# Patient Record
Sex: MALE | Race: WHITE | Employment: OTHER | URBAN - METROPOLITAN AREA
[De-identification: names, ages, dates, MRNs, and addresses within clinical notes are randomized per-mention and may not be internally consistent; named-entity substitution may affect disease eponyms.]

---

## 2022-04-26 RX ORDER — LEVETIRACETAM 500 MG/1
500 TABLET ORAL NIGHTLY
Status: ON HOLD | COMMUNITY
End: 2022-05-25 | Stop reason: HOSPADM

## 2022-04-26 RX ORDER — VIT B COMP NO.3/FOLIC/C/BIOTIN 1 MG-60 MG
1 TABLET ORAL
COMMUNITY

## 2022-04-26 RX ORDER — ROSUVASTATIN CALCIUM 5 MG/1
5 TABLET, COATED ORAL DAILY
COMMUNITY

## 2022-04-26 RX ORDER — INSULIN GLARGINE 100 [IU]/ML
40 INJECTION, SOLUTION SUBCUTANEOUS NIGHTLY
Status: ON HOLD | COMMUNITY
End: 2022-05-25 | Stop reason: HOSPADM

## 2022-04-26 RX ORDER — ACETAMINOPHEN 325 MG/1
650 TABLET ORAL EVERY 6 HOURS PRN
COMMUNITY

## 2022-04-26 RX ORDER — ONDANSETRON 4 MG/1
4 TABLET, ORALLY DISINTEGRATING ORAL EVERY 8 HOURS PRN
Status: ON HOLD | COMMUNITY
End: 2022-05-25 | Stop reason: HOSPADM

## 2022-04-26 RX ORDER — HYDROMORPHONE HYDROCHLORIDE 2 MG/1
2 TABLET ORAL EVERY 8 HOURS PRN
Status: ON HOLD | COMMUNITY
End: 2022-05-25 | Stop reason: HOSPADM

## 2022-04-26 RX ORDER — ASPIRIN 81 MG/1
81 TABLET ORAL DAILY
Status: ON HOLD | COMMUNITY
End: 2022-05-25 | Stop reason: HOSPADM

## 2022-04-26 RX ORDER — POLYETHYLENE GLYCOL 3350 17 G/17G
17 POWDER, FOR SOLUTION ORAL DAILY
Status: ON HOLD | COMMUNITY
End: 2022-05-25 | Stop reason: HOSPADM

## 2022-04-26 RX ORDER — CHLORHEXIDINE GLUCONATE 0.12 MG/ML
15 RINSE ORAL 2 TIMES DAILY
COMMUNITY

## 2022-04-26 RX ORDER — DOCUSATE SODIUM 100 MG/1
100 CAPSULE, LIQUID FILLED ORAL 2 TIMES DAILY
COMMUNITY

## 2022-04-26 RX ORDER — HYDRALAZINE HYDROCHLORIDE 25 MG/1
100 TABLET, FILM COATED ORAL 3 TIMES DAILY
Status: ON HOLD | COMMUNITY
End: 2022-05-25 | Stop reason: HOSPADM

## 2022-04-26 RX ORDER — MEMANTINE HYDROCHLORIDE 5 MG/1
5 TABLET ORAL 2 TIMES DAILY
Status: ON HOLD | COMMUNITY
End: 2022-05-25 | Stop reason: HOSPADM

## 2022-04-26 RX ORDER — OMEPRAZOLE 20 MG/1
40 CAPSULE, DELAYED RELEASE ORAL 2 TIMES DAILY
Status: ON HOLD | COMMUNITY
End: 2022-05-25 | Stop reason: HOSPADM

## 2022-04-26 RX ORDER — DILTIAZEM HYDROCHLORIDE 60 MG/1
60 TABLET, FILM COATED ORAL 3 TIMES DAILY
Status: ON HOLD | COMMUNITY
End: 2022-05-25 | Stop reason: HOSPADM

## 2022-04-26 RX ORDER — CARVEDILOL 3.12 MG/1
3.12 TABLET ORAL 2 TIMES DAILY WITH MEALS
Status: ON HOLD | COMMUNITY
End: 2022-05-25 | Stop reason: HOSPADM

## 2022-04-26 RX ORDER — ONDANSETRON 2 MG/ML
4 INJECTION INTRAMUSCULAR; INTRAVENOUS EVERY 8 HOURS PRN
Status: ON HOLD | COMMUNITY
End: 2022-05-25 | Stop reason: HOSPADM

## 2022-04-26 RX ORDER — CLONAZEPAM 0.5 MG/1
0.5 TABLET ORAL 2 TIMES DAILY PRN
COMMUNITY

## 2022-04-26 RX ORDER — DRONABINOL 2.5 MG/1
2.5 CAPSULE ORAL
Status: ON HOLD | COMMUNITY
End: 2022-05-25 | Stop reason: HOSPADM

## 2022-04-26 RX ORDER — ARIPIPRAZOLE 5 MG/1
5 TABLET ORAL DAILY
Status: ON HOLD | COMMUNITY
End: 2022-05-25 | Stop reason: HOSPADM

## 2022-04-26 RX ORDER — DEXTROSE MONOHYDRATE 25 G/50ML
25 INJECTION, SOLUTION INTRAVENOUS PRN
Status: ON HOLD | COMMUNITY
End: 2022-05-25 | Stop reason: HOSPADM

## 2022-04-26 RX ORDER — NICOTINE POLACRILEX 4 MG
15 LOZENGE BUCCAL SEE ADMIN INSTRUCTIONS
Status: ON HOLD | COMMUNITY
End: 2022-05-25 | Stop reason: HOSPADM

## 2022-04-26 RX ORDER — LEVETIRACETAM 250 MG/1
250 TABLET ORAL DAILY
Status: ON HOLD | COMMUNITY
End: 2022-05-25 | Stop reason: HOSPADM

## 2022-04-26 RX ORDER — CLONIDINE HYDROCHLORIDE 0.1 MG/1
0.1 TABLET ORAL 2 TIMES DAILY
Status: ON HOLD | COMMUNITY
End: 2022-05-25 | Stop reason: HOSPADM

## 2022-04-26 RX ORDER — OXYCODONE HCL 10 MG/1
10 TABLET, FILM COATED, EXTENDED RELEASE ORAL EVERY 12 HOURS
Status: ON HOLD | COMMUNITY
End: 2022-05-25 | Stop reason: HOSPADM

## 2022-04-26 RX ORDER — LEVOTHYROXINE SODIUM 175 UG/1
175 TABLET ORAL DAILY
COMMUNITY

## 2022-04-26 RX ORDER — LEVETIRACETAM 500 MG/1
500 TABLET ORAL
Status: ON HOLD | COMMUNITY
End: 2022-05-25 | Stop reason: HOSPADM

## 2022-04-26 RX ORDER — ANTICOAGULANT SODIUM CITRATE SOLUTION 4 G/100ML
SOLUTION INTRAVENOUS CONTINUOUS
Status: ON HOLD | COMMUNITY
End: 2022-05-25 | Stop reason: HOSPADM

## 2022-04-26 NOTE — PROGRESS NOTES
Patient is currently at [facility].  PAT telephone assessment completed with USHA Ag at [facility] and from the medical record. He is alert to self with periods of confusion. He is not ambulatory. He is a full code. His wife Luis Arreguin is next of kin ([phone]). Per Wali Collins she will be available by telephone for consent on day of procedure. This RN left a voicemail for her to confirm. Patient has a small, nearly healed wound on the dorsum of his right foot covered with a dry sterile dressing. He has a sacral decubitus with a wound vac. The wound vac will be clamped and the machine will not be coming with the patient on the day of procedure. The patient is not in isolation. He is on 2L oxygen prn via nasal cannula. He has a PICC line in his left arm. He has a peripheral IV in his left arm also. He is a Monday, Wednesday, Friday dialysis patient with a right chest tesio catheter. He is unable to use a call light. Transport will be by [facility] ambulance as arranged by Wali Collins. Instructions faxed to [facility].

## 2022-04-26 NOTE — PROGRESS NOTES
Have you been tested for COVID  Yes           Have you been told you were positive for COVID No  Have you had any known exposure to someone that is positive for COVID No  Do you have a cough                   No              Do you have shortness of breath No                 Do you have a sore throat            No                Are you having chills                    No                Are you having muscle aches. No                    Please come to the hospital wearing a mask and have your significant other wear a mask as well. Both of you should check your temperature before leaving to come here,  if it is 100 or higher please call [phone] for instruction. [facility] PRE-ADMISSION TESTING INSTRUCTIONS    The Preadmission Testing patient is instructed accordingly using the following criteria (check applicable):        GENERAL INSTRUCTIONS:    [x] Nothing by mouth after midnight, including gum, candy, mints or water    [x] You may brush your teeth, but do not swallow any water    [x] Take medications as instructed with 1-2 oz of water    [x] Stop herbal supplements and vitamins 5 days prior to procedure    [x] Follow preop dosing of blood thinners per physician instructions    [x] Take 1/2 dose of evening insulin, but no insulin after midnight    [x] No oral diabetic medications after midnight    [x] If diabetic and have low blood sugar or feel symptomatic, take 1-2oz apple juice only    [] Bring inhalers day of surgery    [] Bring C-PAP/ Bi-Pap day of surgery    [] Bring urine specimen day of surgery    [x] Shower or bath with soap, lather and rinse well, AM of Surgery, no lotion, powders or creams to surgical site    [] Follow bowel prep as instructed per surgeon    [x] No tobacco products within 24 hours of surgery     [x] No alcohol or illegal drug use within 24 hours of surgery.     [x] Jewelry, body piercing's, eyeglasses, contact lenses and dentures are not permitted into surgery (bring cases)      [x] Please do not wear any nail polish, make up or hair products on the day of surgery    [x] You can expect a call the business day prior to procedure to notify you if your arrival time changes    [] Parent/guardian of a minor must accompany their child and remain on the premises  the entire time they are under our care     [] Pediatric patients may bring favorite toy, blanket or comfort item with them    [x] A caregiver or family member must remain with the patient during their stay if they are mentally handicapped, have dementia, disoriented or unable to use a call light or would be a safety concern if left unattended    [x] Please notify surgeon if you develop any illness between now and time of surgery (cold, cough, sore throat, fever, nausea, vomiting) or any signs of infections  including skin, wounds, and dental.    [] Other instructions

## 2022-04-27 ENCOUNTER — ANESTHESIA (OUTPATIENT)
Dept: ENDOSCOPY | Age: 67
End: 2022-04-27
Payer: COMMERCIAL

## 2022-04-27 ENCOUNTER — HOSPITAL ENCOUNTER (OUTPATIENT)
Age: 67
Setting detail: OUTPATIENT SURGERY
Discharge: NURSING, REHAB OR LONG-TERM CARE | End: 2022-04-27
Attending: SURGERY | Admitting: SURGERY
Payer: COMMERCIAL

## 2022-04-27 ENCOUNTER — ANESTHESIA EVENT (OUTPATIENT)
Dept: ENDOSCOPY | Age: 67
End: 2022-04-27
Payer: COMMERCIAL

## 2022-04-27 ENCOUNTER — APPOINTMENT (OUTPATIENT)
Dept: GENERAL RADIOLOGY | Age: 67
End: 2022-04-27
Attending: SURGERY
Payer: COMMERCIAL

## 2022-04-27 VITALS
RESPIRATION RATE: 18 BRPM | WEIGHT: 212 LBS | OXYGEN SATURATION: 97 % | DIASTOLIC BLOOD PRESSURE: 69 MMHG | TEMPERATURE: 96.8 F | HEART RATE: 83 BPM | SYSTOLIC BLOOD PRESSURE: 131 MMHG | HEIGHT: 77 IN | BODY MASS INDEX: 25.03 KG/M2

## 2022-04-27 VITALS
SYSTOLIC BLOOD PRESSURE: 99 MMHG | DIASTOLIC BLOOD PRESSURE: 54 MMHG | OXYGEN SATURATION: 87 % | RESPIRATION RATE: 35 BRPM

## 2022-04-27 LAB — METER GLUCOSE: 181 MG/DL (ref 74–99)

## 2022-04-27 PROCEDURE — 2500000003 HC RX 250 WO HCPCS: Performed by: NURSE ANESTHETIST, CERTIFIED REGISTERED

## 2022-04-27 PROCEDURE — 71045 X-RAY EXAM CHEST 1 VIEW: CPT

## 2022-04-27 PROCEDURE — 7100000011 HC PHASE II RECOVERY - ADDTL 15 MIN: Performed by: SURGERY

## 2022-04-27 PROCEDURE — 2580000003 HC RX 258: Performed by: SURGERY

## 2022-04-27 PROCEDURE — 88305 TISSUE EXAM BY PATHOLOGIST: CPT

## 2022-04-27 PROCEDURE — 88305 TISSUE EXAM BY PATHOLOGIST: CPT | Performed by: ANESTHESIOLOGY

## 2022-04-27 PROCEDURE — 3700000000 HC ANESTHESIA ATTENDED CARE: Performed by: SURGERY

## 2022-04-27 PROCEDURE — 2709999900 HC NON-CHARGEABLE SUPPLY: Performed by: SURGERY

## 2022-04-27 PROCEDURE — 3700000001 HC ADD 15 MINUTES (ANESTHESIA): Performed by: SURGERY

## 2022-04-27 PROCEDURE — 6360000002 HC RX W HCPCS: Performed by: NURSE ANESTHETIST, CERTIFIED REGISTERED

## 2022-04-27 PROCEDURE — 7100000010 HC PHASE II RECOVERY - FIRST 15 MIN: Performed by: SURGERY

## 2022-04-27 PROCEDURE — 3609012400 HC EGD TRANSORAL BIOPSY SINGLE/MULTIPLE: Performed by: SURGERY

## 2022-04-27 PROCEDURE — 82962 GLUCOSE BLOOD TEST: CPT

## 2022-04-27 RX ORDER — SODIUM CHLORIDE 9 MG/ML
INJECTION, SOLUTION INTRAVENOUS CONTINUOUS
Status: DISCONTINUED | OUTPATIENT
Start: 2022-04-27 | End: 2022-04-27 | Stop reason: HOSPADM

## 2022-04-27 RX ORDER — PROPOFOL 10 MG/ML
INJECTION, EMULSION INTRAVENOUS PRN
Status: DISCONTINUED | OUTPATIENT
Start: 2022-04-27 | End: 2022-04-27 | Stop reason: SDUPTHER

## 2022-04-27 RX ORDER — LIDOCAINE HYDROCHLORIDE 20 MG/ML
INJECTION, SOLUTION INFILTRATION; PERINEURAL PRN
Status: DISCONTINUED | OUTPATIENT
Start: 2022-04-27 | End: 2022-04-27 | Stop reason: SDUPTHER

## 2022-04-27 RX ORDER — SODIUM CHLORIDE 0.9 % (FLUSH) 0.9 %
5-40 SYRINGE (ML) INJECTION EVERY 12 HOURS SCHEDULED
Status: DISCONTINUED | OUTPATIENT
Start: 2022-04-27 | End: 2022-04-27 | Stop reason: HOSPADM

## 2022-04-27 RX ORDER — CEFAZOLIN SODIUM 1 G/3ML
INJECTION, POWDER, FOR SOLUTION INTRAMUSCULAR; INTRAVENOUS PRN
Status: DISCONTINUED | OUTPATIENT
Start: 2022-04-27 | End: 2022-04-27 | Stop reason: SDUPTHER

## 2022-04-27 RX ORDER — SODIUM CHLORIDE 0.9 % (FLUSH) 0.9 %
5-40 SYRINGE (ML) INJECTION PRN
Status: DISCONTINUED | OUTPATIENT
Start: 2022-04-27 | End: 2022-04-27 | Stop reason: HOSPADM

## 2022-04-27 RX ORDER — SODIUM CHLORIDE 9 MG/ML
25 INJECTION, SOLUTION INTRAVENOUS PRN
Status: DISCONTINUED | OUTPATIENT
Start: 2022-04-27 | End: 2022-04-27 | Stop reason: HOSPADM

## 2022-04-27 RX ADMIN — SODIUM CHLORIDE: 9 INJECTION, SOLUTION INTRAVENOUS at 12:12

## 2022-04-27 RX ADMIN — LIDOCAINE HYDROCHLORIDE 60 MG: 20 INJECTION, SOLUTION EPIDURAL; INFILTRATION; INTRACAUDAL; PERINEURAL at 12:14

## 2022-04-27 RX ADMIN — CEFAZOLIN 2000 MG: 1 INJECTION, POWDER, FOR SOLUTION INTRAMUSCULAR; INTRAVENOUS at 12:19

## 2022-04-27 RX ADMIN — PROPOFOL 210 MG: 10 INJECTION, EMULSION INTRAVENOUS at 12:14

## 2022-04-27 ASSESSMENT — PAIN SCALES - WONG BAKER
WONGBAKER_NUMERICALRESPONSE: 0

## 2022-04-27 ASSESSMENT — COPD QUESTIONNAIRES: CAT_SEVERITY: MODERATE

## 2022-04-27 ASSESSMENT — LIFESTYLE VARIABLES: SMOKING_STATUS: 0

## 2022-04-27 ASSESSMENT — PAIN - FUNCTIONAL ASSESSMENT: PAIN_FUNCTIONAL_ASSESSMENT: NONE - DENIES PAIN

## 2022-04-27 NOTE — ANESTHESIA PRE PROCEDURE
Department of Anesthesiology  Preprocedure Note       Name:  Cadence Perry   Age:  74 y.o.  :  1955                                          MRN:  [id number]         Date:  2022      Surgeon: Floyd Jara):  Mora Maurice MD    Procedure: Procedure(s):  EGD PEG TUBE PLACEMENT    Medications prior to admission:   Prior to Admission medications    Medication Sig Start Date End Date Taking? Authorizing Provider   levETIRAcetam (KEPPRA) 250 MG tablet Take 250 mg by mouth daily   Yes Historical Provider, MD   levETIRAcetam (KEPPRA) 500 MG tablet Take 500 mg by mouth nightly   Yes Historical Provider, MD   apixaban (ELIQUIS) 5 MG TABS tablet Take 5 mg by mouth 2 times daily   Yes Historical Provider, MD   chlorhexidine (PERIDEX) 0.12 % solution Take 15 mLs by mouth 2 times daily   Yes Historical Provider, MD   nystatin (MYCOSTATIN) 657296 UNIT/ML suspension Take 500,000 Units by mouth 3 times daily   Yes Historical Provider, MD   oxyCODONE (OXYCONTIN) 10 MG extended release tablet Take 10 mg by mouth every 12 hours. Yes Historical Provider, MD   NYSTATIN IN AQUAPHOR OINTMENT Apply topically 2 times daily   Yes Historical Provider, MD   clonazePAM (KLONOPIN) 0.5 MG tablet Take 0.5 mg by mouth 2 times daily as needed. Yes Historical Provider, MD   insulin glargine (LANTUS) 100 UNIT/ML injection vial Inject 26 Units into the skin daily   Yes Historical Provider, MD   dronabinol (MARINOL) 2.5 MG capsule Take 2.5 mg by mouth 2 times daily (before meals). Yes Historical Provider, MD   alteplase (CATHFLO) 2 MG injection 2 mg by IntraCATHeter route as needed Per cathflo protocol at [facility]   Yes Historical Provider, MD   cloNIDine (CATAPRES) 0.1 MG tablet Take 0.1 mg by mouth 2 times daily   Yes Historical Provider, MD   HYDROmorphone (DILAUDID) 2 MG tablet Take 2 mg by mouth every 8 hours as needed for Pain.    Yes Historical Provider, MD   anticoagulant sodium citrate 4 GM/100ML SOLN by CRRT route continuous Yes Historical Provider, MD   carvedilol (COREG) 3.125 MG tablet Take 3.125 mg by mouth 2 times daily (with meals)   Yes Historical Provider, MD   hydrALAZINE (APRESOLINE) 25 MG tablet Take 25 mg by mouth 3 times daily   Yes Historical Provider, MD   docusate sodium (COLACE) 100 MG capsule Take 100 mg by mouth 2 times daily   Yes Historical Provider, MD   levETIRAcetam (KEPPRA) 500 MG tablet Take 500 mg by mouth three times a week Monday, Wednesday and Friday after dialysis   Yes Historical Provider, MD   aspirin 81 MG EC tablet Take 81 mg by mouth daily   Yes Historical Provider, MD   memantine (NAMENDA) 5 MG tablet Take 5 mg by mouth 2 times daily   Yes Historical Provider, MD   glucagon, rDNA, 1 MG injection as needed for Low blood sugar   Yes Historical Provider, MD   dextrose 50 % solution Infuse 25 g intravenously as needed   Yes Historical Provider, MD   glucose (GLUTOSE) 40 % GEL Take 15 g by mouth See Admin Instructions   Yes Historical Provider, MD   mineral oil-hydrophilic petrolatum (AQUAPHOR) ointment Apply topically as needed for Dry Skin Apply topically as needed.    Yes Historical Provider, MD   ondansetron (ZOFRAN-ODT) 4 MG disintegrating tablet Take 4 mg by mouth every 8 hours as needed for Nausea or Vomiting   Yes Historical Provider, MD   darbepoetin kandi-polysorbate (ARANESP) 60 MCG/0.3ML SOSY injection Inject 60 mcg into the skin every 14 days   Yes Historical Provider, MD   levothyroxine (SYNTHROID) 175 MCG tablet Take 175 mcg by mouth Daily   Yes Historical Provider, MD   rosuvastatin (CRESTOR) 5 MG tablet Take 5 mg by mouth daily   Yes Historical Provider, MD   ondansetron (ZOFRAN) 4 MG/2ML injection Infuse 4 mg intravenously every 8 hours as needed for Nausea or Vomiting   Yes Historical Provider, MD   omeprazole (PRILOSEC) 20 MG delayed release capsule Take 20 mg by mouth daily   Yes Historical Provider, MD   VORTIoxetine HBr (TRINTELLIX) 20 MG TABS tablet Take 10 mg by mouth daily   Yes Historical Provider, MD   polyethylene glycol (MIRALAX) 17 g PACK packet Take 17 g by mouth daily   Yes Historical Provider, MD   B complex-vitamin C-folic acid (NEPHRO-JESSY) 1 MG tablet Take 1 tablet by mouth daily (with breakfast)   Yes Historical Provider, MD   lactulose (CEPHULAC) 20 g packet Take 20 g by mouth 2 times daily   Yes Historical Provider, MD   insulin lispro (HUMALOG) 100 UNIT/ML injection vial Inject into the skin 4 times daily (before meals and nightly) Sliding scale  150-199 1 unit  200-249 2 units  250-299 3 units  300-349 4 units  Above 350 call physician   Yes Historical Provider, MD   dilTIAZem (CARDIZEM) 60 MG tablet Take 60 mg by mouth 4 times daily   Yes Historical Provider, MD   ARIPiprazole (ABILIFY) 5 MG tablet Take 5 mg by mouth daily   Yes Historical Provider, MD   acetaminophen (TYLENOL) 325 MG tablet Take 650 mg by mouth every 6 hours as needed for Pain   Yes Historical Provider, MD       Current medications:    No current facility-administered medications for this encounter. Current Outpatient Medications   Medication Sig Dispense Refill    levETIRAcetam (KEPPRA) 250 MG tablet Take 250 mg by mouth daily      levETIRAcetam (KEPPRA) 500 MG tablet Take 500 mg by mouth nightly      apixaban (ELIQUIS) 5 MG TABS tablet Take 5 mg by mouth 2 times daily      chlorhexidine (PERIDEX) 0.12 % solution Take 15 mLs by mouth 2 times daily      nystatin (MYCOSTATIN) 613526 UNIT/ML suspension Take 500,000 Units by mouth 3 times daily      oxyCODONE (OXYCONTIN) 10 MG extended release tablet Take 10 mg by mouth every 12 hours.  NYSTATIN IN AQUAPHOR OINTMENT Apply topically 2 times daily      clonazePAM (KLONOPIN) 0.5 MG tablet Take 0.5 mg by mouth 2 times daily as needed.  insulin glargine (LANTUS) 100 UNIT/ML injection vial Inject 26 Units into the skin daily      dronabinol (MARINOL) 2.5 MG capsule Take 2.5 mg by mouth 2 times daily (before meals).       alteplase (CATHFLO) 2 MG injection 2 mg by IntraCATHeter route as needed Per cathflo protocol at [facility]      cloNIDine (CATAPRES) 0.1 MG tablet Take 0.1 mg by mouth 2 times daily      HYDROmorphone (DILAUDID) 2 MG tablet Take 2 mg by mouth every 8 hours as needed for Pain.  anticoagulant sodium citrate 4 GM/100ML SOLN by CRRT route continuous      carvedilol (COREG) 3.125 MG tablet Take 3.125 mg by mouth 2 times daily (with meals)      hydrALAZINE (APRESOLINE) 25 MG tablet Take 25 mg by mouth 3 times daily      docusate sodium (COLACE) 100 MG capsule Take 100 mg by mouth 2 times daily      levETIRAcetam (KEPPRA) 500 MG tablet Take 500 mg by mouth three times a week Monday, Wednesday and Friday after dialysis      aspirin 81 MG EC tablet Take 81 mg by mouth daily      memantine (NAMENDA) 5 MG tablet Take 5 mg by mouth 2 times daily      glucagon, rDNA, 1 MG injection as needed for Low blood sugar      dextrose 50 % solution Infuse 25 g intravenously as needed      glucose (GLUTOSE) 40 % GEL Take 15 g by mouth See Admin Instructions      mineral oil-hydrophilic petrolatum (AQUAPHOR) ointment Apply topically as needed for Dry Skin Apply topically as needed.       ondansetron (ZOFRAN-ODT) 4 MG disintegrating tablet Take 4 mg by mouth every 8 hours as needed for Nausea or Vomiting      darbepoetin kandi-polysorbate (ARANESP) 60 MCG/0.3ML SOSY injection Inject 60 mcg into the skin every 14 days      levothyroxine (SYNTHROID) 175 MCG tablet Take 175 mcg by mouth Daily      rosuvastatin (CRESTOR) 5 MG tablet Take 5 mg by mouth daily      ondansetron (ZOFRAN) 4 MG/2ML injection Infuse 4 mg intravenously every 8 hours as needed for Nausea or Vomiting      omeprazole (PRILOSEC) 20 MG delayed release capsule Take 20 mg by mouth daily      VORTIoxetine HBr (TRINTELLIX) 20 MG TABS tablet Take 10 mg by mouth daily      polyethylene glycol (MIRALAX) 17 g PACK packet Take 17 g by mouth daily      B complex-vitamin C-folic acid (NEPHRO-JESSY) 1 MG tablet Take 1 tablet by mouth daily (with breakfast)      lactulose (CEPHULAC) 20 g packet Take 20 g by mouth 2 times daily      insulin lispro (HUMALOG) 100 UNIT/ML injection vial Inject into the skin 4 times daily (before meals and nightly) Sliding scale  150-199 1 unit  200-249 2 units  250-299 3 units  300-349 4 units  Above 350 call physician      dilTIAZem (CARDIZEM) 60 MG tablet Take 60 mg by mouth 4 times daily      ARIPiprazole (ABILIFY) 5 MG tablet Take 5 mg by mouth daily      acetaminophen (TYLENOL) 325 MG tablet Take 650 mg by mouth every 6 hours as needed for Pain         Allergies: Allergies   Allergen Reactions    Other      \"narcotics\" reaction unknown       Problem List:  There is no problem list on file for this patient.       Past Medical History:        Diagnosis Date    [facility]fib ([facility]     TRUMAN (acute kidney injury) ([facility]     Anemia     Anxiety     Bipolar 1 disorder ([facility])     Charcot foot due to diabetes mellitus ([facility]     CHF [facility]congestive heart failure) ([facility])     CKD (chronic kidney disease)     Diabetes mellitus ([facility]     Dialysis patient ([facility]     Hyperlipidemia     Hypertension     Right sided weakness     Sacral decubitus ulcer     Seizure [facility])        Past Surgical History:        Procedure Laterality Date    AMPUTATION Right 2017    right toe    DIALYSIS CATHETER INSERTION  2020    right chest    HYDROCELE EXCISION  2010    JOINT REPLACEMENT Right 2011    TONSILLECTOMY      VEIN SURGERY  2010    venous ablation       Social History:    Social History     Tobacco Use    Smoking status: Former Smoker    Smokeless tobacco: Never Used   Substance Use Topics    Alcohol use: Not Currently                                Counseling given: Not Answered      Vital Signs (Current):   Vitals:    04/26/22 1019 04/26/22 1043   Weight:  212 lb (96.2 kg)   Height: 6' 5\" (1.956 m)                                               BP Readings from Last 3 Encounters:   No data found for BP       NPO Status:                                                                                 BMI:   Wt Readings from Last 3 Encounters:   04/26/22 212 lb (96.2 kg)     Body mass index is 25.14 kg/m². CBC: No results found for: WBC, RBC, HGB, HCT, MCV, RDW, PLT    CMP: No results found for: NA, K, CL, CO2, BUN, CREATININE, GFRAA, AGRATIO, LABGLOM, GLUCOSE, GLU, PROT, CALCIUM, BILITOT, ALKPHOS, AST, ALT    POC Tests: No results for input(s): POCGLU, POCNA, POCK, POCCL, POCBUN, POCHEMO, POCHCT in the last 72 hours. Coags: No results found for: PROTIME, INR, APTT    HCG (If Applicable): No results found for: PREGTESTUR, PREGSERUM, HCG, HCGQUANT     ABGs: No results found for: PHART, PO2ART, BTP2WBB, QFH9QHP, BEART, C2XNLKLZ     Type & Screen (If Applicable):  No results found for: LABABO, LABRH    Drug/Infectious Status (If Applicable):  No results found for: HIV, HEPCAB    COVID-19 Screening (If Applicable): No results found for: COVID19        Anesthesia Evaluation  Patient summary reviewed and Nursing notes reviewed no history of anesthetic complications:   Airway: Mallampati: III  TM distance: >3 FB   Neck ROM: limited  Mouth opening: > = 3 FB Dental:          Pulmonary:   (+) COPD (2L oxygen NC): moderate,  decreased breath sounds,      (-) not a current smoker                           Cardiovascular:  Exercise tolerance: poor (<4 METS),   (+) hypertension: moderate, dysrhythmias: atrial fibrillation, CHF: no interval change, murmur, hyperlipidemia        Rhythm: regular  Rate: normal           Beta Blocker:  Dose within 24 Hrs         Neuro/Psych:   (+) seizures: no interval change, CVA (Right sided weakness): residual symptoms, neuromuscular disease:, psychiatric history:depression/anxiety              ROS comment: Not currently oriented to person, place, or time.  GI/Hepatic/Renal:   (+) GERD: no interval change, renal disease: ESRD and dialysis,          ROS comment: Dysphagia. Endo/Other:    (+) DiabetesType II DM, using insulin, hypothyroidism, blood dyscrasia (Eliquis): anticoagulation therapy, arthritis (Chronic pain): OA., . Pt had no PAT visit       Abdominal:         (-) obese       Vascular:   + PVD, aortic or cerebral, . ROS comment: Charcot foot  Sacral decubitus. Other Findings: Contractures of the RUE and thenar wasting left hand          Anesthesia Plan      MAC     ASA 4     (He has a PICC line in his left arm. He has a peripheral IV in his left arm also. He is a Monday, Wednesday, Friday dialysis patient with a right chest tesio catheter. Patient at high risk for a low risk procedure  No labs or cardiac available in the eRecord  Consent obtained and plan discussed with POA/wife Justyna Van at [phone] 4/27/22 @ 1030)  Induction: intravenous. Anesthetic plan and risks discussed with patient. Plan discussed with CRNA.                 Aicha Duncan DO   4/27/2022    Note reviewed and agree with plan LILY Kessler - CRNA

## 2022-04-27 NOTE — H&P
[address] [facility]                              HISTORY AND PHYSICAL    PATIENT NAME: Naomi Sierra                    :        1955  MED REC NO:   [id number]                            ROOM:  ACCOUNT NO:   [de-identified]                           ADMIT DATE: 2022  PROVIDER:     Yissel Donovan MD    DATE OF PROCEDURE:  2022    CHIEF COMPLAINT:  \"I can't swallow. \"    HISTORY OF PRESENT ILLNESS:  The patient is a 26-year-old male, a  patient of [facility], who has significant past medical history who has been  in [facility] for quite some time. We were originally consulted for sacral  decubitus which had to be debrided, but now we are consulted for failure  to thrive, inability to take p.o., and pharyngeal dysfunction. The  patient is known to have anxiety, anemia, and CHF as well as diabetes  and chronic kidney disease. The patient reports he has some difficulty  swallowing. There has been no history per se of aspiration, but he has  failure to thrive and has not had good nutrition for several weeks now. He has lost weight. Medical consultation was placed for placement of a  percutaneous endoscopic gastrostomy tube. The patient was evaluated,  and a recommendation was made, and the family agreed; after the risks,  benefits and options were reviewed with the patient's family who agreed  to proceed. Medical records were reviewed at the time of the  consultation because he was somewhat confused. He is a full code. His  wife, Sherly Joseph, this was discussed with her. Medications reviewed. ALLERGIES:  No known drug allergies. REVIEW OF SYSTEMS:  Include no nausea or vomiting. Positive weight  loss. Positive poor nutrition. No shortness of breath, chest pain. No  dizziness, lightheadedness. No evidence of bleeding. Positive sacral  decubitus.   No evidence of rashes or lesions other than that discussed  above. Psych, normal affect. Neurologic, no change in neuro status. PAST MEDICAL HISTORY:  Significant for diabetes mellitus, CHF, chronic  renal disease, hyperlipidemia, hypertension, sacral decubitus, history  of seizure, Charcot foot, bipolar disorder, anxiety, anemia, TRUMAN and  atrial fibrillation. PAST SURGICAL HISTORY:  Reviewed. PHYSICAL EXAMINATION:  VITAL SIGNS:  Stable, afebrile. GENERAL APPEARANCE:  Awake, alert. HEENT:  Head:  Normocephalic, atraumatic. LUNGS:  Clear to auscultation. COR:  Regular rate and rhythm. Atrial fibrillation noted. ABDOMEN:  Soft, mildly distended. No rebound or guarding. No masses or  organomegaly. EXTREMITIES:  Pulses in all extremities without focal deficits. NEUROLOGIC:  Weakness in the right upper extremity. IMPRESSION:  Failure to thrive, pharyngeal dysfunction, inability to  eat, multiple medical problems as above, deconditioning. PLAN:  Percutaneous endoscopic gastrostomy tube placement. I had reviewed the risks, benefits and options with the patient's family  who agrees to proceed. Questions were answered, and informed consent  was obtained.         Libertad Giraldo MD    D: 04/27/2022 7:32:37       T: 04/27/2022 7:37:11     [email or web address]  Job#: [id number]     Doc#: [id number]    CC:

## 2022-04-27 NOTE — ANESTHESIA POSTPROCEDURE EVALUATION
Department of Anesthesiology  Postprocedure Note    Patient: Willy Martínez  MRN: [id number]  YOB: 1955  Date of evaluation: 4/27/2022  Time:  12:34 PM     Procedure Summary     Date: 04/27/22 Room / Location: [facility] / [facility]    Anesthesia Start: 3948 Anesthesia Stop: 1233    Procedures:       EGD PEG TUBE PLACEMENT (N/A )      EGD BIOPSY Diagnosis: (INABILITY TO FEED)    Surgeons: Melanie Alvarez MD Responsible Provider: Sofiya Segura DO    Anesthesia Type: MAC ASA Status: 4          Anesthesia Type: MAC    Sally Phase I:      Sally Phase II:      Last vitals: Reviewed and per EMR flowsheets. Anesthesia Post Evaluation    Patient location during evaluation: bedside (endo rn)  Patient participation: complete - patient cannot participate  Level of consciousness: lethargic  Airway patency: patent  Nausea & Vomiting: no nausea and no vomiting  Complications: no  Cardiovascular status: blood pressure returned to baseline  Respiratory status: acceptable  Hydration status: euvolemic  Comments: Seen and examined. Back to baseline.

## 2022-04-27 NOTE — BRIEF OP NOTE
DATE OF PROCEDURE: 4/27/2022    SURGEON: MD RAMA Fuchs: Pharyngeal dysfunction, inability to take PO    POSTOPERATIVE DIAGNOSES: same, severe gastritis/duodenitis     OPERATION: EGD with biopsy of duodendum and stomach                          Placement of PEG  ANESTHESIA: LMAC  EBL: 5 cc  Drains: none    COMPLICATIONS: None. Findings:  Severe duodenitis, gastritis; Biopsies and photographs obtained. A new 20 Fr PEG placeed to 3 cm from skin surface. Multiple photos and biopsies taken.       Ander Grant MD 4/27/2022 12:30 PM

## 2022-04-28 NOTE — OP NOTE
[address] [address]                                OPERATIVE REPORT    PATIENT NAME: Raissa Davenport                    :        1955  MED REC NO:   [id number]                            ROOM:  ACCOUNT NO:   [de-identified]                           ADMIT DATE: 2022  PROVIDER:     Tara Tafoya MD    DATE OF PROCEDURE:  2022    PREOPERATIVE DIAGNOSIS:  Pharyngeal dysfunction, inability to eat. POSTOPERATIVE DIAGNOSIS:  Severe gastritis and duodenitis, deferred to  Pathology. PROCEDURE PERFORMED:  Esophagogastroduodenoscopy with multiple biopsies  of the duodenum and stomach and photography, percutaneous endoscopic  gastrostomy tube placement. SURGEON:  Tara Tafoya MD    ANESTHESIA:  LMAC. COMPLICATIONS:  None. EBL:  5 mL. DRAINS:  None. SPECIMENS:  Gastric biopsies and duodenal biopsies. FINDINGS:  Severe duodenitis and gastritis of which biopsies were  performed and photographs were taken. A new 20-Kyrgyz PEG tube was  secured 3 cm from the skin surface without complication. A second scope  was performed demonstrating no evidence of bleeding and a PEG tube in  proper position. INDICATION:  Pharyngeal dysfunction, inability to take p.o. DESCRIPTION OF PROCEDURE:  The patient was identified. Informed consent  was obtained as the risks and benefits of the procedure were reviewed  with the patient and family who agreed to proceed. The patient was  brought into the endoscopy suite. An IV was established and the above  anesthesia was administered as well as Cetacaine spray to the  oropharynx. The adult video esophagoscope was inserted into the proximal esophagus  without complications. The scope then traversed into the distal  esophagus and down into the stomach, which was insufflated with air. The antrum was photographed and visualized.   The pylorus was visualized. After identification, it was intubated. Visualization of the C-loop of  the duodenum was performed without complications. Multiple photographs  were taken. The scope was then brought back into the stomach. On the anterior wall,  the light was visualized. The area was prepped and draped in a sterile  fashion. A 5-mL wheal of lidocaine was created over the area. A small  nick was made with a 15 blade. A needle was inserted directly into the  stomach without complication. Using a loop through the scope, the  needle was grasped. A guidewire was passed through the needle and  subsequently grasped with the loop snare. The snare was then brought  out through the oropharynx. In the usual fashion the PEG tube was  secured to the guidewire and in pull-through fashion was brought through  the oropharynx, esophagus, stomach, and out the anterior abdominal wall. The PEG tube was in proper position and matured. The scope was  reinserted without complication, and visualization demonstrated no  evidence of bleeding. The patient tolerated the procedure well and was transferred to the  recovery room area in stable condition, with stable vital signs. Biopsies performed as stated above.         Alban Ponce MD    D: 04/27/2022 12:49:04       T: 04/27/2022 13:53:20     [email or web address]  Job#: [id number]     Doc#: [id number]    CC:  John Rios MD       Doc MD Cesar       [facility]

## 2022-05-10 ENCOUNTER — HOSPITAL ENCOUNTER (INPATIENT)
Age: 67
LOS: 14 days | Discharge: NURSING, REHAB OR LONG-TERM CARE | DRG: 003 | End: 2022-05-25
Attending: STUDENT IN AN ORGANIZED HEALTH CARE EDUCATION/TRAINING PROGRAM | Admitting: INTERNAL MEDICINE
Payer: MEDICARE

## 2022-05-10 DIAGNOSIS — R77.8 ELEVATED TROPONIN: ICD-10-CM

## 2022-05-10 DIAGNOSIS — J18.9 PNEUMONIA DUE TO INFECTIOUS ORGANISM, UNSPECIFIED LATERALITY, UNSPECIFIED PART OF LUNG: ICD-10-CM

## 2022-05-10 DIAGNOSIS — I46.9 CARDIAC ARREST (HCC): Primary | ICD-10-CM

## 2022-05-10 DIAGNOSIS — R52 PAIN: ICD-10-CM

## 2022-05-10 DIAGNOSIS — D72.829 LEUKOCYTOSIS, UNSPECIFIED TYPE: ICD-10-CM

## 2022-05-10 DIAGNOSIS — J96.01 ACUTE RESPIRATORY FAILURE WITH HYPOXIA (HCC): ICD-10-CM

## 2022-05-10 PROCEDURE — 36556 INSERT NON-TUNNEL CV CATH: CPT

## 2022-05-10 PROCEDURE — 99285 EMERGENCY DEPT VISIT HI MDM: CPT

## 2022-05-10 PROCEDURE — 5A1955Z RESPIRATORY VENTILATION, GREATER THAN 96 CONSECUTIVE HOURS: ICD-10-PCS | Performed by: INTERNAL MEDICINE

## 2022-05-10 PROCEDURE — 31500 INSERT EMERGENCY AIRWAY: CPT

## 2022-05-10 PROCEDURE — 96375 TX/PRO/DX INJ NEW DRUG ADDON: CPT

## 2022-05-10 PROCEDURE — 0BH18EZ INSERTION OF ENDOTRACHEAL AIRWAY INTO TRACHEA, VIA NATURAL OR ARTIFICIAL OPENING ENDOSCOPIC: ICD-10-PCS | Performed by: INTERNAL MEDICINE

## 2022-05-10 PROCEDURE — 96374 THER/PROPH/DIAG INJ IV PUSH: CPT

## 2022-05-10 PROCEDURE — 02HV33Z INSERTION OF INFUSION DEVICE INTO SUPERIOR VENA CAVA, PERCUTANEOUS APPROACH: ICD-10-PCS | Performed by: INTERNAL MEDICINE

## 2022-05-11 ENCOUNTER — APPOINTMENT (OUTPATIENT)
Dept: CT IMAGING | Age: 67
DRG: 003 | End: 2022-05-11
Payer: MEDICARE

## 2022-05-11 ENCOUNTER — APPOINTMENT (OUTPATIENT)
Dept: GENERAL RADIOLOGY | Age: 67
DRG: 003 | End: 2022-05-11
Payer: MEDICARE

## 2022-05-11 PROBLEM — I46.9 CARDIAC ARREST (HCC): Status: ACTIVE | Noted: 2022-05-11

## 2022-05-11 LAB
ABO/RH: NORMAL
ACANTHOCYTES: ABNORMAL
ADENOVIRUS BY PCR: NOT DETECTED
ALBUMIN SERPL-MCNC: 2.4 G/DL (ref 3.5–5.2)
ALP BLD-CCNC: 591 U/L (ref 40–129)
ALT SERPL-CCNC: 30 U/L (ref 0–40)
AMMONIA: 41.8 UMOL/L (ref 16–60)
ANION GAP SERPL CALCULATED.3IONS-SCNC: 11 MMOL/L (ref 7–16)
ANION GAP SERPL CALCULATED.3IONS-SCNC: 11 MMOL/L (ref 7–16)
ANISOCYTOSIS: ABNORMAL
ANISOCYTOSIS: ABNORMAL
ANTIBODY SCREEN: NORMAL
APTT: 32.8 SEC (ref 24.5–35.1)
AST SERPL-CCNC: 31 U/L (ref 0–39)
B.E.: -0.5 MMOL/L (ref -3–3)
B.E.: -1.6 MMOL/L (ref -3–3)
B.E.: -1.7 MMOL/L (ref -3–3)
BASOPHILIC STIPPLING: ABNORMAL
BASOPHILIC STIPPLING: ABNORMAL
BASOPHILS ABSOLUTE: 0 E9/L (ref 0–0.2)
BASOPHILS ABSOLUTE: 0.03 E9/L (ref 0–0.2)
BASOPHILS RELATIVE PERCENT: 0 % (ref 0–2)
BASOPHILS RELATIVE PERCENT: 0.1 % (ref 0–2)
BILIRUB SERPL-MCNC: 0.3 MG/DL (ref 0–1.2)
BORDETELLA PARAPERTUSSIS BY PCR: NOT DETECTED
BORDETELLA PERTUSSIS BY PCR: NOT DETECTED
BUN BLDV-MCNC: 61 MG/DL (ref 6–23)
BUN BLDV-MCNC: 65 MG/DL (ref 6–23)
BURR CELLS: ABNORMAL
C-REACTIVE PROTEIN: 13.9 MG/DL (ref 0–0.4)
CALCIUM SERPL-MCNC: 10.5 MG/DL (ref 8.6–10.2)
CALCIUM SERPL-MCNC: 11.1 MG/DL (ref 8.6–10.2)
CHLAMYDOPHILIA PNEUMONIAE BY PCR: NOT DETECTED
CHLORIDE BLD-SCNC: 94 MMOL/L (ref 98–107)
CHLORIDE BLD-SCNC: 95 MMOL/L (ref 98–107)
CO2: 26 MMOL/L (ref 22–29)
CO2: 27 MMOL/L (ref 22–29)
COHB: 0.7 % (ref 0–1.5)
COHB: 0.8 % (ref 0–1.5)
COHB: 1.1 % (ref 0–1.5)
CORONAVIRUS 229E BY PCR: NOT DETECTED
CORONAVIRUS HKU1 BY PCR: NOT DETECTED
CORONAVIRUS NL63 BY PCR: NOT DETECTED
CORONAVIRUS OC43 BY PCR: NOT DETECTED
CORTISOL TOTAL: 32.7 MCG/DL (ref 2.68–18.4)
CREAT SERPL-MCNC: 2.3 MG/DL (ref 0.7–1.2)
CREAT SERPL-MCNC: 2.4 MG/DL (ref 0.7–1.2)
CRITICAL: ABNORMAL
DATE ANALYZED: ABNORMAL
DATE OF COLLECTION: ABNORMAL
EKG ATRIAL RATE: 100 BPM
EKG ATRIAL RATE: 211 BPM
EKG P AXIS: 55 DEGREES
EKG P-R INTERVAL: 158 MS
EKG Q-T INTERVAL: 336 MS
EKG Q-T INTERVAL: 426 MS
EKG QRS DURATION: 144 MS
EKG QRS DURATION: 84 MS
EKG QTC CALCULATION (BAZETT): 404 MS
EKG QTC CALCULATION (BAZETT): 500 MS
EKG R AXIS: 106 DEGREES
EKG R AXIS: 16 DEGREES
EKG T AXIS: 0 DEGREES
EKG T AXIS: 53 DEGREES
EKG VENTRICULAR RATE: 83 BPM
EKG VENTRICULAR RATE: 87 BPM
EOSINOPHILS ABSOLUTE: 0 E9/L (ref 0.05–0.5)
EOSINOPHILS ABSOLUTE: 0.01 E9/L (ref 0.05–0.5)
EOSINOPHILS RELATIVE PERCENT: 0 % (ref 0–6)
EOSINOPHILS RELATIVE PERCENT: 0 % (ref 0–6)
FIO2: 50 %
GFR AFRICAN AMERICAN: 33
GFR AFRICAN AMERICAN: 34
GFR NON-AFRICAN AMERICAN: 27 ML/MIN/1.73
GFR NON-AFRICAN AMERICAN: 28 ML/MIN/1.73
GLUCOSE BLD-MCNC: 254 MG/DL (ref 74–99)
GLUCOSE BLD-MCNC: 341 MG/DL (ref 74–99)
HCO3: 22.8 MMOL/L (ref 22–26)
HCO3: 28.4 MMOL/L (ref 22–26)
HCO3: 29.4 MMOL/L (ref 22–26)
HCT VFR BLD CALC: 26.8 % (ref 37–54)
HCT VFR BLD CALC: 29.4 % (ref 37–54)
HCT VFR BLD CALC: 30.8 % (ref 37–54)
HEMOGLOBIN: 7.8 G/DL (ref 12.5–16.5)
HEMOGLOBIN: 8.7 G/DL (ref 12.5–16.5)
HEMOGLOBIN: 8.8 G/DL (ref 12.5–16.5)
HHB: 0.1 % (ref 0–5)
HHB: 0.3 % (ref 0–5)
HHB: 3.6 % (ref 0–5)
HUMAN METAPNEUMOVIRUS BY PCR: NOT DETECTED
HUMAN RHINOVIRUS/ENTEROVIRUS BY PCR: NOT DETECTED
HYPOCHROMIA: ABNORMAL
HYPOCHROMIA: ABNORMAL
IMMATURE GRANULOCYTES #: 0.78 E9/L
IMMATURE GRANULOCYTES %: 3.4 % (ref 0–5)
INFLUENZA A BY PCR: NOT DETECTED
INFLUENZA B BY PCR: NOT DETECTED
INR BLD: 1.2
LAB: ABNORMAL
LACTIC ACID, SEPSIS: 2.9 MMOL/L (ref 0.5–1.9)
LACTIC ACID: 1.7 MMOL/L (ref 0.5–2.2)
LACTIC ACID: 2 MMOL/L (ref 0.5–2.2)
LACTIC ACID: 2.5 MMOL/L (ref 0.5–2.2)
LV EF: 63 %
LVEF MODALITY: NORMAL
LYMPHOCYTES ABSOLUTE: 0 E9/L (ref 1.5–4)
LYMPHOCYTES ABSOLUTE: 0.57 E9/L (ref 1.5–4)
LYMPHOCYTES RELATIVE PERCENT: 0 % (ref 20–42)
LYMPHOCYTES RELATIVE PERCENT: 2.5 % (ref 20–42)
Lab: ABNORMAL
MAGNESIUM: 2.5 MG/DL (ref 1.6–2.6)
MCH RBC QN AUTO: 28.1 PG (ref 26–35)
MCH RBC QN AUTO: 28.5 PG (ref 26–35)
MCHC RBC AUTO-ENTMCNC: 28.6 % (ref 32–34.5)
MCHC RBC AUTO-ENTMCNC: 29.1 % (ref 32–34.5)
MCV RBC AUTO: 96.4 FL (ref 80–99.9)
MCV RBC AUTO: 99.7 FL (ref 80–99.9)
METAMYELOCYTES RELATIVE PERCENT: 1.7 % (ref 0–1)
METER GLUCOSE: 129 MG/DL (ref 74–99)
METER GLUCOSE: 153 MG/DL (ref 74–99)
METER GLUCOSE: 164 MG/DL (ref 74–99)
METER GLUCOSE: 195 MG/DL (ref 74–99)
METHB: 0.3 % (ref 0–1.5)
MODE: ABNORMAL
MODE: AC
MODE: AC
MONOCYTES ABSOLUTE: 0.71 E9/L (ref 0.1–0.95)
MONOCYTES ABSOLUTE: 0.86 E9/L (ref 0.1–0.95)
MONOCYTES RELATIVE PERCENT: 3.1 % (ref 2–12)
MONOCYTES RELATIVE PERCENT: 4.4 % (ref 2–12)
MYCOPLASMA PNEUMONIAE BY PCR: NOT DETECTED
NEUTROPHILS ABSOLUTE: 20.54 E9/L (ref 1.8–7.3)
NEUTROPHILS ABSOLUTE: 20.64 E9/L (ref 1.8–7.3)
NEUTROPHILS RELATIVE PERCENT: 90.9 % (ref 43–80)
NEUTROPHILS RELATIVE PERCENT: 93.9 % (ref 43–80)
NUCLEATED RED BLOOD CELLS: 0 /100 WBC
O2 CONTENT: 12.1 ML/DL
O2 CONTENT: 14.3 ML/DL
O2 CONTENT: 15.1 ML/DL
O2 SATURATION: 96.3 % (ref 92–98.5)
O2 SATURATION: 99.7 % (ref 92–98.5)
O2 SATURATION: 99.9 % (ref 92–98.5)
O2HB: 95 % (ref 94–97)
O2HB: 98.6 % (ref 94–97)
O2HB: 98.9 % (ref 94–97)
OPERATOR ID: 2863
OPERATOR ID: 3342
OPERATOR ID: 3342
OVALOCYTES: ABNORMAL
OVALOCYTES: ABNORMAL
PARAINFLUENZA VIRUS 1 BY PCR: NOT DETECTED
PARAINFLUENZA VIRUS 2 BY PCR: NOT DETECTED
PARAINFLUENZA VIRUS 3 BY PCR: NOT DETECTED
PARAINFLUENZA VIRUS 4 BY PCR: NOT DETECTED
PATIENT TEMP: 37 C
PCO2: 37.5 MMHG (ref 35–45)
PCO2: 72.9 MMHG (ref 35–45)
PCO2: 95.8 MMHG (ref 35–45)
PDW BLD-RTO: 16.7 FL (ref 11.5–15)
PDW BLD-RTO: 16.8 FL (ref 11.5–15)
PEEP/CPAP: 8 CMH2O
PEEP/CPAP: 8 CMH2O
PFO2: 1.61 MMHG/%
PH BLOOD GAS: 7.11 (ref 7.35–7.45)
PH BLOOD GAS: 7.21 (ref 7.35–7.45)
PH BLOOD GAS: 7.4 (ref 7.35–7.45)
PHOSPHORUS: 3 MG/DL (ref 2.5–4.5)
PLATELET # BLD: 271 E9/L (ref 130–450)
PLATELET # BLD: 301 E9/L (ref 130–450)
PMV BLD AUTO: 9.5 FL (ref 7–12)
PMV BLD AUTO: 9.8 FL (ref 7–12)
PO2: 298.4 MMHG (ref 75–100)
PO2: 431.1 MMHG (ref 75–100)
PO2: 80.5 MMHG (ref 75–100)
POIKILOCYTES: ABNORMAL
POIKILOCYTES: ABNORMAL
POLYCHROMASIA: ABNORMAL
POLYCHROMASIA: ABNORMAL
POTASSIUM REFLEX MAGNESIUM: 4.5 MMOL/L (ref 3.5–5)
POTASSIUM SERPL-SCNC: 4.1 MMOL/L (ref 3.5–5)
PRO-BNP: ABNORMAL PG/ML (ref 0–125)
PROTHROMBIN TIME: 13 SEC (ref 9.3–12.4)
RBC # BLD: 2.78 E12/L (ref 3.8–5.8)
RBC # BLD: 3.09 E12/L (ref 3.8–5.8)
RESPIRATORY SYNCYTIAL VIRUS BY PCR: NOT DETECTED
RR MECHANICAL: 20 B/MIN
RR MECHANICAL: 20 B/MIN
SARS-COV-2, PCR: NOT DETECTED
SEDIMENTATION RATE, ERYTHROCYTE: 78 MM/HR (ref 0–15)
SODIUM BLD-SCNC: 131 MMOL/L (ref 132–146)
SODIUM BLD-SCNC: 133 MMOL/L (ref 132–146)
SOURCE, BLOOD GAS: ABNORMAL
T4 FREE: 0.86 NG/DL (ref 0.93–1.7)
TEAR DROP CELLS: ABNORMAL
THB: 10 G/DL (ref 11.5–16.5)
THB: 9 G/DL (ref 11.5–16.5)
THB: 9.7 G/DL (ref 11.5–16.5)
TIME ANALYZED: 139
TIME ANALYZED: 15
TIME ANALYZED: 822
TOTAL PROTEIN: 6.9 G/DL (ref 6.4–8.3)
TROPONIN, HIGH SENSITIVITY: 308 NG/L (ref 0–11)
TROPONIN, HIGH SENSITIVITY: 313 NG/L (ref 0–11)
TROPONIN, HIGH SENSITIVITY: 320 NG/L (ref 0–11)
TSH SERPL DL<=0.05 MIU/L-ACNC: 2.7 UIU/ML (ref 0.27–4.2)
VT MECHANICAL: 450 ML
VT MECHANICAL: 450 ML
WBC # BLD: 21.5 E9/L (ref 4.5–11.5)
WBC # BLD: 22.6 E9/L (ref 4.5–11.5)

## 2022-05-11 PROCEDURE — 36592 COLLECT BLOOD FROM PICC: CPT

## 2022-05-11 PROCEDURE — 2580000003 HC RX 258

## 2022-05-11 PROCEDURE — 84439 ASSAY OF FREE THYROXINE: CPT

## 2022-05-11 PROCEDURE — 87077 CULTURE AEROBIC IDENTIFY: CPT

## 2022-05-11 PROCEDURE — 85014 HEMATOCRIT: CPT

## 2022-05-11 PROCEDURE — 2580000003 HC RX 258: Performed by: INTERNAL MEDICINE

## 2022-05-11 PROCEDURE — 2500000003 HC RX 250 WO HCPCS

## 2022-05-11 PROCEDURE — 99223 1ST HOSP IP/OBS HIGH 75: CPT | Performed by: INTERNAL MEDICINE

## 2022-05-11 PROCEDURE — 2580000003 HC RX 258: Performed by: STUDENT IN AN ORGANIZED HEALTH CARE EDUCATION/TRAINING PROGRAM

## 2022-05-11 PROCEDURE — 6360000002 HC RX W HCPCS: Performed by: SPECIALIST

## 2022-05-11 PROCEDURE — 86140 C-REACTIVE PROTEIN: CPT

## 2022-05-11 PROCEDURE — APPSS60 APP SPLIT SHARED TIME 46-60 MINUTES: Performed by: PHYSICIAN ASSISTANT

## 2022-05-11 PROCEDURE — 85730 THROMBOPLASTIN TIME PARTIAL: CPT

## 2022-05-11 PROCEDURE — C9113 INJ PANTOPRAZOLE SODIUM, VIA: HCPCS | Performed by: STUDENT IN AN ORGANIZED HEALTH CARE EDUCATION/TRAINING PROGRAM

## 2022-05-11 PROCEDURE — 82805 BLOOD GASES W/O2 SATURATION: CPT

## 2022-05-11 PROCEDURE — 83735 ASSAY OF MAGNESIUM: CPT

## 2022-05-11 PROCEDURE — 6360000002 HC RX W HCPCS: Performed by: STUDENT IN AN ORGANIZED HEALTH CARE EDUCATION/TRAINING PROGRAM

## 2022-05-11 PROCEDURE — 87040 BLOOD CULTURE FOR BACTERIA: CPT

## 2022-05-11 PROCEDURE — 84100 ASSAY OF PHOSPHORUS: CPT

## 2022-05-11 PROCEDURE — 86850 RBC ANTIBODY SCREEN: CPT

## 2022-05-11 PROCEDURE — 83605 ASSAY OF LACTIC ACID: CPT

## 2022-05-11 PROCEDURE — 2580000003 HC RX 258: Performed by: SPECIALIST

## 2022-05-11 PROCEDURE — A4216 STERILE WATER/SALINE, 10 ML: HCPCS

## 2022-05-11 PROCEDURE — 6360000002 HC RX W HCPCS

## 2022-05-11 PROCEDURE — 31500 INSERT EMERGENCY AIRWAY: CPT

## 2022-05-11 PROCEDURE — 82962 GLUCOSE BLOOD TEST: CPT

## 2022-05-11 PROCEDURE — 83880 ASSAY OF NATRIURETIC PEPTIDE: CPT

## 2022-05-11 PROCEDURE — A4216 STERILE WATER/SALINE, 10 ML: HCPCS | Performed by: STUDENT IN AN ORGANIZED HEALTH CARE EDUCATION/TRAINING PROGRAM

## 2022-05-11 PROCEDURE — 80048 BASIC METABOLIC PNL TOTAL CA: CPT

## 2022-05-11 PROCEDURE — 36415 COLL VENOUS BLD VENIPUNCTURE: CPT

## 2022-05-11 PROCEDURE — 87081 CULTURE SCREEN ONLY: CPT

## 2022-05-11 PROCEDURE — 94002 VENT MGMT INPAT INIT DAY: CPT

## 2022-05-11 PROCEDURE — 2000000000 HC ICU R&B

## 2022-05-11 PROCEDURE — 80053 COMPREHEN METABOLIC PANEL: CPT

## 2022-05-11 PROCEDURE — 93010 ELECTROCARDIOGRAM REPORT: CPT | Performed by: INTERNAL MEDICINE

## 2022-05-11 PROCEDURE — 99222 1ST HOSP IP/OBS MODERATE 55: CPT | Performed by: NURSE PRACTITIONER

## 2022-05-11 PROCEDURE — 86923 COMPATIBILITY TEST ELECTRIC: CPT

## 2022-05-11 PROCEDURE — 2500000003 HC RX 250 WO HCPCS: Performed by: STUDENT IN AN ORGANIZED HEALTH CARE EDUCATION/TRAINING PROGRAM

## 2022-05-11 PROCEDURE — 85018 HEMOGLOBIN: CPT

## 2022-05-11 PROCEDURE — 85651 RBC SED RATE NONAUTOMATED: CPT

## 2022-05-11 PROCEDURE — 0202U NFCT DS 22 TRGT SARS-COV-2: CPT

## 2022-05-11 PROCEDURE — 87186 SC STD MICRODIL/AGAR DIL: CPT

## 2022-05-11 PROCEDURE — P9040 RBC LEUKOREDUCED IRRADIATED: HCPCS

## 2022-05-11 PROCEDURE — 86901 BLOOD TYPING SEROLOGIC RH(D): CPT

## 2022-05-11 PROCEDURE — 70450 CT HEAD/BRAIN W/O DYE: CPT

## 2022-05-11 PROCEDURE — 82533 TOTAL CORTISOL: CPT

## 2022-05-11 PROCEDURE — 82140 ASSAY OF AMMONIA: CPT

## 2022-05-11 PROCEDURE — 85025 COMPLETE CBC W/AUTO DIFF WBC: CPT

## 2022-05-11 PROCEDURE — 85610 PROTHROMBIN TIME: CPT

## 2022-05-11 PROCEDURE — 93005 ELECTROCARDIOGRAM TRACING: CPT | Performed by: PHYSICIAN ASSISTANT

## 2022-05-11 PROCEDURE — 71045 X-RAY EXAM CHEST 1 VIEW: CPT

## 2022-05-11 PROCEDURE — P9016 RBC LEUKOCYTES REDUCED: HCPCS

## 2022-05-11 PROCEDURE — 93306 TTE W/DOPPLER COMPLETE: CPT

## 2022-05-11 PROCEDURE — 6370000000 HC RX 637 (ALT 250 FOR IP): Performed by: STUDENT IN AN ORGANIZED HEALTH CARE EDUCATION/TRAINING PROGRAM

## 2022-05-11 PROCEDURE — 84443 ASSAY THYROID STIM HORMONE: CPT

## 2022-05-11 PROCEDURE — 6370000000 HC RX 637 (ALT 250 FOR IP): Performed by: INTERNAL MEDICINE

## 2022-05-11 PROCEDURE — 71250 CT THORAX DX C-: CPT

## 2022-05-11 PROCEDURE — 84484 ASSAY OF TROPONIN QUANT: CPT

## 2022-05-11 PROCEDURE — 93005 ELECTROCARDIOGRAM TRACING: CPT | Performed by: STUDENT IN AN ORGANIZED HEALTH CARE EDUCATION/TRAINING PROGRAM

## 2022-05-11 PROCEDURE — 87150 DNA/RNA AMPLIFIED PROBE: CPT

## 2022-05-11 PROCEDURE — 86900 BLOOD TYPING SEROLOGIC ABO: CPT

## 2022-05-11 PROCEDURE — 74176 CT ABD & PELVIS W/O CONTRAST: CPT

## 2022-05-11 RX ORDER — DOCUSATE SODIUM 100 MG/1
100 CAPSULE, LIQUID FILLED ORAL 2 TIMES DAILY
Status: DISCONTINUED | OUTPATIENT
Start: 2022-05-11 | End: 2022-05-11

## 2022-05-11 RX ORDER — INSULIN GLARGINE 100 [IU]/ML
7 INJECTION, SOLUTION SUBCUTANEOUS DAILY
COMMUNITY

## 2022-05-11 RX ORDER — INSULIN GLARGINE 100 [IU]/ML
20 INJECTION, SOLUTION SUBCUTANEOUS 2 TIMES DAILY
Status: DISCONTINUED | OUTPATIENT
Start: 2022-05-11 | End: 2022-05-26 | Stop reason: HOSPADM

## 2022-05-11 RX ORDER — INSULIN LISPRO 100 [IU]/ML
0-12 INJECTION, SOLUTION INTRAVENOUS; SUBCUTANEOUS EVERY 6 HOURS
Status: DISCONTINUED | OUTPATIENT
Start: 2022-05-11 | End: 2022-05-26 | Stop reason: HOSPADM

## 2022-05-11 RX ORDER — CHLORHEXIDINE GLUCONATE 0.12 MG/ML
15 RINSE ORAL 2 TIMES DAILY
Status: DISCONTINUED | OUTPATIENT
Start: 2022-05-11 | End: 2022-05-26 | Stop reason: HOSPADM

## 2022-05-11 RX ORDER — SODIUM CHLORIDE 9 MG/ML
INJECTION, SOLUTION INTRAVENOUS PRN
Status: DISCONTINUED | OUTPATIENT
Start: 2022-05-11 | End: 2022-05-26 | Stop reason: HOSPADM

## 2022-05-11 RX ORDER — SENNA PLUS 8.6 MG/1
1 TABLET ORAL NIGHTLY
Status: DISCONTINUED | OUTPATIENT
Start: 2022-05-11 | End: 2022-05-11

## 2022-05-11 RX ORDER — LINEZOLID 2 MG/ML
600 INJECTION, SOLUTION INTRAVENOUS EVERY 12 HOURS
Status: DISCONTINUED | OUTPATIENT
Start: 2022-05-11 | End: 2022-05-15

## 2022-05-11 RX ORDER — LEVETIRACETAM 100 MG/ML
500 SOLUTION ORAL 2 TIMES DAILY
Status: DISCONTINUED | OUTPATIENT
Start: 2022-05-11 | End: 2022-05-26 | Stop reason: HOSPADM

## 2022-05-11 RX ORDER — ONDANSETRON 2 MG/ML
4 INJECTION INTRAMUSCULAR; INTRAVENOUS ONCE
Status: COMPLETED | OUTPATIENT
Start: 2022-05-11 | End: 2022-05-11

## 2022-05-11 RX ORDER — 0.9 % SODIUM CHLORIDE 0.9 %
1000 INTRAVENOUS SOLUTION INTRAVENOUS ONCE
Status: COMPLETED | OUTPATIENT
Start: 2022-05-11 | End: 2022-05-11

## 2022-05-11 RX ORDER — DEXTROSE MONOHYDRATE 50 MG/ML
100 INJECTION, SOLUTION INTRAVENOUS PRN
Status: DISCONTINUED | OUTPATIENT
Start: 2022-05-11 | End: 2022-05-17

## 2022-05-11 RX ORDER — SENNOSIDES 8.8 MG/5ML
5 LIQUID ORAL NIGHTLY
Status: DISCONTINUED | OUTPATIENT
Start: 2022-05-11 | End: 2022-05-26 | Stop reason: HOSPADM

## 2022-05-11 RX ORDER — POLYETHYLENE GLYCOL 3350 17 G/17G
17 POWDER, FOR SOLUTION ORAL DAILY
Status: DISCONTINUED | OUTPATIENT
Start: 2022-05-11 | End: 2022-05-26 | Stop reason: HOSPADM

## 2022-05-11 RX ORDER — SODIUM CHLORIDE 0.9 % (FLUSH) 0.9 %
5-40 SYRINGE (ML) INJECTION EVERY 12 HOURS SCHEDULED
Status: DISCONTINUED | OUTPATIENT
Start: 2022-05-11 | End: 2022-05-26 | Stop reason: HOSPADM

## 2022-05-11 RX ORDER — SODIUM CHLORIDE 0.9 % (FLUSH) 0.9 %
5-40 SYRINGE (ML) INJECTION PRN
Status: DISCONTINUED | OUTPATIENT
Start: 2022-05-11 | End: 2022-05-26 | Stop reason: HOSPADM

## 2022-05-11 RX ORDER — FENTANYL CITRATE 50 UG/ML
50 INJECTION, SOLUTION INTRAMUSCULAR; INTRAVENOUS ONCE
Status: COMPLETED | OUTPATIENT
Start: 2022-05-11 | End: 2022-05-11

## 2022-05-11 RX ADMIN — CHLORHEXIDINE GLUCONATE 0.12% ORAL RINSE 15 ML: 1.2 LIQUID ORAL at 11:04

## 2022-05-11 RX ADMIN — Medication 50 MCG/HR: at 01:07

## 2022-05-11 RX ADMIN — MEROPENEM 1000 MG: 1 INJECTION, POWDER, FOR SOLUTION INTRAVENOUS at 15:55

## 2022-05-11 RX ADMIN — LEVETIRACETAM 500 MG: 100 SOLUTION ORAL at 20:30

## 2022-05-11 RX ADMIN — DOCUSATE SODIUM LIQUID 100 MG: 100 LIQUID ORAL at 11:04

## 2022-05-11 RX ADMIN — LEVOTHYROXINE SODIUM 175 MCG: 125 TABLET ORAL at 11:06

## 2022-05-11 RX ADMIN — HYDROCORTISONE SODIUM SUCCINATE 100 MG: 100 INJECTION, POWDER, FOR SOLUTION INTRAMUSCULAR; INTRAVENOUS at 04:14

## 2022-05-11 RX ADMIN — INSULIN LISPRO 2 UNITS: 100 INJECTION, SOLUTION INTRAVENOUS; SUBCUTANEOUS at 18:23

## 2022-05-11 RX ADMIN — CEFEPIME 2000 MG: 2 INJECTION, POWDER, FOR SOLUTION INTRAMUSCULAR; INTRAVENOUS at 04:14

## 2022-05-11 RX ADMIN — Medication 10 ML: at 20:26

## 2022-05-11 RX ADMIN — SENNOSIDES 8.8 MG: 8.8 SYRUP ORAL at 22:24

## 2022-05-11 RX ADMIN — SENNOSIDES 8.8 MG: 8.8 SYRUP ORAL at 11:19

## 2022-05-11 RX ADMIN — SODIUM CHLORIDE 1000 ML: 9 INJECTION, SOLUTION INTRAVENOUS at 01:18

## 2022-05-11 RX ADMIN — LINEZOLID 600 MG: 600 INJECTION, SOLUTION INTRAVENOUS at 11:43

## 2022-05-11 RX ADMIN — NOREPINEPHRINE BITARTRATE 5 MCG/MIN: 1 INJECTION, SOLUTION, CONCENTRATE INTRAVENOUS at 00:40

## 2022-05-11 RX ADMIN — Medication 2 MG/HR: at 02:08

## 2022-05-11 RX ADMIN — SODIUM CHLORIDE, PRESERVATIVE FREE 40 MG: 5 INJECTION INTRAVENOUS at 11:05

## 2022-05-11 RX ADMIN — ONDANSETRON 4 MG: 2 INJECTION INTRAMUSCULAR; INTRAVENOUS at 01:10

## 2022-05-11 RX ADMIN — LEVETIRACETAM 500 MG: 100 SOLUTION ORAL at 11:06

## 2022-05-11 RX ADMIN — LINEZOLID 600 MG: 600 INJECTION, SOLUTION INTRAVENOUS at 23:30

## 2022-05-11 RX ADMIN — SODIUM CHLORIDE: 9 INJECTION, SOLUTION INTRAVENOUS at 15:51

## 2022-05-11 RX ADMIN — INSULIN GLARGINE 20 UNITS: 100 INJECTION, SOLUTION SUBCUTANEOUS at 20:30

## 2022-05-11 RX ADMIN — INSULIN LISPRO 2 UNITS: 100 INJECTION, SOLUTION INTRAVENOUS; SUBCUTANEOUS at 11:27

## 2022-05-11 RX ADMIN — POLYETHYLENE GLYCOL 3350 17 G: 17 POWDER, FOR SOLUTION ORAL at 10:00

## 2022-05-11 RX ADMIN — INSULIN GLARGINE 20 UNITS: 100 INJECTION, SOLUTION SUBCUTANEOUS at 11:24

## 2022-05-11 RX ADMIN — SODIUM CHLORIDE 80 MG: 9 INJECTION INTRAMUSCULAR; INTRAVENOUS; SUBCUTANEOUS at 01:59

## 2022-05-11 RX ADMIN — Medication 25 MCG/HR: at 18:22

## 2022-05-11 RX ADMIN — CHLORHEXIDINE GLUCONATE 0.12% ORAL RINSE 15 ML: 1.2 LIQUID ORAL at 20:26

## 2022-05-11 RX ADMIN — DOCUSATE SODIUM LIQUID 100 MG: 100 LIQUID ORAL at 20:26

## 2022-05-11 RX ADMIN — VANCOMYCIN HYDROCHLORIDE 2000 MG: 10 INJECTION, POWDER, LYOPHILIZED, FOR SOLUTION INTRAVENOUS at 06:42

## 2022-05-11 RX ADMIN — FENTANYL CITRATE 50 MCG: 50 INJECTION, SOLUTION INTRAMUSCULAR; INTRAVENOUS at 00:38

## 2022-05-11 ASSESSMENT — PULMONARY FUNCTION TESTS
PIF_VALUE: 39
PIF_VALUE: 47
PIF_VALUE: 40
PIF_VALUE: 45
PIF_VALUE: 40
PIF_VALUE: 56
PIF_VALUE: 52
PIF_VALUE: 46
PIF_VALUE: 40
PIF_VALUE: 50
PIF_VALUE: 46
PIF_VALUE: 42
PIF_VALUE: 54
PIF_VALUE: 45
PIF_VALUE: 42
PIF_VALUE: 53
PIF_VALUE: 40
PIF_VALUE: 46
PIF_VALUE: 48

## 2022-05-11 ASSESSMENT — PAIN SCALES - WONG BAKER
WONGBAKER_NUMERICALRESPONSE: 0

## 2022-05-11 NOTE — CONSULTS
[address] [place] [address] [facility]  Consultation Note     Admit Date: 5/10/2022 11:58 PM    Reason for Consult:   Sepsis with leukocytosis, on Levophed with sacral ulcers and pneumonia    Attending Physician:  Abdirahman Sim MD    HISTORY OF PRESENT ILLNESS:             The history is obtained from extensive review of available past medical records. The patient is a 74 y.o. male who is previously known to the ID service. The patient was admitted to [facility] from [facility] on 3/15/2022 with a stage IV sacral decubitus ulcer. He had undergone debridement on 3/10/2022 during his previous admission. Cultures grew VRE and Pseudomonas aeruginosa. MRI was negative for osteomyelitis. Surgical report did not show infection past the fascia. He was on Levofloxacin after dialysis until 3/24/2022. He had a PEG inserted on 4/27/2022. He was followed by Dr. Richard Ramirez at [facility]. He began developing leukocytosis. Blood cultures and decubitus ulcer cultures were sent out. He was also growing Streptococcus aureus and gram-negative rods. Blood cultures are negative so far. He was treated with Daptomycin and Meropenem    The patient was sent to the ED at [facility] on 5/10/2022 for altered mentation and respiratory distress. He was hypothermic, tachycardic and hypotensive. White count was 22. 6. proBNP was 29,060. Creatinine was 2.3 and BUN was 61. Cortisol was 30.7. He was intubated and admitted to the ICU. He is on vasopressors. He was treated with Vancomycin and Cefepime in the ED. Patient was seen by surgery for decubitus ulcer and bright red blood per rectum. The are not planning procedures.     Past Medical History:        Diagnosis Date    [facility]     TRUMAN (acute kidney injury) ([facility]     Anemia     Anxiety     Bipolar 1 disorder ([facility]     Charcot foot due to diabetes mellitus ([facility]     CHF (congestive heart failure) ([facility]     CKD (chronic kidney disease)     Diabetes mellitus [facility]     Dialysis patient ([facility]     Hyperlipidemia     Hypertension     Right sided weakness     Sacral decubitus ulcer     Seizure [facility])      Past Surgical History:        Procedure Laterality Date    AMPUTATION Right 2017    right toe    DIALYSIS CATHETER INSERTION  2020    right chest    HYDROCELE EXCISION  2010    JOINT REPLACEMENT Right 2011    TONSILLECTOMY      UPPER GASTROINTESTINAL ENDOSCOPY N/A 4/27/2022    EGD BIOPSY performed by Clinton Erazo MD at [place] [address]  2010    venous ablation     Current Medications:   Scheduled Meds:   polyethylene glycol  17 g Oral Daily    pantoprazole (PROTONIX) 40 mg injection  40 mg IntraVENous Daily    chlorhexidine  15 mL Mouth/Throat BID    senna  5 mL Per G Tube Nightly    docusate  100 mg Per G Tube BID    levothyroxine  175 mcg Per G Tube Daily    [Held by provider] nitroglycerin  0.5 inch Topical 4 times per day    insulin glargine  20 Units SubCUTAneous BID    insulin lispro  0-12 Units SubCUTAneous Q6H    levETIRAcetam  500 mg PEG Tube BID     Continuous Infusions:   norepinephrine 15 mcg/min (05/11/22 0100)    fentaNYL 150 mcg/hr (05/11/22 0129)    midazolam 2 mg/hr (05/11/22 0208)    dextrose       PRN Meds:perflutren lipid microspheres, glucose, dextrose bolus **OR** dextrose bolus, glucagon (rDNA), dextrose    Allergies:   Other    Social History:   Social History     Socioeconomic History    Marital status:      Spouse name: Not on file    Number of children: Not on file    Years of education: Not on file    Highest education level: Not on file   Occupational History    Not on file   Tobacco Use    Smoking status: Former Smoker    Smokeless tobacco: Never Used   Vaping Use    Vaping Use: Never used   Substance and Sexual Activity    Alcohol use: Not Currently    Drug use: Not Currently    Sexual activity: Not on file   Other Topics Concern    Not on file   Social History Narrative    Not on file     Social Determinants of Health     Financial Resource Strain:     Difficulty of Paying Living Expenses: Not on file   Food Insecurity:     Worried About Running Out of Food in the Last Year: Not on file    Jenny of Food in the Last Year: Not on file   Transportation Needs:     Lack of Transportation (Medical): Not on file    Lack of Transportation (Non-Medical): Not on file   Physical Activity:     Days of Exercise per Week: Not on file    Minutes of Exercise per Session: Not on file   Stress:     Feeling of Stress : Not on file   Social Connections:     Frequency of Communication with Friends and Family: Not on file    Frequency of Social Gatherings with Friends and Family: Not on file    Attends Voodoo Services: Not on file    Active Member of [address] [place] [address] [employer] or Organizations: Not on file    Attends Club or Organization Meetings: Not on file    Marital Status: Not on file   Intimate Partner Violence:     Fear of Current or Ex-Partner: Not on file    Emotionally Abused: Not on file    Physically Abused: Not on file    Sexually Abused: Not on file   Housing Stability:     Unable to Pay for Housing in the Last Year: Not on file    Number of [place] in the Last Year: Not on file    Unstable Housing in the Last Year: Not on file      Family History:   No family history on file. . Otherwise non-pertinent to the chief complaint. REVIEW OF SYSTEMS:    10 point review of system could not be obtained for the patient given his status of intubation and sedation. No family members at bedside. Otherwise, as in the HPI    PHYSICAL EXAM:    Vitals:   BP 85/71   Pulse 90   Temp 98.7 °F (37.1 °C) (Axillary)   Resp 22   Ht 6' 5\" (1.956 m)   Wt 206 lb 9.1 oz (93.7 kg)   SpO2 98%   BMI 24.50 kg/m²   Constitutional: The patient is lying in bed in the ICU. He is intubated and sedated. Skin: Warm and dry. No rashes were noted. HEENT: Eyes show round, and reactive pupils.  No jaundice. Moist mucous membranes, no ulcerations, no thrush. ET tube. Neck: Supple to movements. No lymphadenopathy. Chest: No use of accessory muscles to breathe. Symmetrical expansion. Auscultation reveals no wheezing, crackles, or rhonchi. Right tunneled HD catheter. Cardiovascular: S1 and S2 are rhythmic and regular. No murmurs appreciated. Abdomen: Positive bowel sounds to auscultation. Benign to palpation. No masses felt. No hepatosplenomegaly. PEG. Extremities: No clubbing, no cyanosis, no edema. Lines: Peripheral.      CBC+dif:  Recent Labs     05/11/22  0032 05/11/22  0258 05/11/22  0830   WBC 22.6*  --  21.5*   HGB 8.8*   < > 7.8*   HCT 30.8*   < > 26.8*   MCV 99.7   < > 96.4      < > 271   NEUTROABS 20.54*  --   --     < > = values in this interval not displayed.      No results found for: CRP   No results found for: CRPHS  No results found for: SEDRATE  Lab Results   Component Value Date    ALT 30 05/11/2022    AST 31 05/11/2022    ALKPHOS 591 (H) 05/11/2022    BILITOT 0.3 05/11/2022     Lab Results   Component Value Date     05/11/2022    K 4.5 05/11/2022    CL 94 05/11/2022    CO2 26 05/11/2022    BUN 61 05/11/2022    CREATININE 2.3 05/11/2022    GFRAA 34 05/11/2022    LABGLOM 28 05/11/2022    GLUCOSE 341 05/11/2022    PROT 6.9 05/11/2022    LABALBU 2.4 05/11/2022    CALCIUM 11.1 05/11/2022    BILITOT 0.3 05/11/2022    ALKPHOS 591 05/11/2022    AST 31 05/11/2022    ALT 30 05/11/2022       Lab Results   Component Value Date    PROTIME 13.0 05/11/2022    INR 1.2 05/11/2022       Lab Results   Component Value Date    TSH 2.700 05/11/2022       No results found for: NITRITE, COLORU, PHUR, LABCAST, WBCUA, RBCUA, MUCUS, TRICHOMONAS, YEAST, BACTERIA, CLARITYU, SPECGRAV, LEUKOCYTESUR, UROBILINOGEN, BILIRUBINUR, BLOODU, GLUCOSEU, AMORPHOUS    Lab Results   Component Value Date    HCO3 22.8 05/11/2022    BE -1.7 05/11/2022    O2SAT 96.3 05/11/2022    PH 7.402 05/11/2022    PCO2 37.5 05/11/2022    PO2 80.5 05/11/2022     Radiology:      Microbiology:  Blood culture 5/10/2022: Negative so far  Decubitus ulcer 5/10/2022: Staphylococcus aureus, GNR  Respiratory panel: Pending  Nares screen MRSA: Pending  Blood cultures 5/11/2022: Negative so far    Assessment:  · HCAP  · Sepsis with septic shock associated  · Acute respiratory failure  · Leukocytosis associated to HCAP  · Hypothermia associated with the above  · Sacral decubitus ulcer with Pseudomonas and Enterococcus. Treated with IV antibiotics  · CKD, on HD    Plan:    · Start Linezolid  · Resume Meropenem  · Check cultures, baseline ESR, CRP  · Monitor labs  · Will follow with you    Thank you for having us see this patient in consultation. Discussed with Dr. Jason Bernal.     Levi Mims MD  9:12 AM  5/11/2022

## 2022-05-11 NOTE — CONSULTS
Palliative Care Department  [phone]  Palliative Care Initial Consult  Provider Dian Walls, APRN - CNP     Daniel Pack  [id number]  Hospital Day: 2  Date of Initial Consult: 5/11/2022  Referring Provider: Adrián Feliz MD  Palliative Medicine was consulted for assistance with: Goals of Care, Code Status Discussion, Family Support    HPI:   Daniel Pack is a 74 y.o. with a medical history of atrial fibrillation on Eliquis and aspirin, anxiety, anemia, CHF, DM, CKD on HD, sacral decubitus ulcer with wound VAC, HLD, hypothyroidism who was admitted on 5/10/2022 from facility with a CHIEF COMPLAINT of unresponsiveness. Patient reportedly was unresponsive at facility and lost pulses while in the ED. CPR was initiated, patient was intubated and 2 rounds of epinephrine were given. ROSC was achieved and patient was transferred to ICU for further management. Stools were noted to be maroon-colored and patient was started on Protonix. Patient was also started on antibiotics for HCAP/sepsis with septic shock. Palliative medicine is consulted for goals of care, CODE STATUS discussion, and family support. ASSESSMENT/PLAN:     Pertinent Hospital Diagnoses      Status post cardiac arrest   Acute respiratory failure   Sepsis with septic shock   HCAP   CKD on HD   Sacral decubitus ulcer      Palliative Care Encounter / Counseling Regarding Goals of Care  Please see detailed goals of care discussion as below   At this time, Daniel Pack, Does Not have capacity for medical decision-making.   Capacity is time limited and situation/question specific   During encounter, Sandra Ramirez, spouse, was surrogate medical decision-maker   Outcome of goals of care meeting: Continue full code and current management   Code status Full Code   Advanced Directives: no POA or living will in epic   Surrogate/Legal NOK:  shira Newman, spouse, [phone]        Spiritual assessment: no spiritual distress identified  Bereavement and grief: to be determined  Referrals to: none today  SUBJECTIVE:     Current medical issues leading to Palliative Medicine involvement include   Active Hospital Problems    Diagnosis Date Noted    Cardiac arrest [facility]) [I46.9] 05/11/2022     Priority: Medium       Details of Conversation: Chart reviewed and patient seen. Patient remains intubated in ICU. Responsive to voice and follows simple commands. Spoke to patient's spouse, Vijaya Santillan. Role of palliative medicine explained. Vijaya Santillan explains that patient has told her in the past he does not want to be a DNR. We discussed CODE STATUS options and Vijaya Santillan wishes to continue full code and aggressive medical management. Vijaya Santillan is tearful on the phone. Support was provided and questions were answered. Palliative medicine will continue to follow. OBJECTIVE:   Prognosis: depends upon goals and unknown    Physical Exam:  /62   Pulse 89   Temp 97.7 °F (36.5 °C) (Infrared)   Resp 19   Ht 6' 5\" (1.956 m)   Wt 206 lb 9.1 oz (93.7 kg)   SpO2 99%   BMI 24.50 kg/m²   Constitutional:  Ill-appearing, awakens to voice  Lungs:  Intubated  Heart:  IRRR  Abd:  Soft, non tender, non distended, bowel sounds present  Ext:  Moving all extremities, + edema, pulses present  Skin:  Warm and dry  Neuro: Awakens to voice, follows simple commands    Objective data reviewed: labs, images, records, medication use, vitals and chart    Discussed patient and the plan of care with the other IDT members: Palliative Medicine IDT Team    Time/Communication  Greater than 50% of time spent, total 50 minutes in counseling and coordination of care at the bedside regarding goals of care, diagnosis and prognosis and see above. Thank you for allowing Palliative Medicine to participate in the care of Chris Schulz.

## 2022-05-11 NOTE — CONSULTS
Comprehensive Nutrition Assessment    Type and Reason for Visit:  Initial,Consult (TF Ordering and Management)    Nutrition Recommendations/Plan:   1. When hemodynamically stable, recommend continue/start current TF order, as tolerated. TF Ordered:  Immune Enhancing TF (Pivot 1.5) to goal rate 55 ml/hr x 23 hr/d (Hold 30 min before and after Synthroid) and Protein Modular once daily  This will provide: 1265 ml/d, 1998 total regine, 145 g total protein, 960 ml free water  This regimen will meet ~100% calorie/protein needs     Malnutrition Assessment:  Malnutrition Status: At risk for malnutrition (Comment) (05/11/22 9281)    Context:  Chronic Illness     Findings of the 6 clinical characteristics of malnutrition:  Energy Intake:  Mild decrease in energy intake (Comment) (NPO/prior to TF order)  Weight Loss:  Greater than 7.5% over 3 months     Body Fat Loss:  No significant body fat loss     Muscle Mass Loss:  No significant muscle mass loss    Fluid Accumulation:  No significant fluid accumulation     Strength:  Not Performed    Nutrition Assessment:    Pt admit from LTAC after unresponsive -s/p cardiac arrest. Now intubated/sedated. PMH=DM, ESRD on IHD, dementia, CHF, PEG. Pt w/wound VAC from LTAC on buttocks wound. Will change TF order to Immune Enhancing to promote wound healing and peptide-based likely well randolph while on pressor. Continue to monitor tolerance. Nutrition Related Findings:    pressor (MAP 65), NGT, PEG clamped and scab around site,  intubated, hematochezia d/t anal fissure, +3 edema, Wound Type:  Wound Vac,Wound Consult Pending       Current Nutrition Intake & Therapies:    Average Meal Intake: NPO  Average Supplements Intake: NPO  Current Tube Feeding (TF) Orders:  · Feeding Route: PEG  · Formula: Diabetic  · Schedule: Continuous  · Feeding Regimen: goal =50 ml/hr = 1200 ml/d  · Additives/Modulars: None  · Water Flushes: 30 ml Q 4 hr = 180 ml/d  · Current TF & Flush Orders Provides: just ordered 5/11 - clamped this AM  · Goal TF & Flush Orders Provides: 1800 regine, 99 g pro, 1091 ml total free water      Anthropometric Measures:  Height: 6' 5\" (195.6 cm)  Ideal Body Weight (IBW): 208 lbs (95 kg)    Admission Body Weight: 206 lb 9.1 oz (93.7 kg) (5/11)  Current Body Weight: 206 lb 9.1 oz (93.7 kg), 99.3 % IBW. Weight Source: Bed Scale (5/11)  Current BMI (kg/m2): 24.5  Usual Body Weight: 249 lb 1.9 oz (113 kg) (2/12 at [facility])  % Weight Change (Calculated): -17.1                    BMI Categories: Normal Weight (BMI 22.0 to 24.9) age over 72    Estimated Daily Nutrient Needs:  Energy Requirements Based On: Formula ([address] [facility] [address]b)  Weight Used for Energy Requirements: Current  Energy (kcal/day): 1146-3551  Weight Used for Protein Requirements: Current  Protein (g/day): 140-155  Method Used for Fluid Requirements: Standard Renal  Fluid (ml/day): per Renal and Critical Care    Nutrition Diagnosis:   · Inadequate oral intake related to impaired respiratory function as evidenced by intubation,nutrition support - enteral nutrition,NPO or clear liquid status due to medical condition,wounds      Nutrition Interventions:   Food and/or Nutrient Delivery: Continue NPO,Modify Tube Feeding (Immune Enhancing TF and Protein Modular to meet needs and promote wound healing)  Nutrition Education/Counseling: Education not indicated  Coordination of Nutrition Care: Continue to monitor while inpatient       Goals:     Goals:  Tolerate nutrition support at goal rate       Nutrition Monitoring and Evaluation:   Behavioral-Environmental Outcomes: None Identified  Food/Nutrient Intake Outcomes: Enteral Nutrition Intake/Tolerance  Physical Signs/Symptoms Outcomes: Hemodynamic Status,Biochemical Data,GI Status,Fluid Status or Edema,Nutrition Focused Physical Findings,Skin,Weight    Discharge Planning:    Enteral Nutrition     Rosalinda Husain RD, CNSC, LD  Contact: [phone]

## 2022-05-11 NOTE — CONSULTS
Inpatient Cardiology Consultation      Reason for Consult: Post cardiac arrest    Consulting Physician:  Karyna Owens    Requesting Physician:  Unique Velásquez, APRN - CNP    Date of Consultation: 5/11/2022    HISTORY OF PRESENT ILLNESS:   Mr Lori Ryder is a 64-year-old male who is not previously known to [facility] cardiology. Past medical history includes  atrial fibrillation on Eliquis, HFpEF (EF 50-55% on TTE 3/2022 at [facility]), end-stage renal disease on hemodialysis Monday Wednesday Friday,malnutrition with history of PEG tube, hypothyroidism, type 2 diabetes mellitus, chronic nonhealing wounds with stage IV sacral decubitus ulcer, seizures, dementia. hospitalized 3/15/2022 with stage IV sacral decubitus ulcer with debridement and cultures positive for VRE and Pseudomonas aeruginosa. MRI negative for osteomyelitis. Presented to [facility] 5/11/2022 after being found unresponsive at SNF. VS: 94.4-1 06/ 5% ventilator-133/75  Labs: WBC 22.6, H&H 8.8/30.8, platelets 623. Na+ 131, K+ 4.5, BUN/SCR 61/2.3, glucose 341. Troponin 308--320  proBNP 29,060      Lactic acid 2.9  Blood gases: pH 7.105, PCO2 95.8, PO2 431.1, HCO3 29.4    CT head: No acute intracranial abnormalities  CT chest without contrast: Pulmonary edema versus multifocal pneumonia with bilateral pleural effusions. Generalized mediastinal lymphadenopathy  CT abdomen pelvis: Bilateral pleural effusions left greater than the right with superimposed consolidation. No acute abdominal findings. Patient was brought in via EMS being bagged with reportedly having a pulse. Upon presentation to the emergency department no pulse was noted and CPR was initiated. 2 rounds of epinephrine were given. ROSC was achieved and he was intubated for airway protection. He was admitted to the ICU with hypotension and started on Levophed and acute anemia not requiring transfusion at this time. Stools were noted to be maroon-colored.   He was started on Protonix and general surgery was consulted. Blood cultures were obtained and he was started on antibiotics for HCAP/sepsis with septic shock. Nephrology and infectious disease as well as hematology/oncology were consulted. Cardiology was asked see the patient for further recommendations and management post cardiac arrest.  An echocardiogram was ordered by the ICU team, pending results. Please note: past medical records were reviewed per electronic medical record (EMR) - see detailed reports under Past Medical/ Surgical History. Past Medical History:    1. Paroxysmal atrial fibrillation  2. HFpEF  3. TTE 3/7/2022 [facility]): Normal left  ventricle size. The left ventricle has low normal systolic      function. The estimated left ventricular ejection fraction is 50-55%. 05) Wall thickness is increased consistent with right ventricular  hypertrophy. The right ventricle has moderately decreased function. 06) Mild to moderate mitral regurgitation. 07) Mitral annular calcification. 08) Severe tricuspid regurgitation. 09) Moderate pulmonic regurgitation. 10) Moderate left atrial enlargement. 11) The left atrium is mildly enlarged. 12) The right atrium is moderately enlarged. 13) Borderline pulmonary hypertension; the estimated pulmonary artery      systolic pressure is 41 mmHg. 14) Diastolic function could not be assessed due to arrhythmia. 4. Hypertension  5. Hyperlipidemia  6. Type 2 diabetes mellitus, poorly controlled history of Charcot foot and great toe amputation  7. End-stage renal disease on hemodialysis  8. Chronic anemia  9. Malnutrition requiring PEG tube  10. Hypothyroidism  11. Chronic nonhealing wounds with sacral decubitus ulcer  12. History of tonsillectomy, right joint replacement          Medications Prior to admit:  Prior to Admission medications    Medication Sig Start Date End Date Taking?  Authorizing Provider   insulin glargine (LANTUS) 100 UNIT/ML injection vial Inject 7 Units into the skin daily Give in a.m. Yes Historical Provider, MD   levETIRAcetam (KEPPRA) 250 MG tablet Take 250 mg by mouth daily    Historical Provider, MD   levETIRAcetam (KEPPRA) 500 MG tablet Take 500 mg by mouth nightly    Historical Provider, MD   apixaban (ELIQUIS) 5 MG TABS tablet Take 5 mg by mouth 2 times daily    Historical Provider, MD   chlorhexidine (PERIDEX) 0.12 % solution Take 15 mLs by mouth 2 times daily    Historical Provider, MD   nystatin (MYCOSTATIN) 955064 UNIT/ML suspension Take 500,000 Units by mouth 3 times daily    Historical Provider, MD   oxyCODONE (OXYCONTIN) 10 MG extended release tablet Take 10 mg by mouth every 12 hours. Historical Provider, MD   NYSTATIN IN AQUAPHOR OINTMENT Apply topically 2 times daily    Historical Provider, MD   clonazePAM (KLONOPIN) 0.5 MG tablet Take 0.5 mg by mouth 2 times daily as needed. Historical Provider, MD   insulin glargine (LANTUS) 100 UNIT/ML injection vial Inject 40 Units into the skin nightly     Historical Provider, MD   dronabinol (MARINOL) 2.5 MG capsule Take 2.5 mg by mouth 2 times daily (before meals). Patient not taking: Reported on 5/11/2022    Historical Provider, MD   alteplase (CATHFLO) 2 MG injection 2 mg by IntraCATHeter route as needed Per cathflo protocol at [facility] Provider, MD   cloNIDine (CATAPRES) 0.1 MG tablet Take 0.1 mg by mouth 2 times daily    Historical Provider, MD   HYDROmorphone (DILAUDID) 2 MG tablet Take 2 mg by mouth every 8 hours as needed for Pain.     Historical Provider, MD   anticoagulant sodium citrate 4 GM/100ML SOLN by CRRT route continuous    Historical Provider, MD   carvedilol (COREG) 3.125 MG tablet Take 3.125 mg by mouth 2 times daily (with meals)    Historical Provider, MD   hydrALAZINE (APRESOLINE) 25 MG tablet Take 100 mg by mouth 3 times daily     Historical Provider, MD   docusate sodium (COLACE) 100 MG capsule Take 100 mg by mouth 2 times daily    Historical Provider, MD   levETIRAcetam (KEPPRA) 500 MG tablet Take 500 mg by mouth three times a week Monday, Wednesday and Friday after dialysis    Historical Provider, MD   aspirin 81 MG EC tablet Take 81 mg by mouth daily    Historical Provider, MD   memantine (NAMENDA) 5 MG tablet Take 5 mg by mouth 2 times daily    Historical Provider, MD   glucagon, rDNA, 1 MG injection as needed for Low blood sugar    Historical Provider, MD   dextrose 50 % solution Infuse 25 g intravenously as needed    Historical Provider, MD   glucose (GLUTOSE) 40 % GEL Take 15 g by mouth See Admin Instructions    Historical Provider, MD   mineral oil-hydrophilic petrolatum (AQUAPHOR) ointment Apply topically as needed for Dry Skin Apply topically as needed.     Historical Provider, MD   ondansetron (ZOFRAN-ODT) 4 MG disintegrating tablet Take 4 mg by mouth every 8 hours as needed for Nausea or Vomiting    Historical Provider, MD   darbepoetin kandi-polysorbate (ARANESP) 60 MCG/0.3ML SOSY injection Inject 60 mcg into the skin every 14 days    Historical Provider, MD   levothyroxine (SYNTHROID) 175 MCG tablet Take 175 mcg by mouth Daily    Historical Provider, MD   rosuvastatin (CRESTOR) 5 MG tablet Take 5 mg by mouth daily    Historical Provider, MD   ondansetron (ZOFRAN) 4 MG/2ML injection Infuse 4 mg intravenously every 8 hours as needed for Nausea or Vomiting    Historical Provider, MD   omeprazole (PRILOSEC) 20 MG delayed release capsule Take 40 mg by mouth 2 times daily     Historical Provider, MD   VORTIoxetine HBr (TRINTELLIX) 20 MG TABS tablet Take 20 mg by mouth daily     Historical Provider, MD   polyethylene glycol (MIRALAX) 17 g PACK packet Take 17 g by mouth daily    Historical Provider, MD   B complex-vitamin C-folic acid (NEPHRO-JESSY) 1 MG tablet Take 1 tablet by mouth daily (with breakfast)    Historical Provider, MD   lactulose (CEPHULAC) 20 g packet Take 20 g by mouth 2 times daily    Historical Provider, MD   insulin lispro (HUMALOG) 100 UNIT/ML injection vial Inject into the skin every 6 hours Sliding scale  150-199 1 unit  200-249 2 units  250-299 3 units  300-349 4 units  Above 350 call physician     Historical Provider, MD   dilTIAZem (CARDIZEM) 60 MG tablet Take 60 mg by mouth 3 times daily     Historical Provider, MD   ARIPiprazole (ABILIFY) 5 MG tablet Take 5 mg by mouth daily    Historical Provider, MD   acetaminophen (TYLENOL) 325 MG tablet Take 650 mg by mouth every 6 hours as needed for Pain    Historical Provider, MD       Current Medications:    Current Facility-Administered Medications: norepinephrine (LEVOPHED) 16 mg in dextrose 5 % 250 mL infusion, 1-100 mcg/min, IntraVENous, Continuous  fentaNYL 10 mcg/ml in 0.9%  ml infusion,  mcg/hr, IntraVENous, Continuous  midazolam (VERSED) 1 mg/mL in D5W infusion, 1-10 mg/hr, IntraVENous, Continuous  polyethylene glycol (GLYCOLAX) packet 17 g, 17 g, Oral, Daily  pantoprazole (PROTONIX) 40 mg in sodium chloride (PF) 10 mL injection, 40 mg, IntraVENous, Daily  chlorhexidine (PERIDEX) 0.12 % solution 15 mL, 15 mL, Mouth/Throat, BID  senna (SENOKOT) 8.8 MG/5ML syrup 8.8 mg, 5 mL, Per G Tube, Nightly  docusate (COLACE) 50 MG/5ML liquid 100 mg, 100 mg, Per G Tube, BID  levothyroxine (SYNTHROID) tablet 175 mcg, 175 mcg, Per G Tube, Daily  perflutren lipid microspheres (DEFINITY) injection 1.65 mg, 1.5 mL, IntraVENous, ONCE PRN  [Held by provider] nitroglycerin (NITRO-BID) 2 % ointment 0.5 inch, 0.5 inch, Topical, 4 times per day  insulin glargine (LANTUS) injection vial 20 Units, 20 Units, SubCUTAneous, BID  insulin lispro (HUMALOG) injection vial 0-12 Units, 0-12 Units, SubCUTAneous, Q6H  glucose chewable tablet 16 g, 4 tablet, Oral, PRN  dextrose bolus 10% 125 mL, 125 mL, IntraVENous, PRN **OR** dextrose bolus 10% 250 mL, 250 mL, IntraVENous, PRN  glucagon (rDNA) injection 1 mg, 1 mg, IntraMUSCular, PRN  dextrose 5 % solution, 100 mL/hr, IntraVENous, PRN  levETIRAcetam To get better and follow your care plan as instructed. * 133   K 4.5 4.1   CO2 26 27   BUN 61* 65*   CREATININE 2.3* 2.4*   LABGLOM 28 27   CALCIUM 11.1* 10.5*     Mag:   Recent Labs     05/11/22  0830   MG 2.5     Phos:   Recent Labs     05/11/22  0830   PHOS 3.0     TSH:   Recent Labs     05/11/22 0032   TSH 2.700       Recent Labs     05/11/22 0032   PROTIME 13.0*   INR 1.2     APTT:  Recent Labs     05/11/22 0032   APTT 32.8     LIVER PROFILE:  Recent Labs     05/11/22 0032   AST 31   ALT 30   LABALBU 2.4*       Assessment and Plan to follow per Dr Fabián Augustin   Electronically signed by Albert Medley, [address] on 5/11/2022 at 11:33 AM    The above documentation has been prepared under my direction and personally reviewed by me in its entirety. I confirm that the note above accurately reflects all work, treatment, procedures, and medical decision making performed by me. The patient's history was independently obtained. The patient was independently examined. Electrocardiogram, prior and present cardiovascular assessment, and laboratory studies were reviewed. The patient is a 67-year-old white male with no association to [facility] and present residence at [facility]. He is presently unable to provide history with history exclusively obtained from review of medical records. He has a reported history of persistent/permanent atrial fibrillation with a rate control and anticoagulation strategy with an outpatient echocardiogram of March, 2022 reportedly demonstrating evidence of a normal-sized left ventricular chamber with left ventricular systolic function at the lower limits of normal estimated ejection fraction 50-55% with reported right ventricular hypertrophy and right ventricular dysfunction with mild to moderate mitral regurgitation and severe tricuspid regurgitation with mild pulmonary hypertension right ventricular systolic pressures of approximately 40 mmHg.   Patient has a reported history of hypertension, diabetes with as that of his past medical history and review of systems as documented. On examination he is presently intubated and sedated. He is presently hemodynamically stable with vital signs as documented. Jugular venous pressure appears normal with no identified carotid bruits. Lung fields are essentially clear to auscultation with slightly coarse breath sounds. Cardiac examination is notable for an irregular rhythm consistent with that of his atrial arrhythmias with no cardiac murmur presently appreciated. A benign abdominal examination is present no significant peripheral edema noted    Diagnostic Assessment and Plan: On a clinical basis, the patient presents with a picture compatible with that of sepsis and hypercapnic respiratory failure and a possible primary respiratory arrest.  Presently no evidence of acute electrocardiographic abnormalities are noted with his high-sensitivity troponin levels nonspecific in nature, especially in view of his chronic kidney disease and the possibility of a type II event secondary to increased myocardial oxygen demands not entirely excluded. On this basis, supportive care will be maintained and predominately directed by that of the pulmonary/critical care, infectious disease and nephrology services with plans of an echocardiogram to assess the presence or absence of regional wall motion of base and assessment of left ventricular systolic function to guide additional therapeutic recommendations for cardiovascular standpoint. Appropriate monitoring of his volume status will be necessary as well as that of needs of appropriate nutritional support to assist fluid mobilization reduce risk of progressive debilitation. Aggressive risk factor modification of blood pressure, diabetes and serum lipids will remain essential to reducing risk of future atherosclerotic development.   His overall prognosis appears guarded with recommended palliative care assessment to assist family decision related to advanced directives. Thank you for allowing me to participate in your patient's care. Please feel free to contact me if you have any questions or concerns. Aye Quick.  Sarahy Del Toro, [address] [facility]

## 2022-05-11 NOTE — CONSULTS
Critical Care Admit/Consult Note         Patient - Daniel Pack   MRN -  [id number]   Harleen # - [de-identified]   - 1955      Date of Admission -  5/10/2022 11:58 PM  Date of evaluation -  2022  0209/0209-A   Hospital Day - 0            ADMIT/CONSULT DETAILS     Reason for Admit/Consult   Status postcardiac arrest    Catrina Mcgowan MD  Primary Care Physician - Julianna Delarosa MD     ICU attending - Dr. Alondra Menendez    [facility]   The patient is a 74 y.o. male resident of [facility] with significant past medical history of atrial fibrillation on Eliquis and ASA, anxiety, anemia, CHF, insulin dependent type II diabetes, CKD with on hemodialysis, sacral decubitus ulcer with wound VAC, hyperlipidemia, hypothyroidism presents from the ED status postcardiac arrest.  Patient had arrived via EMS from nursing home facility with complaints of unresponsiveness. At that time patient had pulses. Subsequently the patient lost pulses while in the ED and CPR was initiated. 2 rounds of epinephrine were given. ROSC was achieved. Patient was intubated for airway protection. Patient's vital signs in the ED were significant for hypotension. Patient was started on Levophed through a central line. Additionally he was found to be anemic with an initial hemoglobin of 8.8. Repeat hemoglobin is 8.7. Patient did not require blood transfusion. He was noted to have maroon-colored stools concerning for a GI bleed and was started on Protonix. Additionally lab work showed evidence of elevated white blood cell count and pneumonia on both chest x-ray and CT scan. Patient was started on vancomycin. Blood cultures were ordered.         Awake and following commands: No  Current Ventilation: - Ventilator Settings:    Vt (Set, mL): 450 mL  Resp Rate (Set): 20 bmp  FiO2 : 50 %    PEEP/CPAP (cmH2O): 8  Pressure Support: 0 cmH20  Sedation: fentanyl and versed  Paralyzed: No  Vasopressors: norepinephrine    Past Medical History         Diagnosis Date    A-fib ([facility]     TRUMAN (acute kidney injury) ([facility]     Anemia     Anxiety     Bipolar 1 disorder ([facility]     Charcot foot due to diabetes mellitus ([facility]     CHF (congestive heart failure) ([facility])     CKD (chronic kidney disease)     Diabetes mellitus ([facility]     Dialysis patient ([facility]     Hyperlipidemia     Hypertension     Right sided weakness     Sacral decubitus ulcer     Seizure ([facility]         Past Surgical History           Procedure Laterality Date    AMPUTATION Right 2017    right toe    DIALYSIS CATHETER INSERTION  2020    right chest    HYDROCELE EXCISION  2010    JOINT REPLACEMENT Right 2011    TONSILLECTOMY      UPPER GASTROINTESTINAL ENDOSCOPY N/A 4/27/2022    EGD BIOPSY performed by Wellington Araya MD at [place] [id number]  2010    venous ablation       SOCIAL AND OCCUPATIONAL HEALTH:    Social History       Tobacco History       Smoking Status  Former Smoker      Smokeless Tobacco Use  Never Used                    Current Medications   Current Medications    vancomycin  20 mg/kg IntraVENous Once       IV Drips/Infusions   norepinephrine 15 mcg/min (05/11/22 0100)    fentaNYL 150 mcg/hr (05/11/22 0129)    midazolam 2 mg/hr (05/11/22 0208)     Home Medications  Medications Prior to Admission: levETIRAcetam (KEPPRA) 250 MG tablet, Take 250 mg by mouth daily  levETIRAcetam (KEPPRA) 500 MG tablet, Take 500 mg by mouth nightly  apixaban (ELIQUIS) 5 MG TABS tablet, Take 5 mg by mouth 2 times daily  chlorhexidine (PERIDEX) 0.12 % solution, Take 15 mLs by mouth 2 times daily  nystatin (MYCOSTATIN) 033592 UNIT/ML suspension, Take 500,000 Units by mouth 3 times daily  oxyCODONE (OXYCONTIN) 10 MG extended release tablet, Take 10 mg by mouth every 12 hours. NYSTATIN IN AQUAPHOR OINTMENT, Apply topically 2 times daily  clonazePAM (KLONOPIN) 0.5 MG tablet, Take 0.5 mg by mouth 2 times daily as needed.   insulin glargine (LANTUS) 100 UNIT/ML injection vial, Inject 26 Units into the skin daily  dronabinol (MARINOL) 2.5 MG capsule, Take 2.5 mg by mouth 2 times daily (before meals). alteplase (CATHFLO) 2 MG injection, 2 mg by IntraCATHeter route as needed Per cathflo protocol at [facility]  cloNIDine (CATAPRES) 0.1 MG tablet, Take 0.1 mg by mouth 2 times daily  HYDROmorphone (DILAUDID) 2 MG tablet, Take 2 mg by mouth every 8 hours as needed for Pain. anticoagulant sodium citrate 4 GM/100ML SOLN, by CRRT route continuous  carvedilol (COREG) 3.125 MG tablet, Take 3.125 mg by mouth 2 times daily (with meals)  hydrALAZINE (APRESOLINE) 25 MG tablet, Take 25 mg by mouth 3 times daily  docusate sodium (COLACE) 100 MG capsule, Take 100 mg by mouth 2 times daily  levETIRAcetam (KEPPRA) 500 MG tablet, Take 500 mg by mouth three times a week Monday, Wednesday and Friday after dialysis  aspirin 81 MG EC tablet, Take 81 mg by mouth daily  memantine (NAMENDA) 5 MG tablet, Take 5 mg by mouth 2 times daily  glucagon, rDNA, 1 MG injection, as needed for Low blood sugar  dextrose 50 % solution, Infuse 25 g intravenously as needed  glucose (GLUTOSE) 40 % GEL, Take 15 g by mouth See Admin Instructions  mineral oil-hydrophilic petrolatum (AQUAPHOR) ointment, Apply topically as needed for Dry Skin Apply topically as needed.   ondansetron (ZOFRAN-ODT) 4 MG disintegrating tablet, Take 4 mg by mouth every 8 hours as needed for Nausea or Vomiting  darbepoetin knadi-polysorbate (ARANESP) 60 MCG/0.3ML SOSY injection, Inject 60 mcg into the skin every 14 days  levothyroxine (SYNTHROID) 175 MCG tablet, Take 175 mcg by mouth Daily  rosuvastatin (CRESTOR) 5 MG tablet, Take 5 mg by mouth daily  ondansetron (ZOFRAN) 4 MG/2ML injection, Infuse 4 mg intravenously every 8 hours as needed for Nausea or Vomiting  omeprazole (PRILOSEC) 20 MG delayed release capsule, Take 20 mg by mouth daily  VORTIoxetine HBr (TRINTELLIX) 20 MG TABS tablet, Take 10 mg by mouth daily  polyethylene glycol (MIRALAX) 17 g PACK packet, Take 17 g by mouth daily  B complex-vitamin C-folic acid (NEPHRO-JESSY) 1 MG tablet, Take 1 tablet by mouth daily (with breakfast)  lactulose (CEPHULAC) 20 g packet, Take 20 g by mouth 2 times daily  insulin lispro (HUMALOG) 100 UNIT/ML injection vial, Inject into the skin 4 times daily (before meals and nightly) Sliding scale 150-199 1 unit 200-249 2 units 250-299 3 units 300-349 4 units Above 350 call physician  dilTIAZem (CARDIZEM) 60 MG tablet, Take 60 mg by mouth 4 times daily  ARIPiprazole (ABILIFY) 5 MG tablet, Take 5 mg by mouth daily  acetaminophen (TYLENOL) 325 MG tablet, Take 650 mg by mouth every 6 hours as needed for Pain    Diet/Nutrition   No diet orders on file    Allergies   Other    Social History   Tobacco   reports that he has quit smoking. He has never used smokeless tobacco.    Alcohol     reports previous alcohol use. Occupational history/exposure? Family History   No family history on file. ROS   Review of Systems   Unable to perform ROS: Mental status change       Mechanical Ventilation Data   VENT SETTINGS (Comprehensive)  Vent Information  Vent Mode: AC/VC  Ventilator Initiate: Yes  Additional Respiratory Assessments  Pulse: 90  Resp: 19  SpO2: 96 %  End Tidal CO2: 42 (%)    ABG  Lab Results   Component Value Date    PH 7.208 2022    PCO2 72.9 2022    PO2 298.4 2022    HCO3 28.4 2022    O2SAT 99.9 2022     Lab Results   Component Value Date    MODE AC 2022       Vitals    height is 6' 5\" (1.956 m) and weight is 206 lb 9.1 oz (93.7 kg). His infrared temperature is 96.6 °F (35.9 °C). His blood pressure is 80/63 and his pulse is 90. His respiration is 19 and oxygen saturation is 96%.        Temperature Range: Temp: 96.6 °F (35.9 °C) Temp  Av.4 °F (35.2 °C)  Min: 94.4 °F (34.7 °C)  Max: 96.6 °F (35.9 °C)  BP Range:  Systolic (07WLL), OPO:613 , Min:74 , JFW:851     Diastolic (05TEY), LR, Min:63, Max:79    Pulse Range: Pulse  Av.1 Min: 68  Max: 106  Respiration Range: Resp  Av.9  Min: 19  Max: 20  Current Pulse Ox[de-identified]  SpO2: 96 %  24HR Pulse Ox Range:  SpO2  Av.2 %  Min: 95 %  Max: 100 %  Oxygen Amount and Delivery:      I/O (24 Hours)  Patient Vitals for the past 8 hrs:   BP Temp Temp src Pulse Resp SpO2 Height Weight   22 0644 80/63 -- -- 90 -- -- -- --   22 80/63 96.6 °F (35.9 °C) Infrared 95 19 96 % 6' 5\" (1.956 m) 206 lb 9.1 oz (93.7 kg)   22 95/69 -- -- 77 -- 100 % -- --   22 0330 -- 95.2 °F (35.1 °C) Rectal -- -- -- -- --   22 0245 116/79 -- -- 82 -- 99 % -- 212 lb (96.2 kg)   22 0235 -- -- -- 90 20 100 % -- --   22 0230 116/79 -- -- 89 20 100 % -- --   22 0130 129/77 94.4 °F (34.7 °C) Rectal 89 -- 100 % -- --   22 0100 118/77 -- -- 96 20 100 % -- --   22 0051 85/72 -- -- 105 20 100 % -- --   22 0042 (!) 74/67 -- -- 86 -- 100 % -- --   22 0030 113/68 -- -- 94 20 100 % -- --   22 0021 -- -- -- 85 20 99 % -- --   22 0018 116/78 -- -- 106 20 100 % -- --   22 0014 133/75 -- -- 106 20 95 % -- --     No intake or output data in the 24 hours ending 22 06  No intake/output data recorded. Patient Vitals for the past 96 hrs (Last 3 readings):   Weight   22 0630 206 lb 9.1 oz (93.7 kg)   22 0245 212 lb (96.2 kg)       Exam   Physical Exam  Vitals reviewed. Constitutional:       Interventions: He is sedated and intubated. HENT:      Head: Normocephalic and atraumatic. Nose: No congestion or rhinorrhea. Eyes:      General: No scleral icterus. Right eye: No discharge. Left eye: No discharge. Extraocular Movements: Extraocular movements intact. Pupils: Pupils are equal, round, and reactive to light. Cardiovascular:      Rate and Rhythm: Normal rate and regular rhythm. Heart sounds: Normal heart sounds. No murmur heard. No friction rub. No gallop.     Pulmonary:      Effort: He is intubated. Breath sounds: Rhonchi present. No wheezing or rales. Abdominal:      General: Abdomen is flat. There is no distension. Tenderness: There is no abdominal tenderness. Genitourinary:     Rectum: Guaiac result negative. Comments: Anal fissure and sacral decubitus ulcer     Musculoskeletal:         General: No deformity or signs of injury. Cervical back: Normal range of motion and neck supple. No rigidity or tenderness. Right lower leg: Edema present. Left lower leg: Edema present. Comments: Chronic skin changes to the LE. Skin:     General: Skin is warm and dry. Coloration: Skin is not jaundiced or pale. Data   Old records and images have been reviewed    Lab Results   CBC     Lab Results   Component Value Date    WBC 22.6 05/11/2022    RBC 3.09 05/11/2022    HGB 8.7 05/11/2022    HCT 29.4 05/11/2022     05/11/2022    MCV 99.7 05/11/2022    MCH 28.5 05/11/2022    MCHC 28.6 05/11/2022    RDW 16.8 05/11/2022    LYMPHOPCT 2.5 05/11/2022    MONOPCT 3.1 05/11/2022    BASOPCT 0.1 05/11/2022    MONOSABS 0.71 05/11/2022    LYMPHSABS 0.57 05/11/2022    EOSABS 0.01 05/11/2022    BASOSABS 0.03 05/11/2022       BMP   Lab Results   Component Value Date     05/11/2022    K 4.5 05/11/2022    CL 94 05/11/2022    CO2 26 05/11/2022    BUN 61 05/11/2022    CREATININE 2.3 05/11/2022    GLUCOSE 341 05/11/2022    CALCIUM 11.1 05/11/2022       LFTS  Lab Results   Component Value Date    ALKPHOS 591 05/11/2022    ALT 30 05/11/2022    AST 31 05/11/2022    PROT 6.9 05/11/2022    BILITOT 0.3 05/11/2022    LABALBU 2.4 05/11/2022       INR  Recent Labs     05/11/22 0032   PROTIME 13.0*   INR 1.2       APTT  Recent Labs     05/11/22 0032   APTT 32.8       Lactic Acid  No results found for: LACTA     BNP   No results for input(s): BNP in the last 72 hours. Cultures     No results for input(s): BC in the last 72 hours.   No results for input(s): Mony Menchaca in the last 72 hours. No results for input(s): LABURIN in the last 72 hours. Radiology   CT Chest: Findings suggesting pulmonary edema versus a multifocal pneumonia, with associated bilateral pleural effusions. SYSTEMS ASSESSMENT    Neuro  Pt intubated and sedated   Baseline is alert with confusion   Seizure disorder on Keppra     Respiratory  Acute hypercapnic respiratory failure with hypoxia  Pt intubated on 05/10/22  ABGs-adjust vent settings as indicated     Cardiovascular  Hx of afib on Eliquis   Elevated trop likely secondary to CKD   EKG concerning for heart block   Unclear as to when pt's most recent echo was given multiple different outpatient visits   Cardiology consult     GI  Hx of PEG tube   Concern for GI bleed with maroon colored stools  Anal fissure  Surgery consult recommended PPI     Renal  Hx of CKD on dialysis M/W/F  Hypercalcemia of unknown etiology. Nephrology consult     Infectious disease  Sepsis with septic shock   Continue leveophed and attempt to wean   Cortisol ordered in the ED: 31   Sacral decubitus ulcer   Pneumonia   Blood cultures   Urine legionella and strep   Infectious disease consult     Heme/Onc  Maroon colored stools noted today with Hgb of 8.7  Monitor HH   Surgery consult     Endocrine  Hx of Type II DM   Hyperglycemia   Lantus 20 BID   Hypothyroidism; resume home meds and check levels in 48 hours. Social/Spiritual/DNR/Other  Code status: Full code  Diet No diet orders on file  Stress ulcer prophylaxis:   DVT prophylaxis:  none; pt is on elqiuis will hold now. May need Heprain will defer to cardiology and reassess   Consultations needed: Yes  Transfer out of ICU today? No    Lines/catheters  Date 05/10  ETT  CVC triple lumen R femoral vein   Urinary cath        Parul Domingo DO  6:55 AM  05/11/22          I personally saw, examined and provided care for the patient. Radiographs, labs and medication list were reviewed by me independently.  I spoke with bedside nursing, therapists and consultants. Critical care services and times documented are independent of procedures and multidisciplinary rounds with Residents. Additionally comprehensive, multidisciplinary rounds were conducted with the MICU team. The case was discussed in detail and plans for care were established. Review of Residents documentation was conducted and revisions were made as appropriate. I agree with the above documented exam, problem list and plan of care.    Ori Maradiaga MD   CCT excluding procedures :36'

## 2022-05-11 NOTE — H&P
History & Physical      PCP: Mason Daugherty MD    Date of Admission: 5/10/2022    Date of Service: Pt seen/examined on 5/10/2022 and is     admitted to Inpatient with expected LOS greater than two midnights due to medical therapy. Chief Complaint:  had concerns including Loss of Consciousness (arrived from [facility] via [facility] ems unresponsive). History Of Present Illness:    Mr. Tiesha Bhatt, a 74y.o. year old male  who  has a past medical history of A-fib ([address] TRUMAN (acute kidney injury) ([address] Anemia, Anxiety, Bipolar 1 disorder ([address] Charcot foot due to diabetes mellitus ([address] CHF (congestive heart failure) ([address] CKD (chronic kidney disease), Diabetes mellitus ([address] Dialysis patient ([address] Hyperlipidemia, Hypertension, Right sided weakness, Sacral decubitus ulcer, and Seizure ([address] This is a 76-year-old male with progressive dementia and spinal stenosis. Has been bedbound for a long time. He has had in the past bouts of kidney failure that did not require long-term resuscitation with dialysis however he was at acute care for decompensation from his large sacral decubitus and acute renal failure that did require renal replacement therapy of hemodialysis. He finishes antibiotics. His sacral wound was slow to heal because of poor oral intake and he was given a PEG tube for nutritional supplementation purposes since his wife wanted to. Patient had been stable till about 48 hours ago. Monday he was noticed to have a large amount of bloody stool. He was started on an intravenous proton pump inhibitor and surgery services were asked to see him. His anticoagulation was held. Patient was being watched and he did not have any more episodes. Yesterday he had an increase in his WBCs without having any localizing symptoms. Infectious disease did see him and started him immediately on broad-spectrum antibiotics.   Patient became unresponsive and virtually reportedly evening more towards the early hours of this morning. Fluids were started unfortunately patient could not be resuscitated despite best efforts of the on-call intensive virtual specialist.  Patient was sent over to acute care where he was intubated for cardiorespiratory failure. He was found to be in cardiac arrest.  Appropriate O2 resuscitation resulted in resumption of heart rhythm. Patient was started on pressors moved to the ICU  Imaging reveals a significant pleural effusion and HCAP        Past Medical History:        Diagnosis Date    [facility]fib ([facility]     TRUMAN (acute kidney injury) ([facility]     Anemia     Anxiety     Bipolar 1 disorder ([facility]     Charcot foot due to diabetes mellitus ([facility]     CHF (congestive heart failure) ([facility]     CKD (chronic kidney disease)     Diabetes mellitus ([facility]     Dialysis patient ([facility]     Hyperlipidemia     Hypertension     Right sided weakness     Sacral decubitus ulcer     Seizure [facility])        Past Surgical History:        Procedure Laterality Date    AMPUTATION Right 2017    right toe    DIALYSIS CATHETER INSERTION  2020    right chest    HYDROCELE EXCISION  2010    JOINT REPLACEMENT Right 2011    TONSILLECTOMY      UPPER GASTROINTESTINAL ENDOSCOPY N/A 4/27/2022    EGD BIOPSY performed by Cece Woodward MD at [place] [id number]  2010    venous ablation       Medications Prior to Admission:      Prior to Admission medications    Medication Sig Start Date End Date Taking? Authorizing Provider   insulin glargine (LANTUS) 100 UNIT/ML injection vial Inject 7 Units into the skin daily Give in a.m.    Yes Historical Provider, MD   levETIRAcetam (KEPPRA) 250 MG tablet Take 250 mg by mouth daily    Historical Provider, MD   levETIRAcetam (KEPPRA) 500 MG tablet Take 500 mg by mouth nightly    Historical Provider, MD   apixaban (ELIQUIS) 5 MG TABS tablet Take 5 mg by mouth 2 times daily    Historical Provider, MD   chlorhexidine (PERIDEX) 0.12 % solution Take 15 mLs by mouth 2 times daily    Historical Provider, MD   nystatin (MYCOSTATIN) 128216 UNIT/ML suspension Take 500,000 Units by mouth 3 times daily    Historical Provider, MD   oxyCODONE (OXYCONTIN) 10 MG extended release tablet Take 10 mg by mouth every 12 hours. Historical Provider, MD   NYSTATIN IN AQUAPHOR OINTMENT Apply topically 2 times daily    Historical Provider, MD   clonazePAM (KLONOPIN) 0.5 MG tablet Take 0.5 mg by mouth 2 times daily as needed. Historical Provider, MD   insulin glargine (LANTUS) 100 UNIT/ML injection vial Inject 40 Units into the skin nightly     Historical Provider, MD   dronabinol (MARINOL) 2.5 MG capsule Take 2.5 mg by mouth 2 times daily (before meals). Patient not taking: Reported on 5/11/2022    Historical Provider, MD   alteplase (CATHFLO) 2 MG injection 2 mg by IntraCATHeter route as needed Per cathflo protocol at [facility] Provider, MD   cloNIDine (CATAPRES) 0.1 MG tablet Take 0.1 mg by mouth 2 times daily    Historical Provider, MD   HYDROmorphone (DILAUDID) 2 MG tablet Take 2 mg by mouth every 8 hours as needed for Pain.     Historical Provider, MD   anticoagulant sodium citrate 4 GM/100ML SOLN by CRRT route continuous    Historical Provider, MD   carvedilol (COREG) 3.125 MG tablet Take 3.125 mg by mouth 2 times daily (with meals)    Historical Provider, MD   hydrALAZINE (APRESOLINE) 25 MG tablet Take 100 mg by mouth 3 times daily     Historical Provider, MD   docusate sodium (COLACE) 100 MG capsule Take 100 mg by mouth 2 times daily    Historical Provider, MD   levETIRAcetam (KEPPRA) 500 MG tablet Take 500 mg by mouth three times a week Monday, Wednesday and Friday after dialysis    Historical Provider, MD   aspirin 81 MG EC tablet Take 81 mg by mouth daily    Historical Provider, MD   memantine (NAMENDA) 5 MG tablet Take 5 mg by mouth 2 times daily    Historical Provider, MD   glucagon, rDNA, 1 MG injection as needed for Low blood sugar    Historical Provider, MD   dextrose 50 % solution Infuse 25 g intravenously as needed    Historical Provider, MD   glucose (GLUTOSE) 40 % GEL Take 15 g by mouth See Admin Instructions    Historical Provider, MD   mineral oil-hydrophilic petrolatum (AQUAPHOR) ointment Apply topically as needed for Dry Skin Apply topically as needed.     Historical Provider, MD   ondansetron (ZOFRAN-ODT) 4 MG disintegrating tablet Take 4 mg by mouth every 8 hours as needed for Nausea or Vomiting    Historical Provider, MD   darbepoetin kanid-polysorbate (ARANESP) 60 MCG/0.3ML SOSY injection Inject 60 mcg into the skin every 14 days    Historical Provider, MD   levothyroxine (SYNTHROID) 175 MCG tablet Take 175 mcg by mouth Daily    Historical Provider, MD   rosuvastatin (CRESTOR) 5 MG tablet Take 5 mg by mouth daily    Historical Provider, MD   ondansetron (ZOFRAN) 4 MG/2ML injection Infuse 4 mg intravenously every 8 hours as needed for Nausea or Vomiting    Historical Provider, MD   omeprazole (PRILOSEC) 20 MG delayed release capsule Take 40 mg by mouth 2 times daily     Historical Provider, MD   VORTIoxetine HBr (TRINTELLIX) 20 MG TABS tablet Take 20 mg by mouth daily     Historical Provider, MD   polyethylene glycol (MIRALAX) 17 g PACK packet Take 17 g by mouth daily    Historical Provider, MD   B complex-vitamin C-folic acid (NEPHRO-JESSY) 1 MG tablet Take 1 tablet by mouth daily (with breakfast)    Historical Provider, MD   lactulose (CEPHULAC) 20 g packet Take 20 g by mouth 2 times daily    Historical Provider, MD   insulin lispro (HUMALOG) 100 UNIT/ML injection vial Inject into the skin every 6 hours Sliding scale  150-199 1 unit  200-249 2 units  250-299 3 units  300-349 4 units  Above 350 call physician     Historical Provider, MD   dilTIAZem (CARDIZEM) 60 MG tablet Take 60 mg by mouth 3 times daily     Historical Provider, MD   ARIPiprazole (ABILIFY) 5 MG tablet Take 5 mg by mouth daily    Historical Provider, MD   acetaminophen (TYLENOL) 325 MG tablet Take 650 mg by mouth every 6 hours as needed for Pain    Historical Provider, MD       Allergies: Other    Social History:    TOBACCO:   reports that he has quit smoking. He has never used smokeless tobacco.  ETOH:   reports previous alcohol use. Family History:    Reviewed in detail and negative for DM, CAD, Cancer, CVA. Positive as follows\"  No family history on file. REVIEW OF SYSTEMS:   Pertinent positives as noted in the HPI. All other systems reviewed and negative. PHYSICAL EXAM:  BP (!) 96/47   Pulse 96   Temp 97.7 °F (36.5 °C) (Infrared)   Resp 18   Ht 6' 5\" (1.956 m)   Wt 206 lb 9.1 oz (93.7 kg)   SpO2 98%   BMI 24.50 kg/m²   General appearance: Orally intubated HEENT: eyes are closed   neck:   Respiratory: Significantly decreased bibasilar bases  Cardiovascular: Irregular heart rate rhythm  Abdomen: Nontender positive for distention positive for PEG  Musculoskeletal: Some thickening of the skin on his lower extremities. Dorsalis pedis hard to palpate   skin: Large clean sacral decubitus neurologic: Unresponsive due to sedation           CBC:   Recent Labs     05/11/22  0032 05/11/22  0258 05/11/22  0830   WBC 22.6*  --  21.5*   RBC 3.09*  --  2.78*   HGB 8.8* 8.7* 7.8*   HCT 30.8* 29.4* 26.8*   MCV 99.7  --  96.4   RDW 16.8*  --  16.7*     --  271     BMP:   Recent Labs     05/11/22  0032 05/11/22  0830   * 133   K 4.5 4.1   CL 94* 95*   CO2 26 27   BUN 61* 65*   CREATININE 2.3* 2.4*   MG  --  2.5   PHOS  --  3.0     LFT:  Recent Labs     05/11/22 0032   PROT 6.9   ALKPHOS 591*   ALT 30   AST 31   BILITOT 0.3     CE:  No results for input(s): CKTOTAL, TROPONINI in the last 72 hours. PT/INR:   Recent Labs     05/11/22 0032   INR 1.2   APTT 32.8     BNP: No results for input(s): BNP in the last 72 hours.   ESR: No results found for: SEDRATE  CRP: No results found for: CRP  D Dimer: No results found for: DDIMER   Folate and B12: No results found for: [id number], No results found for: FOLATE  Lactic Acid:   Lab Results   Component Value Date    LACTA 2.0 05/11/2022     Thyroid Studies:   Lab Results   Component Value Date    TSH 2.700 05/11/2022       Oupatient labs:  Lab Results   Component Value Date    TSH 2.700 05/11/2022    INR 1.2 05/11/2022       Urinalysis:  No results found for: Hernando Alaina, BACTERIA, RBCUA, BLOODU, SPECGRAV, GLUCOSEU    Imaging:  Echo Complete    Result Date: 5/11/2022  Transthoracic Echocardiography Report (TTE)  Demographics   Patient Name   [place] Gender            Male                 Y   Medical Record [id number]      Room Number       3074  Number   Account #      [de-identified]     Procedure Date    05/11/2022   Corporate ID                 Ordering          Jennifer Kirkland                               Physician   Accession      [id number]    Referring  Number                       Physician   Date of Birth  1955    Sonographer       Glenn Sánchez   Age            74 year(s)    Interpreting      [address]                               Physician         Physician Cardiology                                                 Jennifer Brewer MD                                Any Other  Procedure Type of Study   TTE procedure:Echo Complete W/Doppler & Color Flow. Procedure Date Date: 05/11/2022 Start: 10:34 AM Study Location: Portable Technical Quality: Limited visualization Indications:Cardiac arrest. Patient Status: Routine Contrast Medium: Definity. Amount - 2 ml Height: 77 inches Weight: 206 pounds BSA: 2.26 m^2 BMI: 24.43 kg/m^2 HR: 81 bpm BP: 95/45 mmHg  Findings   Left Ventricle  Left ventricle was not well visualized. Micro-bubble contrast injected to enhance left ventricular visualization. Left ventricle is normal in size . No regional wall motion abnormalities seen. Normal left ventricular ejection fraction. Ejection fraction is visually estimated at 60-65%. Indeterminate diastolic function.    Right Ventricle  The right ventricle was not clearly visualized. Grossly normal right ventricular size. Left Atrium  Left atrium was not clearly visualized. Normal sized left atrium. Right Atrium  Right atrium is not clearly visualized. Mitral Valve  The mitral valve was not well visualized. Tricuspid Valve  The tricuspid valve was not well visualized. Aortic Valve  The aortic valve leaflets were not well visualized. Pulmonic Valve  The pulmonic valve was not well visualized. Aorta  Aorta was not clearly visualized. Conclusions   Summary  Technically suboptimal and limited study with imaging limited to apical  window. Left ventricle was not well visualized. Left ventricle is normal in size . No regional wall motion abnormalities seen. Normal left ventricular ejection fraction.    Signature   ----------------------------------------------------------------  Electronically signed by Shaye Christie MD(Interpreting  physician) on 05/11/2022 02:24 PM  ----------------------------------------------------------------  M-Mode/2D Measurements & Calculations    LV Volume Diastolic: 63.7 ml   LV Volume Systolic: 16.6 ml   LV EDV/LV EDV Index: 30.8 ml/14 ml/m^2LV ESV/LV   ESV Index: 11.6 ml/5ml/ m^2   EF Calculated: 62.3 %    LV Length: 7 cm                                  LA volume/Index: 22 ml                                                    /10ml/m^2                                                     IVC Expiration: 1.7 cm  Doppler Measurements & Calculations   MV Peak E-Wave: 0.62   AV Peak Velocity:  LVOT Peak Velocity: 0.87 m/s  m/s                    0.96 m/s           LVOT Mean Velocity: 0.63 m/s  MV Peak A-Wave: 0.3    AV Peak Gradient:  LVOT Peak Gradient: 3 mmHgLVOT  m/s                    3.69 mmHg          Mean Gradient: 1.9 mmHg  MV E/A Ratio: 2.06     AV Mean Velocity:  MV Peak Gradient: 1.9  0.69 m/s  mmHg                   AV Mean Gradient:  MV Mean Gradient: 0.6  2.2 mmHg  mmHg                   AV VTI: 14.1 cm  MV Mean Velocity: 0.36  m/s                    LVOT VTI: 12.9 cm  MV Deceleration Time:  175.4 msec  MV P1/2t: 41.9 msec  MVA by PHT:5.25 cm^2   MV E' Septal Velocity:  0.06 m/s  MV E' Lateral  Velocity: 6 m/s  [email or web address] [id number]    CT ABDOMEN PELVIS WO CONTRAST Additional Contrast? None    Result Date: 5/11/2022  EXAMINATION: CT OF THE ABDOMEN AND PELVIS WITHOUT CONTRAST 5/11/2022 2:08 am TECHNIQUE: CT of the abdomen and pelvis was performed without the administration of intravenous contrast. Multiplanar reformatted images are provided for review. Automated exposure control, iterative reconstruction, and/or weight based adjustment of the mA/kV was utilized to reduce the radiation dose to as low as reasonably achievable. COMPARISON: None. HISTORY: ORDERING SYSTEM PROVIDED HISTORY: Cardiac arrest TECHNOLOGIST PROVIDED HISTORY: Reason for exam:->Cardiac arrest Additional Contrast?->None Decision Support Exception - unselect if not a suspected or confirmed emergency medical condition->Emergency Medical Condition (MA) FINDINGS: Lower Chest: Bilateral pleural effusions are noted, greater on the left than right, with superimposed consolidation. There is no pericardial effusion. Organs: Small amount of perihepatic ascites is noted. No evidence of a hepatic mass. Calcified gallstones are seen within the dependent portions of the gallbladder, without pericholecystic fluid. The spleen, pancreas, adrenal glands and kidneys are within normal limits. Simple cyst within the left upper renal pole. No further follow-up is required for this lesion. GI/Bowel: The small and large bowel, are within normal limits. No bowel obstruction. Gastrostomy tube within the stomach. The appendix is normal. Pelvis: Evaluation of the pelvic organs is limited due to streak artifact from the patient's right hip arthroplasty.   Roberts catheter within the bladder. Peritoneum/Retroperitoneum: No mass, fluid collection or lymphadenopathy. Bones/Soft Tissues: A right hip arthroplasty is seen. Severe degenerative changes of the left hip are noted, in addition to a chronic posttraumatic deformity involving the left femoral neck. Degenerative changes and diffuse bony demineralization are seen. No discrete lytic or blastic osseous lesions are identified. Bridging osteophytes/syndesmophytes are noted, in addition to calcifications along the inter spinous ligament. This is a nonspecific finding which may be suggestive of a seronegative spondyloarthropathy. Atherosclerotic calcifications of the abdominal aorta and its branches, without aneurysm. A right femoral venous catheter is noted. Generalized body wall edema, suggestive of anasarca. Bilateral pleural effusions, greater on the left than right, with superimposed consolidation. In the appropriate setting, a pneumonia with associated pleural effusions cannot be excluded. No bowel obstruction, free air or obstructive uropathy. Additional findings involving the abdomen and pelvis, as detailed above. CT HEAD WO CONTRAST    Result Date: 5/11/2022  EXAMINATION: CT OF THE HEAD WITHOUT CONTRAST  5/11/2022 2:08 am TECHNIQUE: CT of the head was performed without the administration of intravenous contrast. Automated exposure control, iterative reconstruction, and/or weight based adjustment of the mA/kV was utilized to reduce the radiation dose to as low as reasonably achievable. COMPARISON: None. HISTORY: ORDERING SYSTEM PROVIDED HISTORY: cardiac arrest TECHNOLOGIST PROVIDED HISTORY: Has a \"code stroke\" or \"stroke alert\" been called? ->No Reason for exam:->cardiac arrest Decision Support Exception - unselect if not a suspected or confirmed emergency medical condition->Emergency Medical Condition (MA) FINDINGS: BRAIN/VENTRICLES: There is no acute intracranial hemorrhage, mass effect or midline shift.   No abnormal extra-axial fluid collection. The gray-white differentiation is maintained without evidence of an acute infarct. There is no evidence of hydrocephalus. Chronic lacunar infarct within the left thalamus. Mild cortical atrophy, with resultant prominence of the ventricles and cisterns. Mild chronic microvascular ischemic changes involving the periventricular and subcortical white matter. . ORBITS: The orbits are normal in appearance. SINUSES: The visualized paranasal sinuses and mastoid air cells demonstrate no acute abnormality. SOFT TISSUES/SKULL:  No acute abnormality of the visualized skull or soft tissues. No acute findings. Chronic changes as above. CT CHEST WO CONTRAST    Result Date: 5/11/2022  EXAMINATION: CT OF THE CHEST WITHOUT CONTRAST 5/11/2022 2:08 am TECHNIQUE: CT of the chest was performed without the administration of intravenous contrast. Multiplanar reformatted images are provided for review. Automated exposure control, iterative reconstruction, and/or weight based adjustment of the mA/kV was utilized to reduce the radiation dose to as low as reasonably achievable. COMPARISON: None. HISTORY: ORDERING SYSTEM PROVIDED HISTORY: Cardiac arrest TECHNOLOGIST PROVIDED HISTORY: Reason for exam:->Cardiac arrest Decision Support Exception - unselect if not a suspected or confirmed emergency medical condition->Emergency Medical Condition (MA) FINDINGS: Mediastinum: There is an endotracheal tube in satisfactory position. Also seen is a gastric tube. Mediastinal lymphadenopathy is noted. No evidence of aortic aneurysm. No pericardial effusion is seen. The airways are patent, without obstructing endobronchial process. Lungs/pleura: Bilateral pleural effusions are noted, moderate to large on the left and small on the right. Attenuation of the effusions is consistent with that of simple fluid. Superimposed parenchymal consolidative changes are noted involving the lungs bilaterally.   There is evidence of rightward mediastinal shift, which may suggest a component of underlying volume loss/atelectasis. Non dependent ground-glass opacities are also seen involving the lungs, with reticular and reticulonodular disease noted. No evidence of a dominant mass within the visualized, aerated portions of the lungs. Upper Abdomen: Please refer to the abdominal CT for further information regarding the abdomen. Soft Tissues/Bones: Multilevel bridging syndesmophytes are seen, in addition to calcifications of the inter spinous ligaments. This may suggest sequela of seronegative spondyloarthropathy. No discrete lytic or blastic osseous lesions. Superimposed degenerative changes are also noted, with associated spondylosis. No discrete soft tissue abnormality is seen within the limitations of a nonenhanced exam.     Findings suggesting pulmonary edema versus a multifocal pneumonia, with associated bilateral pleural effusions. Generalized mediastinal adenopathy, which may represent reactive changes. This is a nonspecific finding. Differential diagnosis would include infection, inflammation or neoplastic causes. XR CHEST PORTABLE    Result Date: 5/11/2022  EXAMINATION: ONE XRAY VIEW OF THE CHEST 5/11/2022 12:38 am COMPARISON: None. HISTORY: ORDERING SYSTEM PROVIDED HISTORY: ET tube placement TECHNOLOGIST PROVIDED HISTORY: Reason for exam:->ET tube placement FINDINGS: Lines and tubes are unchanged. Cardiomediastinal contours are stable. Persistent central pulmonary vascular congestion. Diffuse bilateral parenchymal and interstitial opacities, in addition to bilateral pleural effusions, stable. No pneumothorax. No acute osseous abnormality. Stable congestive/consolidative changes, with bilateral pleural effusions. No significant change. XR CHEST 1 VIEW    Result Date: 4/27/2022  EXAMINATION: ONE XRAY VIEW OF THE CHEST 4/27/2022 10:36 am COMPARISON: None.  HISTORY: ORDERING SYSTEM PROVIDED HISTORY: PICC placement TECHNOLOGIST PROVIDED HISTORY: Reason for exam:->PICC placement FINDINGS: Heart size is normal.  There is bibasilar pleural thickening worse in the left costophrenic angle. There is a right IJ dialysis catheter which is appropriate. Tip of a left-sided PICC line is in the superior vena cava. Bibasilar pleural thickening       ASSESSMENT:    Principal Problem:    Cardiac arrest [facility])  Resolved Problems:    * No resolved hospital problems. *  Respiratory failure  History of hemodialysis dependence  Hypotension  Sepsis syndrome  Type 2 diabetes     PLAN:  1.  Maintaining critical care per pulmonary with broad-spectrum antibiotic coverage infectious disease consult and of course ventilator support  2. Cardiology consult for cause of cardiac arrest  3. Infectious disease to see as well  4. Patient has baseline dementia and chronic pain. Would be appropriate to have palliative services see him as well  5. Resumption of nutrition only once able to tolerate tube feeds  6. Dialysis services to remain in place    7. Insulin coverage for hyperglycemia  Diet: Diet NPO  ADULT TUBE FEEDING; PEG; Immune Enhancing; Continuous; 20; Yes; 20; Q 4 hours; 55; 30; Q 4 hours; Protein;  Once daily  Code Status: Prior  Surrogate decision maker confirmed with patient:   Extended Emergency Contact Information  Primary Emergency Contact: Deisi Kerr  Address: [address] [place] [address] Phone: [phone]  Mobile Phone: [phone]  Relation: Spouse      DVT Prophylaxis: []Lovenox []Heparin []PCD [x] [address] []Encouraged ambulation  Disposition: []Med/Surg [] Intermediate [x] ICU/CCU  Admit status: [] Observation [x] Inpatient     +++++++++++++++++++++++++++++++++++++++++++++++++  [address] [place], [place]  +++++++++++++++++++++++++++++++++++++++++++++++++  NOTE: This report was transcribed using voice recognition software. Every effort was made to ensure accuracy; however, inadvertent computerized transcription errors may be present.

## 2022-05-11 NOTE — PROGRESS NOTES
.Patient admitted from ED to209, with the following belongings none, placed on monitor, patient oriented to room and unit visiting hours. Patient guide at bedside, reviewed patient rights and responsibilities. MRSA nasal swab obtained. Bed alarm on. Call light within reach.  Pt intubated & sedated

## 2022-05-11 NOTE — ED PROVIDER NOTES
The history is provided by medical records and the EMS personnel. 75-year-old male present emergency department complaint of unresponsive per nursing facility. EMS brought in patient was being bagged was reportedly to have a pulse. Patient cannot provide any history at this time history limited. Based on patient's condition pulse check performed and patient did not have a pulse and CPR was started. The patient presents with unresponsive that has been going on for 1 day. These symptoms are moderate in severity. Symptoms are made better by nothing. Symptoms are made worse by nothing. Associated symptoms include none. Review of Systems   Unable to perform ROS: Acuity of condition        Physical Exam  Vitals and nursing note reviewed. Constitutional:       General: He is in acute distress. Appearance: He is well-developed. HENT:      Head: Normocephalic and atraumatic. Nose: Nose normal.      Mouth/Throat:      Mouth: Mucous membranes are dry. Pharynx: No oropharyngeal exudate or posterior oropharyngeal erythema. Eyes:      General: No scleral icterus. Conjunctiva/sclera: Conjunctivae normal.   Cardiovascular:      Comments: No pulse    Pulmonary:      Effort: Respiratory distress present. Comments: Patient being bagged at this time bilateral breath sounds present  Abdominal:      General: Abdomen is flat. There is no distension. Palpations: Abdomen is soft. Musculoskeletal:         General: No swelling, tenderness or deformity. Cervical back: Neck supple. Skin:     General: Skin is warm and dry. Coloration: Skin is not jaundiced. Neurological:      Mental Status: He is alert. Comments: Patient unresponsive, cannot follow commands at this time.   Was intubated due to CPR started due to no pulse on arrival.          Procedures       PROCEDURE  5/11/22       Time: 0010    INTUBATION  Risks, benefits and alternatives if able (for applicable procedures below) described. Performed By: Charles Cespedes MD.    Indication:  Respiratory failure, hypoxia and airway protection. Informed consent: Consent unable to be obtained due to the emergent nature of this procedure. .  Procedure: Following Preoxygenation the patient was pretreated with None  followed by None. Intubation was performed after multiple attempt(s) by direct laryngoscopy using a Glidescope and 8.0mm cuffed endotracheal tube was inserted . Initial post procedure placement:  confirmed by bilateral breath sounds, ETCO2 detection, and absence of sounds over stomach. Tube Secured @ 24cm at the Lip. Post procedure chest x-ray: showed appropriate tube position. Procedural Complications: None. Anesthesia Consult:  No.       PROCEDURE  5/11/22       Time: 0025    CENTRAL LINE INSERTION  Risks, benefits and alternatives (for applicable procedures below) described. Performed By: Charles Cespedes MD.    Indication: centrally administered medications and inability to gain peripheral access. Informed consent: Consent unable to be obtained due to the emergent nature of this procedure. .  Procedure: After routine sterile preparation, local anesthesia not performed due to emergent nature of this procedure. A right 3-Lumen 7F Central Venous Catheter was placed by femoral vein approach and secured by standard fashion. Ultrasound Guidance:   used. Number of Attempts: 1  Post-procedure Findings: A post procedural chest x-ray  was not indicated. Patient tolerated the procedure well. MDM     25-year-old male present emergency department arrived being bagged by EMS. Was found to have no pulse and in cardiac arrest CPR was started patient received 2 rounds of epi and compressions. Patient intubated following this as well as central line was placed. Patient noted to have maroon stool on physical exam concerning for GI bleed.   Laboratory work-up significant for concerns of pneumonia as well on imaging patient was started on antibiotics. Patient due to blood pressure instability was started on Levophed as well. Work-up significant for lactic acidosis, elevated white count as well as elevated troponin. Patient's hemoglobin stable at 8.8 and repeat hemoglobin stable at 8.7. Patient given Protonix for GI bleed. Patient will be admitted to the intensive care unit at this time due to his cardiac arrest acute respiratory hypoxic failure and pneumonia.                   --------------------------------------------- PAST HISTORY ---------------------------------------------  Past Medical History:  has a past medical history of A-fib ([facility] TRUMAN (acute kidney injury) ([facility] Anemia, Anxiety, Bipolar 1 disorder ([facility] Charcot foot due to diabetes mellitus ([facility] CHF (congestive heart failure) ([facility] CKD (chronic kidney disease), Diabetes mellitus ([facility] Dialysis patient ([facility] Hyperlipidemia, Hypertension, Right sided weakness, Sacral decubitus ulcer, and Seizure ([facility] Past Surgical History:  has a past surgical history that includes Hydrocele surgery (2010); joint replacement (Right, 2011); amputation (Right, 2017); Tonsillectomy; Vein Surgery (2010); Dialysis Catheter Insertion (2020); and Upper gastrointestinal endoscopy (N/A, 4/27/2022). Social History:  reports that he has quit smoking. He has never used smokeless tobacco. He reports previous alcohol use. He reports previous drug use. Family History: family history is not on file. The patients home medications have been reviewed. Allergies:  Other    -------------------------------------------------- RESULTS -------------------------------------------------    Lab  Results for orders placed or performed during the hospital encounter of 05/10/22   CBC with Auto Differential   Result Value Ref Range    WBC 22.6 (H) 4.5 - 11.5 E9/L    RBC 3.09 (L) 3.80 - 5.80 E12/L    Hemoglobin 8.8 (L) 12.5 - 16.5 g/dL    Hematocrit 30.8 (L) 37.0 - 54.0 %    MCV 99.7 80.0 - 99.9 fL    MCH 28.5 26.0 - 35.0 pg    MCHC 28.6 (L) 32.0 - 34.5 %    RDW 16.8 (H) 11.5 - 15.0 fL    Platelets 953 279 - 765 E9/L    MPV 9.5 7.0 - 12.0 fL    Neutrophils % 90.9 (H) 43.0 - 80.0 %    Immature Granulocytes % 3.4 0.0 - 5.0 %    Lymphocytes % 2.5 (L) 20.0 - 42.0 %    Monocytes % 3.1 2.0 - 12.0 %    Eosinophils % 0.0 0.0 - 6.0 %    Basophils % 0.1 0.0 - 2.0 %    Neutrophils Absolute 20.54 (H) 1.80 - 7.30 E9/L    Immature Granulocytes # 0.78 E9/L    Lymphocytes Absolute 0.57 (L) 1.50 - 4.00 E9/L    Monocytes Absolute 0.71 0.10 - 0.95 E9/L    Eosinophils Absolute 0.01 (L) 0.05 - 0.50 E9/L    Basophils Absolute 0.03 0.00 - 0.20 E9/L    Anisocytosis 2+     Polychromasia 1+     Hypochromia 1+     Poikilocytes 1+     Ovalocytes 1+     Basophilic Stippling 1+    Comprehensive Metabolic Panel w/ Reflex to MG   Result Value Ref Range    Sodium 131 (L) 132 - 146 mmol/L    Potassium reflex Magnesium 4.5 3.5 - 5.0 mmol/L    Chloride 94 (L) 98 - 107 mmol/L    CO2 26 22 - 29 mmol/L    Anion Gap 11 7 - 16 mmol/L    Glucose 341 (H) 74 - 99 mg/dL    BUN 61 (H) 6 - 23 mg/dL    CREATININE 2.3 (H) 0.7 - 1.2 mg/dL    GFR Non-African American 28 >=60 mL/min/1.73    GFR African American 34     Calcium 11.1 (H) 8.6 - 10.2 mg/dL    Total Protein 6.9 6.4 - 8.3 g/dL    Albumin 2.4 (L) 3.5 - 5.2 g/dL    Total Bilirubin 0.3 0.0 - 1.2 mg/dL    Alkaline Phosphatase 591 (H) 40 - 129 U/L    ALT 30 0 - 40 U/L    AST 31 0 - 39 U/L   Troponin   Result Value Ref Range    Troponin, High Sensitivity 308 (H) 0 - 11 ng/L   Brain Natriuretic Peptide   Result Value Ref Range    Pro-BNP 29,060 (H) 0 - 125 pg/mL   Ammonia   Result Value Ref Range    Ammonia 41.8 16.0 - 60.0 umol/L   Lactate, Sepsis   Result Value Ref Range    Lactic Acid, Sepsis 2.9 (H) 0.5 - 1.9 mmol/L   Protime-INR   Result Value Ref Range    Protime 13.0 (H) 9.3 - 12.4 sec    INR 1.2    APTT   Result Value Ref Range    aPTT 32.8 24.5 - 35.1 sec   TSH   Result Value Ref Range TSH 2.700 0.270 - 4.200 uIU/mL   T4, Free   Result Value Ref Range    T4 Free 0.86 (L) 0.93 - 1.70 ng/dL   Blood Gas, Arterial   Result Value Ref Range    Date Analyzed [id number]     Time Analyzed 0015     Source: Blood Arterial     pH, Blood Gas 7.105 (LL) 7.350 - 7.450    PCO2 95.8 (HH) 35.0 - 45.0 mmHg    PO2 431.1 (H) 75.0 - 100.0 mmHg    HCO3 29.4 (H) 22.0 - 26.0 mmol/L    B.E. -1.6 -3.0 - 3.0 mmol/L    O2 Sat 99.7 (H) 92.0 - 98.5 %    O2Hb 98.6 (H) 94.0 - 97.0 %    COHb 0.8 0.0 - 1.5 %    MetHb 0.3 0.0 - 1.5 %    O2 Content 15.1 mL/dL    HHb 0.3 0.0 - 5.0 %    tHb (est) 10.0 (L) 11.5 - 16.5 g/dL    Mode Bagging     Date Of Collection      Time Collected      Pt Temp 37.0 C     ID [id number]     Lab [id number]     Critical(s) Notified Handed report to Dr/RN    Troponin   Result Value Ref Range    Troponin, High Sensitivity 320 (H) 0 - 11 ng/L   Blood Gas, Arterial   Result Value Ref Range    Date Analyzed [id number]     Time Analyzed 0139     Source: Blood Arterial     pH, Blood Gas 7.208 (LL) 7.350 - 7.450    PCO2 72.9 (HH) 35.0 - 45.0 mmHg    PO2 298.4 (H) 75.0 - 100.0 mmHg    HCO3 28.4 (H) 22.0 - 26.0 mmol/L    B.E. -0.5 -3.0 - 3.0 mmol/L    O2 Sat 99.9 (H) 92.0 - 98.5 %    O2Hb 98.9 (H) 94.0 - 97.0 %    COHb 0.7 0.0 - 1.5 %    MetHb 0.3 0.0 - 1.5 %    O2 Content 14.3 mL/dL    HHb 0.1 0.0 - 5.0 %    tHb (est) 9.7 (L) 11.5 - 16.5 g/dL    Mode AC     Rr Mechanical 20.0 b/min    Vt Mechanical 450.0 mL    Peep/Cpap 8.0 cmH2O    Date Of Collection      Time Collected      Pt Temp 37.0 C     ID [id number]     Lab [id number]     Critical(s) Notified Handed report to Dr/RN    Hemoglobin and Hematocrit   Result Value Ref Range    Hemoglobin 8.7 (L) 12.5 - 16.5 g/dL    Hematocrit 29.4 (L) 37.0 - 54.0 %   EKG 12 Lead   Result Value Ref Range    Ventricular Rate 83 BPM    Atrial Rate 100 BPM    QRS Duration 144 ms    Q-T Interval 426 ms    QTc Calculation (Bazett) 500 ms    R Axis 106 degrees    T Axis 0 degrees Radiology  CT HEAD WO CONTRAST   Final Result   No acute findings. Chronic changes as above. CT CHEST WO CONTRAST   Final Result   Findings suggesting pulmonary edema versus a multifocal pneumonia, with   associated bilateral pleural effusions. Generalized mediastinal adenopathy, which may represent reactive changes. This is a nonspecific finding. Differential diagnosis would include   infection, inflammation or neoplastic causes. CT ABDOMEN PELVIS WO CONTRAST Additional Contrast? None   Final Result   Bilateral pleural effusions, greater on the left than right, with   superimposed consolidation. In the appropriate setting, a pneumonia with   associated pleural effusions cannot be excluded. No bowel obstruction, free air or obstructive uropathy. Additional findings   involving the abdomen and pelvis, as detailed above. XR CHEST PORTABLE   Final Result   Stable congestive/consolidative changes, with bilateral pleural effusions. No significant change. EKG:  This EKG is signed and interpreted by me. Rate: 83  Rhythm: Sinus  Interpretation: No acute ST elevation impression sinus rhythm does have baseline artifact noted as well as right bundle branch block  Comparison: no previous EKG available      ------------------------- NURSING NOTES AND VITALS REVIEWED ---------------------------  Date / Time Roomed:  5/10/2022 11:58 PM  ED Bed Assignment:  03/03    The nursing notes within the ED encounter and vital signs as below have been reviewed.    Patient Vitals for the past 24 hrs:   BP Temp Temp src Pulse Resp SpO2 Weight   05/11/22 0245 116/79 -- -- 82 -- 99 % 212 lb (96.2 kg)   05/11/22 0235 -- -- -- 90 20 100 % --   05/11/22 0230 116/79 -- -- 89 20 100 % --   05/11/22 0130 129/77 94.4 °F (34.7 °C) Rectal 89 -- 100 % --   05/11/22 0100 118/77 -- -- 96 20 100 % --   05/11/22 0051 85/72 -- -- 105 20 100 % --   05/11/22 0042 (!) 74/67 -- -- 86 -- 100 % -- 05/11/22 0030 113/68 -- -- 94 20 100 % --   05/11/22 0021 -- -- -- 85 20 99 % --   05/11/22 0018 116/78 -- -- 106 20 100 % --   05/11/22 0014 133/75 -- -- 106 20 95 % --       Oxygen Saturation Interpretation: intubated      ------------------------------------------ PROGRESS NOTES ------------------------------------------  Re-evaluation(s):  Time: 0330. Patients symptoms show no change  Repeat physical examination is not changed    Time: 0130. Patients symptoms show no change  Repeat physical examination is not changed          --------------------------------- ADDITIONAL PROVIDER NOTES ---------------------------------  Consultations:  Spoke with Dr. Shira Epley,  They will admit this patient. This patient's ED course included: a personal history and physicial examination, re-evaluation prior to disposition, multiple bedside re-evaluations, IV medications, cardiac monitoring, continuous pulse oximetry and complex medical decision making and emergency management    This patient has remained hemodynamically stable during their ED course. Please note that the withdrawal or failure to initiate urgent interventions for this patient would likely result in a life threatening deterioration or permanent disability. Accordingly this patient received  45 minutes of critical care time, excluding separately billable procedures. Systems at risk for deterioration include: cardiac. Clinical Impression  1. Cardiac arrest (HCC)    2. Leukocytosis, unspecified type    3. Pneumonia due to infectious organism, unspecified laterality, unspecified part of lung    4. Elevated troponin    5.  Acute respiratory failure with hypoxia ([facility]          Disposition  Patient's disposition: Admit to CCU/ICU  Patient's condition is critical       Harrison Hernandez MD  Resident  05/11/22 6030       Cherry Jarquin MD  05/16/22 3575

## 2022-05-11 NOTE — CONSULTS
TABS tablet Take 5 mg by mouth 2 times daily    Historical Provider, MD   chlorhexidine (PERIDEX) 0.12 % solution Take 15 mLs by mouth 2 times daily    Historical Provider, MD   nystatin (MYCOSTATIN) 039460 UNIT/ML suspension Take 500,000 Units by mouth 3 times daily    Historical Provider, MD   oxyCODONE (OXYCONTIN) 10 MG extended release tablet Take 10 mg by mouth every 12 hours. Historical Provider, MD   NYSTATIN IN AQUAPHOR OINTMENT Apply topically 2 times daily    Historical Provider, MD   clonazePAM (KLONOPIN) 0.5 MG tablet Take 0.5 mg by mouth 2 times daily as needed. Historical Provider, MD   insulin glargine (LANTUS) 100 UNIT/ML injection vial Inject 26 Units into the skin daily    Historical Provider, MD   dronabinol (MARINOL) 2.5 MG capsule Take 2.5 mg by mouth 2 times daily (before meals). Historical Provider, MD   alteplase (CATHFLO) 2 MG injection 2 mg by IntraCATHeter route as needed Per cathflo protocol at [facility] Provider, MD   cloNIDine (CATAPRES) 0.1 MG tablet Take 0.1 mg by mouth 2 times daily    Historical Provider, MD   HYDROmorphone (DILAUDID) 2 MG tablet Take 2 mg by mouth every 8 hours as needed for Pain.     Historical Provider, MD   anticoagulant sodium citrate 4 GM/100ML SOLN by CRRT route continuous    Historical Provider, MD   carvedilol (COREG) 3.125 MG tablet Take 3.125 mg by mouth 2 times daily (with meals)    Historical Provider, MD   hydrALAZINE (APRESOLINE) 25 MG tablet Take 25 mg by mouth 3 times daily    Historical Provider, MD   docusate sodium (COLACE) 100 MG capsule Take 100 mg by mouth 2 times daily    Historical Provider, MD   levETIRAcetam (KEPPRA) 500 MG tablet Take 500 mg by mouth three times a week Monday, Wednesday and Friday after dialysis    Historical Provider, MD   aspirin 81 MG EC tablet Take 81 mg by mouth daily    Historical Provider, MD   memantine (NAMENDA) 5 MG tablet Take 5 mg by mouth 2 times daily    Historical Provider, MD   glucagon, rDNA, 1 MG injection as needed for Low blood sugar    Historical Provider, MD   dextrose 50 % solution Infuse 25 g intravenously as needed    Historical Provider, MD   glucose (GLUTOSE) 40 % GEL Take 15 g by mouth See Admin Instructions    Historical Provider, MD   mineral oil-hydrophilic petrolatum (AQUAPHOR) ointment Apply topically as needed for Dry Skin Apply topically as needed.     Historical Provider, MD   ondansetron (ZOFRAN-ODT) 4 MG disintegrating tablet Take 4 mg by mouth every 8 hours as needed for Nausea or Vomiting    Historical Provider, MD   darbepoetin kandi-polysorbate (ARANESP) 60 MCG/0.3ML SOSY injection Inject 60 mcg into the skin every 14 days    Historical Provider, MD   levothyroxine (SYNTHROID) 175 MCG tablet Take 175 mcg by mouth Daily    Historical Provider, MD   rosuvastatin (CRESTOR) 5 MG tablet Take 5 mg by mouth daily    Historical Provider, MD   ondansetron (ZOFRAN) 4 MG/2ML injection Infuse 4 mg intravenously every 8 hours as needed for Nausea or Vomiting    Historical Provider, MD   omeprazole (PRILOSEC) 20 MG delayed release capsule Take 20 mg by mouth daily    Historical Provider, MD   VORTIoxetine HBr (TRINTELLIX) 20 MG TABS tablet Take 10 mg by mouth daily    Historical Provider, MD   polyethylene glycol (MIRALAX) 17 g PACK packet Take 17 g by mouth daily    Historical Provider, MD   B complex-vitamin C-folic acid (NEPHRO-JESSY) 1 MG tablet Take 1 tablet by mouth daily (with breakfast)    Historical Provider, MD   lactulose (CEPHULAC) 20 g packet Take 20 g by mouth 2 times daily    Historical Provider, MD   insulin lispro (HUMALOG) 100 UNIT/ML injection vial Inject into the skin 4 times daily (before meals and nightly) Sliding scale  150-199 1 unit  200-249 2 units  250-299 3 units  300-349 4 units  Above 350 call physician    Historical Provider, MD   dilTIAZem (CARDIZEM) 60 MG tablet Take 60 mg by mouth 4 times daily    Historical Provider, MD   ARIPiprazole (ABILIFY) 5 MG tablet Take 5 mg by mouth daily    Historical Provider, MD   acetaminophen (TYLENOL) 325 MG tablet Take 650 mg by mouth every 6 hours as needed for Pain    Historical Provider, MD       Allergies   Allergen Reactions    Other Nausea Only     \"narcotics\" reaction unknown       No family history on file. Social History     Tobacco Use    Smoking status: Former Smoker    Smokeless tobacco: Never Used   Vaping Use    Vaping Use: Never used   Substance Use Topics    Alcohol use: Not Currently    Drug use: Not Currently         Review of Systems- unable to reliably obtain as patient is intubated and sedated    PHYSICAL EXAM:    Vitals:    05/11/22 0330   BP:    Pulse:    Resp:    Temp: 95.2 °F (35.1 °C)   SpO2:        General Appearance:  Intubated and sedated  Skin:  Sacral wound with wound vac in place  Head/face:  NCAT  Eyes:  No gross abnormalities. Lungs:  Intubated on ventilator  Heart:  Regular rate, hypotensive  Abdomen:  Soft, non-distended. PEG in place  Extremities: Extremities warm to touch, pink, with no edema. Male Rectal:  Anal fissure present. Some blood appreciated on rectal exam, but no other masses/hemorrhoids/lesions    LABS:    CBC  Recent Labs     05/11/22 0032 05/11/22 0032 05/11/22 0258   WBC 22.6*  --   --    HGB 8.8*   < > 8.7*   HCT 30.8*   < > 29.4*     --   --     < > = values in this interval not displayed. BMP  Recent Labs     05/11/22 0032   *   K 4.5   CL 94*   CO2 26   BUN 61*   CREATININE 2.3*   CALCIUM 11.1*     Liver Function  Recent Labs     05/11/22 0032   BILITOT 0.3   AST 31   ALT 30   ALKPHOS 591*   PROT 6.9   LABALBU 2.4*     No results for input(s): LACTATE in the last 72 hours.   Recent Labs     05/11/22 0032   INR 1.2       RADIOLOGY    CT ABDOMEN PELVIS WO CONTRAST Additional Contrast? None    Result Date: 5/11/2022  EXAMINATION: CT OF THE ABDOMEN AND PELVIS WITHOUT CONTRAST 5/11/2022 2:08 am TECHNIQUE: CT of the abdomen and pelvis was performed without the administration of intravenous contrast. Multiplanar reformatted images are provided for review. Automated exposure control, iterative reconstruction, and/or weight based adjustment of the mA/kV was utilized to reduce the radiation dose to as low as reasonably achievable. COMPARISON: None. HISTORY: ORDERING SYSTEM PROVIDED HISTORY: Cardiac arrest TECHNOLOGIST PROVIDED HISTORY: Reason for exam:->Cardiac arrest Additional Contrast?->None Decision Support Exception - unselect if not a suspected or confirmed emergency medical condition->Emergency Medical Condition (MA) FINDINGS: Lower Chest: Bilateral pleural effusions are noted, greater on the left than right, with superimposed consolidation. There is no pericardial effusion. Organs: Small amount of perihepatic ascites is noted. No evidence of a hepatic mass. Calcified gallstones are seen within the dependent portions of the gallbladder, without pericholecystic fluid. The spleen, pancreas, adrenal glands and kidneys are within normal limits. Simple cyst within the left upper renal pole. No further follow-up is required for this lesion. GI/Bowel: The small and large bowel, are within normal limits. No bowel obstruction. Gastrostomy tube within the stomach. The appendix is normal. Pelvis: Evaluation of the pelvic organs is limited due to streak artifact from the patient's right hip arthroplasty. Roberts catheter within the bladder. Peritoneum/Retroperitoneum: No mass, fluid collection or lymphadenopathy. Bones/Soft Tissues: A right hip arthroplasty is seen. Severe degenerative changes of the left hip are noted, in addition to a chronic posttraumatic deformity involving the left femoral neck. Degenerative changes and diffuse bony demineralization are seen. No discrete lytic or blastic osseous lesions are identified. Bridging osteophytes/syndesmophytes are noted, in addition to calcifications along the inter spinous ligament.   This is a nonspecific finding which may be suggestive of a seronegative spondyloarthropathy. Atherosclerotic calcifications of the abdominal aorta and its branches, without aneurysm. A right femoral venous catheter is noted. Generalized body wall edema, suggestive of anasarca. Bilateral pleural effusions, greater on the left than right, with superimposed consolidation. In the appropriate setting, a pneumonia with associated pleural effusions cannot be excluded. No bowel obstruction, free air or obstructive uropathy. Additional findings involving the abdomen and pelvis, as detailed above. CT HEAD WO CONTRAST    Result Date: 5/11/2022  EXAMINATION: CT OF THE HEAD WITHOUT CONTRAST  5/11/2022 2:08 am TECHNIQUE: CT of the head was performed without the administration of intravenous contrast. Automated exposure control, iterative reconstruction, and/or weight based adjustment of the mA/kV was utilized to reduce the radiation dose to as low as reasonably achievable. COMPARISON: None. HISTORY: ORDERING SYSTEM PROVIDED HISTORY: cardiac arrest TECHNOLOGIST PROVIDED HISTORY: Has a \"code stroke\" or \"stroke alert\" been called? ->No Reason for exam:->cardiac arrest Decision Support Exception - unselect if not a suspected or confirmed emergency medical condition->Emergency Medical Condition (MA) FINDINGS: BRAIN/VENTRICLES: There is no acute intracranial hemorrhage, mass effect or midline shift. No abnormal extra-axial fluid collection. The gray-white differentiation is maintained without evidence of an acute infarct. There is no evidence of hydrocephalus. Chronic lacunar infarct within the left thalamus. Mild cortical atrophy, with resultant prominence of the ventricles and cisterns. Mild chronic microvascular ischemic changes involving the periventricular and subcortical white matter. . ORBITS: The orbits are normal in appearance. SINUSES: The visualized paranasal sinuses and mastoid air cells demonstrate no acute abnormality.  SOFT TISSUES/SKULL:  No acute abnormality of the visualized skull or soft tissues. No acute findings. Chronic changes as above. CT CHEST WO CONTRAST    Result Date: 5/11/2022  EXAMINATION: CT OF THE CHEST WITHOUT CONTRAST 5/11/2022 2:08 am TECHNIQUE: CT of the chest was performed without the administration of intravenous contrast. Multiplanar reformatted images are provided for review. Automated exposure control, iterative reconstruction, and/or weight based adjustment of the mA/kV was utilized to reduce the radiation dose to as low as reasonably achievable. COMPARISON: None. HISTORY: ORDERING SYSTEM PROVIDED HISTORY: Cardiac arrest TECHNOLOGIST PROVIDED HISTORY: Reason for exam:->Cardiac arrest Decision Support Exception - unselect if not a suspected or confirmed emergency medical condition->Emergency Medical Condition (MA) FINDINGS: Mediastinum: There is an endotracheal tube in satisfactory position. Also seen is a gastric tube. Mediastinal lymphadenopathy is noted. No evidence of aortic aneurysm. No pericardial effusion is seen. The airways are patent, without obstructing endobronchial process. Lungs/pleura: Bilateral pleural effusions are noted, moderate to large on the left and small on the right. Attenuation of the effusions is consistent with that of simple fluid. Superimposed parenchymal consolidative changes are noted involving the lungs bilaterally. There is evidence of rightward mediastinal shift, which may suggest a component of underlying volume loss/atelectasis. Non dependent ground-glass opacities are also seen involving the lungs, with reticular and reticulonodular disease noted. No evidence of a dominant mass within the visualized, aerated portions of the lungs. Upper Abdomen: Please refer to the abdominal CT for further information regarding the abdomen. Soft Tissues/Bones: Multilevel bridging syndesmophytes are seen, in addition to calcifications of the inter spinous ligaments.   This may suggest sequela of seronegative spondyloarthropathy. No discrete lytic or blastic osseous lesions. Superimposed degenerative changes are also noted, with associated spondylosis. No discrete soft tissue abnormality is seen within the limitations of a nonenhanced exam.     Findings suggesting pulmonary edema versus a multifocal pneumonia, with associated bilateral pleural effusions. Generalized mediastinal adenopathy, which may represent reactive changes. This is a nonspecific finding. Differential diagnosis would include infection, inflammation or neoplastic causes. XR CHEST PORTABLE    Result Date: 5/11/2022  EXAMINATION: ONE XRAY VIEW OF THE CHEST 5/11/2022 12:38 am COMPARISON: None. HISTORY: ORDERING SYSTEM PROVIDED HISTORY: ET tube placement TECHNOLOGIST PROVIDED HISTORY: Reason for exam:->ET tube placement FINDINGS: Lines and tubes are unchanged. Cardiomediastinal contours are stable. Persistent central pulmonary vascular congestion. Diffuse bilateral parenchymal and interstitial opacities, in addition to bilateral pleural effusions, stable. No pneumothorax. No acute osseous abnormality. Stable congestive/consolidative changes, with bilateral pleural effusions. No significant change. ASSESSMENT:  74 y.o. male with hematochezia 2/2 anal fissure. PEG lavage was equivocal.    PLAN:  Monitor HgB  PPI BID for recent EGD demonstrating gastritis  Sitz baths  Bowel regimen  No plans for EGD/endoscopy at this time. If patient clinically worsens or shows signs of active GI bleed can consider endoscopy. Patient findings and plan discussed with Dr. Isac Shone.      Electronically signed by Philip Lux MD on 5/11/22 at 5:13 AM EDT

## 2022-05-11 NOTE — PLAN OF CARE
Problem: Discharge Planning  Goal: Discharge to home or other facility with appropriate resources  Outcome: Not Progressing  Flowsheets  Taken 5/11/2022 1259 by Waleska Garcia RN  Discharge to home or other facility with appropriate resources:   Identify discharge learning needs (meds, wound care, etc)   Arrange for needed discharge resources and transportation as appropriate  Taken 5/11/2022 0711 by Mina Mohan RN  Discharge to home or other facility with appropriate resources:   Identify barriers to discharge with patient and caregiver   Identify discharge learning needs (meds, wound care, etc)   Refer to discharge planning if patient needs post-hospital services based on physician order or complex needs related to functional status, cognitive ability or social support system   Arrange for needed discharge resources and transportation as appropriate   Arrange for interpreters to assist at discharge as needed  Taken 5/11/2022 0705 by Mina Mohan RN  Discharge to home or other facility with appropriate resources:   Refer to discharge planning if patient needs post-hospital services based on physician order or complex needs related to functional status, cognitive ability or social support system   Identify discharge learning needs (meds, wound care, etc)   Arrange for needed discharge resources and transportation as appropriate   Identify barriers to discharge with patient and caregiver  Taken 5/11/2022 0700 by Pedrito Kumar RN  Discharge to home or other facility with appropriate resources:   Identify barriers to discharge with patient and caregiver   Arrange for interpreters to assist at discharge as needed     Problem: Pain  Goal: Verbalizes/displays adequate comfort level or baseline comfort level  Outcome: Progressing  Flowsheets (Taken 5/11/2022 0630 by Mina Mohan RN)  Verbalizes/displays adequate comfort level or baseline comfort level: Assess pain using appropriate pain scale Problem: Skin/Tissue Integrity  Goal: Absence of new skin breakdown  Description: 1. Monitor for areas of redness and/or skin breakdown  2. Assess vascular access sites hourly  3. Every 4-6 hours minimum:  Change oxygen saturation probe site  4. Every 4-6 hours:  If on nasal continuous positive airway pressure, respiratory therapy assess nares and determine need for appliance change or resting period. Outcome: Progressing     Problem: Safety - Adult  Goal: Free from fall injury  Outcome: Progressing     Problem: ABCDS Injury Assessment  Goal: Absence of physical injury  Outcome: Progressing     Problem: Respiratory - Adult  Goal: Achieves optimal ventilation and oxygenation  5/11/2022 1848 by Jamie Nagy RN  Outcome: Progressing  5/11/2022 0737 by Yanet Mckay RCP  Outcome: Progressing  Flowsheets (Taken 5/11/2022 0737)  Achieves optimal ventilation and oxygenation:   Assess for changes in respiratory status   Assess the need for suctioning and aspirate as needed   Respiratory therapy support as indicated   Oxygen supplementation based on oxygen saturation or arterial blood gases  Note: Patient is synchronous with ventilator. Problem: Chronic Conditions and Co-morbidities  Goal: Patient's chronic conditions and co-morbidity symptoms are monitored and maintained or improved  Outcome: Progressing     Problem: Safety - Medical Restraint  Goal: Remains free of injury from restraints (Restraint for Interference with Medical Device)  Description: INTERVENTIONS:  1. Determine that other, less restrictive measures have been tried or would not be effective before applying the restraint  2. Evaluate the patient's condition at the time of restraint application  3. Inform patient/family regarding the reason for restraint  4.  Q2H: Monitor safety, psychosocial status, comfort, nutrition and hydration  Outcome: Progressing  Flowsheets (Taken 5/11/2022 1333 by Reji Moran RN)  Remains free of injury from restraints (restraint for interference with medical device): Every 2 hours: Monitor safety, psychosocial status, comfort, nutrition and hydration

## 2022-05-11 NOTE — PROGRESS NOTES
..  Intensive Care Daily Quality Rounding Checklist      ICU Team Members: Dr. Sanchez, residents, charge nurse, bedside nurse, respiratory therapy and clinical pharmacist     ICU Day #: 1    Intubation Date: 5/11/2022    Ventilator Day #: 1    Central Line Insertion Date: 5/11/22        Day #: 1     Arterial Line Insertion Date:       Day #:     Temporary Hemodialysis Catheter Insertion Date:       Day # admitted with temp dialysis cath    DVT Prophylaxis:     GI Prophylaxis: Protonix    Roberts Catheter Insertion Date: 5/11/2022       Day #: 1      Continued need (if yes, reason documented and discussed with physician):    Skin Issues/ Wounds and ordered treatment discussed on rounds: yes    Goals/ Plans for the Day: monitor labs and vitals. Consult cardio, palliative and nephrology. NICOM and ECHO ordered. Start TF's when able.  PossibleThoracentesis in 48 hours

## 2022-05-11 NOTE — ED NOTES
Patient arrived to ED via EMS from [facility] unresponsive and being bagged by EMS personnel. Patient taken directly to room 3, Dr. Jace Esteban at bedside. Patient did lose pulse at 2356, CPR immediately initiated. 2356  Epi administered. No pulse, CPR in progress. 2358  Bicarb administered. CPR continues. 2359  Pulse check - PEA, calcium administered. CPR continues. 0001  EPI administered, ROSC achieved. Intubation w 8.0 tube, 25 @ lip, secured with commercial tube madrigal.       Anais Pérez RN  05/11/22 1461

## 2022-05-11 NOTE — CARE COORDINATION
Social Work/Discharge Planning:  Chart reviewed. Patient admitted from [facility] in [address], where he cardiac arrested. He was intubated 5/11 and is on 50% FiO2. Patient has a peg tube, which he had at [facility]. [facility] with [facility] states patient can return to [facility] when medically stable.  will follow and will discuss discharge planning with family when appropriate.   Electronically signed by SHAHNAZ Alanis on 5/11/2022 at 3:13 PM

## 2022-05-11 NOTE — CONSULTS
Progress Note  5/11/2022 1:25 PM  Subjective:   Admit Date: 5/10/2022  PCP: Colby Ritter MD  Interval History: Full consult deferred as pt was being followed longitudinally at [facility]. 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 He was found to be unresponsive at [facility] 5/10/22, EMS was called and pt transferred to [facility] ED. In the ED the pt was found to be pulseless he was resuscitated from a Cardiac arrest and admitted to the MICU    5/11/22: Pt intubated and off sedation at the time of my visit earolier this AM, he was on norepi 10mcg    Diet: Diet NPO  ADULT TUBE FEEDING; PEG; Diabetic; Continuous; 20; Yes; 20; Q 4 hours; 50; 30; Q 4 hours    Data:   Scheduled Meds:   polyethylene glycol  17 g Oral Daily    pantoprazole (PROTONIX) 40 mg injection  40 mg IntraVENous Daily    chlorhexidine  15 mL Mouth/Throat BID    senna  5 mL Per G Tube Nightly    docusate  100 mg Per G Tube BID    levothyroxine  175 mcg Per G Tube Daily    [Held by provider] nitroglycerin  0.5 inch Topical 4 times per day    insulin glargine  20 Units SubCUTAneous BID    insulin lispro  0-12 Units SubCUTAneous Q6H    levETIRAcetam  500 mg PEG Tube BID    linezolid  600 mg IntraVENous Q12H    meropenem  1,000 mg IntraVENous Q24H     Continuous Infusions:   norepinephrine 8 mcg/min (05/11/22 1143)    fentaNYL 150 mcg/hr (05/11/22 0129)    midazolam 2 mg/hr (05/11/22 0208)    dextrose       PRN Meds:perflutren lipid microspheres, glucose, dextrose bolus **OR** dextrose bolus, glucagon (rDNA), dextrose  No intake/output data recorded. No intake/output data recorded.   No intake or output data in the 24 hours ending 05/11/22 1325  CBC:   Recent Labs     05/11/22  0032 05/11/22  0258 05/11/22  0830   WBC 22.6*  --  21.5*   HGB 8.8* 8.7* 7.8*     --  271     BMP:    Recent Labs     05/11/22  0032 05/11/22  0830   * 133   K 4.5 4.1   CL 94* 95*   CO2 26 27   BUN 61* 65*   CREATININE 2.3* 2.4*   GLUCOSE 341* 254* Hepatic:   Recent Labs     05/11/22 0032   AST 31   ALT 30   BILITOT 0.3   ALKPHOS 591*     Troponin: No results for input(s): TROPONINI in the last 72 hours. BNP: No results for input(s): BNP in the last 72 hours. Lipids: No results for input(s): CHOL, HDL in the last 72 hours. Invalid input(s): LDLCALCU  ABGs: No results found for: PHART, PO2ART, [id number]  INR:   Recent Labs     05/11/22 0032   INR 1.2     -----------------------------------------------------------------  RAD:   EXAMINATION:   ONE XRAY VIEW OF THE CHEST       5/11/2022 12:38 am       COMPARISON:   None.       HISTORY:   ORDERING SYSTEM PROVIDED HISTORY: ET tube placement   TECHNOLOGIST PROVIDED HISTORY:   Reason for exam:->ET tube placement       FINDINGS:   Lines and tubes are unchanged.  Cardiomediastinal contours are stable. Persistent central pulmonary vascular congestion.       Diffuse bilateral parenchymal and interstitial opacities, in addition to   bilateral pleural effusions, stable.       No pneumothorax.  No acute osseous abnormality.           Impression   Stable congestive/consolidative changes, with bilateral pleural effusions. No significant change. Objective:   Vitals: BP (!) 90/50   Pulse 81   Temp 97.7 °F (36.5 °C) (Infrared)   Resp 19   Ht 6' 5\" (1.956 m)   Wt 206 lb 9.1 oz (93.7 kg)   SpO2 98%   BMI 24.50 kg/m²   General appearance: Intubated opened eyes to physical stimuli  Skin: Skin color, texture, turgor normal. No rashes or lesions  HEENT: Head: Normal, normocephalic, atraumatic.   Neck: no JVD and supple, symmetrical, trachea midline  Lungs: rhonchi bilaterallydecreased BS at the L base  Heart: irregularly irregular rhythm, no S3 or S4 and no rub  Abdomen: soft, non-tender; bowel sounds normal; no masses,  no organomegaly  Extremities: edema trace to 1 + bilat  Neurologic: Mental status: pt opened eyes to physical stimuli but did not interact with me    Assessment:   Patient Active Problem List:     Cardiac arrest [facility])    Plan:   1. 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 Hold IHD today and plan treatment 5/12/22  2. Follow Labs    2. 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 Follow H/H  2. Continue SHANIQUA  3. Transfuse for hgb <7    3. 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  Correct Ca++ with IHD    4-S/P Cardio-Pulm Arrest        Ronald Sullivan MD

## 2022-05-12 ENCOUNTER — APPOINTMENT (OUTPATIENT)
Dept: GENERAL RADIOLOGY | Age: 67
DRG: 003 | End: 2022-05-12
Payer: MEDICARE

## 2022-05-12 LAB
ANION GAP SERPL CALCULATED.3IONS-SCNC: 11 MMOL/L (ref 7–16)
ANISOCYTOSIS: ABNORMAL
B.E.: 0.6 MMOL/L (ref -3–3)
BASOPHILIC STIPPLING: ABNORMAL
BASOPHILS ABSOLUTE: 0.14 E9/L (ref 0–0.2)
BASOPHILS RELATIVE PERCENT: 0.9 % (ref 0–2)
BUN BLDV-MCNC: 76 MG/DL (ref 6–23)
BURR CELLS: ABNORMAL
CALCIUM SERPL-MCNC: 10 MG/DL (ref 8.6–10.2)
CHLORIDE BLD-SCNC: 94 MMOL/L (ref 98–107)
CO2: 25 MMOL/L (ref 22–29)
COHB: 1.3 % (ref 0–1.5)
CREAT SERPL-MCNC: 2.7 MG/DL (ref 0.7–1.2)
CRITICAL: ABNORMAL
DATE ANALYZED: ABNORMAL
DATE OF COLLECTION: ABNORMAL
EOSINOPHILS ABSOLUTE: 0 E9/L (ref 0.05–0.5)
EOSINOPHILS RELATIVE PERCENT: 0 % (ref 0–6)
FIO2: 40 %
GFR AFRICAN AMERICAN: 29
GFR NON-AFRICAN AMERICAN: 24 ML/MIN/1.73
GLUCOSE BLD-MCNC: 130 MG/DL (ref 74–99)
HCO3: 25.7 MMOL/L (ref 22–26)
HCT VFR BLD CALC: 22.5 % (ref 37–54)
HCT VFR BLD CALC: 24.1 % (ref 37–54)
HEMOGLOBIN: 6.8 G/DL (ref 12.5–16.5)
HEMOGLOBIN: 7.5 G/DL (ref 12.5–16.5)
HHB: 2.6 % (ref 0–5)
HYPOCHROMIA: ABNORMAL
LAB: ABNORMAL
LACTIC ACID: 1.5 MMOL/L (ref 0.5–2.2)
LACTIC ACID: 1.6 MMOL/L (ref 0.5–2.2)
LYMPHOCYTES ABSOLUTE: 0.64 E9/L (ref 1.5–4)
LYMPHOCYTES RELATIVE PERCENT: 3.5 % (ref 20–42)
Lab: ABNORMAL
MAGNESIUM: 2.6 MG/DL (ref 1.6–2.6)
MCH RBC QN AUTO: 28.3 PG (ref 26–35)
MCHC RBC AUTO-ENTMCNC: 30.2 % (ref 32–34.5)
MCV RBC AUTO: 93.8 FL (ref 80–99.9)
METER GLUCOSE: 125 MG/DL (ref 74–99)
METER GLUCOSE: 126 MG/DL (ref 74–99)
METER GLUCOSE: 139 MG/DL (ref 74–99)
METER GLUCOSE: >500 MG/DL (ref 74–99)
METHB: 0.3 % (ref 0–1.5)
MODE: AC
MONOCYTES ABSOLUTE: 0.64 E9/L (ref 0.1–0.95)
MONOCYTES RELATIVE PERCENT: 4.3 % (ref 2–12)
NEUTROPHILS ABSOLUTE: 14.56 E9/L (ref 1.8–7.3)
NEUTROPHILS RELATIVE PERCENT: 91.3 % (ref 43–80)
NUCLEATED RED BLOOD CELLS: 0.9 /100 WBC
O2 CONTENT: 10.4 ML/DL
O2 SATURATION: 97.4 % (ref 92–98.5)
O2HB: 95.8 % (ref 94–97)
OPERATOR ID: ABNORMAL
ORGANISM: ABNORMAL
PATIENT TEMP: 37 C
PCO2: 43.7 MMHG (ref 35–45)
PDW BLD-RTO: 17.1 FL (ref 11.5–15)
PEEP/CPAP: 8 CMH2O
PFO2: 2.35 MMHG/%
PH BLOOD GAS: 7.39 (ref 7.35–7.45)
PHOSPHORUS: 2.5 MG/DL (ref 2.5–4.5)
PLATELET # BLD: 217 E9/L (ref 130–450)
PMV BLD AUTO: 9.7 FL (ref 7–12)
PO2: 94 MMHG (ref 75–100)
POIKILOCYTES: ABNORMAL
POLYCHROMASIA: ABNORMAL
POTASSIUM SERPL-SCNC: 4.5 MMOL/L (ref 3.5–5)
RBC # BLD: 2.4 E12/L (ref 3.8–5.8)
ROULEAUX: ABNORMAL
RR MECHANICAL: 18 B/MIN
SCHISTOCYTES: ABNORMAL
SODIUM BLD-SCNC: 130 MMOL/L (ref 132–146)
SOURCE, BLOOD GAS: ABNORMAL
THB: 7.6 G/DL (ref 11.5–16.5)
TIME ANALYZED: 641
VT MECHANICAL: 400 ML
WBC # BLD: 16 E9/L (ref 4.5–11.5)

## 2022-05-12 PROCEDURE — C9113 INJ PANTOPRAZOLE SODIUM, VIA: HCPCS | Performed by: STUDENT IN AN ORGANIZED HEALTH CARE EDUCATION/TRAINING PROGRAM

## 2022-05-12 PROCEDURE — 6370000000 HC RX 637 (ALT 250 FOR IP): Performed by: INTERNAL MEDICINE

## 2022-05-12 PROCEDURE — 90935 HEMODIALYSIS ONE EVALUATION: CPT

## 2022-05-12 PROCEDURE — 2500000003 HC RX 250 WO HCPCS: Performed by: STUDENT IN AN ORGANIZED HEALTH CARE EDUCATION/TRAINING PROGRAM

## 2022-05-12 PROCEDURE — 6360000002 HC RX W HCPCS: Performed by: STUDENT IN AN ORGANIZED HEALTH CARE EDUCATION/TRAINING PROGRAM

## 2022-05-12 PROCEDURE — 83605 ASSAY OF LACTIC ACID: CPT

## 2022-05-12 PROCEDURE — 87070 CULTURE OTHR SPECIMN AEROBIC: CPT

## 2022-05-12 PROCEDURE — 5A1D70Z PERFORMANCE OF URINARY FILTRATION, INTERMITTENT, LESS THAN 6 HOURS PER DAY: ICD-10-PCS | Performed by: INTERNAL MEDICINE

## 2022-05-12 PROCEDURE — 80048 BASIC METABOLIC PNL TOTAL CA: CPT

## 2022-05-12 PROCEDURE — 2580000003 HC RX 258: Performed by: SPECIALIST

## 2022-05-12 PROCEDURE — 85014 HEMATOCRIT: CPT

## 2022-05-12 PROCEDURE — 85025 COMPLETE CBC W/AUTO DIFF WBC: CPT

## 2022-05-12 PROCEDURE — 2580000003 HC RX 258: Performed by: STUDENT IN AN ORGANIZED HEALTH CARE EDUCATION/TRAINING PROGRAM

## 2022-05-12 PROCEDURE — 87206 SMEAR FLUORESCENT/ACID STAI: CPT

## 2022-05-12 PROCEDURE — 6360000002 HC RX W HCPCS: Performed by: SPECIALIST

## 2022-05-12 PROCEDURE — 2000000000 HC ICU R&B

## 2022-05-12 PROCEDURE — 87186 SC STD MICRODIL/AGAR DIL: CPT

## 2022-05-12 PROCEDURE — 71045 X-RAY EXAM CHEST 1 VIEW: CPT

## 2022-05-12 PROCEDURE — 87205 SMEAR GRAM STAIN: CPT

## 2022-05-12 PROCEDURE — 2580000003 HC RX 258: Performed by: INTERNAL MEDICINE

## 2022-05-12 PROCEDURE — 36592 COLLECT BLOOD FROM PICC: CPT

## 2022-05-12 PROCEDURE — 36600 WITHDRAWAL OF ARTERIAL BLOOD: CPT

## 2022-05-12 PROCEDURE — 6370000000 HC RX 637 (ALT 250 FOR IP): Performed by: STUDENT IN AN ORGANIZED HEALTH CARE EDUCATION/TRAINING PROGRAM

## 2022-05-12 PROCEDURE — 94003 VENT MGMT INPAT SUBQ DAY: CPT

## 2022-05-12 PROCEDURE — 87077 CULTURE AEROBIC IDENTIFY: CPT

## 2022-05-12 PROCEDURE — 99233 SBSQ HOSP IP/OBS HIGH 50: CPT | Performed by: INTERNAL MEDICINE

## 2022-05-12 PROCEDURE — 84100 ASSAY OF PHOSPHORUS: CPT

## 2022-05-12 PROCEDURE — 82962 GLUCOSE BLOOD TEST: CPT

## 2022-05-12 PROCEDURE — 82805 BLOOD GASES W/O2 SATURATION: CPT

## 2022-05-12 PROCEDURE — 36430 TRANSFUSION BLD/BLD COMPNT: CPT

## 2022-05-12 PROCEDURE — 83735 ASSAY OF MAGNESIUM: CPT

## 2022-05-12 PROCEDURE — 85018 HEMOGLOBIN: CPT

## 2022-05-12 PROCEDURE — 36415 COLL VENOUS BLD VENIPUNCTURE: CPT

## 2022-05-12 RX ORDER — SODIUM CHLORIDE 9 MG/ML
INJECTION, SOLUTION INTRAVENOUS PRN
Status: DISCONTINUED | OUTPATIENT
Start: 2022-05-12 | End: 2022-05-17

## 2022-05-12 RX ORDER — ANTICOAGULANT SODIUM CITRATE SOLUTION 4 G/100ML
4.2 SOLUTION INTRAVENOUS PRN
Status: DISCONTINUED | OUTPATIENT
Start: 2022-05-12 | End: 2022-05-26 | Stop reason: HOSPADM

## 2022-05-12 RX ADMIN — INSULIN GLARGINE 20 UNITS: 100 INJECTION, SOLUTION SUBCUTANEOUS at 09:26

## 2022-05-12 RX ADMIN — CHLORHEXIDINE GLUCONATE 0.12% ORAL RINSE 15 ML: 1.2 LIQUID ORAL at 09:00

## 2022-05-12 RX ADMIN — CHLORHEXIDINE GLUCONATE 0.12% ORAL RINSE 15 ML: 1.2 LIQUID ORAL at 20:33

## 2022-05-12 RX ADMIN — NOREPINEPHRINE BITARTRATE 6 MCG/MIN: 1 INJECTION, SOLUTION, CONCENTRATE INTRAVENOUS at 06:40

## 2022-05-12 RX ADMIN — Medication 75 MCG/HR: at 09:34

## 2022-05-12 RX ADMIN — MEROPENEM 1000 MG: 1 INJECTION, POWDER, FOR SOLUTION INTRAVENOUS at 16:47

## 2022-05-12 RX ADMIN — SODIUM CHLORIDE, PRESERVATIVE FREE 40 MG: 5 INJECTION INTRAVENOUS at 09:26

## 2022-05-12 RX ADMIN — DOCUSATE SODIUM LIQUID 100 MG: 100 LIQUID ORAL at 20:33

## 2022-05-12 RX ADMIN — LEVOTHYROXINE SODIUM 175 MCG: 125 TABLET ORAL at 06:37

## 2022-05-12 RX ADMIN — LINEZOLID 600 MG: 600 INJECTION, SOLUTION INTRAVENOUS at 14:25

## 2022-05-12 RX ADMIN — INSULIN GLARGINE 20 UNITS: 100 INJECTION, SOLUTION SUBCUTANEOUS at 20:42

## 2022-05-12 RX ADMIN — LEVETIRACETAM 500 MG: 100 SOLUTION ORAL at 09:26

## 2022-05-12 RX ADMIN — Medication 10 ML: at 09:00

## 2022-05-12 RX ADMIN — POLYETHYLENE GLYCOL 3350 17 G: 17 POWDER, FOR SOLUTION ORAL at 09:26

## 2022-05-12 RX ADMIN — DOCUSATE SODIUM LIQUID 100 MG: 100 LIQUID ORAL at 09:26

## 2022-05-12 RX ADMIN — SENNOSIDES 8.8 MG: 8.8 SYRUP ORAL at 20:34

## 2022-05-12 RX ADMIN — Medication 10 ML: at 20:34

## 2022-05-12 RX ADMIN — LEVETIRACETAM 500 MG: 100 SOLUTION ORAL at 20:33

## 2022-05-12 ASSESSMENT — PULMONARY FUNCTION TESTS
PIF_VALUE: 35
PIF_VALUE: 33
PIF_VALUE: 32
PIF_VALUE: 37
PIF_VALUE: 35
PIF_VALUE: 30
PIF_VALUE: 36
PIF_VALUE: 35
PIF_VALUE: 24
PIF_VALUE: 27
PIF_VALUE: 33
PIF_VALUE: 30
PIF_VALUE: 32
PIF_VALUE: 36
PIF_VALUE: 37
PIF_VALUE: 33
PIF_VALUE: 40
PIF_VALUE: 36
PIF_VALUE: 34
PIF_VALUE: 42
PIF_VALUE: 35
PIF_VALUE: 33
PIF_VALUE: 32
PIF_VALUE: 31
PIF_VALUE: 31
PIF_VALUE: 37
PIF_VALUE: 32

## 2022-05-12 ASSESSMENT — PAIN SCALES - WONG BAKER
WONGBAKER_NUMERICALRESPONSE: 0

## 2022-05-12 ASSESSMENT — PAIN SCALES - GENERAL: PAINLEVEL_OUTOF10: 0

## 2022-05-12 NOTE — PROGRESS NOTES
INPATIENT CARDIOLOGY FOLLOW-UP    Name: Chris Schulz    Age: 74 y.o. Date of Admission: 5/10/2022 11:58 PM    Date of Service: 5/12/2022    Chief Complaint: Follow-up for sepsis with associated septic shock, possible type II non-ST elevation myocardial infarction, atrial tachycardia/flutter, chronic kidney disease, probable pneumonia    Interim History: The patient remains intubated and mechanically ventilated with persistent low-grade fevers and a leukocytosis in addition to that of a persistent predominantly left-sided pulmonary infiltrate. He remains hypotensive with ongoing needs of vasoactive support and marginal rate control of his atrial arrhythmias. Interim assessments of additional consulting services have been reviewed including that of palliative care. His echocardiogram while extremely limited demonstrates no evidence of regional wall motion at base with normal left ventricular systolic function. Review of Systems: The remainder of a complete multisystem review including consitutional, central nervous, respiratory, circulatory, gastrointestinal, genitourinary, endocrinologic, hematologic, musculoskeletal and psychiatric are negative. Problem List:  Patient Active Problem List   Diagnosis    Cardiac arrest [facility])       Allergies:   Allergies   Allergen Reactions    Other Nausea Only     \"narcotics\" reaction unknown       Current Medications:  Current Facility-Administered Medications   Medication Dose Route Frequency Provider Last Rate Last Admin    norepinephrine (LEVOPHED) 16 mg in dextrose 5 % 250 mL infusion  1-100 mcg/min IntraVENous Continuous Klaudia Rodrigues MD 3.8 mL/hr at 05/12/22 0747 4 mcg/min at 05/12/22 0747    fentaNYL 10 mcg/ml in 0.9%  ml infusion   mcg/hr IntraVENous Continuous Klaudia Rodrigues MD 7.5 mL/hr at 05/12/22 0654 75 mcg/hr at 05/12/22 0654    midazolam (VERSED) 1 mg/mL in D5W infusion  1-10 mg/hr IntraVENous Continuous Neeraj Cook MD 2 mL/hr at 05/12/22 0654 2 mg/hr at 05/12/22 0654    polyethylene glycol (GLYCOLAX) packet 17 g  17 g Oral Daily Kendall Bass MD   17 g at 05/11/22 1000    pantoprazole (PROTONIX) 40 mg in sodium chloride (PF) 10 mL injection  40 mg IntraVENous Daily Kendall Bass MD   40 mg at 05/11/22 1105    chlorhexidine (PERIDEX) 0.12 % solution 15 mL  15 mL Mouth/Throat BID Rosana Stewart MD   15 mL at 05/11/22 2026    senna (SENOKOT) 8.8 MG/5ML syrup 8.8 mg  5 mL Per G Tube Nightly Rosana Stewart MD   8.8 mg at 05/11/22 2224    docusate (COLACE) 50 MG/5ML liquid 100 mg  100 mg Per G Tube BID Rosana Stewart MD   100 mg at 05/11/22 2026    levothyroxine (SYNTHROID) tablet 175 mcg  175 mcg Per G Tube Daily Rosana Stewart MD   175 mcg at 05/12/22 4197    perflutren lipid microspheres (DEFINITY) injection 1.65 mg  1.5 mL IntraVENous ONCE PRN Rosana Stewart MD        [Held by provider] nitroglycerin (NITRO-BID) 2 % ointment 0.5 inch  0.5 inch Topical 4 times per day Sabra Bernabe MD        insulin glargine (LANTUS) injection vial 20 Units  20 Units SubCUTAneous BID Rosana Stewart MD   20 Units at 05/11/22 2030    insulin lispro (HUMALOG) injection vial 0-12 Units  0-12 Units SubCUTAneous Q6H Rosana Stewart MD   2 Units at 05/11/22 1823    glucose chewable tablet 16 g  4 tablet Oral PRN Rosana Stewart MD        dextrose bolus 10% 125 mL  125 mL IntraVENous PRN Rosana Stewart MD        Or    dextrose bolus 10% 250 mL  250 mL IntraVENous PRN Rosana Stewart MD        glucagon (rDNA) injection 1 mg  1 mg IntraMUSCular PRN Rosana Stewart MD        dextrose 5 % solution  100 mL/hr IntraVENous PRN Rosana Stewart MD        levETIRAcetam (KEPPRA) 100 MG/ML solution 500 mg  500 mg PEG Tube BID Rosana Stewart MD   500 mg at 05/11/22 2030    linezolid (ZYVOX) IVPB 600 mg  600 mg IntraVENous Q12H Prabhu Way MD   Stopped at 05/12/22 0205    No intake/output data recorded. Laboratory Tests:  Lab Results   Component Value Date    CREATININE 2.4 (H) 05/11/2022    BUN 65 (H) 05/11/2022     05/11/2022    K 4.1 05/11/2022    CL 95 (L) 05/11/2022    CO2 27 05/11/2022     No results for input(s): CKTOTAL, CKMB in the last 72 hours.     Invalid input(s): TROPONONI  No results found for: BNP  Lab Results   Component Value Date    WBC 16.0 05/12/2022    RBC 2.40 05/12/2022    HGB 6.8 05/12/2022    HCT 22.5 05/12/2022    MCV 93.8 05/12/2022    MCH 28.3 05/12/2022    MCHC 30.2 05/12/2022    RDW 17.1 05/12/2022     05/12/2022    MPV 9.7 05/12/2022     Recent Labs     05/11/22  0032   ALKPHOS 591*   ALT 30   AST 31   PROT 6.9   BILITOT 0.3   LABALBU 2.4*     Lab Results   Component Value Date    MG 2.5 05/11/2022     Lab Results   Component Value Date    PROTIME 13.0 05/11/2022    INR 1.2 05/11/2022     Lab Results   Component Value Date    TSH 2.700 05/11/2022     No components found for: CHLPL  No results found for: TRIG  No results found for: HDL  No results found for: [address]    Cardiac Tests:  Telemetry findings reviewed: atrial tachycardia/flutter with mean rates of approximately 110 bpm, no new tachy/bradyarrhythmias overnight  Chest X-ray: A repeat chest x-ray reviewed at time of evaluation limited by rotation demonstrates no evidence of cardiomegaly with persistent diffuse infiltrates more prominently noted in the left lung  Last Echocardiogram: Technically suboptimal and limited echocardiogram demonstrates evidence of a grossly normal size left ventricular chamber with normal left ventricular systolic function and an adequate assessment of the right ventricle or additional cardiac structures      ASSESSMENT / PLAN: On a clinical basis, the patient remains critically ill with persistent acute hypoxic respiratory failure and hemodynamic instability necessitating vasoactive support with the picture continuing to suggest the presence of sepsis. A persistent low-grade fever and leukocytosis remains notable in addition to that of a significant infiltrate throughout both his left upper and lower lobes. Persistent marginal rate control of his atrial arrhythmias are noted appropriate for that of his acute illness with plans of the administration of a single dose of digitalis and consideration of substitution of phenylephrine for that of his norepinephrine to further reduce exacerbation of his tachycardia. While extremely limited his echocardiographic findings are not consistent with an acute coronary syndrome origin of his abnormal troponin levels which are likely in part related to increased myocardial oxygen demands of his acute illness but predominantly that of his chronic kidney disease. No additional cardiovascular testing is anticipated at this time with the predominance of care deferred to that of the pulmonary/critical care, infectious disease and nephrology services. He is prognosis remains guarded with his palliative care assessment reviewed and ongoing needs of reinforcement of this to family in regards to their further decisions regarding ongoing advanced directive determinations. Note: This report was completed utilizing computer voice recognition software. Every effort has been made to ensure accuracy, however; inadvertent computerized transcription errors may be present. Andree Rod.  Samuel Davidson, [address] [facility]

## 2022-05-12 NOTE — PROGRESS NOTES
Critical Care Team - Daily Progress Note      Date and time: 2022 11:49 AM  Patient's name:  Willy Martínez  Medical Record Number: [id number]  Patient's account/billing number: [de-identified]  Patient's YOB: 1955  Age: 74 y.o. Date of Admission: 5/10/2022 11:58 PM  Length of stay during current admission: 1      Primary Care Physician: Emilia Butterfield MD  ICU Attending Physician: Dr. Roberto Eldridge Status: Full Code    Reason for ICU admission: Status postcardiac arrest      SUBJECTIVE:     OVERNIGHT EVENTS:         Patient remained afebrile, some episodes of tachycardia. Patient to receive some blood hemoglobin dropped to 6.8. Review of systems unable to perform due to patient's mental status. Intake/Output:   No intake/output data recorded. I/O last 3 completed shifts: In: 1648.6 [I.V.:582.4; NG/GT:680; IV Piggyback:386.2]  Out: -     Awake and following commands: No  Current Ventilation: - Ventilator Settings:    Vt (Set, mL): 400 mL  Resp Rate (Set): 18 bmp  FiO2 : 40 %    PEEP/CPAP (cmH2O): 8  Pressure Support: 0 cmH20  Secretions: none  Sedation: fentanyl  Paralyzed: No  Vasopressors: norepinephrine    Initial HPI + past overnight events: 67-year-old male resident of [facility] [place] with significant past medical history of A. fib on Eliquis, aspirin, anxiety, anemia, CHF, insulin-dependent type 2 diabetes, CKD on hemodialysis, sacral decubitus ulcers with wound VAC, hyperlipidemia, hypothyroidism. Patient presented to the ICU after having a cardiac arrest, required CPR 2 rounds of epinephrine, and intubated.     OBJECTIVE:     VITAL SIGNS:  BP 96/63   Pulse 89   Temp 97.3 °F (36.3 °C)   Resp 18   Ht 6' 5\" (1.956 m)   Wt 218 lb 11.1 oz (99.2 kg)   SpO2 99%   BMI 25.93 kg/m²   Tmax over 24 hours:  Temp (24hrs), Av.2 °F (36.8 °C), Min:97.2 °F (36.2 °C), Max:99.5 °F (37.5 °C)      Patient Vitals for the past 6 hrs:   BP Temp Pulse Resp SpO2 Weight   22 1147 96/63 -- 89 -- -- --   05/12/22 1133 (!) 140/76 -- 109 -- -- --   05/12/22 1115 89/65 -- 89 -- -- --   05/12/22 1100 90/60 -- 92 -- -- --   05/12/22 1051 90/60 -- 93 -- -- --   05/12/22 1032 100/66 -- 104 -- -- --   05/12/22 1018 (!) 99/58 -- 121 -- -- --   05/12/22 0955 107/66 97.3 °F (36.3 °C) 120 18 99 % 218 lb 11.1 oz (99.2 kg)   05/12/22 0815 -- -- 108 17 99 % --   05/12/22 0654 (!) 104/56 -- 109 19 -- --   05/12/22 0600 108/70 -- 121 18 96 % 212 lb 1.3 oz (96.2 kg)         Intake/Output Summary (Last 24 hours) at 5/12/2022 1149  Last data filed at 5/12/2022 0654  Gross per 24 hour   Intake 1648.63 ml   Output --   Net 1648.63 ml     Wt Readings from Last 2 Encounters:   05/12/22 218 lb 11.1 oz (99.2 kg)   04/27/22 212 lb (96.2 kg)     Body mass index is 25.93 kg/m². Physical Exam  Vitals and nursing note reviewed. Constitutional:       General: He is awake. Appearance: He is ill-appearing. Comments: Sedated and intubated   HENT:      Head: Normocephalic and atraumatic. Nose: Nose normal. No congestion or rhinorrhea. Mouth/Throat:      Mouth: Mucous membranes are moist.      Pharynx: Oropharynx is clear. Eyes:      General: No scleral icterus. Extraocular Movements: Extraocular movements intact. Conjunctiva/sclera: Conjunctivae normal.   Cardiovascular:      Rate and Rhythm: Normal rate and regular rhythm. Pulses: Normal pulses. Heart sounds: Normal heart sounds. No murmur heard. Pulmonary:      Breath sounds: No stridor. Rhonchi present. Comments: intubated  Abdominal:      General: Bowel sounds are normal. There is no distension. Palpations: Abdomen is soft. There is no mass. Tenderness: There is no abdominal tenderness. There is no guarding or rebound. Comments: PEG tube   Musculoskeletal:         General: Normal range of motion. Cervical back: Normal range of motion and neck supple. No tenderness. Right lower leg: Edema present.       Left lower leg: Edema present. Skin:     Findings: Lesion (Sacral decubitus) present. Comments: Chronic changes -Dark-colored lower legs b/l  Feet are cold to touch    Neurological:      Comments: Unable to assess due to patient's condition   Psychiatric:         Attention and Perception: Attention normal.         Behavior: Behavior is cooperative.            MEDICATIONS:    Scheduled Meds:   polyethylene glycol  17 g Oral Daily    pantoprazole (PROTONIX) 40 mg injection  40 mg IntraVENous Daily    chlorhexidine  15 mL Mouth/Throat BID    senna  5 mL Per G Tube Nightly    docusate  100 mg Per G Tube BID    levothyroxine  175 mcg Per G Tube Daily    [Held by provider] nitroglycerin  0.5 inch Topical 4 times per day    insulin glargine  20 Units SubCUTAneous BID    insulin lispro  0-12 Units SubCUTAneous Q6H    levETIRAcetam  500 mg PEG Tube BID    linezolid  600 mg IntraVENous Q12H    meropenem  1,000 mg IntraVENous Q24H    sodium chloride flush  5-40 mL IntraVENous 2 times per day     Continuous Infusions:   sodium chloride      norepinephrine 5 mcg/min (05/12/22 1121)    fentaNYL 25 mcg/hr (05/12/22 1121)    midazolam Stopped (05/12/22 0924)    dextrose      sodium chloride Stopped (05/12/22 0243)     PRN Meds:   sodium chloride, , PRN  perflutren lipid microspheres, 1.5 mL, ONCE PRN  glucose, 4 tablet, PRN  dextrose bolus, 125 mL, PRN   Or  dextrose bolus, 250 mL, PRN  glucagon (rDNA), 1 mg, PRN  dextrose, 100 mL/hr, PRN  sodium chloride flush, 5-40 mL, PRN  sodium chloride, , PRN          VENT SETTINGS (Comprehensive) (if applicable):  Vent Information  Vent Mode: AC/VC  Ventilator Initiate: Yes  Additional Respiratory Assessments  Pulse: 89  Resp: 18  SpO2: 99 %  End Tidal CO2: 42 (%)  Humidification Source: Heated wire  Humidification Temp: 37  Circuit Condensation: Drained  Cuff Pressure (cm H2O): 29 cm H2O    Arterial Blood Gas 5/12/2022  pH 7.388, pCO2 43.7, pO2 94.0, HCO3 25.7.      Laboratory findings:    Complete Blood Count:   Recent Labs     05/11/22  0032 05/11/22  0032 05/11/22  0258 05/11/22 0830 05/12/22  0630   WBC 22.6*  --   --  21.5* 16.0*   HGB 8.8*   < > 8.7* 7.8* 6.8*   HCT 30.8*   < > 29.4* 26.8* 22.5*     --   --  271 217    < > = values in this interval not displayed. Last 3 Blood Glucose:   Recent Labs     05/11/22 0032 05/11/22 0830 05/12/22  0630   GLUCOSE 341* 254* 130*        PT/INR:    Lab Results   Component Value Date    PROTIME 13.0 05/11/2022    INR 1.2 05/11/2022     PTT:    Lab Results   Component Value Date    APTT 32.8 05/11/2022       Comprehensive Metabolic Profile:   Recent Labs     05/11/22 0032 05/11/22 0830 05/12/22 0630   * 133 130*   K 4.5 4.1 4.5   CL 94* 95* 94*   CO2 26 27 25   BUN 61* 65* 76*   CREATININE 2.3* 2.4* 2.7*   GLUCOSE 341* 254* 130*   CALCIUM 11.1* 10.5* 10.0   PROT 6.9  --   --    LABALBU 2.4*  --   --    BILITOT 0.3  --   --    ALKPHOS 591*  --   --    AST 31  --   --    ALT 30  --   --       Magnesium:   Lab Results   Component Value Date    MG 2.6 05/12/2022     Phosphorus:   Lab Results   Component Value Date    PHOS 2.5 05/12/2022     Ionized Calcium: No results found for: CAION     Urinalysis:     Troponin: No results for input(s): TROPONINI in the last 72 hours. Microbiology:  Cultures drawn: Gram stain respiratory from sputum suction pending, MRSA nasal colonization positive, blood cultures 24 hours no growth    Respiratory panel negative    Radiology/Imaging:     Chest Xray (5/12/2022):  Partial interval clearing of the lungs when compared to prior studies, vague patchy right perihilar airspace disease and patchy residual airspace disease within the left lung. Trace right pleural effusion and small left pleural effusion.     ASSESSMENT:     Patient Active Problem List    Diagnosis Date Noted    Cardiac arrest ([facility] [address] 05/11/2022         PLAN:     Neuro   Patient intubated and sedated   History of seizures on Keppra    Respiratory   Acute hypercapnic respiratory failure with hypoxia   Intubated 5/10/2022   Patient to have a thoracentesis today   Pneumonia    Cardiovascular   History of A. fib on Eliquis   EKG concerning for heart block.  Elevated troponin most likely secondary to CKD   Cardiology following    GI   History of a PEG tube   Concern for GI bleed    Renal   CKD on dialysis Monday Wednesday Friday  [facility] following    Infectious disease   Sepsis with septic shock   Goal decubitus ulcer   ID following    Heme/Onc   Patient Hg 6.8 - 1PRBC transfusion today    Trend H&H   Surgery following    Endocrine   Type 2 diabetes   Hypothyroidism    Social/Spiritual/DNR/Other   Code status: Full   Diet Diet NPO   ADULT TUBE FEEDING; PEG; Immune Enhancing; Continuous; 20; Yes; 20; Q 4 hours; 55; 30; Q 4 hours; Protein; Once daily   Stress ulcer prophylaxis:    DVT prophylaxis: None, hold Eliquis for now, may need heparin, cardiology input appreciated.  Consultations needed: Yes   Transfer out of ICU today? No    Other:thoracentesis today, transfusion today, goal to extubate as able     Lines/catheters   Date 05/10  o ETT  o CVC triple lumen R femoral vein   o Urinary cath         Riccardo Cisse MD  11:49 AM  05/12/22    I personally saw, examined and provided care for the patient. Radiographs, labs and medication list were reviewed by me independently. I spoke with bedside nursing, therapists and consultants. Critical care services and times documented are independent of procedures and multidisciplinary rounds with Residents. Additionally comprehensive, multidisciplinary rounds were conducted with the MICU team. The case was discussed in detail and plans for care were established. Review of Residents documentation was conducted and revisions were made as appropriate. I agree with the above documented exam, problem list and plan of care.   Lyndsay BHATIA Blair Vasques MD   CCT excluding procedures:38'

## 2022-05-12 NOTE — FLOWSHEET NOTE
05/12/22 1415   Vital Signs   /70   Temp 96.8 °F (36 °C)   Pulse 85   Resp 16   SpO2 98 %   Weight 216 lb 11.4 oz (98.3 kg)   Weight Method Estimated   Percent Weight Change -0.91   Pain Assessment   Pain Assessment Critical Care Pain Observation Tool (CPOT)   Post-Hemodialysis Assessment   Post-Treatment Procedures Blood returned;Catheter capped, clamped with Citrate x 2 ports   Machine Disinfection Process Acid/Vinegar Clean;Exterior Machine Disinfection; Heat Disinfect   Rinseback Volume (ml) 300 ml   Total Liters Processed (l/min) 68.2 l/min   Dialyzer Clearance Clear   Duration of Treatment (minutes) 240 minutes   Hemodialysis Intake (ml) 300 ml   Hemodialysis Output (ml) 1200 ml   NET Removed (ml) 900 ml   Tolerated Treatment Fair   Patient Response to Treatment levophed increased x 2 while on hD, 1 unit PRBCs given while on tx   Bilateral Breath Sounds Clear   Edema Right lower extremity; Left lower extremity   RLE Edema +1   LLE Edema +1

## 2022-05-12 NOTE — PATIENT CARE CONFERENCE
Intensive Care Daily Quality Rounding Checklist        ICU Team Members: bedside nurse, charge nurse, clinical pharmacist, Jack Sotelo,      ICU Day #: 2     Intubation Date: 5/11/2022     Ventilator Day #: 2     Central Line Insertion Date: 5/11/22                                                    Day #: 2      Arterial Line Insertion Date:                              Day #:      Temporary Hemodialysis Catheter Insertion Date:                              Day # admitted with temp dialysis cath     DVT Prophylaxis:     GI Prophylaxis: Protonix     Roberts Catheter Insertion Date: HD patient                                        Day #:                              Continued need (if yes, reason documented and discussed with physician):     Skin Issues/ Wounds and ordered treatment discussed on rounds: yes     Goals/ Plans for the Day: PossibleThoracentesis, wean vent/sedation,HD per nephrology, wean levophed as tolerated

## 2022-05-12 NOTE — PLAN OF CARE
Problem: Discharge Planning  Goal: Discharge to home or other facility with appropriate resources  Outcome: Progressing     Problem: Pain  Goal: Verbalizes/displays adequate comfort level or baseline comfort level  Outcome: Progressing  Flowsheets (Taken 5/12/2022 0600 by Samul Babinski A. [place] Spina, JONELLE)  Verbalizes/displays adequate comfort level or baseline comfort level: Assess pain using appropriate pain scale     Problem: Skin/Tissue Integrity  Goal: Absence of new skin breakdown  Description: 1. Monitor for areas of redness and/or skin breakdown  2. Assess vascular access sites hourly  3. Every 4-6 hours minimum:  Change oxygen saturation probe site  4. Every 4-6 hours:  If on nasal continuous positive airway pressure, respiratory therapy assess nares and determine need for appliance change or resting period. Outcome: Progressing     Problem: Safety - Adult  Goal: Free from fall injury  Outcome: Progressing     Problem: ABCDS Injury Assessment  Goal: Absence of physical injury  Outcome: Progressing     Problem: Respiratory - Adult  Goal: Achieves optimal ventilation and oxygenation  5/12/2022 1905 by Vidal Garcia RN  Outcome: Progressing  5/12/2022 1523 by Yanira Delagdo RCP  Outcome: Not Progressing  Flowsheets (Taken 5/11/2022 2000 by Oswaldo Bell RN)  Achieves optimal ventilation and oxygenation: Assess for changes in respiratory status  Note: No changes in respiratory status     Problem: Chronic Conditions and Co-morbidities  Goal: Patient's chronic conditions and co-morbidity symptoms are monitored and maintained or improved  Outcome: Progressing     Problem: Safety - Medical Restraint  Goal: Remains free of injury from restraints (Restraint for Interference with Medical Device)  Description: INTERVENTIONS:  1. Determine that other, less restrictive measures have been tried or would not be effective before applying the restraint  2.  Evaluate the patient's condition at the time of restraint application  3. Inform patient/family regarding the reason for restraint  4.  Q2H: Monitor safety, psychosocial status, comfort, nutrition and hydration  Outcome: Progressing  Flowsheets (Taken 5/12/2022 7276 by Al Resendiz RN)  Remains free of injury from restraints (restraint for interference with medical device): Every 2 hours: Monitor safety, psychosocial status, comfort, nutrition and hydration

## 2022-05-12 NOTE — PROGRESS NOTES
[address] [place] [address] [facility]  [facility]  Progress Note    SUBJECTIVE:  Chief Complaint   Patient presents with    Loss of Consciousness     arrived from [facility] via [facility] unresponsive     Patient is in the ICU. Here is intubated and sedated. Tolerating antibiotics. No fever. On vasopressors. Review of systems:  A 10 point review of systems was done. As stated above, otherwise negative. Medications:   polyethylene glycol  17 g Oral Daily    pantoprazole (PROTONIX) 40 mg injection  40 mg IntraVENous Daily    chlorhexidine  15 mL Mouth/Throat BID    senna  5 mL Per G Tube Nightly    docusate  100 mg Per G Tube BID    levothyroxine  175 mcg Per G Tube Daily    [Held by provider] nitroglycerin  0.5 inch Topical 4 times per day    insulin glargine  20 Units SubCUTAneous BID    insulin lispro  0-12 Units SubCUTAneous Q6H    levETIRAcetam  500 mg PEG Tube BID    linezolid  600 mg IntraVENous Q12H    meropenem  1,000 mg IntraVENous Q24H    sodium chloride flush  5-40 mL IntraVENous 2 times per day      sodium chloride      norepinephrine 4 mcg/min (22 4800)    fentaNYL 50 mcg/hr (22 1458)    midazolam Stopped (22)    dextrose      sodium chloride Stopped (22 6473)     sodium chloride, perflutren lipid microspheres, glucose, dextrose bolus **OR** dextrose bolus, glucagon (rDNA), dextrose, sodium chloride flush, sodium chloride    OBJECTIVE:  BP (!) 104/56   Pulse 108   Temp 99.5 °F (37.5 °C) (Axillary)   Resp 17   Ht 6' 5\" (1.956 m)   Wt 212 lb 1.3 oz (96.2 kg)   SpO2 99%   BMI 25.15 kg/m²   Temp  Av.4 °F (36.9 °C)  Min: 97.2 °F (36.2 °C)  Max: 99.5 °F (37.5 °C)  Constitutional: The patient is sedated, on ventilator. FiO2 40%. PEEP 8. Skin: Warm and dry. No rashes were noted. HEENT: Eyes show round, and reactive pupils. No jaundice. Moist mucous membranes, no ulcerations, no thrush. ETT. OGT  Neck: Supple to movements. No lymphadenopathy. Chest: No use of accessory muscles to breathe. Symmetrical expansion. Auscultation reveals coarse breath sounds. Rhonchi on bases. Right tunneled HD catheter. Cardiovascular: Heart sounds arrhythmic and irregular. No murmurs appreciated. Abdomen: Positive bowel sounds to auscultation. Benign to palpation. No masses felt. No hepatosplenomegaly. Extremities: No clubbing, no cyanosis, no edema. Back: Stage IV decubitus ulcer with dressing. Lines: Left PICC (Vibra)  Right femoral TLC 5/11/2022.     Laboratory and Tests Review:  Lab Results   Component Value Date    WBC 16.0 (H) 05/12/2022    WBC 21.5 (H) 05/11/2022    WBC 22.6 (H) 05/11/2022    HGB 6.8 (LL) 05/12/2022    HCT 22.5 (L) 05/12/2022    MCV 93.8 05/12/2022     05/12/2022     Lab Results   Component Value Date    NEUTROABS 14.56 (H) 05/12/2022    NEUTROABS 20.64 (H) 05/11/2022    NEUTROABS 20.54 (H) 05/11/2022     Lab Results   Component Value Date    CRP 13.9 (H) 05/11/2022     No results found for: CRP  Lab Results   Component Value Date    SEDRATE 78 (H) 05/11/2022     Lab Results   Component Value Date    ALT 30 05/11/2022    AST 31 05/11/2022    ALKPHOS 591 (H) 05/11/2022    BILITOT 0.3 05/11/2022     Lab Results   Component Value Date     05/12/2022    K 4.5 05/12/2022    K 4.5 05/11/2022    CL 94 05/12/2022    CO2 25 05/12/2022    BUN 76 05/12/2022    CREATININE 2.7 05/12/2022    CREATININE 2.4 05/11/2022    CREATININE 2.3 05/11/2022    GFRAA 29 05/12/2022    LABGLOM 24 05/12/2022    GLUCOSE 130 05/12/2022    PROT 6.9 05/11/2022    LABALBU 2.4 05/11/2022    CALCIUM 10.0 05/12/2022    BILITOT 0.3 05/11/2022    ALKPHOS 591 05/11/2022    AST 31 05/11/2022    ALT 30 05/11/2022     Radiology:      Microbiology:   [facility]:  Blood culture 5/10/2022: Negative so far  Decubitus ulcer 5/10/2022: MRSA, mixed GNR  Respiratory panel: Pending  Nares screen MRSA: Pending  Blood cultures 5/11/2022: Negative so far      [facility]:  Respiratory panel: Negative  Blood cultures 5/11/2022: Negative so far  Nares screen MRSA: Positive     ASSESSMENT:  · HCAP. Respiratory cultures were not sent  · Sepsis with septic shock associated to HCAP  · Status postcardiac arrest  · Acute respiratory failure  · Leukocytosis secondary to HCAP- improving  · Hypothermia, improved  · History of recent sacral decubitus ulcer infection with Pseudomonas and Enterococcus.   Treated with IV antibiotic between March and April 2022  · CKD, on HD    PLAN:  · Continue Linezolid and Meropenem, day 3  · Send respiratory cultures  · We will follow with you    Spoke with ICU team.    Prabhu Way MD  10:05 AM  5/12/2022

## 2022-05-12 NOTE — CARE COORDINATION
Social Work/Discharge Planning:  Chart reviewed. Patient had dialysis today. Anticipate thoracentesis today. Called patient wife Sherly Joseph (ph: [phone]) and completed initial assessment. Explained Social Work role. Patient wife plans for her  to return to [facility] pending his progress. Will continue to follow.   Electronically signed by SHAHNAZ Rodríguez on 5/12/2022 at 3:01 PM

## 2022-05-12 NOTE — PLAN OF CARE
Problem: Discharge Planning  Goal: Discharge to home or other facility with appropriate resources  5/12/2022 0038 by Jaki Meek RN  Outcome: Progressing  Flowsheets (Taken 5/11/2022 2000)  Discharge to home or other facility with appropriate resources: Arrange for needed discharge resources and transportation as appropriate  5/11/2022 1848 by Saqib Damon RN  Outcome: Not Progressing  Flowsheets  Taken 5/11/2022 1259 by Saqib Damon RN  Discharge to home or other facility with appropriate resources:   Identify discharge learning needs (meds, wound care, etc)   Arrange for needed discharge resources and transportation as appropriate  Taken 5/11/2022 0711 by Lily Urbina RN  Discharge to home or other facility with appropriate resources:   Identify barriers to discharge with patient and caregiver   Identify discharge learning needs (meds, wound care, etc)   Refer to discharge planning if patient needs post-hospital services based on physician order or complex needs related to functional status, cognitive ability or social support system   Arrange for needed discharge resources and transportation as appropriate   Arrange for interpreters to assist at discharge as needed  Taken 5/11/2022 0705 by Lily Urbina RN  Discharge to home or other facility with appropriate resources:   Refer to discharge planning if patient needs post-hospital services based on physician order or complex needs related to functional status, cognitive ability or social support system   Identify discharge learning needs (meds, wound care, etc)   Arrange for needed discharge resources and transportation as appropriate   Identify barriers to discharge with patient and caregiver  Taken 5/11/2022 0700 by Shilpi Rooney RN  Discharge to home or other facility with appropriate resources:   Identify barriers to discharge with patient and caregiver   Arrange for interpreters to assist at discharge as needed     Problem: Pain  Goal: Verbalizes/displays adequate comfort level or baseline comfort level  5/12/2022 0038 by Aisha Bell RN  Outcome: Progressing  Flowsheets  Taken 5/12/2022 0000  Verbalizes/displays adequate comfort level or baseline comfort level: Assess pain using appropriate pain scale  Taken 5/11/2022 2200  Verbalizes/displays adequate comfort level or baseline comfort level: Assess pain using appropriate pain scale  Taken 5/11/2022 2000  Verbalizes/displays adequate comfort level or baseline comfort level: Assess pain using appropriate pain scale  5/11/2022 1848 by Roshni Maldonado RN  Outcome: Progressing  Flowsheets (Taken 5/11/2022 0630 by Reymundo Waite RN)  Verbalizes/displays adequate comfort level or baseline comfort level: Assess pain using appropriate pain scale     Problem: Skin/Tissue Integrity  Goal: Absence of new skin breakdown  Description: 1. Monitor for areas of redness and/or skin breakdown  2. Assess vascular access sites hourly  3. Every 4-6 hours minimum:  Change oxygen saturation probe site  4. Every 4-6 hours:  If on nasal continuous positive airway pressure, respiratory therapy assess nares and determine need for appliance change or resting period. 5/12/2022 0038 by Aisha Gallegos RN  Outcome: Progressing  5/11/2022 1848 by Roshni Maldonado RN  Outcome: Progressing     Problem: Safety - Adult  Goal: Free from fall injury  5/12/2022 0038 by Aisha Gallegos RN  Outcome: Progressing  5/11/2022 1848 by Roshni Maldonado RN  Outcome: Progressing     Problem: ABCDS Injury Assessment  Goal: Absence of physical injury  5/12/2022 0038 by Aisha Gallegos RN  Outcome: Progressing  5/11/2022 1848 by Roshni Maldonado RN  Outcome: Progressing     Problem: Respiratory - Adult  Goal: Achieves optimal ventilation and oxygenation  5/12/2022 0038 by Aisha Bell RN  Outcome: Progressing  Flowsheets (Taken 5/11/2022 2000)  Achieves optimal ventilation and oxygenation: Assess for changes in respiratory status  5/11/2022 1848 by Nora Clark RN  Outcome: Progressing     Problem: Chronic Conditions and Co-morbidities  Goal: Patient's chronic conditions and co-morbidity symptoms are monitored and maintained or improved  5/12/2022 0038 by Hermilo Marrero. Fred Eason RN  Outcome: Progressing  Flowsheets (Taken 5/11/2022 2000)  Care Plan - Patient's Chronic Conditions and Co-Morbidity Symptoms are Monitored and Maintained or Improved: Monitor and assess patient's chronic conditions and comorbid symptoms for stability, deterioration, or improvement  5/11/2022 1848 by Nora Clark RN  Outcome: Progressing     Problem: Safety - Medical Restraint  Goal: Remains free of injury from restraints (Restraint for Interference with Medical Device)  Description: INTERVENTIONS:  1. Determine that other, less restrictive measures have been tried or would not be effective before applying the restraint  2. Evaluate the patient's condition at the time of restraint application  3. Inform patient/family regarding the reason for restraint  4. Q2H: Monitor safety, psychosocial status, comfort, nutrition and hydration  5/12/2022 0038 by Hermilo Marrero.  Fred Eason RN  Outcome: Progressing  Flowsheets  Taken 5/12/2022 0000  Remains free of injury from restraints (restraint for interference with medical device): Every 2 hours: Monitor safety, psychosocial status, comfort, nutrition and hydration  Taken 5/11/2022 2200  Remains free of injury from restraints (restraint for interference with medical device): Every 2 hours: Monitor safety, psychosocial status, comfort, nutrition and hydration  Taken 5/11/2022 2000  Remains free of injury from restraints (restraint for interference with medical device): Every 2 hours: Monitor safety, psychosocial status, comfort, nutrition and hydration  5/11/2022 1848 by Nora Clark, RN  Outcome: Progressing  Flowsheets (Taken 5/11/2022 1333 by Paresh Poe RN)  Remains free of injury from restraints (restraint for interference with medical device): Every 2 hours: Monitor safety, psychosocial status, comfort, nutrition and hydration

## 2022-05-12 NOTE — PROGRESS NOTES
Internal Medicine Progress Note      Synopsis: Patient admitted on 5/10/2022     Mr. Rickie Garay, a 74y.o. year old male who has a past medical history of A-fib ([address] TRUMAN (acute kidney injury) ([address] Anemia, Anxiety, Bipolar 1 disorder ([address] Charcot foot due to diabetes mellitus ([address] CHF (congestive heart failure) ([address] CKD (chronic kidney disease), Diabetes mellitus ([address] Dialysis patient ([address] Hyperlipidemia, Hypertension, Right sided weakness, Sacral decubitus ulcer, and Seizure ([facility] [address] This is a 69-year-old male with progressive dementia and spinal stenosis. Has been bedbound for a long time. He has had in the past bouts of kidney failure that did not require long-term resuscitation with dialysis however he was at acute care for decompensation from his large sacral decubitus and acute renal failure that did require renal replacement therapy of hemodialysis. He finishes antibiotics. His sacral wound was slow to heal because of poor oral intake and he was given a PEG tube for nutritional supplementation purposes since his wife wanted to. Patient had been stable till about 48 hours ago. Monday he was noticed to have a large amount of bloody stool. He was started on an intravenous proton pump inhibitor and surgery services were asked to see him. His anticoagulation was held. Patient was being watched and he did not have any more episodes. Yesterday he had an increase in his WBCs without having any localizing symptoms. Infectious disease did see him and started him immediately on broad-spectrum antibiotics. Patient became unresponsive and virtually reportedly evening more towards the early hours of this morning. Fluids were started unfortunately patient could not be resuscitated despite best efforts of the on-call intensive virtual specialist. Patient was sent over to acute care where he was intubated for cardiorespiratory failure.  He was found to be in cardiac arrest.   Appropriate O2 resuscitation resulted in resumption of heart rhythm. Patient was started on pressors moved to the ICU   Imaging reveals a significant pleural effusion and HCAP    Subjective    Still in the ICU. Pressors just weaned off. Still little bit soft on his blood pressure. Less sedation    Exam:  BP (!) 98/57   Pulse 89   Temp 97.9 °F (36.6 °C) (Axillary)   Resp 18   Ht 6' 5\" (1.956 m)   Wt 216 lb 11.4 oz (98.3 kg)   SpO2 96%   BMI 25.70 kg/m²   General appearance: Orally intubated HEENT: eyes are closed   neck:   Respiratory: Significantly decreased bibasilar bases   Cardiovascular: Irregular heart rate rhythm   Abdomen: Nontender positive for distention positive for PEG   Musculoskeletal: Some thickening of the skin on his lower extremities.  Dorsalis pedis hard to palpate   skin: Large clean sacral decubitus   neurologic: Unresponsive due to sedation       Medications:  Reviewed    Infusion Medications    sodium chloride      norepinephrine Stopped (05/12/22 1520)    fentaNYL 25 mcg/hr (05/12/22 1827)    midazolam Stopped (05/12/22 0924)    dextrose      sodium chloride Stopped (05/12/22 1647)     Scheduled Medications    polyethylene glycol  17 g Oral Daily    pantoprazole (PROTONIX) 40 mg injection  40 mg IntraVENous Daily    chlorhexidine  15 mL Mouth/Throat BID    senna  5 mL Per G Tube Nightly    docusate  100 mg Per G Tube BID    levothyroxine  175 mcg Per G Tube Daily    [Held by provider] nitroglycerin  0.5 inch Topical 4 times per day    insulin glargine  20 Units SubCUTAneous BID    insulin lispro  0-12 Units SubCUTAneous Q6H    levETIRAcetam  500 mg PEG Tube BID    linezolid  600 mg IntraVENous Q12H    meropenem  1,000 mg IntraVENous Q24H    sodium chloride flush  5-40 mL IntraVENous 2 times per day     PRN Meds: sodium chloride, anticoagulant sodium citrate, perflutren lipid microspheres, glucose, dextrose bolus **OR** dextrose bolus, glucagon (rDNA), dextrose, sodium chloride flush, sodium chloride    I/O    Intake/Output Summary (Last 24 hours) at 5/12/2022 1938  Last data filed at 5/12/2022 1827  Gross per 24 hour   Intake 2820.25 ml   Output 1200 ml   Net 1620.25 ml       Labs:   Recent Labs     05/11/22  0032 05/11/22  0258 05/11/22  0830 05/12/22  0630 05/12/22  1700   WBC 22.6*  --  21.5* 16.0*  --    HGB 8.8*   < > 7.8* 6.8* 7.5*   HCT 30.8*   < > 26.8* 22.5* 24.1*     --  271 217  --     < > = values in this interval not displayed. Recent Labs     05/11/22  0032 05/11/22 0830 05/12/22  0630   * 133 130*   K 4.5 4.1 4.5   CL 94* 95* 94*   CO2 26 27 25   BUN 61* 65* 76*   CREATININE 2.3* 2.4* 2.7*   CALCIUM 11.1* 10.5* 10.0   PHOS  --  3.0 2.5       Recent Labs     05/11/22 0032   PROT 6.9   ALKPHOS 591*   ALT 30   AST 31   BILITOT 0.3       Recent Labs     05/11/22 0032   INR 1.2       No results for input(s): Patrick Tsang in the last 72 hours. Chronic labs:  Lab Results   Component Value Date    TSH 2.700 05/11/2022    INR 1.2 05/11/2022       Radiology:  CT ABDOMEN PELVIS WO CONTRAST Additional Contrast? None    Result Date: 5/11/2022  Bilateral pleural effusions, greater on the left than right, with superimposed consolidation. In the appropriate setting, a pneumonia with associated pleural effusions cannot be excluded. No bowel obstruction, free air or obstructive uropathy. Additional findings involving the abdomen and pelvis, as detailed above. CT HEAD WO CONTRAST    Result Date: 5/11/2022  No acute findings. Chronic changes as above. CT CHEST WO CONTRAST    Result Date: 5/11/2022  Findings suggesting pulmonary edema versus a multifocal pneumonia, with associated bilateral pleural effusions. Generalized mediastinal adenopathy, which may represent reactive changes. This is a nonspecific finding. Differential diagnosis would include infection, inflammation or neoplastic causes. XR CHEST PORTABLE    Result Date: 5/12/2022  1.  Partial interval clearing of the lungs when compared with the patient's prior study. 2. Vague patchy right perihilar airspace disease and patchy residual airspace disease within the left lung. 3. Trace right pleural effusion and small left pleural effusion. XR CHEST PORTABLE    Result Date: 5/11/2022  Stable congestive/consolidative changes, with bilateral pleural effusions. No significant change. XR CHEST 1 VIEW    Result Date: 4/27/2022  Bibasilar pleural thickening   ASSESSMENT:    Principal Problem:    Cardiac arrest [facility])  Resolved Problems:    * No resolved hospital problems. *  Sepsis  Respiratory failure  Hypotension  Long-term dialysis  Pleural effusion  Pneumonia     PLAN:    1. Received a dialysis with ultrafiltration and fluid boluses  2. Continue to follow hemoglobin and appropriate transfusion  3. Remains full code per discussion with family  4. Plans for pleural effusion to be tapped to allow for better weaning  5. Glycemic control with insulin  6. Maintain Keppra for seizures  7. Maintaining linezolid and meropenem    Diet: Diet NPO  ADULT TUBE FEEDING; PEG; Immune Enhancing; Continuous; 20; Yes; 20; Q 4 hours; 55; 30; Q 4 hours; Protein; Once daily  Code Status: Full Code  PT/OT Eval Status:   Unable  DVT Prophylaxis:   On hold for pleural tap  Recommended disposition at discharge:   Unsure yet    +++++++++++++++++++++++++++++++++++++++++++++++++  Sheeba Green MD   [facility].  +++++++++++++++++++++++++++++++++++++++++++++++++  NOTE: This report was transcribed using voice recognition software. Every effort was made to ensure accuracy; however, inadvertent computerized transcription errors may be present.

## 2022-05-12 NOTE — PROGRESS NOTES
Progress Note  5/12/2022 2:59 PM  Subjective:   Admit Date: 5/10/2022  PCP: Nicole Hammonds MD  Interval History: Full consult deferred as pt was being followed longitudinally at [facility]. 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 He was found to be unresponsive at [facility] 5/10/22, EMS was called and pt transferred to [facility] ED. In the ED the pt was found to be pulseless he was resuscitated from a Cardiac arrest and admitted to the MICU    5/12/22: Pt intubated and on dialysis  at the time of my visit earlier this AM, he was on norepi 6mcg at the time of my visit    Diet: Diet NPO  ADULT TUBE FEEDING; PEG; Immune Enhancing; Continuous; 20; Yes; 20; Q 4 hours; 55; 30; Q 4 hours; Protein; Once daily    Data:   Scheduled Meds:   polyethylene glycol  17 g Oral Daily    pantoprazole (PROTONIX) 40 mg injection  40 mg IntraVENous Daily    chlorhexidine  15 mL Mouth/Throat BID    senna  5 mL Per G Tube Nightly    docusate  100 mg Per G Tube BID    levothyroxine  175 mcg Per G Tube Daily    [Held by provider] nitroglycerin  0.5 inch Topical 4 times per day    insulin glargine  20 Units SubCUTAneous BID    insulin lispro  0-12 Units SubCUTAneous Q6H    levETIRAcetam  500 mg PEG Tube BID    linezolid  600 mg IntraVENous Q12H    meropenem  1,000 mg IntraVENous Q24H    sodium chloride flush  5-40 mL IntraVENous 2 times per day     Continuous Infusions:   sodium chloride      norepinephrine 1 mcg/min (05/12/22 1415)    fentaNYL 25 mcg/hr (05/12/22 1121)    midazolam Stopped (05/12/22 0924)    dextrose      sodium chloride Stopped (05/12/22 0243)     PRN Meds:sodium chloride, anticoagulant sodium citrate, perflutren lipid microspheres, glucose, dextrose bolus **OR** dextrose bolus, glucagon (rDNA), dextrose, sodium chloride flush, sodium chloride  I/O last 3 completed shifts:   In: 1648.6 [I.V.:582.4; NG/GT:680; IV Piggyback:386.2]  Out: -   I/O this shift:  In: 300   Out: 1200     Intake/Output Summary appearance: Intubated opened eyes to physical stimuli  Skin: Skin color, texture, turgor normal. No rashes or lesions  HEENT: Head: Normal, normocephalic, atraumatic. Neck: no JVD and supple, symmetrical, trachea midline  Lungs: rhonchi bilaterallydecreased BS at the L base  Heart: irregularly irregular rhythm, no S3 or S4 and no rub  Abdomen: soft, non-tender; bowel sounds normal; no masses,  no organomegaly  Extremities: edema trace to 1 + bilat  Neurologic: Mental status: pt opened eyes to physical stimuli but did not interact with me    Assessment:   Patient Active Problem List:     Cardiac arrest ([facility] [address]    Plan:   1. Stage III TRUMAN requiring KRT with IHD dialyzing MWF at [address] [place] [address] with underlying CKD G3B-pt has remained oliguric without evidence of renal recovery  PLAN:  1. IHD this AM issues with intradialytic hypotension-UF target decreased from 1600ml to 1200ml and a 200ml fluid bolus given with improvement in SBP from 70's-->90's from   2. Follow Labs    2. Anemia in the TRUMNA-with episodes GI loss at [facility]  3. HgB 6.8  PLAN:  1. Follow H/H  2. Continue SHANIQUA  3. Transfusing  for hgb <7    3. 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  Correct Ca++ with IHD    4-S/P Cardio-Pulm Arrest        Kaci Chi MD

## 2022-05-12 NOTE — PROGRESS NOTES
GENERAL SURGERY  DAILY PROGRESS NOTE  5/12/2022    Chief Complaint   Patient presents with    Loss of Consciousness     arrived from [facility] via [facility] em[facility] unresponsive       Subjective:  Pt intubated and sedated. On low dose Levo. HgB 7.8 from 8.7 from 8.8. Objective:  /63   Pulse 123   Temp 99.1 °F (37.3 °C) (Axillary)   Resp 18   Ht 6' 5\" (1.956 m)   Wt 206 lb 9.1 oz (93.7 kg)   SpO2 98%   BMI 24.50 kg/m²     GENERAL: Intubated, sedated  HEAD: Normocephalic, atraumatic  EYES: No sclera icterus, pupils equal  LUNGS: On mechanical ventilation  CARDIOVASCULAR:  Tachycardic and normotensive on pressors  ABDOMEN:  Soft, non-tender, non-distended  EXTREMITIES: No edema or swelling  SKIN: Warm and dry    Assessment/Plan:  74 y.o. male with hematochezia secondary to anal fissure    - Okay for tube feeds from surgical POV  - PPI BID  - Bowel regimen  - Sitz baths  - Nitro-bid Cream to fissure Q6  - Monitor Hgb, transfuse as needed  - No plans for scopes at this time  - Will be available as needed. Electronically signed by Elizabeth Henderson MD on 5/12/2022 at 5:10 AM    Agree w above  No plans for scopes at this time    Aury Woodall MD  Minimally Invasive General Surgery and Endoscopy  [address] [place] [address].  [address]: [phone]  F: [phone]    Electronically signed by Tasha Pleitez MD on 5/12/2022 at 11:02 AM

## 2022-05-12 NOTE — PROGRESS NOTES
Chart reviewed. Goals of care and CODE STATUS have been established. Family wishes to continue full code and current management. Palliative medicine continues to follow for ongoing goals of care and CODE STATUS discussions as well as support for patient and family.

## 2022-05-13 ENCOUNTER — APPOINTMENT (OUTPATIENT)
Dept: GENERAL RADIOLOGY | Age: 67
DRG: 003 | End: 2022-05-13
Payer: MEDICARE

## 2022-05-13 LAB
ACINETOBACTER CALCOAC BAUMANNII COMPLEX BY PCR: NOT DETECTED
ANION GAP SERPL CALCULATED.3IONS-SCNC: 10 MMOL/L (ref 7–16)
ANISOCYTOSIS: ABNORMAL
APPEARANCE FLUID: NORMAL
B.E.: 2.3 MMOL/L (ref -3–3)
BACTEROIDES FRAGILIS BY PCR: NOT DETECTED
BASOPHILIC STIPPLING: ABNORMAL
BASOPHILS ABSOLUTE: 0 E9/L (ref 0–0.2)
BASOPHILS RELATIVE PERCENT: 0 % (ref 0–2)
BOTTLE TYPE: ABNORMAL
BUN BLDV-MCNC: 50 MG/DL (ref 6–23)
BURR CELLS: ABNORMAL
CALCIUM SERPL-MCNC: 9 MG/DL (ref 8.6–10.2)
CANDIDA ALBICANS BY PCR: NOT DETECTED
CANDIDA AURIS BY PCR: NOT DETECTED
CANDIDA GLABRATA BY PCR: NOT DETECTED
CANDIDA KRUSEI BY PCR: NOT DETECTED
CANDIDA PARAPSILOSIS BY PCR: NOT DETECTED
CANDIDA TROPICALIS BY PCR: NOT DETECTED
CELL COUNT FLUID TYPE: NORMAL
CHLORIDE BLD-SCNC: 94 MMOL/L (ref 98–107)
CO2: 28 MMOL/L (ref 22–29)
COHB: 1.3 % (ref 0–1.5)
COLOR FLUID: YELLOW
CREAT SERPL-MCNC: 1.8 MG/DL (ref 0.7–1.2)
CRITICAL: ABNORMAL
CRYPTOCOCCUS NEOFORMANS/GATTII BY PCR: NOT DETECTED
DATE ANALYZED: ABNORMAL
DATE OF COLLECTION: ABNORMAL
ENTEROBACTER CLOACAE COMPLEX BY PCR: NOT DETECTED
ENTEROBACTERALES BY PCR: NOT DETECTED
ENTEROCOCCUS FAECALIS BY PCR: NOT DETECTED
ENTEROCOCCUS FAECIUM BY PCR: NOT DETECTED
EOSINOPHILS ABSOLUTE: 0.12 E9/L (ref 0.05–0.5)
EOSINOPHILS RELATIVE PERCENT: 0.9 % (ref 0–6)
ESCHERICHIA COLI BY PCR: NOT DETECTED
FIO2: 35 %
FLUID TYPE: NORMAL
GFR AFRICAN AMERICAN: 46
GFR NON-AFRICAN AMERICAN: 38 ML/MIN/1.73
GLUCOSE BLD-MCNC: 219 MG/DL (ref 74–99)
GLUCOSE, FLUID: 53 MG/DL
HAEMOPHILUS INFLUENZAE BY PCR: NOT DETECTED
HCO3: 27.1 MMOL/L (ref 22–26)
HCT VFR BLD CALC: 23.1 % (ref 37–54)
HEMOGLOBIN: 7.1 G/DL (ref 12.5–16.5)
HHB: 4.8 % (ref 0–5)
HYPOCHROMIA: ABNORMAL
KLEBSIELLA AEROGENES BY PCR: NOT DETECTED
KLEBSIELLA OXYTOCA BY PCR: NOT DETECTED
KLEBSIELLA PNEUMONIAE GROUP BY PCR: NOT DETECTED
LAB: ABNORMAL
LACTIC ACID: 1.7 MMOL/L (ref 0.5–2.2)
LD, FLUID: 536 U/L
LISTERIA MONOCYTOGENES BY PCR: NOT DETECTED
LYMPHOCYTES ABSOLUTE: 0.13 E9/L (ref 1.5–4)
LYMPHOCYTES RELATIVE PERCENT: 0.9 % (ref 20–42)
Lab: ABNORMAL
MAGNESIUM: 2.2 MG/DL (ref 1.6–2.6)
MCH RBC QN AUTO: 28.6 PG (ref 26–35)
MCHC RBC AUTO-ENTMCNC: 30.7 % (ref 32–34.5)
MCV RBC AUTO: 93.1 FL (ref 80–99.9)
METER GLUCOSE: 174 MG/DL (ref 74–99)
METER GLUCOSE: 183 MG/DL (ref 74–99)
METER GLUCOSE: 198 MG/DL (ref 74–99)
METER GLUCOSE: 210 MG/DL (ref 74–99)
METER GLUCOSE: 230 MG/DL (ref 74–99)
METER GLUCOSE: 242 MG/DL (ref 74–99)
METHB: 0.3 % (ref 0–1.5)
METHICILLIN RESISTANCE MECA/C  BY PCR: DETECTED
MODE: AC
MONOCYTE, FLUID: 3 %
MONOCYTES ABSOLUTE: 0.26 E9/L (ref 0.1–0.95)
MONOCYTES RELATIVE PERCENT: 1.7 % (ref 2–12)
NEISSERIA MENINGITIDIS BY PCR: NOT DETECTED
NEUTROPHIL, FLUID: 97 %
NEUTROPHILS ABSOLUTE: 12.71 E9/L (ref 1.8–7.3)
NEUTROPHILS RELATIVE PERCENT: 96.5 % (ref 43–80)
NUCLEATED CELLS FLUID: 927 /UL
NUCLEATED RED BLOOD CELLS: 0.9 /100 WBC
O2 CONTENT: 10 ML/DL
O2 SATURATION: 95.1 % (ref 92–98.5)
O2HB: 93.6 % (ref 94–97)
OPERATOR ID: 2485
ORDER NUMBER: ABNORMAL
OVALOCYTES: ABNORMAL
PATIENT TEMP: 37 C
PCO2: 43.4 MMHG (ref 35–45)
PDW BLD-RTO: 16.9 FL (ref 11.5–15)
PEEP/CPAP: 8 CMH2O
PFO2: 2.18 MMHG/%
PH BLOOD GAS: 7.41 (ref 7.35–7.45)
PHOSPHORUS: 1.5 MG/DL (ref 2.5–4.5)
PLATELET # BLD: 156 E9/L (ref 130–450)
PMV BLD AUTO: 9.6 FL (ref 7–12)
PO2: 76.3 MMHG (ref 75–100)
POIKILOCYTES: ABNORMAL
POLYCHROMASIA: ABNORMAL
POTASSIUM SERPL-SCNC: 4.2 MMOL/L (ref 3.5–5)
PROTEIN FLUID: 3.6 G/DL
PROTEUS SPECIES BY PCR: NOT DETECTED
PSEUDOMONAS AERUGINOSA BY PCR: NOT DETECTED
RBC # BLD: 2.48 E12/L (ref 3.8–5.8)
RBC FLUID: <2000 /UL
ROULEAUX: ABNORMAL
RR MECHANICAL: 18 B/MIN
SALMONELLA SPECIES BY PCR: NOT DETECTED
SERRATIA MARCESCENS BY PCR: NOT DETECTED
SODIUM BLD-SCNC: 132 MMOL/L (ref 132–146)
SOURCE OF BLOOD CULTURE: ABNORMAL
SOURCE, BLOOD GAS: ABNORMAL
STAPHYLOCOCCUS AUREUS BY PCR: NOT DETECTED
STAPHYLOCOCCUS EPIDERMIDIS BY PCR: DETECTED
STAPHYLOCOCCUS LUGDUNENSIS BY PCR: NOT DETECTED
STAPHYLOCOCCUS SPECIES BY PCR: DETECTED
STENOTROPHOMONAS MALTOPHILIA BY PCR: NOT DETECTED
STREPTOCOCCUS AGALACTIAE BY PCR: NOT DETECTED
STREPTOCOCCUS PNEUMONIAE BY PCR: NOT DETECTED
STREPTOCOCCUS PYOGENES  BY PCR: NOT DETECTED
STREPTOCOCCUS SPECIES BY PCR: NOT DETECTED
THB: 7.5 G/DL (ref 11.5–16.5)
TIME ANALYZED: 613
VT MECHANICAL: 400 ML
WBC # BLD: 13.1 E9/L (ref 4.5–11.5)

## 2022-05-13 PROCEDURE — 32554 ASPIRATE PLEURA W/O IMAGING: CPT

## 2022-05-13 PROCEDURE — 87077 CULTURE AEROBIC IDENTIFY: CPT

## 2022-05-13 PROCEDURE — 84157 ASSAY OF PROTEIN OTHER: CPT

## 2022-05-13 PROCEDURE — 87070 CULTURE OTHR SPECIMN AEROBIC: CPT

## 2022-05-13 PROCEDURE — 87205 SMEAR GRAM STAIN: CPT

## 2022-05-13 PROCEDURE — 36415 COLL VENOUS BLD VENIPUNCTURE: CPT

## 2022-05-13 PROCEDURE — 80048 BASIC METABOLIC PNL TOTAL CA: CPT

## 2022-05-13 PROCEDURE — 0W9B3ZZ DRAINAGE OF LEFT PLEURAL CAVITY, PERCUTANEOUS APPROACH: ICD-10-PCS | Performed by: INTERNAL MEDICINE

## 2022-05-13 PROCEDURE — C9113 INJ PANTOPRAZOLE SODIUM, VIA: HCPCS | Performed by: STUDENT IN AN ORGANIZED HEALTH CARE EDUCATION/TRAINING PROGRAM

## 2022-05-13 PROCEDURE — 84100 ASSAY OF PHOSPHORUS: CPT

## 2022-05-13 PROCEDURE — 2500000003 HC RX 250 WO HCPCS: Performed by: INTERNAL MEDICINE

## 2022-05-13 PROCEDURE — 82962 GLUCOSE BLOOD TEST: CPT

## 2022-05-13 PROCEDURE — 2580000003 HC RX 258: Performed by: INTERNAL MEDICINE

## 2022-05-13 PROCEDURE — 2500000003 HC RX 250 WO HCPCS: Performed by: STUDENT IN AN ORGANIZED HEALTH CARE EDUCATION/TRAINING PROGRAM

## 2022-05-13 PROCEDURE — 94003 VENT MGMT INPAT SUBQ DAY: CPT

## 2022-05-13 PROCEDURE — 89051 BODY FLUID CELL COUNT: CPT

## 2022-05-13 PROCEDURE — 82805 BLOOD GASES W/O2 SATURATION: CPT

## 2022-05-13 PROCEDURE — 36600 WITHDRAWAL OF ARTERIAL BLOOD: CPT

## 2022-05-13 PROCEDURE — 83605 ASSAY OF LACTIC ACID: CPT

## 2022-05-13 PROCEDURE — 6370000000 HC RX 637 (ALT 250 FOR IP): Performed by: STUDENT IN AN ORGANIZED HEALTH CARE EDUCATION/TRAINING PROGRAM

## 2022-05-13 PROCEDURE — 6370000000 HC RX 637 (ALT 250 FOR IP): Performed by: INTERNAL MEDICINE

## 2022-05-13 PROCEDURE — 83735 ASSAY OF MAGNESIUM: CPT

## 2022-05-13 PROCEDURE — 2580000003 HC RX 258: Performed by: STUDENT IN AN ORGANIZED HEALTH CARE EDUCATION/TRAINING PROGRAM

## 2022-05-13 PROCEDURE — 36592 COLLECT BLOOD FROM PICC: CPT

## 2022-05-13 PROCEDURE — 85025 COMPLETE CBC W/AUTO DIFF WBC: CPT

## 2022-05-13 PROCEDURE — 83615 LACTATE (LD) (LDH) ENZYME: CPT

## 2022-05-13 PROCEDURE — 2580000003 HC RX 258: Performed by: SPECIALIST

## 2022-05-13 PROCEDURE — 71045 X-RAY EXAM CHEST 1 VIEW: CPT

## 2022-05-13 PROCEDURE — 99233 SBSQ HOSP IP/OBS HIGH 50: CPT | Performed by: INTERNAL MEDICINE

## 2022-05-13 PROCEDURE — 88305 TISSUE EXAM BY PATHOLOGIST: CPT

## 2022-05-13 PROCEDURE — 2000000000 HC ICU R&B

## 2022-05-13 PROCEDURE — 6360000002 HC RX W HCPCS: Performed by: SPECIALIST

## 2022-05-13 PROCEDURE — 88112 CYTOPATH CELL ENHANCE TECH: CPT

## 2022-05-13 PROCEDURE — 82947 ASSAY GLUCOSE BLOOD QUANT: CPT

## 2022-05-13 PROCEDURE — 87186 SC STD MICRODIL/AGAR DIL: CPT

## 2022-05-13 PROCEDURE — 6360000002 HC RX W HCPCS: Performed by: STUDENT IN AN ORGANIZED HEALTH CARE EDUCATION/TRAINING PROGRAM

## 2022-05-13 RX ORDER — LIDOCAINE HYDROCHLORIDE 20 MG/ML
INJECTION, SOLUTION INFILTRATION; PERINEURAL
Status: DISPENSED
Start: 2022-05-13 | End: 2022-05-13

## 2022-05-13 RX ADMIN — LINEZOLID 600 MG: 600 INJECTION, SOLUTION INTRAVENOUS at 23:52

## 2022-05-13 RX ADMIN — Medication 10 ML: at 08:20

## 2022-05-13 RX ADMIN — INSULIN LISPRO 2 UNITS: 100 INJECTION, SOLUTION INTRAVENOUS; SUBCUTANEOUS at 12:24

## 2022-05-13 RX ADMIN — LEVETIRACETAM 500 MG: 100 SOLUTION ORAL at 21:53

## 2022-05-13 RX ADMIN — LINEZOLID 600 MG: 600 INJECTION, SOLUTION INTRAVENOUS at 12:23

## 2022-05-13 RX ADMIN — DOCUSATE SODIUM LIQUID 100 MG: 100 LIQUID ORAL at 21:54

## 2022-05-13 RX ADMIN — Medication 10 ML: at 21:54

## 2022-05-13 RX ADMIN — INSULIN LISPRO 4 UNITS: 100 INJECTION, SOLUTION INTRAVENOUS; SUBCUTANEOUS at 23:52

## 2022-05-13 RX ADMIN — INSULIN LISPRO 2 UNITS: 100 INJECTION, SOLUTION INTRAVENOUS; SUBCUTANEOUS at 18:13

## 2022-05-13 RX ADMIN — INSULIN GLARGINE 20 UNITS: 100 INJECTION, SOLUTION SUBCUTANEOUS at 08:20

## 2022-05-13 RX ADMIN — LEVETIRACETAM 500 MG: 100 SOLUTION ORAL at 08:19

## 2022-05-13 RX ADMIN — LINEZOLID 600 MG: 600 INJECTION, SOLUTION INTRAVENOUS at 00:08

## 2022-05-13 RX ADMIN — Medication 25 MCG/HR: at 22:04

## 2022-05-13 RX ADMIN — SENNOSIDES 8.8 MG: 8.8 SYRUP ORAL at 21:54

## 2022-05-13 RX ADMIN — DOCUSATE SODIUM LIQUID 100 MG: 100 LIQUID ORAL at 08:19

## 2022-05-13 RX ADMIN — INSULIN LISPRO 4 UNITS: 100 INJECTION, SOLUTION INTRAVENOUS; SUBCUTANEOUS at 06:06

## 2022-05-13 RX ADMIN — POLYETHYLENE GLYCOL 3350 17 G: 17 POWDER, FOR SOLUTION ORAL at 08:19

## 2022-05-13 RX ADMIN — LEVOTHYROXINE SODIUM 175 MCG: 125 TABLET ORAL at 05:28

## 2022-05-13 RX ADMIN — CHLORHEXIDINE GLUCONATE 0.12% ORAL RINSE 15 ML: 1.2 LIQUID ORAL at 21:57

## 2022-05-13 RX ADMIN — SODIUM CHLORIDE, PRESERVATIVE FREE 40 MG: 5 INJECTION INTRAVENOUS at 08:19

## 2022-05-13 RX ADMIN — MEROPENEM 1000 MG: 1 INJECTION, POWDER, FOR SOLUTION INTRAVENOUS at 16:31

## 2022-05-13 RX ADMIN — CHLORHEXIDINE GLUCONATE 0.12% ORAL RINSE 15 ML: 1.2 LIQUID ORAL at 08:19

## 2022-05-13 RX ADMIN — INSULIN LISPRO 2 UNITS: 100 INJECTION, SOLUTION INTRAVENOUS; SUBCUTANEOUS at 00:10

## 2022-05-13 RX ADMIN — SODIUM PHOSPHATE, MONOBASIC, MONOHYDRATE AND SODIUM PHOSPHATE, DIBASIC, ANHYDROUS 30 MMOL: 276; 142 INJECTION, SOLUTION INTRAVENOUS at 16:35

## 2022-05-13 RX ADMIN — INSULIN GLARGINE 20 UNITS: 100 INJECTION, SOLUTION SUBCUTANEOUS at 21:58

## 2022-05-13 ASSESSMENT — PULMONARY FUNCTION TESTS
PIF_VALUE: 33
PIF_VALUE: 27
PIF_VALUE: 23
PIF_VALUE: 32
PIF_VALUE: 29
PIF_VALUE: 25
PIF_VALUE: 32
PIF_VALUE: 23
PIF_VALUE: 21
PIF_VALUE: 31
PIF_VALUE: 23
PIF_VALUE: 27
PIF_VALUE: 25
PIF_VALUE: 23
PIF_VALUE: 25
PIF_VALUE: 27
PIF_VALUE: 27
PIF_VALUE: 31
PIF_VALUE: 22
PIF_VALUE: 25

## 2022-05-13 ASSESSMENT — PAIN SCALES - GENERAL
PAINLEVEL_OUTOF10: 0

## 2022-05-13 NOTE — PROGRESS NOTES
Progress Note  5/13/2022 4:13 PM  Subjective:   Admit Date: 5/10/2022  PCP: Georgette Moore MD  Interval History: Full consult deferred as pt was being followed longitudinally at [facility]. 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 He was found to be unresponsive at [facility] 5/10/22, EMS was called and pt transferred to [facility] ED. In the ED the pt was found to be pulseless he was resuscitated from a Cardiac arrest and admitted to the MICU    5/13/22: Pt intubated  And sedated. Had a thoracentesis just prior to my arrival this AM and 450ml fluid removed, off norepi at the time of my visit    Diet: Diet NPO  ADULT TUBE FEEDING; PEG; Immune Enhancing; Continuous; 20; Yes; 20; Q 4 hours; 55; 30; Q 4 hours; Protein; Once daily    Data:   Scheduled Meds:   lidocaine        polyethylene glycol  17 g Oral Daily    pantoprazole (PROTONIX) 40 mg injection  40 mg IntraVENous Daily    chlorhexidine  15 mL Mouth/Throat BID    senna  5 mL Per G Tube Nightly    docusate  100 mg Per G Tube BID    levothyroxine  175 mcg Per G Tube Daily    insulin glargine  20 Units SubCUTAneous BID    insulin lispro  0-12 Units SubCUTAneous Q6H    levETIRAcetam  500 mg PEG Tube BID    linezolid  600 mg IntraVENous Q12H    meropenem  1,000 mg IntraVENous Q24H    sodium chloride flush  5-40 mL IntraVENous 2 times per day     Continuous Infusions:   sodium chloride      norepinephrine Stopped (05/12/22 1520)    fentaNYL 25 mcg/hr (05/13/22 0536)    midazolam Stopped (05/12/22 0924)    dextrose      sodium chloride 10 mL/hr at 05/13/22 0536     PRN Meds:sodium chloride, anticoagulant sodium citrate, perflutren lipid microspheres, glucose, dextrose bolus **OR** dextrose bolus, glucagon (rDNA), dextrose, sodium chloride flush, sodium chloride  I/O last 3 completed shifts: In: 3852.8 [I.V.:456.6; Blood:350; NG/GT:1712; IV Piggyback:1034.2]  Out: 1200   No intake/output data recorded.     Intake/Output Summary (Last 24 hours) at 5/13/2022 1613  Last data filed at 5/13/2022 0536  Gross per 24 hour   Intake 2244.64 ml   Output --   Net 2244.64 ml     CBC:   Recent Labs     05/11/22  0830 05/11/22  0830 05/12/22  0630 05/12/22  1700 05/13/22  0535   WBC 21.5*  --  16.0*  --  13.1*   HGB 7.8*   < > 6.8* 7.5* 7.1*     --  217  --  156    < > = values in this interval not displayed. BMP:    Recent Labs     05/11/22  0830 05/12/22  0630 05/13/22  0535    130* 132   K 4.1 4.5 4.2   CL 95* 94* 94*   CO2 27 25 28   BUN 65* 76* 50*   CREATININE 2.4* 2.7* 1.8*   GLUCOSE 254* 130* 219*     Hepatic:   Recent Labs     05/11/22 0032   AST 31   ALT 30   BILITOT 0.3   ALKPHOS 591*     Troponin: No results for input(s): TROPONINI in the last 72 hours. BNP: No results for input(s): BNP in the last 72 hours. Lipids: No results for input(s): CHOL, HDL in the last 72 hours. Invalid input(s): LDLCALCU  ABGs: No results found for: PHART, PO2ART, ZNM9VED  INR:   Recent Labs     05/11/22 0032   INR 1.2     -----------------------------------------------------------------  RAD:   EXAMINATION:   ONE XRAY VIEW OF THE CHEST       5/11/2022 12:38 am       COMPARISON:   None.       HISTORY:   ORDERING SYSTEM PROVIDED HISTORY: ET tube placement   TECHNOLOGIST PROVIDED HISTORY:   Reason for exam:->ET tube placement       FINDINGS:   Lines and tubes are unchanged.  Cardiomediastinal contours are stable. Persistent central pulmonary vascular congestion.       Diffuse bilateral parenchymal and interstitial opacities, in addition to   bilateral pleural effusions, stable.       No pneumothorax.  No acute osseous abnormality.           Impression   Stable congestive/consolidative changes, with bilateral pleural effusions. No significant change.          Objective:   Vitals: BP (!) 82/46   Pulse 74   Temp 97.8 °F (36.6 °C) (Axillary)   Resp 13   Ht 6' 5\" (1.956 m)   Wt 217 lb 9.5 oz (98.7 kg)   SpO2 98%   BMI 25.80 kg/m²   General appearance: Intubated opened eyes to physical stimuli  Skin: Skin color, texture, turgor normal. No rashes or lesions  HEENT: Head: Normal, normocephalic, atraumatic. Neck: no JVD and supple, symmetrical, trachea midline  Lungs: rhonchi bilaterallydecreased BS at the L base  Heart: irregularly irregular rhythm, no S3 or S4 and no rub  Abdomen: soft, non-tender; bowel sounds normal; no masses,  no organomegaly  Extremities: edema trace to 1 + bilat  Neurologic: Mental status: pt opened eyes to physical stimuli but did not interact with me    Assessment:   Patient Active Problem List:     Cardiac arrest ([facility] [address]    Plan:   1. 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 IHD to be 5/14/22  2. Follow Labs    2. 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 Follow H/H  2. Continue SHANIQUA  3. Transfuse  for hgb <7    3. 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 Control Ca++ with IHD  2. IV supplement the PO4    4-S/P Cardio-Pulm Arrest-remains intubated  S/P thoracentesis 450ml 5/13/22  PLAN:  1. Defer to [facility] and Cardiology    5- Sepsis  BC -(+) Staph epi-methicillin resistant  PLAN:  1.  Plan as per CHARLES Bautista MD

## 2022-05-13 NOTE — PATIENT CARE CONFERENCE
Intensive Care Daily Quality Rounding Checklist        ICU Team Members:  Dr. Diamond Tirado, charge nurse, bedside nurse, respiratory therapist    ICU Day #: 3     Intubation Date: 5/11/2022     Ventilator Day #: 3     Central Line Insertion Date: 5/11/22                                                    Day #: 3      Arterial Line Insertion Date: n/a                             Day #: n/a     Temporary Hemodialysis Catheter Insertion Date: n/a                             Day # admitted with tunneled dialysis cath     DVT Prophylaxis: Eliquis on hold for thoracentesis    GI Prophylaxis: Protonix     Roberts Catheter Insertion Date: HD patient                                        CYW #:                              Continued need (if yes, reason documented and discussed with physician):     Skin Issues/ Wounds and ordered treatment discussed on rounds: yes, not seen by wound care yet     Goals/ Plans for the Day: Possible Thoracentesis, wean vent/sedation, HD per nephrology, daily labs, soft wrist restraints to prevent tubes

## 2022-05-13 NOTE — PLAN OF CARE
Problem: Discharge Planning  Goal: Discharge to home or other facility with appropriate resources  5/13/2022 0033 by Brien Peralta RN  Outcome: Progressing  Flowsheets (Taken 5/12/2022 2000)  Discharge to home or other facility with appropriate resources: Arrange for needed discharge resources and transportation as appropriate  5/12/2022 1905 by Tony George RN  Outcome: Progressing     Problem: Pain  Goal: Verbalizes/displays adequate comfort level or baseline comfort level  5/13/2022 0033 by Brien Peralta RN  Outcome: Progressing  Flowsheets  Taken 5/13/2022 0000  Verbalizes/displays adequate comfort level or baseline comfort level: Assess pain using appropriate pain scale  Taken 5/12/2022 2000  Verbalizes/displays adequate comfort level or baseline comfort level: Assess pain using appropriate pain scale  5/12/2022 1905 by Tony George RN  Outcome: Progressing  Flowsheets (Taken 5/12/2022 0600 by Umm Raman RN)  Verbalizes/displays adequate comfort level or baseline comfort level: Assess pain using appropriate pain scale     Problem: Skin/Tissue Integrity  Goal: Absence of new skin breakdown  Description: 1. Monitor for areas of redness and/or skin breakdown  2. Assess vascular access sites hourly  3. Every 4-6 hours minimum:  Change oxygen saturation probe site  4. Every 4-6 hours:  If on nasal continuous positive airway pressure, respiratory therapy assess nares and determine need for appliance change or resting period.   5/13/2022 0033 by Brien Peralta RN  Outcome: Progressing  5/12/2022 1905 by Tony George RN  Outcome: Progressing     Problem: Safety - Adult  Goal: Free from fall injury  5/13/2022 0033 by Brien Peralta RN  Outcome: Progressing  Flowsheets (Taken 5/13/2022 0031)  Free From Fall Injury: Based on caregiver fall risk screen, instruct family/caregiver to ask for assistance with transferring infant if caregiver noted to have fall risk factors  5/12/2022 1905 by Jensen Zhu RN  Outcome: Progressing     Problem: ABCDS Injury Assessment  Goal: Absence of physical injury  5/13/2022 0033 by Theodore Vasquez RN  Outcome: Progressing  Flowsheets (Taken 5/13/2022 0031)  Absence of Physical Injury: Implement safety measures based on patient assessment  5/12/2022 1905 by Jensen Zhu RN  Outcome: Progressing     Problem: Respiratory - Adult  Goal: Achieves optimal ventilation and oxygenation  5/13/2022 0033 by Theodore Vasquez RN  Outcome: Progressing  5/12/2022 1905 by Jensen Zhu RN  Outcome: Progressing  5/12/2022 1523 by Jesus Valladares RCP  Outcome: Not Progressing  Flowsheets (Taken 5/11/2022 2000 by Cathleen White. Bailee Adams RN)  Achieves optimal ventilation and oxygenation: Assess for changes in respiratory status  Note: No changes in respiratory status     Problem: Chronic Conditions and Co-morbidities  Goal: Patient's chronic conditions and co-morbidity symptoms are monitored and maintained or improved  5/13/2022 0033 by Theodore Vasquez RN  Outcome: Progressing  Flowsheets (Taken 5/12/2022 2000)  Care Plan - Patient's Chronic Conditions and Co-Morbidity Symptoms are Monitored and Maintained or Improved: Monitor and assess patient's chronic conditions and comorbid symptoms for stability, deterioration, or improvement  5/12/2022 1905 by Jensen Zhu RN  Outcome: Progressing     Problem: Safety - Medical Restraint  Goal: Remains free of injury from restraints (Restraint for Interference with Medical Device)  Description: INTERVENTIONS:  1. Determine that other, less restrictive measures have been tried or would not be effective before applying the restraint  2. Evaluate the patient's condition at the time of restraint application  3. Inform patient/family regarding the reason for restraint  4.  Q2H: Monitor safety, psychosocial status, comfort, nutrition and hydration  5/13/2022 0033 by Theodore Vasquez RN  Outcome: Progressing  Flowsheets  Taken 5/12/2022 [address] free of injury from restraints (restraint for interference with medical device): Every 2 hours: Monitor safety, psychosocial status, comfort, nutrition and hydration  Taken 5/12/2022 2000  Remains free of injury from restraints (restraint for interference with medical device): Every 2 hours: Monitor safety, psychosocial status, comfort, nutrition and hydration  5/12/2022 1905 by Melanie Leahy RN  Outcome: Progressing  Flowsheets (Taken 5/12/2022 0650 by Rohit Nice RN)  Remains free of injury from restraints (restraint for interference with medical device): Every 2 hours: Monitor safety, psychosocial status, comfort, nutrition and hydration

## 2022-05-13 NOTE — PROGRESS NOTES
INPATIENT CARDIOLOGY FOLLOW-UP    Name: Lacy Omalley    Age: 74 y.o. Date of Admission: 5/10/2022 11:58 PM    Date of Service: 5/13/2022    Chief Complaint: Follow-up for sepsis with associated septic shock, possible type II non-ST elevation myocardial infarction, atrial tachycardia/flutter, chronic kidney disease, probable pneumonia    Interim History: The patient remains limitedly changed with persistent acute hypoxic respiratory failure and will ventilatory requirements. While relatively hypotensive, vasoactive support has been withdrawn and presently acceptable rate control of his atrial fibrillation is noted. He tolerated dialysis as well as that of transfusion without difficulty with a persistent anemia and leukocytosis in the absence of a present fever. Review of Systems: The remainder of a complete multisystem review including consitutional, central nervous, respiratory, circulatory, gastrointestinal, genitourinary, endocrinologic, hematologic, musculoskeletal and psychiatric are negative. Problem List:  Patient Active Problem List   Diagnosis    Cardiac arrest [facility])       Allergies:   Allergies   Allergen Reactions    Other Nausea Only     \"narcotics\" reaction unknown       Current Medications:  Current Facility-Administered Medications   Medication Dose Route Frequency Provider Last Rate Last Admin    0.9 % sodium chloride infusion   IntraVENous PRN Drea Sewell MD        anticoagulant sodium citrate 4 GM/100ML solution 0.168 g  4.2 mL IntraCATHeter PRN Elis Barry MD        norepinephrine (LEVOPHED) 16 mg in dextrose 5 % 250 mL infusion  1-100 mcg/min IntraVENous Continuous Tommy Arteaga MD   Paused at 05/12/22 1520    fentaNYL 10 mcg/ml in 0.9%  ml infusion   mcg/hr IntraVENous Continuous Tommy Arteaga MD 2.5 mL/hr at 05/13/22 0536 25 mcg/hr at 05/13/22 0536    midazolam (VERSED) 1 mg/mL in D5W infusion  1-10 mg/hr IntraVENous Continuous Alcario Sailors MD Sherri   Stopped at 05/12/22 0924    polyethylene glycol (GLYCOLAX) packet 17 g  17 g Oral Daily Kailee Oviedo MD   17 g at 05/12/22 0926    pantoprazole (PROTONIX) 40 mg in sodium chloride (PF) 10 mL injection  40 mg IntraVENous Daily Kailee Oviedo MD   40 mg at 05/12/22 0926    chlorhexidine (PERIDEX) 0.12 % solution 15 mL  15 mL Mouth/Throat BID Harinder Vaughn MD   15 mL at 05/12/22 2033    senna (SENOKOT) 8.8 MG/5ML syrup 8.8 mg  5 mL Per G Tube Nightly Harinder Vaughn MD   8.8 mg at 05/12/22 2034    docusate (COLACE) 50 MG/5ML liquid 100 mg  100 mg Per G Tube BID Harinder Vaughn MD   100 mg at 05/12/22 2033    levothyroxine (SYNTHROID) tablet 175 mcg  175 mcg Per G Tube Daily Harinder Vaughn MD   175 mcg at 05/13/22 7647    perflutren lipid microspheres (DEFINITY) injection 1.65 mg  1.5 mL IntraVENous ONCE PRN Harinder Vaughn MD        [Held by provider] nitroglycerin (NITRO-BID) 2 % ointment 0.5 inch  0.5 inch Topical 4 times per day Yennifer Cook MD        insulin glargine (LANTUS) injection vial 20 Units  20 Units SubCUTAneous BID Harinder Vaughn MD   20 Units at 05/12/22 2042    insulin lispro (HUMALOG) injection vial 0-12 Units  0-12 Units SubCUTAneous Q6H Harinder Vaughn MD   4 Units at 05/13/22 0606    glucose chewable tablet 16 g  4 tablet Oral PRN Harinder Vaughn MD        dextrose bolus 10% 125 mL  125 mL IntraVENous PRN Harinder Vaughn MD        Or    dextrose bolus 10% 250 mL  250 mL IntraVENous PRN Harinder Vaughn MD        glucagon (rDNA) injection 1 mg  1 mg IntraMUSCular PRN Harinder Vaughn MD        dextrose 5 % solution  100 mL/hr IntraVENous PRN Harinder Vaughn MD        levETIRAcetam (KEPPRA) 100 MG/ML solution 500 mg  500 mg PEG Tube BID Harinder Vaughn MD   500 mg at 05/12/22 2033    linezolid (ZYVOX) IVPB 600 mg  600 mg IntraVENous Q12H Evelyne Mathew MD   Stopped at 05/13/22 0110    meropenem 3442.86 ml   Output 1200 ml   Net 2242.86 ml     I/O this shift:  In: 1032.5 [I.V.:104.6; NG/GT:583; IV Piggyback:344.9]  Out: -     Laboratory Tests:  Lab Results   Component Value Date    CREATININE 1.8 (H) 05/13/2022    BUN 50 (H) 05/13/2022     05/13/2022    K 4.2 05/13/2022    CL 94 (L) 05/13/2022    CO2 28 05/13/2022     No results for input(s): CKTOTAL, CKMB in the last 72 hours. Invalid input(s): TROPONONI  No results found for: BNP  Lab Results   Component Value Date    WBC 13.1 05/13/2022    RBC 2.48 05/13/2022    HGB 7.1 05/13/2022    HCT 23.1 05/13/2022    MCV 93.1 05/13/2022    MCH 28.6 05/13/2022    MCHC 30.7 05/13/2022    RDW 16.9 05/13/2022     05/13/2022    MPV 9.6 05/13/2022     Recent Labs     05/11/22  0032   ALKPHOS 591*   ALT 30   AST 31   PROT 6.9   BILITOT 0.3   LABALBU 2.4*     Lab Results   Component Value Date    MG 2.2 05/13/2022     Lab Results   Component Value Date    PROTIME 13.0 05/11/2022    INR 1.2 05/11/2022     Lab Results   Component Value Date    TSH 2.700 05/11/2022     No components found for: CHLPL  No results found for: TRIG  No results found for: HDL  No results found for: [address]    Cardiac Tests:  Telemetry findings reviewed: atrial fibrillation with a mean ventricular response of approximately 80 bpm, no new tachy/bradyarrhythmias overnight  Chest X-ray: pending      ASSESSMENT / PLAN: On a clinical basis, the patient is marginally improved with persistent relative hypotension but ability to discontinue his vasoactive support and tolerance of dialysis, albeit while remaining on vasoactive support. He remains in his permanent atrial fibrillation with acceptable rate control with ongoing evidence of acute hypoxic respiratory failure.   Presently the majority of his care will be deferred to the pulmonary/critical care service in addition to that of the nephrology and infectious disease services with needs of continued monitoring of his heart rate and in the face of his present relative hypotension necessary with holding of rate control measures beyond that of digitalis. Once appropriate stabilization has occurred, the resumption of his oral anticoagulation will be necessary to reduce risk of embolic events. Ongoing nutritional support will remain essential to fluid mobilization as well as assisting to reduce risk of progressive debilitation. As outlined, no additional cardiovascular assessment is anticipated as it will not further alter present management decisions. His prognosis remains guarded with ongoing benefits of palliative care involvement to assist family in advanced directive decisions. We will evaluate him, particularly with the upcoming weekend should additional cardiovascular difficulties or concerns arise. Note: This report was completed utilizing computer voice recognition software. Every effort has been made to ensure accuracy, however; inadvertent computerized transcription errors may be present. Radha Smith.  Yong Berrios, [address] [facility] Cardiology

## 2022-05-13 NOTE — PROGRESS NOTES
Internal Medicine Progress Note      Synopsis: Patient admitted on 5/10/2022     Mr. Elena Jung, a 74y.o. year old male who has a past medical history of A-fib ([address] TRUMAN (acute kidney injury) ([address] Anemia, Anxiety, Bipolar 1 disorder ([address] Charcot foot due to diabetes mellitus ([address] CHF (congestive heart failure) ([address] CKD (chronic kidney disease), Diabetes mellitus ([address] Dialysis patient ([address] Hyperlipidemia, Hypertension, Right sided weakness, Sacral decubitus ulcer, and Seizure ([facility] [address] This is a 67-year-old male with progressive dementia and spinal stenosis. Has been bedbound for a long time. He has had in the past bouts of kidney failure that did not require long-term resuscitation with dialysis however he was at acute care for decompensation from his large sacral decubitus and acute renal failure that did require renal replacement therapy of hemodialysis. He finishes antibiotics. His sacral wound was slow to heal because of poor oral intake and he was given a PEG tube for nutritional supplementation purposes since his wife wanted to. Patient had been stable till about 48 hours ago. Monday he was noticed to have a large amount of bloody stool. He was started on an intravenous proton pump inhibitor and surgery services were asked to see him. His anticoagulation was held. Patient was being watched and he did not have any more episodes. Yesterday he had an increase in his WBCs without having any localizing symptoms. Infectious disease did see him and started him immediately on broad-spectrum antibiotics. Patient became unresponsive and virtually reportedly evening more towards the early hours of this morning. Fluids were started unfortunately patient could not be resuscitated despite best efforts of the on-call intensive virtual specialist. Patient was sent over to acute care where he was intubated for cardiorespiratory failure.  He was found to be in cardiac arrest.   Appropriate O2 resuscitation resulted in resumption of heart rhythm. Patient was started on pressors moved to the ICU   Imaging reveals a significant pleural effusion and HCAP    Subjective    Still in the ICU. Pressors just weaned off. Still little bit soft on his blood pressure. Less sedation    May 13  Patient appeared more alert. Still hypotensive. Thoracentesis on schedule  Exam:  BP (!) 82/46   Pulse 74   Temp 97.8 °F (36.6 °C) (Axillary)   Resp 13   Ht 6' 5\" (1.956 m)   Wt 217 lb 9.5 oz (98.7 kg)   SpO2 98%   BMI 25.80 kg/m²   General appearance: Orally intubated HEENT: eyes are More openneck:   Respiratory: Significantly decreased bibasilar bases   Cardiovascular: Irregular heart rate rhythm   Abdomen: Nontender positive for distention positive for PEG   Musculoskeletal: Some thickening of the skin on his lower extremities.  Dorsalis pedis hard to palpate   skin: Large clean sacral decubitus   neurologic: Unresponsive due to sedation       Medications:  Reviewed    Infusion Medications    sodium chloride      norepinephrine Stopped (05/12/22 1520)    fentaNYL 25 mcg/hr (05/13/22 0536)    midazolam Stopped (05/12/22 0924)    dextrose      sodium chloride 10 mL/hr at 05/13/22 0536     Scheduled Medications    lidocaine        polyethylene glycol  17 g Oral Daily    pantoprazole (PROTONIX) 40 mg injection  40 mg IntraVENous Daily    chlorhexidine  15 mL Mouth/Throat BID    senna  5 mL Per G Tube Nightly    docusate  100 mg Per G Tube BID    levothyroxine  175 mcg Per G Tube Daily    insulin glargine  20 Units SubCUTAneous BID    insulin lispro  0-12 Units SubCUTAneous Q6H    levETIRAcetam  500 mg PEG Tube BID    linezolid  600 mg IntraVENous Q12H    meropenem  1,000 mg IntraVENous Q24H    sodium chloride flush  5-40 mL IntraVENous 2 times per day     PRN Meds: sodium chloride, anticoagulant sodium citrate, perflutren lipid microspheres, glucose, dextrose bolus **OR** dextrose bolus, glucagon (rDNA), dextrose, sodium chloride flush, sodium chloride    I/O    Intake/Output Summary (Last 24 hours) at 5/13/2022 1549  Last data filed at 5/13/2022 0536  Gross per 24 hour   Intake 2244.64 ml   Output --   Net 2244.64 ml       Labs:   Recent Labs     05/11/22  0830 05/11/22  0830 05/12/22  0630 05/12/22  1700 05/13/22  0535   WBC 21.5*  --  16.0*  --  13.1*   HGB 7.8*   < > 6.8* 7.5* 7.1*   HCT 26.8*   < > 22.5* 24.1* 23.1*     --  217  --  156    < > = values in this interval not displayed. Recent Labs     05/11/22  0830 05/12/22  0630 05/13/22  0535    130* 132   K 4.1 4.5 4.2   CL 95* 94* 94*   CO2 27 25 28   BUN 65* 76* 50*   CREATININE 2.4* 2.7* 1.8*   CALCIUM 10.5* 10.0 9.0   PHOS 3.0 2.5 1.5*       Recent Labs     05/11/22  0032   PROT 6.9   ALKPHOS 591*   ALT 30   AST 31   BILITOT 0.3       Recent Labs     05/11/22 0032   INR 1.2       No results for input(s): CKTOTAL, TROPONINI in the last 72 hours. Chronic labs:  Lab Results   Component Value Date    TSH 2.700 05/11/2022    INR 1.2 05/11/2022       Radiology:  CT ABDOMEN PELVIS WO CONTRAST Additional Contrast? None    Result Date: 5/11/2022  Bilateral pleural effusions, greater on the left than right, with superimposed consolidation. In the appropriate setting, a pneumonia with associated pleural effusions cannot be excluded. No bowel obstruction, free air or obstructive uropathy. Additional findings involving the abdomen and pelvis, as detailed above. CT HEAD WO CONTRAST    Result Date: 5/11/2022  No acute findings. Chronic changes as above. CT CHEST WO CONTRAST    Result Date: 5/11/2022  Findings suggesting pulmonary edema versus a multifocal pneumonia, with associated bilateral pleural effusions. Generalized mediastinal adenopathy, which may represent reactive changes. This is a nonspecific finding. Differential diagnosis would include infection, inflammation or neoplastic causes.      XR CHEST PORTABLE    Result Date: 5/12/2022  1. Partial interval clearing of the lungs when compared with the patient's prior study. 2. Vague patchy right perihilar airspace disease and patchy residual airspace disease within the left lung. 3. Trace right pleural effusion and small left pleural effusion. XR CHEST PORTABLE    Result Date: 5/11/2022  Stable congestive/consolidative changes, with bilateral pleural effusions. No significant change. XR CHEST 1 VIEW    Result Date: 4/27/2022  Bibasilar pleural thickening   ASSESSMENT:    Principal Problem:    Cardiac arrest [facility])  Resolved Problems:    * No resolved hospital problems. *  Sepsis  Respiratory failure  Hypotension  Long-term dialysis  Pleural effusion  Pneumonia     PLAN:    1. Received a dialysis with ultrafiltration and fluid boluses  2. Continue to follow hemoglobin and appropriate transfusion  3. Remains full code per discussion with family  4. Plans for pleural effusion to be tapped to allow for better weaning  5. Glycemic control with insulin  6. Maintain Keppra for seizures  7. Maintaining linezolid and meropenem  May 13  Continue dialysis support with limiting hypotension. Thoracentesis per pulmonary services to allow for ventilator weaning. 2 beats can be started when agreeable with critical care. Glycemic control to be treated with insulin. Antibiotics continue per infectious disease  Being treated for sepsis from healthcare acquired pneumonia and septic shock. Remains in controlled A. Fib. Hypothermia appears slightly better. Still with significant sacral decubitus although he has finished treatment. Baseline history with dementia. Prognosis guarded. WifeWants a full code      Diet: Diet NPO  ADULT TUBE FEEDING; PEG; Immune Enhancing; Continuous; 20; Yes; 20; Q 4 hours; 55; 30; Q 4 hours; Protein;  Once daily  Code Status: Full Code  PT/OT Eval Status:   Unable  DVT Prophylaxis:   On hold for pleural tap  Recommended disposition at discharge: Unsure yet    +++++++++++++++++++++++++++++++++++++++++++++++++  Huber Armijo MD   [facility].  +++++++++++++++++++++++++++++++++++++++++++++++++  NOTE: This report was transcribed using voice recognition software. Every effort was made to ensure accuracy; however, inadvertent computerized transcription errors may be present.

## 2022-05-13 NOTE — PROCEDURES
Thoracentesis Procedure Note    Pre-operative Diagnosis: left Pleural Effusion    Post-operative Diagnosis: same    Indications: Karen oSler is a 74 y.o. male with a  has a past medical history of A-fib ([facility] [address] TRUMAN (acute kidney injury) ([facility] [address] Anemia, Anxiety, Bipolar 1 disorder ([address] Charcot foot due to diabetes mellitus ([facility] [address] CHF (congestive heart failure) ([facility] [address] CKD (chronic kidney disease), Diabetes mellitus ([address] Dialysis patient ([address] Hyperlipidemia, Hypertension, Right sided weakness, Sacral decubitus ulcer, and Seizure ([facility] [address] found to have a Pleural Effusion  ultrasound guided        Consent: After informed consent & appropriate time out protocol was noted & obtained. Risks/Benefits/Alternatives of the procedure were discussed including: infection, bleeding, pain, pneumothorax. Procedure Details:    Under sterile conditions  the patient was placed in the sitting position. The skin was prepped and draped with Chloraprep solution and sterile drapes were utilized. 1% buffered lidocaine was used to anesthetize the skin, subcutaneous tissue and parietal pleura. Then the pleural catheter 8 Lao 7 cm   was advanced into the pleural space. The fluid was obtained without any difficulties and minimal blood loss. A total of 450 cc fluid was removed. The fluid was straw colored/clear in color. A dressing was applied to the wound and wound care instructions were provided. The fluid was sent for analysis. Findings  450 ml of clear pleural fluid was obtained. A sample was sent for protein, cholesterol, and LDH,  Pathology for cytolology, cytogenetics, flow, and cell counts, as well as for infection analysis. Complications:  None; patient tolerated the procedure well. Condition: stable    Plan  A follow up chest x-ray was ordered.   Electronically signed by Rosana Stewart MD on 5/13/2022 at 11:50 AM

## 2022-05-13 NOTE — FLOWSHEET NOTE
Inpatient Wound Care    Admit Date: 5/10/2022 11:58 PM    Reason for consult:  Sacrum    Significant history:  Admitted with cardiac arrest.     Wound history:  POA    Findings:       05/13/22 1543   Wound 05/11/22 Sacrum   Date First Assessed/Time First Assessed: 05/11/22 1324   Present on Hospital Admission: Yes  Primary Wound Type: Pressure Injury  Location: Sacrum   Wound Image    Wound Etiology Pressure Stage 4   Dressing Status New dressing applied   Wound Cleansed Irrigated with saline   Dressing/Treatment Packing  (NSS kerlix)   Dressing Change Due 05/14/22   Wound Length (cm) 10 cm   Wound Width (cm) 9 cm   Wound Depth (cm) 2 cm   Wound Surface Area (cm^2) 90 cm^2   Change in Wound Size % (l*w) -53.06   Wound Volume (cm^3) 180 cm^3   Wound Healing % -9   Undermining Starts ___ O'Clock 12   Undermining Ends___ O'Clock 12   Undermining Maxium Distance (cm) 2cm   Wound Assessment Bleeding  (red)   Drainage Amount Moderate   Drainage Description Serosanguinous   Odor Mild   Gavi-wound Assessment   (dark purple)   Wound Thickness Description not for Pressure Injury Full thickness       Impression:  Stage 4 pressure injury on sacrum with distal DTI. Perirectal abscess. Interventions in place:  Wound cleansed with NSS. Packed with NSS moistened kerlix. Covered with ABDs. A wound vac is not recommended due to the Pt not having a colostomy. Plan: Daily dressing changes.        Joana Vasquez RN 5/13/2022 3:46 PM

## 2022-05-13 NOTE — PLAN OF CARE
Problem: Respiratory - Adult  Goal: Achieves optimal ventilation and oxygenation  5/13/2022 0424 by Sharyle Corrigan, RCP  Outcome: Progressing  Flowsheets (Taken 5/13/2022 0424)  Achieves optimal ventilation and oxygenation:   Oxygen supplementation based on oxygen saturation or arterial blood gases   Assess the need for suctioning and aspirate as needed   Position to facilitate oxygenation and minimize respiratory effort

## 2022-05-13 NOTE — PROGRESS NOTES
Critical Care Team - Daily Progress Note      Date and time: 5/13/2022 3:25 PM  Patient's name:  Pooja Stout  Medical Record Number: [id number]  Patient's account/billing number: [de-identified]  Patient's YOB: 1955  Age: 74 y.o. Date of Admission: 5/10/2022 11:58 PM  Length of stay during current admission: 2      Primary Care Physician: Georgette Moore MD  ICU Attending Physician: Dr. Sanchez     Code Status: Full Code    Reason for ICU admission: Status postcardiac arrest      SUBJECTIVE:     OVERNIGHT EVENTS:       Patient awake and alert today. Continues to be intubated. Patient able to nod to answer questions appropriately. Patient remained afebrile, some episodes of tachycardia, and hypotension. Pressors were switched off. No dialysis today. Review of systems -Limited by patient was able to nod. He denies any headaches, chest pains, abdominal pains, fever, chills. Intake/Output:   No intake/output data recorded. I/O last 3 completed shifts: In: 3852.8 [I.V.:456.6; Blood:350; NG/GT:1712; IV Piggyback:1034.2]  Out: 1200     Awake and following commands: No  Current Ventilation: - Ventilator Settings:    Vt (Set, mL): 0 mL  Resp Rate (Set): 0 bmp  FiO2 : 35 %    PEEP/CPAP (cmH2O): 6  Pressure Support: (S) 18 cmH20  Secretions: Thick, yellow secretions, blood  Sedation: fentanyl  Paralyzed: No  Vasopressors: No    Initial HPI + past overnight events: 15-year-old male resident of [facility] with significant past medical history of A. fib on Eliquis, aspirin, anxiety, anemia, CHF, insulin-dependent type 2 diabetes, CKD on hemodialysis, sacral decubitus ulcers with wound VAC, hyperlipidemia, hypothyroidism. Patient presented to the ICU after having a cardiac arrest, required CPR 2 rounds of epinephrine, and intubated.     OBJECTIVE:     VITAL SIGNS:  BP (!) 82/46   Pulse 74   Temp 97.8 °F (36.6 °C) (Axillary)   Resp 13   Ht 6' 5\" (1.956 m)   Wt 217 lb 9.5 oz (98.7 kg)   SpO2 98% BMI 25.80 kg/m²   Tmax over 24 hours:  Temp (24hrs), Av.9 °F (36.6 °C), Min:97.7 °F (36.5 °C), Max:98.1 °F (36.7 °C)      Patient Vitals for the past 6 hrs:   Pulse Resp SpO2   22 1329 74 13 98 %   22 1119 124 11 94 %   22 1118 135 11 95 %         Intake/Output Summary (Last 24 hours) at 2022 1525  Last data filed at 2022 0536  Gross per 24 hour   Intake 2244.64 ml   Output --   Net 2244.64 ml     Wt Readings from Last 2 Encounters:   22 217 lb 9.5 oz (98.7 kg)   22 212 lb (96.2 kg)     Body mass index is 25.8 kg/m². Physical Exam  Vitals and nursing note reviewed. Constitutional:       General: He is awake. Appearance: He is ill-appearing. Comments:  intubated   HENT:      Head: Normocephalic and atraumatic. Nose: Nose normal. No congestion or rhinorrhea. Mouth/Throat:      Mouth: Mucous membranes are moist.      Pharynx: Oropharynx is clear. Eyes:      General: No scleral icterus. Extraocular Movements: Extraocular movements intact. Conjunctiva/sclera: Conjunctivae normal.   Cardiovascular:      Rate and Rhythm: Normal rate and regular rhythm. Pulses: Normal pulses. Heart sounds: Normal heart sounds. No murmur heard. Pulmonary:      Breath sounds: No stridor. Rhonchi present. Comments: intubated  Abdominal:      General: Bowel sounds are normal. There is no distension. Palpations: Abdomen is soft. There is no mass. Tenderness: There is no abdominal tenderness. There is no guarding or rebound. Comments: PEG tube   Musculoskeletal:         General: Normal range of motion. Cervical back: Normal range of motion and neck supple. No tenderness. Right lower leg: Edema present. Left lower leg: Edema present. Skin:     Findings: Lesion (Sacral decubitus) present.       Comments: Chronic changes -Dark-colored lower legs b/l  Feet are cold to touch    Neurological:      Mental Status: He is alert.      Cranial Nerves: No cranial nerve deficit. Sensory: No sensory deficit. Comments: Patient awake and alert, orientated to name. Not sure about location and season. Psychiatric:         Attention and Perception: Attention normal.         Behavior: Behavior is cooperative.            MEDICATIONS:    Scheduled Meds:   lidocaine        polyethylene glycol  17 g Oral Daily    pantoprazole (PROTONIX) 40 mg injection  40 mg IntraVENous Daily    chlorhexidine  15 mL Mouth/Throat BID    senna  5 mL Per G Tube Nightly    docusate  100 mg Per G Tube BID    levothyroxine  175 mcg Per G Tube Daily    insulin glargine  20 Units SubCUTAneous BID    insulin lispro  0-12 Units SubCUTAneous Q6H    levETIRAcetam  500 mg PEG Tube BID    linezolid  600 mg IntraVENous Q12H    meropenem  1,000 mg IntraVENous Q24H    sodium chloride flush  5-40 mL IntraVENous 2 times per day     Continuous Infusions:   sodium chloride      norepinephrine Stopped (05/12/22 1520)    fentaNYL 25 mcg/hr (05/13/22 0536)    midazolam Stopped (05/12/22 0924)    dextrose      sodium chloride 10 mL/hr at 05/13/22 0536     PRN Meds:   sodium chloride, , PRN  anticoagulant sodium citrate, 4.2 mL, PRN  perflutren lipid microspheres, 1.5 mL, ONCE PRN  glucose, 4 tablet, PRN  dextrose bolus, 125 mL, PRN   Or  dextrose bolus, 250 mL, PRN  glucagon (rDNA), 1 mg, PRN  dextrose, 100 mL/hr, PRN  sodium chloride flush, 5-40 mL, PRN  sodium chloride, , PRN          VENT SETTINGS (Comprehensive) (if applicable):  Vent Information  Ventilator ID: [id number]  Vent Mode: AC/VC  Ventilator Initiate: Yes  Additional Respiratory Assessments  Pulse: 74  Resp: 13  SpO2: 98 %  End Tidal CO2: 42 (%)  Position: Semi-Fajardo's  Humidification Source: Heated wire  Humidification Temp: 37  Circuit Condensation: Drained  Cuff Pressure (cm H2O): 28 cm H2O    Arterial Blood Gas 5/13/2022  pH 7.414, pCO2 43.4, pO2 76.3, HCO3 27.1      Laboratory findings:    Complete Blood Count:   Recent Labs     05/11/22  0830 05/11/22  0830 05/12/22  0630 05/12/22  1700 05/13/22  0535   WBC 21.5*  --  16.0*  --  13.1*   HGB 7.8*   < > 6.8* 7.5* 7.1*   HCT 26.8*   < > 22.5* 24.1* 23.1*     --  217  --  156    < > = values in this interval not displayed. Last 3 Blood Glucose:   Recent Labs     05/11/22  0830 05/12/22  0630 05/13/22  0535   GLUCOSE 254* 130* 219*        PT/INR:    Lab Results   Component Value Date    PROTIME 13.0 05/11/2022    INR 1.2 05/11/2022     PTT:    Lab Results   Component Value Date    APTT 32.8 05/11/2022       Comprehensive Metabolic Profile:   Recent Labs     05/11/22  0032 05/11/22  0032 05/11/22 0830 05/12/22 0630 05/13/22  0535   *   < > 133 130* 132   K 4.5  --  4.1 4.5 4.2   CL 94*   < > 95* 94* 94*   CO2 26   < > 27 25 28   BUN 61*   < > 65* 76* 50*   CREATININE 2.3*   < > 2.4* 2.7* 1.8*   GLUCOSE 341*   < > 254* 130* 219*   CALCIUM 11.1*   < > 10.5* 10.0 9.0   PROT 6.9  --   --   --   --    LABALBU 2.4*  --   --   --   --    BILITOT 0.3  --   --   --   --    ALKPHOS 591*  --   --   --   --    AST 31  --   --   --   --    ALT 30  --   --   --   --     < > = values in this interval not displayed. Magnesium:   Lab Results   Component Value Date    MG 2.2 05/13/2022     Phosphorus:   Lab Results   Component Value Date    PHOS 1.5 05/13/2022     Ionized Calcium: No results found for: CAION     Urinalysis:     Troponin: No results for input(s): TROPONINI in the last 72 hours. Microbiology:  Cultures drawn: Gram stain respiratory from sputum suction pending, MRSA nasal colonization positive, blood cultures 24 hours no growth-positive contaminant 1 bottle shows gram-positive cocci in clusters. Respiratory panel negative    Radiology/Imaging:     Chest Xray (5/13/2022): No evidence of pneumothorax. Persistent bilateral pleural effusions with bibasilar infiltrates and right lower lobe atelectasis.     ASSESSMENT:     Patient Active Problem List    Diagnosis Date Noted    Cardiac arrest ([facility] [address] 05/11/2022         PLAN:     Neuro  Patient intubated and sedated  History of seizures on Keppra    Respiratory  Acute hypercapnic respiratory failure with hypoxia  Intubated 5/10/2022  Thoracentesis completed today- 450-cytology, protein 3.6 , , glucose 53, culture-positive for staph epidermidis and staph species. , gram stain, cell count with differential all collected. Pneumonia    Cardiovascular  History of A. fib on Eliquis  EKG concerning for heart block. Elevated troponin most likely secondary to CKD  Cardiology following    GI  History of a PEG tube  Concern for GI bleed    Renal  CKD on dialysis Monday Wednesday Friday  Nephrology following    Infectious disease  Sepsis with septic shock  decubitus ulcer  linezolid day 3, meropenem day 3  ID following    Heme/Onc  1PRBC received,    Trend H&H, transfuse if <7.  Surgery following    Endocrine  Type 2 diabetes on Lantus 20 units,MDSS  Hypothyroidism on synthroid    Social/Spiritual/DNR/Other  Code status: Full  Diet Diet NPO  ADULT TUBE FEEDING; PEG; Immune Enhancing; Continuous; 20; Yes; 20; Q 4 hours; 55; 30; Q 4 hours; Protein; Once daily  Stress ulcer prophylaxis: protonix 40mg  DVT prophylaxis: None, hold Eliquis for now, may need heparin, cardiology input appreciated. Consultations needed: Yes  Transfer out of ICU today? No   Other:thoracentesis today, transfusion today, goal to extubate as able     Lines/catheters  Date 05/10  ETT  CVC triple lumen R femoral vein   Urinary cath         Leobardo Liang MD  3:25 PM  05/13/22      I personally saw, examined and provided care for the patient. Radiographs, labs and medication list were reviewed by me independently. I spoke with bedside nursing, therapists and consultants. Critical care services and times documented are independent of procedures and multidisciplinary rounds with Residents.  Additionally comprehensive, multidisciplinary rounds were conducted with the MICU team. The case was discussed in detail and plans for care were established. Review of Residents documentation was conducted and revisions were made as appropriate. I agree with the above documented exam, problem list and plan of care.    Jeo Irwin MD   CCT excluding procedures :45'

## 2022-05-13 NOTE — PROGRESS NOTES
[address] [place] [address] [facility]  Progress Note    SUBJECTIVE:  Chief Complaint   Patient presents with    Loss of Consciousness     arrived from [facility] via [facility] unresponsive     Patient is in the ICU. Here is intubated and sedated. Tolerating antibiotics. No fever. off vasopressors. Opens eyes  Spoke with RN. S/p thoracentesis on the left, serous. Sedated with fentanyl but off pressors    Review of systems:  A 10 point review of systems was done. As stated above, otherwise negative. Medications:   lidocaine        polyethylene glycol  17 g Oral Daily    pantoprazole (PROTONIX) 40 mg injection  40 mg IntraVENous Daily    chlorhexidine  15 mL Mouth/Throat BID    senna  5 mL Per G Tube Nightly    docusate  100 mg Per G Tube BID    levothyroxine  175 mcg Per G Tube Daily    insulin glargine  20 Units SubCUTAneous BID    insulin lispro  0-12 Units SubCUTAneous Q6H    levETIRAcetam  500 mg PEG Tube BID    linezolid  600 mg IntraVENous Q12H    meropenem  1,000 mg IntraVENous Q24H    sodium chloride flush  5-40 mL IntraVENous 2 times per day      sodium chloride      norepinephrine Stopped (22 1520)    fentaNYL 25 mcg/hr (22 0536)    midazolam Stopped (22 0924)    dextrose      sodium chloride 10 mL/hr at 22 05     sodium chloride, anticoagulant sodium citrate, perflutren lipid microspheres, glucose, dextrose bolus **OR** dextrose bolus, glucagon (rDNA), dextrose, sodium chloride flush, sodium chloride    OBJECTIVE:  BP (!) 82/46   Pulse 75   Temp 97.8 °F (36.6 °C) (Axillary)   Resp 18   Ht 6' 5\" (1.956 m)   Wt 217 lb 9.5 oz (98.7 kg)   SpO2 96%   BMI 25.80 kg/m²   Temp  Av.6 °F (36.4 °C)  Min: 96.8 °F (36 °C)  Max: 98.1 °F (36.7 °C)  Constitutional: The patient is sedated, on ventilator. FiO2 35%. PEEP 8. Skin: Warm and dry. No rashes were noted. HEENT: Eyes show round, and reactive pupils. No jaundice.  Moist mucous membranes, no ulcerations, no thrush. ETT. OGT  Neck: Supple to movements. No lymphadenopathy. Chest: No use of accessory muscles to breathe. Symmetrical expansion. Auscultation reveals coarse breath sounds. scattered rhonchi. Right tunneled HD catheter. Cardiovascular: Heart sounds arrhythmic and irregular. No murmurs appreciated. Abdomen: Positive bowel sounds to auscultation. Benign to palpation. No masses felt. No hepatosplenomegaly. Extremities: No clubbing, no cyanosis, no edema. Back: Stage IV decubitus ulcer with dressing. Lines: Left PICC (Vibra)  Right femoral TLC 5/11/2022.     Laboratory and Tests Review:  Lab Results   Component Value Date    WBC 13.1 (H) 05/13/2022    WBC 16.0 (H) 05/12/2022    WBC 21.5 (H) 05/11/2022    HGB 7.1 (L) 05/13/2022    HCT 23.1 (L) 05/13/2022    MCV 93.1 05/13/2022     05/13/2022     Lab Results   Component Value Date    NEUTROABS 12.71 (H) 05/13/2022    NEUTROABS 14.56 (H) 05/12/2022    NEUTROABS 20.64 (H) 05/11/2022     Lab Results   Component Value Date    CRP 13.9 (H) 05/11/2022     No results found for: CRP  Lab Results   Component Value Date    SEDRATE 78 (H) 05/11/2022     Lab Results   Component Value Date    ALT 30 05/11/2022    AST 31 05/11/2022    ALKPHOS 591 (H) 05/11/2022    BILITOT 0.3 05/11/2022     Lab Results   Component Value Date     05/13/2022    K 4.2 05/13/2022    K 4.5 05/11/2022    CL 94 05/13/2022    CO2 28 05/13/2022    BUN 50 05/13/2022    CREATININE 1.8 05/13/2022    CREATININE 2.7 05/12/2022    CREATININE 2.4 05/11/2022    GFRAA 46 05/13/2022    LABGLOM 38 05/13/2022    GLUCOSE 219 05/13/2022    PROT 6.9 05/11/2022    LABALBU 2.4 05/11/2022    CALCIUM 9.0 05/13/2022    BILITOT 0.3 05/11/2022    ALKPHOS 591 05/11/2022    AST 31 05/11/2022    ALT 30 05/11/2022     Radiology:      Microbiology:   [facility]:  Blood culture 5/10/2022: Negative so far  Decubitus ulcer 5/10/2022: MRSA, mixed GNR  Respiratory panel: Pending  Nares screen

## 2022-05-13 NOTE — CARE COORDINATION
Continue ICU, continue vent support. Dialysis, IV Merrem and linezolid. Thoracentesis done. Continue sedation, levophed.  Plan return to [facility] when stable-o

## 2022-05-13 NOTE — PLAN OF CARE
Problem: Discharge Planning  Goal: Discharge to home or other facility with appropriate resources  Outcome: Progressing     Problem: Pain  Goal: Verbalizes/displays adequate comfort level or baseline comfort level  Outcome: Progressing     Problem: Skin/Tissue Integrity  Goal: Absence of new skin breakdown  Description: 1. Monitor for areas of redness and/or skin breakdown  2. Assess vascular access sites hourly  3. Every 4-6 hours minimum:  Change oxygen saturation probe site  4. Every 4-6 hours:  If on nasal continuous positive airway pressure, respiratory therapy assess nares and determine need for appliance change or resting period. Outcome: Progressing     Problem: Safety - Adult  Goal: Free from fall injury  Outcome: Progressing  Flowsheets (Taken 5/13/2022 1300)  Free From Fall Injury: Based on caregiver fall risk screen, instruct family/caregiver to ask for assistance with transferring infant if caregiver noted to have fall risk factors     Problem: ABCDS Injury Assessment  Goal: Absence of physical injury  Outcome: Progressing  Flowsheets (Taken 5/13/2022 1300)  Absence of Physical Injury: Implement safety measures based on patient assessment     Problem: Respiratory - Adult  Goal: Achieves optimal ventilation and oxygenation  5/13/2022 1949 by Anne Hall RN  Outcome: Progressing  5/13/2022 1614 by Jaquelin Rosa RCP  Outcome: Progressing     Problem: Chronic Conditions and Co-morbidities  Goal: Patient's chronic conditions and co-morbidity symptoms are monitored and maintained or improved  Outcome: Progressing     Problem: Safety - Medical Restraint  Goal: Remains free of injury from restraints (Restraint for Interference with Medical Device)  Description: INTERVENTIONS:  1. Determine that other, less restrictive measures have been tried or would not be effective before applying the restraint  2. Evaluate the patient's condition at the time of restraint application  3.  Inform patient/family regarding the reason for restraint  4.  Q2H: Monitor safety, psychosocial status, comfort, nutrition and hydration  Outcome: Progressing  Flowsheets (Taken 5/13/2022 2091)  Remains free of injury from restraints (restraint for interference with medical device):   Determine that other, less restrictive measures have been tried or would not be effective before applying the restraint   Evaluate the patient's condition at the time of restraint application   Inform patient/family regarding the reason for restraint   Every 2 hours: Monitor safety, psychosocial status, comfort, nutrition and hydration

## 2022-05-14 ENCOUNTER — APPOINTMENT (OUTPATIENT)
Dept: GENERAL RADIOLOGY | Age: 67
DRG: 003 | End: 2022-05-14
Payer: MEDICARE

## 2022-05-14 LAB
ANION GAP SERPL CALCULATED.3IONS-SCNC: 13 MMOL/L (ref 7–16)
ANISOCYTOSIS: ABNORMAL
BASOPHILIC STIPPLING: ABNORMAL
BASOPHILS ABSOLUTE: 0.01 E9/L (ref 0–0.2)
BASOPHILS RELATIVE PERCENT: 0.1 % (ref 0–2)
BLOOD BANK DISPENSE STATUS: NORMAL
BLOOD BANK DISPENSE STATUS: NORMAL
BLOOD BANK PRODUCT CODE: NORMAL
BLOOD BANK PRODUCT CODE: NORMAL
BPU ID: NORMAL
BPU ID: NORMAL
BUN BLDV-MCNC: 71 MG/DL (ref 6–23)
BURR CELLS: ABNORMAL
CALCIUM SERPL-MCNC: 9 MG/DL (ref 8.6–10.2)
CHLORIDE BLD-SCNC: 92 MMOL/L (ref 98–107)
CO2: 27 MMOL/L (ref 22–29)
CREAT SERPL-MCNC: 2.2 MG/DL (ref 0.7–1.2)
DESCRIPTION BLOOD BANK: NORMAL
DESCRIPTION BLOOD BANK: NORMAL
EOSINOPHILS ABSOLUTE: 0.13 E9/L (ref 0.05–0.5)
EOSINOPHILS RELATIVE PERCENT: 1.2 % (ref 0–6)
GFR AFRICAN AMERICAN: 36
GFR NON-AFRICAN AMERICAN: 30 ML/MIN/1.73
GLUCOSE BLD-MCNC: 241 MG/DL (ref 74–99)
GRAM STAIN ORDERABLE: NORMAL
GRAM STAIN ORDERABLE: NORMAL
HCT VFR BLD CALC: 22.7 % (ref 37–54)
HCT VFR BLD CALC: 25.8 % (ref 37–54)
HEMOGLOBIN: 6.9 G/DL (ref 12.5–16.5)
HEMOGLOBIN: 8 G/DL (ref 12.5–16.5)
HYPOCHROMIA: ABNORMAL
IMMATURE GRANULOCYTES #: 0.25 E9/L
IMMATURE GRANULOCYTES %: 2.3 % (ref 0–5)
LYMPHOCYTES ABSOLUTE: 0.38 E9/L (ref 1.5–4)
LYMPHOCYTES RELATIVE PERCENT: 3.6 % (ref 20–42)
MAGNESIUM: 2.3 MG/DL (ref 1.6–2.6)
MCH RBC QN AUTO: 28.4 PG (ref 26–35)
MCHC RBC AUTO-ENTMCNC: 30.4 % (ref 32–34.5)
MCV RBC AUTO: 93.4 FL (ref 80–99.9)
METER GLUCOSE: 108 MG/DL (ref 74–99)
METER GLUCOSE: 129 MG/DL (ref 74–99)
METER GLUCOSE: 132 MG/DL (ref 74–99)
METER GLUCOSE: 149 MG/DL (ref 74–99)
METER GLUCOSE: 241 MG/DL (ref 74–99)
METER GLUCOSE: 68 MG/DL (ref 74–99)
MONOCYTES ABSOLUTE: 0.61 E9/L (ref 0.1–0.95)
MONOCYTES RELATIVE PERCENT: 5.7 % (ref 2–12)
NEUTROPHILS ABSOLUTE: 9.31 E9/L (ref 1.8–7.3)
NEUTROPHILS RELATIVE PERCENT: 87.1 % (ref 43–80)
OVALOCYTES: ABNORMAL
PDW BLD-RTO: 16.9 FL (ref 11.5–15)
PHOSPHORUS: 3.2 MG/DL (ref 2.5–4.5)
PLATELET # BLD: 151 E9/L (ref 130–450)
PMV BLD AUTO: 10.1 FL (ref 7–12)
POIKILOCYTES: ABNORMAL
POLYCHROMASIA: ABNORMAL
POTASSIUM SERPL-SCNC: 4.4 MMOL/L (ref 3.5–5)
RBC # BLD: 2.43 E12/L (ref 3.8–5.8)
SODIUM BLD-SCNC: 132 MMOL/L (ref 132–146)
TARGET CELLS: ABNORMAL
WBC # BLD: 10.7 E9/L (ref 4.5–11.5)

## 2022-05-14 PROCEDURE — 87070 CULTURE OTHR SPECIMN AEROBIC: CPT

## 2022-05-14 PROCEDURE — 94640 AIRWAY INHALATION TREATMENT: CPT

## 2022-05-14 PROCEDURE — 2580000003 HC RX 258: Performed by: INTERNAL MEDICINE

## 2022-05-14 PROCEDURE — 6370000000 HC RX 637 (ALT 250 FOR IP): Performed by: INTERNAL MEDICINE

## 2022-05-14 PROCEDURE — 85018 HEMOGLOBIN: CPT

## 2022-05-14 PROCEDURE — 2580000003 HC RX 258: Performed by: SPECIALIST

## 2022-05-14 PROCEDURE — 36415 COLL VENOUS BLD VENIPUNCTURE: CPT

## 2022-05-14 PROCEDURE — 82962 GLUCOSE BLOOD TEST: CPT

## 2022-05-14 PROCEDURE — 84100 ASSAY OF PHOSPHORUS: CPT

## 2022-05-14 PROCEDURE — 6370000000 HC RX 637 (ALT 250 FOR IP): Performed by: STUDENT IN AN ORGANIZED HEALTH CARE EDUCATION/TRAINING PROGRAM

## 2022-05-14 PROCEDURE — 6360000002 HC RX W HCPCS: Performed by: STUDENT IN AN ORGANIZED HEALTH CARE EDUCATION/TRAINING PROGRAM

## 2022-05-14 PROCEDURE — 94003 VENT MGMT INPAT SUBQ DAY: CPT

## 2022-05-14 PROCEDURE — 90935 HEMODIALYSIS ONE EVALUATION: CPT

## 2022-05-14 PROCEDURE — 80048 BASIC METABOLIC PNL TOTAL CA: CPT

## 2022-05-14 PROCEDURE — C9113 INJ PANTOPRAZOLE SODIUM, VIA: HCPCS | Performed by: STUDENT IN AN ORGANIZED HEALTH CARE EDUCATION/TRAINING PROGRAM

## 2022-05-14 PROCEDURE — A4216 STERILE WATER/SALINE, 10 ML: HCPCS | Performed by: STUDENT IN AN ORGANIZED HEALTH CARE EDUCATION/TRAINING PROGRAM

## 2022-05-14 PROCEDURE — 6360000002 HC RX W HCPCS: Performed by: SPECIALIST

## 2022-05-14 PROCEDURE — 36592 COLLECT BLOOD FROM PICC: CPT

## 2022-05-14 PROCEDURE — 2000000000 HC ICU R&B

## 2022-05-14 PROCEDURE — 85025 COMPLETE CBC W/AUTO DIFF WBC: CPT

## 2022-05-14 PROCEDURE — 2580000003 HC RX 258: Performed by: STUDENT IN AN ORGANIZED HEALTH CARE EDUCATION/TRAINING PROGRAM

## 2022-05-14 PROCEDURE — 36430 TRANSFUSION BLD/BLD COMPNT: CPT

## 2022-05-14 PROCEDURE — 83735 ASSAY OF MAGNESIUM: CPT

## 2022-05-14 PROCEDURE — 85014 HEMATOCRIT: CPT

## 2022-05-14 PROCEDURE — 71045 X-RAY EXAM CHEST 1 VIEW: CPT

## 2022-05-14 PROCEDURE — 6360000002 HC RX W HCPCS: Performed by: INTERNAL MEDICINE

## 2022-05-14 RX ORDER — SODIUM CHLORIDE 9 MG/ML
INJECTION, SOLUTION INTRAVENOUS PRN
Status: DISCONTINUED | OUTPATIENT
Start: 2022-05-14 | End: 2022-05-17

## 2022-05-14 RX ORDER — IPRATROPIUM BROMIDE AND ALBUTEROL SULFATE 2.5; .5 MG/3ML; MG/3ML
1 SOLUTION RESPIRATORY (INHALATION) EVERY 4 HOURS
Status: DISCONTINUED | OUTPATIENT
Start: 2022-05-14 | End: 2022-05-26 | Stop reason: HOSPADM

## 2022-05-14 RX ORDER — ACETYLCYSTEINE 100 MG/ML
4 SOLUTION ORAL; RESPIRATORY (INHALATION) EVERY 4 HOURS
Status: DISCONTINUED | OUTPATIENT
Start: 2022-05-14 | End: 2022-05-21

## 2022-05-14 RX ADMIN — POLYETHYLENE GLYCOL 3350 17 G: 17 POWDER, FOR SOLUTION ORAL at 08:13

## 2022-05-14 RX ADMIN — Medication 10 ML: at 08:06

## 2022-05-14 RX ADMIN — INSULIN LISPRO 4 UNITS: 100 INJECTION, SOLUTION INTRAVENOUS; SUBCUTANEOUS at 05:30

## 2022-05-14 RX ADMIN — ACETYLCYSTEINE 400 MG: 100 INHALANT RESPIRATORY (INHALATION) at 16:33

## 2022-05-14 RX ADMIN — DOCUSATE SODIUM LIQUID 100 MG: 100 LIQUID ORAL at 08:05

## 2022-05-14 RX ADMIN — LEVOTHYROXINE SODIUM 175 MCG: 125 TABLET ORAL at 05:30

## 2022-05-14 RX ADMIN — ACETYLCYSTEINE 400 MG: 100 INHALANT RESPIRATORY (INHALATION) at 21:03

## 2022-05-14 RX ADMIN — DEXTROSE MONOHYDRATE 125 ML: 100 INJECTION, SOLUTION INTRAVENOUS at 17:42

## 2022-05-14 RX ADMIN — INSULIN GLARGINE 20 UNITS: 100 INJECTION, SOLUTION SUBCUTANEOUS at 08:05

## 2022-05-14 RX ADMIN — LEVETIRACETAM 500 MG: 100 SOLUTION ORAL at 20:55

## 2022-05-14 RX ADMIN — DOCUSATE SODIUM LIQUID 100 MG: 100 LIQUID ORAL at 20:55

## 2022-05-14 RX ADMIN — MEROPENEM 1000 MG: 1 INJECTION, POWDER, FOR SOLUTION INTRAVENOUS at 15:40

## 2022-05-14 RX ADMIN — LINEZOLID 600 MG: 600 INJECTION, SOLUTION INTRAVENOUS at 23:46

## 2022-05-14 RX ADMIN — CHLORHEXIDINE GLUCONATE 0.12% ORAL RINSE 15 ML: 1.2 LIQUID ORAL at 20:55

## 2022-05-14 RX ADMIN — LINEZOLID 600 MG: 600 INJECTION, SOLUTION INTRAVENOUS at 13:25

## 2022-05-14 RX ADMIN — SENNOSIDES 8.8 MG: 8.8 SYRUP ORAL at 20:55

## 2022-05-14 RX ADMIN — IPRATROPIUM BROMIDE AND ALBUTEROL SULFATE 1 AMPULE: .5; 2.5 SOLUTION RESPIRATORY (INHALATION) at 21:02

## 2022-05-14 RX ADMIN — LEVETIRACETAM 500 MG: 100 SOLUTION ORAL at 08:05

## 2022-05-14 RX ADMIN — CHLORHEXIDINE GLUCONATE 0.12% ORAL RINSE 15 ML: 1.2 LIQUID ORAL at 08:05

## 2022-05-14 RX ADMIN — INSULIN LISPRO 2 UNITS: 100 INJECTION, SOLUTION INTRAVENOUS; SUBCUTANEOUS at 23:46

## 2022-05-14 RX ADMIN — Medication 10 ML: at 20:54

## 2022-05-14 RX ADMIN — IPRATROPIUM BROMIDE AND ALBUTEROL SULFATE 1 AMPULE: .5; 2.5 SOLUTION RESPIRATORY (INHALATION) at 16:33

## 2022-05-14 RX ADMIN — SODIUM CHLORIDE, PRESERVATIVE FREE 40 MG: 5 INJECTION INTRAVENOUS at 08:05

## 2022-05-14 ASSESSMENT — PULMONARY FUNCTION TESTS
PIF_VALUE: 24
PIF_VALUE: 29
PIF_VALUE: 24
PIF_VALUE: 27
PIF_VALUE: 28
PIF_VALUE: 31
PIF_VALUE: 27
PIF_VALUE: 36
PIF_VALUE: 28
PIF_VALUE: 25
PIF_VALUE: 22
PIF_VALUE: 29
PIF_VALUE: 22
PIF_VALUE: 41
PIF_VALUE: 22
PIF_VALUE: 27
PIF_VALUE: 26
PIF_VALUE: 26

## 2022-05-14 ASSESSMENT — PAIN SCALES - GENERAL
PAINLEVEL_OUTOF10: 0
PAINLEVEL_OUTOF10: 0

## 2022-05-14 NOTE — PLAN OF CARE
Problem: Respiratory - Adult  Goal: Achieves optimal ventilation and oxygenation  5/14/2022 1326 by Rebel Stewart RCP  Outcome: Progressing  Flowsheets (Taken 5/14/2022 1326)  Achieves optimal ventilation and oxygenation:   Oxygen supplementation based on oxygen saturation or arterial blood gases   Assess for changes in respiratory status   Assess the need for suctioning and aspirate as needed   Position to facilitate oxygenation and minimize respiratory effort  Note: Weaning in progress, PS 16 35% FiO2 PEEP 6, tolerating well.

## 2022-05-14 NOTE — PLAN OF CARE
Problem: Respiratory - Adult  Goal: Achieves optimal ventilation and oxygenation  5/14/2022 0505 by Anahi Escobedo RCP  Outcome: Progressing  Flowsheets (Taken 5/14/2022 0505)  Achieves optimal ventilation and oxygenation:   Assess for changes in respiratory status   Position to facilitate oxygenation and minimize respiratory effort   Assess the need for suctioning and aspirate as needed   Oxygen supplementation based on oxygen saturation or arterial blood gases   Respiratory therapy support as indicated  Note: Pt tolerating PS wean during the day, rested on AC overnight. FiO2 weaned to 30%.

## 2022-05-14 NOTE — PATIENT CARE CONFERENCE
Intensive Care Daily Quality Rounding Checklist        ICU Team Members:  Dr. Sowmya Cortez, charge nurse, bedside nurse, respiratory therapist     ICU Day #: 4     Intubation Date: 5/11/2022     Ventilator Day #: 4     Central Line Insertion Date: 5/11/22                                                    Day #: 4      Arterial Line Insertion Date: n/a                             Day #: n/a     Temporary Hemodialysis Catheter Insertion Date: n/a                             Day # admitted with tunneled dialysis cath     DVT Prophylaxis: Eliquis on hold for anemia/ SCD's    GI Prophylaxis: Protonix     Roberts Catheter Insertion Date: HD patient                                        CHK #:                              Continued need (if yes, reason documented and discussed with physician):     Skin Issues/ Wounds and ordered treatment discussed on rounds: yes, not seen by wound care yet     Goals/ Plans for the Day: Daily labs and replace/transfuse as needed. Wean vent/sedation, HD per nephrology, daily labs, soft wrist restraints. Continue critical care management.

## 2022-05-14 NOTE — PROGRESS NOTES
Critical Care Team - Daily Progress Note      Date and time: 5/14/2022 8:27 AM  Patient's name:  Kassie Foley  Medical Record Number: [id number]  Patient's account/billing number: [de-identified]  Patient's YOB: 1955  Age: 74 y.o. Date of Admission: 5/10/2022 11:58 PM  Length of stay during current admission: 3      Primary Care Physician: Jamie Sahni MD  ICU Attending Physician: Dr. Anthony Mcdonald    Code Status: Full Code    Reason for ICU admission: Status postcardiac arrest      SUBJECTIVE:     OVERNIGHT EVENTS:       Patient awake and alert today. Continues to be intubated. Patient able to nod to answer questions appropriately. Patient remained afebrile, some episodes of tachycardia, and hypotension. Pressors were switched off. Due for Dialysis today     Review of systems -Limited by patient was able to nod. He denies any headaches, chest pains, abdominal pains, fever, chills. Intake/Output:   No intake/output data recorded. I/O last 3 completed shifts: In: 3235 [I.V.:284.7; NG/GT:2096; IV Piggyback:1543.3]  Out: -     Awake and following commands: No  Current Ventilation: - Ventilator Settings:    Vt (Set, mL): 400 mL  Resp Rate (Set): 18 bmp  FiO2 : 30 %    PEEP/CPAP (cmH2O): 6  Pressure Support: 0 cmH20  Secretions: Thick, yellow secretions, blood  Sedation: fentanyl  Paralyzed: No  Vasopressors: No    Initial HPI + past overnight events: 56-year-old male resident of [facility] with significant past medical history of A. fib on Eliquis, aspirin, anxiety, anemia, CHF, insulin-dependent type 2 diabetes, CKD on hemodialysis, sacral decubitus ulcers with wound VAC, hyperlipidemia, hypothyroidism. Patient presented to the ICU after having a cardiac arrest, required CPR 2 rounds of epinephrine, and intubated.     OBJECTIVE:     VITAL SIGNS:  /61   Pulse 76   Temp 97.5 °F (36.4 °C) (Infrared)   Resp 18   Ht 6' 5\" (1.956 m)   Wt 223 lb 15.8 oz (101.6 kg)   SpO2 99%   BMI 26.56 kg/m² Tmax over 24 hours:  Temp (24hrs), Av.8 °F (36.6 °C), Min:97.3 °F (36.3 °C), Max:98.4 °F (36.9 °C)      Patient Vitals for the past 6 hrs:   BP Temp Temp src Pulse Resp SpO2 Weight   22 0800 117/61 97.5 °F (36.4 °C) Infrared 76 18 99 % --   22 0600 (!) 101/55 -- -- 74 23 98 % --   22 0500 111/67 -- -- 75 18 97 % 223 lb 15.8 oz (101.6 kg)   22 0400 110/61 97.3 °F (36.3 °C) Infrared 77 17 97 % --   22 0303 -- -- -- 69 18 96 % --         Intake/Output Summary (Last 24 hours) at 2022 0827  Last data filed at 2022 0554  Gross per 24 hour   Intake 2077.5 ml   Output --   Net 2077.5 ml     Wt Readings from Last 2 Encounters:   22 223 lb 15.8 oz (101.6 kg)   22 212 lb (96.2 kg)     Body mass index is 26.56 kg/m². Physical Exam  Vitals and nursing note reviewed. Constitutional:       General: He is awake. Appearance: He is ill-appearing. Comments:  intubated   HENT:      Head: Normocephalic and atraumatic. Nose: Nose normal. No congestion or rhinorrhea. Mouth/Throat:      Mouth: Mucous membranes are moist.      Pharynx: Oropharynx is clear. Eyes:      General: No scleral icterus. Extraocular Movements: Extraocular movements intact. Conjunctiva/sclera: Conjunctivae normal.   Cardiovascular:      Rate and Rhythm: Normal rate and regular rhythm. Pulses: Normal pulses. Heart sounds: Normal heart sounds. No murmur heard. Pulmonary:      Breath sounds: No stridor. Rhonchi present. Comments: intubated  Abdominal:      General: Bowel sounds are normal. There is no distension. Palpations: Abdomen is soft. There is no mass. Tenderness: There is no abdominal tenderness. There is no guarding or rebound. Comments: PEG tube   Musculoskeletal:         General: Normal range of motion. Cervical back: Normal range of motion and neck supple. No tenderness. Right lower leg: Edema present.       Left lower leg: Edema present. Skin:     Findings: Lesion (Sacral decubitus) present. Comments: Chronic changes -Dark-colored lower legs b/l  Feet are cold to touch    Neurological:      Mental Status: He is alert. Cranial Nerves: No cranial nerve deficit. Sensory: No sensory deficit. Comments: Patient awake and alert, orientated to name. Not sure about location and season. Psychiatric:         Attention and Perception: Attention normal.         Behavior: Behavior is cooperative.            MEDICATIONS:    Scheduled Meds:   polyethylene glycol  17 g Oral Daily    pantoprazole (PROTONIX) 40 mg injection  40 mg IntraVENous Daily    chlorhexidine  15 mL Mouth/Throat BID    senna  5 mL Per G Tube Nightly    docusate  100 mg Per G Tube BID    levothyroxine  175 mcg Per G Tube Daily    insulin glargine  20 Units SubCUTAneous BID    insulin lispro  0-12 Units SubCUTAneous Q6H    levETIRAcetam  500 mg PEG Tube BID    linezolid  600 mg IntraVENous Q12H    meropenem  1,000 mg IntraVENous Q24H    sodium chloride flush  5-40 mL IntraVENous 2 times per day     Continuous Infusions:   sodium chloride      sodium chloride      norepinephrine Stopped (05/12/22 1520)    fentaNYL 25 mcg/hr (05/13/22 2204)    midazolam Stopped (05/12/22 0924)    dextrose      sodium chloride Stopped (05/13/22 1631)     PRN Meds:   sodium chloride, , PRN  sodium chloride, , PRN  anticoagulant sodium citrate, 4.2 mL, PRN  perflutren lipid microspheres, 1.5 mL, ONCE PRN  glucose, 4 tablet, PRN  dextrose bolus, 125 mL, PRN   Or  dextrose bolus, 250 mL, PRN  glucagon (rDNA), 1 mg, PRN  dextrose, 100 mL/hr, PRN  sodium chloride flush, 5-40 mL, PRN  sodium chloride, , PRN          VENT SETTINGS (Comprehensive) (if applicable):  Vent Information  Ventilator ID: [id number]  Vent Mode: AC/VC  Ventilator Initiate: Yes  Additional Respiratory Assessments  Pulse: 76  Resp: 18  SpO2: 99 %  End Tidal CO2: 42 (%)  Position: Semi-Fajardo's  Humidification Source: Heated wire  Humidification Temp: 36.4  Circuit Condensation: Drained  Cuff Pressure (cm H2O): 28 cm H2O    Arterial Blood Gas 5/14/2022  pH 7.414, pCO2 43.4, pO2 76.3, HCO3 27.1      Laboratory findings:    Complete Blood Count:   Recent Labs     05/12/22  0630 05/12/22  0630 05/12/22  1700 05/13/22  0535 05/14/22  0529   WBC 16.0*  --   --  13.1* 10.7   HGB 6.8*   < > 7.5* 7.1* 6.9*   HCT 22.5*   < > 24.1* 23.1* 22.7*     --   --  156 151    < > = values in this interval not displayed. Last 3 Blood Glucose:   Recent Labs     05/12/22  0630 05/13/22  0535 05/14/22  0529   GLUCOSE 130* 219* 241*        PT/INR:    Lab Results   Component Value Date    PROTIME 13.0 05/11/2022    INR 1.2 05/11/2022     PTT:    Lab Results   Component Value Date    APTT 32.8 05/11/2022       Comprehensive Metabolic Profile:   Recent Labs     05/12/22  0630 05/13/22  0535 05/14/22  0529   * 132 132   K 4.5 4.2 4.4   CL 94* 94* 92*   CO2 25 28 27   BUN 76* 50* 71*   CREATININE 2.7* 1.8* 2.2*   GLUCOSE 130* 219* 241*   CALCIUM 10.0 9.0 9.0      Magnesium:   Lab Results   Component Value Date    MG 2.3 05/14/2022     Phosphorus:   Lab Results   Component Value Date    PHOS 3.2 05/14/2022     Ionized Calcium: No results found for: CAION     Urinalysis:     Troponin: No results for input(s): TROPONINI in the last 72 hours. Microbiology:  Cultures drawn: Gram stain respiratory from sputum suction pending, MRSA nasal colonization positive, blood cultures 24 hours no growth-positive contaminant 1 bottle shows gram-positive cocci in clusters. Respiratory panel negative    Radiology/Imaging:     Chest Xray (5/14/2022): No evidence of pneumothorax. Persistent bilateral pleural effusions with bibasilar infiltrates and right lower lobe atelectasis.     ASSESSMENT:     Patient Active Problem List    Diagnosis Date Noted    Cardiac arrest ([facility] [address] 05/11/2022         PLAN: Neuro   Patient intubated and sedated   History of seizures on Keppra    Respiratory   Acute hypercapnic respiratory failure with hypoxia   Intubated 5/10/2022   Thoracentesis completed today- 450-cytology, protein 3.6 , , glucose 53, culture-positive for staph epidermidis and staph species. , gram stain, cell count with differential all collected.  Pneumonia   Weaning trial after dialysis today     Cardiovascular   History of A. fib on Eliquis   EKG concerning for heart block.  Elevated troponin most likely secondary to CKD   Cardiology following    GI   History of a PEG tube   Concern for GI bleed    Renal   CKD on dialysis Monday Wednesday Friday  [facility] Nephrology following    Infectious disease   Sepsis with septic shock   decubitus ulcer   Abx per ID       Heme/Onc   1PRBC received,     Trend H&H, transfuse if <7.   Surgery following    Endocrine   Type 2 diabetes on Lantus 20 units,MDSS   Hypothyroidism on synthroid    Social/Spiritual/DNR/Other   Code status: Full  Diet Diet NPO   ADULT TUBE FEEDING; PEG; Immune Enhancing; Continuous; 20; Yes; 20; Q 4 hours; 55; 30; Q 4 hours; Protein; Once daily   Stress ulcer prophylaxis: protonix 40mg   DVT prophylaxis: None, hold Eliquis for now, may need heparin, cardiology input appreciated.  Consultations needed: Yes   Transfer out of ICU today? No    Other:thoracentesis today, transfusion today, goal to extubate as able     Lines/catheters   Date 05/10  o ETT  o CVC triple lumen R femoral vein   o Urinary cath         Shayy Tucker,   8:27 AM  05/14/22        I personally saw, examined and provided care for the patient. Radiographs, labs and medication list were reviewed by me independently. Review of Residents documentation was conducted and revisions were made as appropriate. I agree with the above documented exam, problem list and plan of care.     SBT today as tolerated with possible extubation  HD today    CCT excluding

## 2022-05-14 NOTE — PLAN OF CARE
Problem: Safety - Adult  Goal: Free from fall injury  5/14/2022 1138 by Jeff Reese RN  Outcome: Progressing     Problem: ABCDS Injury Assessment  Goal: Absence of physical injury  5/14/2022 1138 by Jeff Reese RN  Outcome: Progressing     Problem: Respiratory - Adult  Goal: Achieves optimal ventilation and oxygenation  5/14/2022 1138 by Jeff Reese RN  Outcome: Progressing     Problem: Safety - Medical Restraint  Goal: Remains free of injury from restraints (Restraint for Interference with Medical Device)  Description: INTERVENTIONS:  1. Determine that other, less restrictive measures have been tried or would not be effective before applying the restraint  2. Evaluate the patient's condition at the time of restraint application  3. Inform patient/family regarding the reason for restraint  4.  Q2H: Monitor safety, psychosocial status, comfort, nutrition and hydration  5/14/2022 1138 by Jeff Reese RN  Outcome: Progressing

## 2022-05-14 NOTE — FLOWSHEET NOTE
05/14/22 1215   Vital Signs   /75   Temp 97.4 °F (36.3 °C)   Pulse 82   Resp 24   Weight 220 lb 10.9 oz (100.1 kg)   Percent Weight Change -1.48   Post-Hemodialysis Assessment   Post-Treatment Procedures Blood returned;Catheter capped, clamped with Citrate x 2 ports   Machine Disinfection Process Acid/Vinegar Clean;Exterior Machine Disinfection   Rinseback Volume (ml) 300 ml   Total Liters Processed (l/min) 67.9 l/min   Dialyzer Clearance Lightly streaked   Duration of Treatment (minutes) 240 minutes   Hemodialysis Intake (ml) 800 ml   Hemodialysis Output (ml) 2400 ml   NET Removed (ml) 1500 ml   Tolerated Treatment Good   Patient Response to Treatment tolerated tx well, net uf 1500cc removed. Pt. given one unit PRBC's during HD. Bilateral Breath Sounds Diminished   Edema Right lower extremity; Left lower extremity   Edema Generalized +1   RLE Edema +1   LLE Edema +2   Physician Notified No   Patient Disposition Remain in ICU/ED

## 2022-05-14 NOTE — PLAN OF CARE
Problem: Discharge Planning  Goal: Discharge to home or other facility with appropriate resources  5/14/2022 0037 by Renetta Agrawal RN  Outcome: Progressing  Flowsheets  Taken 5/14/2022 0030  Discharge to home or other facility with appropriate resources: Identify barriers to discharge with patient and caregiver  Taken 5/13/2022 2000  Discharge to home or other facility with appropriate resources: Arrange for needed discharge resources and transportation as appropriate  5/13/2022 1949 by Deny Willis RN  Outcome: Progressing     Problem: Pain  Goal: Verbalizes/displays adequate comfort level or baseline comfort level  5/14/2022 0037 by Renetta Agrawal RN  Outcome: Progressing  Flowsheets (Taken 5/14/2022 0000)  Verbalizes/displays adequate comfort level or baseline comfort level: Assess pain using appropriate pain scale  5/13/2022 1949 by Deny Willis RN  Outcome: Progressing     Problem: Skin/Tissue Integrity  Goal: Absence of new skin breakdown  Description: 1. Monitor for areas of redness and/or skin breakdown  2. Assess vascular access sites hourly  3. Every 4-6 hours minimum:  Change oxygen saturation probe site  4. Every 4-6 hours:  If on nasal continuous positive airway pressure, respiratory therapy assess nares and determine need for appliance change or resting period.   5/14/2022 0037 by Renetta Agrawal RN  Outcome: Progressing  5/13/2022 1949 by Deny Willis RN  Outcome: Progressing     Problem: Safety - Adult  Goal: Free from fall injury  5/14/2022 0037 by Renetta Agrawal RN  Outcome: Progressing  Flowsheets (Taken 5/14/2022 0036)  Free From Fall Injury: Based on caregiver fall risk screen, instruct family/caregiver to ask for assistance with transferring infant if caregiver noted to have fall risk factors  5/13/2022 1949 by Deny Willis RN  Outcome: Progressing  Flowsheets (Taken 5/13/2022 1300)  Free From Fall Injury: Based on caregiver fall risk screen, instruct family/caregiver to ask for assistance with transferring infant if caregiver noted to have fall risk factors     Problem: ABCDS Injury Assessment  Goal: Absence of physical injury  5/14/2022 0037 by Andres Hansen RN  Outcome: Progressing  Flowsheets (Taken 5/14/2022 0036)  Absence of Physical Injury: Implement safety measures based on patient assessment  5/13/2022 1949 by Olvin Reyes RN  Outcome: Progressing  Flowsheets (Taken 5/13/2022 1300)  Absence of Physical Injury: Implement safety measures based on patient assessment     Problem: Respiratory - Adult  Goal: Achieves optimal ventilation and oxygenation  5/14/2022 0037 by Andres Hansen RN  Outcome: Progressing  5/13/2022 1949 by Olvin Reyes RN  Outcome: Progressing  5/13/2022 1614 by Cinthia Gaxiola RCP  Outcome: Progressing     Problem: Chronic Conditions and Co-morbidities  Goal: Patient's chronic conditions and co-morbidity symptoms are monitored and maintained or improved  5/14/2022 0037 by Andres Hansen RN  Outcome: Progressing  Flowsheets (Taken 5/13/2022 2000)  Care Plan - Patient's Chronic Conditions and Co-Morbidity Symptoms are Monitored and Maintained or Improved: Monitor and assess patient's chronic conditions and comorbid symptoms for stability, deterioration, or improvement  5/13/2022 1949 by Olvin Reyes RN  Outcome: Progressing     Problem: Safety - Medical Restraint  Goal: Remains free of injury from restraints (Restraint for Interference with Medical Device)  Description: INTERVENTIONS:  1. Determine that other, less restrictive measures have been tried or would not be effective before applying the restraint  2. Evaluate the patient's condition at the time of restraint application  3. Inform patient/family regarding the reason for restraint  4.  Q2H: Monitor safety, psychosocial status, comfort, nutrition and hydration  5/14/2022 0037 by Andres Hansen RN  Outcome: Progressing  Flowsheets (Taken 5/14/2022 0000)  Remains free of injury from restraints

## 2022-05-14 NOTE — PROGRESS NOTES
Internal Medicine Progress Note      Synopsis: Patient admitted on 5/10/2022     Mr. Daysi Cordero, a 74y.o. year old male who has a past medical history of A-fib ([address] TRUMAN (acute kidney injury) ([address] Anemia, Anxiety, Bipolar 1 disorder ([address] Charcot foot due to diabetes mellitus ([address] CHF (congestive heart failure) ([address] CKD (chronic kidney disease), Diabetes mellitus ([address] Dialysis patient ([address] Hyperlipidemia, Hypertension, Right sided weakness, Sacral decubitus ulcer, and Seizure ([address] This is a 67-year-old male with progressive dementia and spinal stenosis. Has been bedbound for a long time. He has had in the past bouts of kidney failure that did not require long-term resuscitation with dialysis however he was at acute care for decompensation from his large sacral decubitus and acute renal failure that did require renal replacement therapy of hemodialysis. He finishes antibiotics. His sacral wound was slow to heal because of poor oral intake and he was given a PEG tube for nutritional supplementation purposes since his wife wanted to. Patient had been stable till about 48 hours ago. Monday he was noticed to have a large amount of bloody stool. He was started on an intravenous proton pump inhibitor and surgery services were asked to see him. His anticoagulation was held. Patient was being watched and he did not have any more episodes. Yesterday he had an increase in his WBCs without having any localizing symptoms. Infectious disease did see him and started him immediately on broad-spectrum antibiotics. Patient became unresponsive and virtually reportedly evening more towards the early hours of this morning. Fluids were started unfortunately patient could not be resuscitated despite best efforts of the on-call intensive virtual specialist. Patient was sent over to acute care where he was intubated for cardiorespiratory failure.  He was found to be in cardiac arrest.   Appropriate O2 resuscitation resulted in resumption of heart rhythm. Patient was started on pressors moved to the ICU   Imaging reveals a significant pleural effusion and HCAP    Subjective    Still in the ICU. Pressors just weaned off. Still little bit soft on his blood pressure. Less sedation    May 13  Patient appeared more alert. Still hypotensive. Thoracentesis on schedule  May 14  Uneventful night. He was on vent support at night although he did tolerate trials in the day. Centesis pulled off some fluid   exam:  BP (!) 101/55   Pulse 74   Temp 97.3 °F (36.3 °C) (Infrared)   Resp 23   Ht 6' 5\" (1.956 m)   Wt 223 lb 15.8 oz (101.6 kg)   SpO2 98%   BMI 26.56 kg/m²   General appearance: Orally intubated HEENT: eyes are More open     Respiratory: Significantly decreased bibasilar bases   Cardiovascular: Irregular heart rate rhythm   Abdomen: Nontender positive for distention positive for PEG   Musculoskeletal: Some thickening of the skin on his lower extremities.  Dorsalis pedis hard to palpate   skin: Large clean sacral decubitus   neurologic: Sleepy      Medications:  Reviewed    Infusion Medications    sodium chloride      norepinephrine Stopped (05/12/22 1520)    fentaNYL 25 mcg/hr (05/13/22 2204)    midazolam Stopped (05/12/22 0924)    dextrose      sodium chloride Stopped (05/13/22 1631)     Scheduled Medications    polyethylene glycol  17 g Oral Daily    pantoprazole (PROTONIX) 40 mg injection  40 mg IntraVENous Daily    chlorhexidine  15 mL Mouth/Throat BID    senna  5 mL Per G Tube Nightly    docusate  100 mg Per G Tube BID    levothyroxine  175 mcg Per G Tube Daily    insulin glargine  20 Units SubCUTAneous BID    insulin lispro  0-12 Units SubCUTAneous Q6H    levETIRAcetam  500 mg PEG Tube BID    linezolid  600 mg IntraVENous Q12H    meropenem  1,000 mg IntraVENous Q24H    sodium chloride flush  5-40 mL IntraVENous 2 times per day     PRN Meds: sodium chloride, anticoagulant sodium citrate, perflutren lipid microspheres, glucose, dextrose bolus **OR** dextrose bolus, glucagon (rDNA), dextrose, sodium chloride flush, sodium chloride    I/O    Intake/Output Summary (Last 24 hours) at 5/14/2022 0752  Last data filed at 5/14/2022 0554  Gross per 24 hour   Intake 2891.5 ml   Output --   Net 2891.5 ml       Labs:   Recent Labs     05/12/22  0630 05/12/22  0630 05/12/22  1700 05/13/22  0535 05/14/22  0529   WBC 16.0*  --   --  13.1* 10.7   HGB 6.8*   < > 7.5* 7.1* 6.9*   HCT 22.5*   < > 24.1* 23.1* 22.7*     --   --  156 151    < > = values in this interval not displayed. Recent Labs     05/12/22  0630 05/13/22  0535 05/14/22  0529   * 132 132   K 4.5 4.2 4.4   CL 94* 94* 92*   CO2 25 28 27   BUN 76* 50* 71*   CREATININE 2.7* 1.8* 2.2*   CALCIUM 10.0 9.0 9.0   PHOS 2.5 1.5* 3.2       No results for input(s): PROT, ALB, ALKPHOS, ALT, AST, BILITOT, AMYLASE, LIPASE in the last 72 hours. No results for input(s): INR in the last 72 hours. No results for input(s): Margie Morataya in the last 72 hours. Chronic labs:  Lab Results   Component Value Date    TSH 2.700 05/11/2022    INR 1.2 05/11/2022       Radiology:  CT ABDOMEN PELVIS WO CONTRAST Additional Contrast? None    Result Date: 5/11/2022  Bilateral pleural effusions, greater on the left than right, with superimposed consolidation. In the appropriate setting, a pneumonia with associated pleural effusions cannot be excluded. No bowel obstruction, free air or obstructive uropathy. Additional findings involving the abdomen and pelvis, as detailed above. CT HEAD WO CONTRAST    Result Date: 5/11/2022  No acute findings. Chronic changes as above. CT CHEST WO CONTRAST    Result Date: 5/11/2022  Findings suggesting pulmonary edema versus a multifocal pneumonia, with associated bilateral pleural effusions. Generalized mediastinal adenopathy, which may represent reactive changes. This is a nonspecific finding. Differential diagnosis would include infection, inflammation or neoplastic causes. XR CHEST PORTABLE    Result Date: 5/12/2022  1. Partial interval clearing of the lungs when compared with the patient's prior study. 2. Vague patchy right perihilar airspace disease and patchy residual airspace disease within the left lung. 3. Trace right pleural effusion and small left pleural effusion. XR CHEST PORTABLE    Result Date: 5/11/2022  Stable congestive/consolidative changes, with bilateral pleural effusions. No significant change. XR CHEST 1 VIEW    Result Date: 4/27/2022  Bibasilar pleural thickening   ASSESSMENT:    Principal Problem:    Cardiac arrest [facility])  Resolved Problems:    * No resolved hospital problems. *  Sepsis  Respiratory failure  Hypotension  Long-term dialysis  Pleural effusion  Pneumonia     PLAN:    1. Received a dialysis with ultrafiltration and fluid boluses  2. Continue to follow hemoglobin and appropriate transfusion  3. Remains full code per discussion with family  4. Plans for pleural effusion to be tapped to allow for better weaning  5. Glycemic control with insulin  6. Maintain Keppra for seizures  7. Maintaining linezolid and meropenem  May 13  Continue dialysis support with limiting hypotension. Thoracentesis per pulmonary services to allow for ventilator weaning. 2 beats can be started when agreeable with critical care. Glycemic control to be treated with insulin. Antibiotics continue per infectious disease  Being treated for sepsis from healthcare acquired pneumonia and septic shock. .  Remains in controlled A. Fib. Hypothermia appears slightly better. Still with significant sacral decubitus although he has finished treatment. Baseline history with dementia. Prognosis guarded. WifeWants a full code  May 14  Hemoglobin slightly low. Storm stool per nursing staff.   Weaning when okay  Blood pressure still soft  Pressors are being weaned off along with sedation  Antibiotic coverage per infectious disease for sepsis from HCAP   Blood sugars cover with sliding scale  Watch hemoglobin and CBC      Diet: Diet NPO  ADULT TUBE FEEDING; PEG; Immune Enhancing; Continuous; 20; Yes; 20; Q 4 hours; 55; 30; Q 4 hours; Protein; Once daily  Code Status: Full Code  PT/OT Eval Status:   Unable  DVT Prophylaxis:   Resume when okay with critical care and due to procedures and decreased hemoglobin with recent history of bloody stool  Recommended disposition at discharge:   Unsure yet    +++++++++++++++++++++++++++++++++++++++++++++++++  Venus Lennox, MD   [facility].  +++++++++++++++++++++++++++++++++++++++++++++++++  NOTE: This report was transcribed using voice recognition software. Every effort was made to ensure accuracy; however, inadvertent computerized transcription errors may be present.

## 2022-05-14 NOTE — PROGRESS NOTES
Left arm PICC removed and tip sent for culture. Pt cleaned for large liquid BM. Sacral wound cleansed and new dressing applied. Pt turned and repositioned.  Tolerating pressure support well

## 2022-05-14 NOTE — PROGRESS NOTES
Progress Note  5/14/2022 5:34 AM  Subjective:   Admit Date: 5/10/2022  PCP: Emilia Butterfield MD  Per consult note: \"Full consult deferred as pt was being followed longitudinally at [facility]. 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 He was found to be unresponsive at [facility] 5/10/22, EMS was called and pt transferred to [facility] ED. In the ED the pt was found to be pulseless he was resuscitated from a Cardiac arrest and admitted to the MICU\"    5/14: Receiving HD treatment. Remains intubated and sedated. Eyes open but does not interact. Diet: Diet NPO  ADULT TUBE FEEDING; PEG; Immune Enhancing; Continuous; 20; Yes; 20; Q 4 hours; 55; 30; Q 4 hours; Protein; Once daily    Data:   Scheduled Meds:   polyethylene glycol  17 g Oral Daily    pantoprazole (PROTONIX) 40 mg injection  40 mg IntraVENous Daily    chlorhexidine  15 mL Mouth/Throat BID    senna  5 mL Per G Tube Nightly    docusate  100 mg Per G Tube BID    levothyroxine  175 mcg Per G Tube Daily    insulin glargine  20 Units SubCUTAneous BID    insulin lispro  0-12 Units SubCUTAneous Q6H    levETIRAcetam  500 mg PEG Tube BID    linezolid  600 mg IntraVENous Q12H    meropenem  1,000 mg IntraVENous Q24H    sodium chloride flush  5-40 mL IntraVENous 2 times per day     Continuous Infusions:   sodium chloride      norepinephrine Stopped (05/12/22 1520)    fentaNYL 25 mcg/hr (05/13/22 2204)    midazolam Stopped (05/12/22 0924)    dextrose      sodium chloride Stopped (05/13/22 1631)     PRN Meds:sodium chloride, anticoagulant sodium citrate, perflutren lipid microspheres, glucose, dextrose bolus **OR** dextrose bolus, glucagon (rDNA), dextrose, sodium chloride flush, sodium chloride  I/O last 3 completed shifts: In: 4314.8 [I.V.:341.3; Blood:350; NG/GT:1846; IV Piggyback:1477.6]  Out: 1200   No intake/output data recorded.     Intake/Output Summary (Last 24 hours) at 5/14/2022 0534  Last data filed at 5/13/2022 1800  Gross per 24 hour Intake 2452.67 ml   Output --   Net 2452.67 ml     CBC:   Recent Labs     05/11/22  0830 05/11/22  0830 05/12/22  0630 05/12/22  1700 05/13/22  0535   WBC 21.5*  --  16.0*  --  13.1*   HGB 7.8*   < > 6.8* 7.5* 7.1*     --  217  --  156    < > = values in this interval not displayed. BMP:    Recent Labs     05/11/22  0830 05/12/22  0630 05/13/22  0535    130* 132   K 4.1 4.5 4.2   CL 95* 94* 94*   CO2 27 25 28   BUN 65* 76* 50*   CREATININE 2.4* 2.7* 1.8*   GLUCOSE 254* 130* 219*     Hepatic:   No results for input(s): AST, ALT, ALB, BILITOT, ALKPHOS in the last 72 hours. Troponin: No results for input(s): TROPONINI in the last 72 hours. BNP: No results for input(s): BNP in the last 72 hours. Lipids: No results for input(s): CHOL, HDL in the last 72 hours. Invalid input(s): LDLCALCU  ABGs: No results found for: PHART, PO2ART, HIY1OOF  INR:   No results for input(s): INR in the last 72 hours. -----------------------------------------------------------------  RAD:   EXAMINATION:   ONE XRAY VIEW OF THE CHEST       5/11/2022 12:38 am       COMPARISON:   None.       HISTORY:   ORDERING SYSTEM PROVIDED HISTORY: ET tube placement   TECHNOLOGIST PROVIDED HISTORY:   Reason for exam:->ET tube placement       FINDINGS:   Lines and tubes are unchanged.  Cardiomediastinal contours are stable. Persistent central pulmonary vascular congestion.       Diffuse bilateral parenchymal and interstitial opacities, in addition to   bilateral pleural effusions, stable.       No pneumothorax.  No acute osseous abnormality.           Impression   Stable congestive/consolidative changes, with bilateral pleural effusions. No significant change.          Objective:   Vitals: /61   Pulse 77   Temp 97.3 °F (36.3 °C) (Infrared)   Resp 17   Ht 6' 5\" (1.956 m)   Wt 217 lb 9.5 oz (98.7 kg)   SpO2 97%   BMI 25.80 kg/m²     General appearance: Intubated on vent  Skin: No rashes or lesions on exposed skin  Neck: No JVD, RIJ TDC  Lungs: Coarse upper, diminished lower  Heart: irreg., no rub  Abdomen: Soft, + bowel sounds, +PEG  Extremities: Trace edema BlE  Neurologic: opens eyes to stimuli, no tracking        Assessment:   Patient Active Problem List:     Cardiac arrest ([address]    Plan:   1. 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 IHD today, UF set for 1.5L fluid removal  2. Follow Labs    2. Anemia in the TRUMAN-with episodes GI loss at [facility]  HgB 6.8-->7.5-->7.1 S/P 1 unit PRBC 5/12/22-->  PLAN:  1. Follow H/H  2. Continue SHANIQUA  3. Transfuse  for hgb <7  4. Receiving one unit pRBCs today    3. Sec HPTH of Renal Origin with Hypercalcemia-Hypercalcemia resolved with Hypophosphatemia  PO4  1.5-->3.2  Ca++ 9.0  PLAN:  1. Control Ca++ with IHD  2. IV supplement the PO4 as needed    4-S/P Cardio-Pulm Arrest-remains intubated  S/P thoracentesis 450ml 5/13/22  PLAN:  1. Defer to [facility] and Cardiology    5- Sepsis  BC -(+) Staph epi-methicillin resistant  PLAN:  1.  Plan as per LILY Hammer - [facility]     Pt seen and examined agree with above  For hd today  Remains anuric  Angely Latif MD

## 2022-05-14 NOTE — PROGRESS NOTES
[address] [place] [address] [facility]  Progress Note    SUBJECTIVE:  Chief Complaint   Patient presents with    Loss of Consciousness     arrived from [facility] via [facility] em[facility] unresponsive     Patient is in the ICU. Here is intubated and sedated fentanyl  FiO2 30%  Tolerating antibiotics. Afebrile. No vasopressors. Opens eyes  S/p thoracentesis on the left, serous.-Cultures negative      Review of systems:  A 10 point review of systems was done. As stated above, otherwise negative. Medications:   polyethylene glycol  17 g Oral Daily    pantoprazole (PROTONIX) 40 mg injection  40 mg IntraVENous Daily    chlorhexidine  15 mL Mouth/Throat BID    senna  5 mL Per G Tube Nightly    docusate  100 mg Per G Tube BID    levothyroxine  175 mcg Per G Tube Daily    insulin glargine  20 Units SubCUTAneous BID    insulin lispro  0-12 Units SubCUTAneous Q6H    levETIRAcetam  500 mg PEG Tube BID    linezolid  600 mg IntraVENous Q12H    meropenem  1,000 mg IntraVENous Q24H    sodium chloride flush  5-40 mL IntraVENous 2 times per day      sodium chloride      sodium chloride      norepinephrine Stopped (22 1520)    fentaNYL 25 mcg/hr (22 2204)    midazolam Stopped (22 0924)    dextrose      sodium chloride Stopped (22 1631)     sodium chloride, sodium chloride, anticoagulant sodium citrate, perflutren lipid microspheres, glucose, dextrose bolus **OR** dextrose bolus, glucagon (rDNA), dextrose, sodium chloride flush, sodium chloride    OBJECTIVE:  /69   Pulse 83   Temp 97.3 °F (36.3 °C)   Resp (!) 41   Ht 6' 5\" (1.956 m)   Wt 223 lb 15.8 oz (101.6 kg)   SpO2 92%   BMI 26.56 kg/m²   Temp  Av.6 °F (36.4 °C)  Min: 97.3 °F (36.3 °C)  Max: 98.2 °F (36.8 °C)  Constitutional: The patient is sedated, on ventilator. FiO2 35%. PEEP 8. Skin: Warm and dry. No rashes were noted. HEENT: Eyes show round, and reactive pupils. No jaundice.  Moist mucous membranes, no ulcerations, no thrush. ETT. OGT  Neck: Supple to movements. No lymphadenopathy. Chest: No use of accessory muscles to breathe. Symmetrical expansion. Auscultation reveals coarse breath sounds. scattered rhonchi. Right tunneled HD catheter. Cardiovascular: Heart sounds arrhythmic and irregular. No murmurs appreciated. Abdomen: Positive bowel sounds to auscultation. Benign to palpation. No masses felt. No hepatosplenomegaly. Extremities: No clubbing, no cyanosis, no edema. Back: Stage IV decubitus ulcer with dressing. Lines: Left PICC ([facility])  Right femoral TLC 5/11/2022.     Laboratory and Tests Review:  Lab Results   Component Value Date    WBC 10.7 05/14/2022    WBC 13.1 (H) 05/13/2022    WBC 16.0 (H) 05/12/2022    HGB 6.9 (LL) 05/14/2022    HCT 22.7 (L) 05/14/2022    MCV 93.4 05/14/2022     05/14/2022     Lab Results   Component Value Date    NEUTROABS 9.31 (H) 05/14/2022    NEUTROABS 12.71 (H) 05/13/2022    NEUTROABS 14.56 (H) 05/12/2022     Lab Results   Component Value Date    CRP 13.9 (H) 05/11/2022     No results found for: CRPHS  Lab Results   Component Value Date    SEDRATE 78 (H) 05/11/2022     Lab Results   Component Value Date    ALT 30 05/11/2022    AST 31 05/11/2022    ALKPHOS 591 (H) 05/11/2022    BILITOT 0.3 05/11/2022     Lab Results   Component Value Date     05/14/2022    K 4.4 05/14/2022    K 4.5 05/11/2022    CL 92 05/14/2022    CO2 27 05/14/2022    BUN 71 05/14/2022    CREATININE 2.2 05/14/2022    CREATININE 1.8 05/13/2022    CREATININE 2.7 05/12/2022    GFRAA 36 05/14/2022    LABGLOM 30 05/14/2022    GLUCOSE 241 05/14/2022    PROT 6.9 05/11/2022    LABALBU 2.4 05/11/2022    CALCIUM 9.0 05/14/2022    BILITOT 0.3 05/11/2022    ALKPHOS 591 05/11/2022    AST 31 05/11/2022    ALT 30 05/11/2022     Radiology:    Reviewed  Microbiology:   [facility]:  Blood culture 5/10/2022: Negative so far  Decubitus ulcer 5/10/2022: MRSA, mixed GNR  Respiratory panel: Pending  Nares screen MRSA: Pending  Blood cultures 5/11/2022: Negative so far      [facility]:  Respiratory panel: Negative  Blood cultures 5/11/2022: Gram-positive cocci in clusters-staph epidermidis methicillin-resistant 2 out of 2 sets  Nares screen MRSA: Positive   resp culture 5/12/22-gram-negative's    ASSESSMENT:  · HCAP. Respiratory cultures were not sent  · Sepsis with septic shock associated to HCAP  · Status postcardiac arrest  · Acute respiratory failure  · Leukocytosis secondary to HCAP- improving  · Hypothermia, improved  · History of recent sacral decubitus ulcer infection with Pseudomonas and Enterococcus.   Treated with IV antibiotic between March and April 2022  · CKD, on HD    PLAN:  · Continue Linezolid and Meropenem, day 5  · Left thoracentesis-cultures negative  · follow respiratory cultures and pleural fluid  · Remove the PICC line from [facility] culture the tip  · Discussed with critical care  · We will follow with you        Stacey Navas MD  1:11 PM  5/14/2022

## 2022-05-15 ENCOUNTER — APPOINTMENT (OUTPATIENT)
Dept: CT IMAGING | Age: 67
DRG: 003 | End: 2022-05-15
Payer: MEDICARE

## 2022-05-15 ENCOUNTER — APPOINTMENT (OUTPATIENT)
Dept: GENERAL RADIOLOGY | Age: 67
DRG: 003 | End: 2022-05-15
Payer: MEDICARE

## 2022-05-15 LAB
ANION GAP SERPL CALCULATED.3IONS-SCNC: 10 MMOL/L (ref 7–16)
ANISOCYTOSIS: ABNORMAL
BASOPHILIC STIPPLING: ABNORMAL
BASOPHILS ABSOLUTE: 0.01 E9/L (ref 0–0.2)
BASOPHILS RELATIVE PERCENT: 0.1 % (ref 0–2)
BUN BLDV-MCNC: 52 MG/DL (ref 6–23)
CALCIUM SERPL-MCNC: 8.9 MG/DL (ref 8.6–10.2)
CHLORIDE BLD-SCNC: 91 MMOL/L (ref 98–107)
CO2: 31 MMOL/L (ref 22–29)
CREAT SERPL-MCNC: 1.7 MG/DL (ref 0.7–1.2)
EOSINOPHILS ABSOLUTE: 0.09 E9/L (ref 0.05–0.5)
EOSINOPHILS RELATIVE PERCENT: 0.9 % (ref 0–6)
GFR AFRICAN AMERICAN: 49
GFR NON-AFRICAN AMERICAN: 40 ML/MIN/1.73
GLUCOSE BLD-MCNC: 183 MG/DL (ref 74–99)
HCT VFR BLD CALC: 24.8 % (ref 37–54)
HEMOGLOBIN: 7.6 G/DL (ref 12.5–16.5)
HYPOCHROMIA: ABNORMAL
IMMATURE GRANULOCYTES #: 0.23 E9/L
IMMATURE GRANULOCYTES %: 2.3 % (ref 0–5)
LYMPHOCYTES ABSOLUTE: 0.37 E9/L (ref 1.5–4)
LYMPHOCYTES RELATIVE PERCENT: 3.7 % (ref 20–42)
MAGNESIUM: 2.2 MG/DL (ref 1.6–2.6)
MCH RBC QN AUTO: 26.6 PG (ref 26–35)
MCHC RBC AUTO-ENTMCNC: 30.6 % (ref 32–34.5)
MCV RBC AUTO: 86.7 FL (ref 80–99.9)
METER GLUCOSE: 172 MG/DL (ref 74–99)
METER GLUCOSE: 179 MG/DL (ref 74–99)
METER GLUCOSE: 190 MG/DL (ref 74–99)
METER GLUCOSE: 210 MG/DL (ref 74–99)
METER GLUCOSE: 220 MG/DL (ref 74–99)
MONOCYTES ABSOLUTE: 0.7 E9/L (ref 0.1–0.95)
MONOCYTES RELATIVE PERCENT: 7 % (ref 2–12)
NEUTROPHILS ABSOLUTE: 8.62 E9/L (ref 1.8–7.3)
NEUTROPHILS RELATIVE PERCENT: 86 % (ref 43–80)
OVALOCYTES: ABNORMAL
PDW BLD-RTO: 22 FL (ref 11.5–15)
PHOSPHORUS: 2.2 MG/DL (ref 2.5–4.5)
PLATELET # BLD: 130 E9/L (ref 130–450)
PMV BLD AUTO: 9.4 FL (ref 7–12)
POIKILOCYTES: ABNORMAL
POLYCHROMASIA: ABNORMAL
POTASSIUM SERPL-SCNC: 4 MMOL/L (ref 3.5–5)
RBC # BLD: 2.86 E12/L (ref 3.8–5.8)
SODIUM BLD-SCNC: 132 MMOL/L (ref 132–146)
TARGET CELLS: ABNORMAL
WBC # BLD: 10 E9/L (ref 4.5–11.5)

## 2022-05-15 PROCEDURE — 6370000000 HC RX 637 (ALT 250 FOR IP): Performed by: INTERNAL MEDICINE

## 2022-05-15 PROCEDURE — 71045 X-RAY EXAM CHEST 1 VIEW: CPT

## 2022-05-15 PROCEDURE — 6360000002 HC RX W HCPCS: Performed by: STUDENT IN AN ORGANIZED HEALTH CARE EDUCATION/TRAINING PROGRAM

## 2022-05-15 PROCEDURE — 85025 COMPLETE CBC W/AUTO DIFF WBC: CPT

## 2022-05-15 PROCEDURE — 83735 ASSAY OF MAGNESIUM: CPT

## 2022-05-15 PROCEDURE — 36592 COLLECT BLOOD FROM PICC: CPT

## 2022-05-15 PROCEDURE — 80048 BASIC METABOLIC PNL TOTAL CA: CPT

## 2022-05-15 PROCEDURE — 2580000003 HC RX 258: Performed by: CLINICAL NURSE SPECIALIST

## 2022-05-15 PROCEDURE — 6360000002 HC RX W HCPCS: Performed by: SPECIALIST

## 2022-05-15 PROCEDURE — 36600 WITHDRAWAL OF ARTERIAL BLOOD: CPT

## 2022-05-15 PROCEDURE — 6370000000 HC RX 637 (ALT 250 FOR IP): Performed by: STUDENT IN AN ORGANIZED HEALTH CARE EDUCATION/TRAINING PROGRAM

## 2022-05-15 PROCEDURE — 6370000000 HC RX 637 (ALT 250 FOR IP): Performed by: SPECIALIST

## 2022-05-15 PROCEDURE — 2000000000 HC ICU R&B

## 2022-05-15 PROCEDURE — 36415 COLL VENOUS BLD VENIPUNCTURE: CPT

## 2022-05-15 PROCEDURE — 2500000003 HC RX 250 WO HCPCS: Performed by: STUDENT IN AN ORGANIZED HEALTH CARE EDUCATION/TRAINING PROGRAM

## 2022-05-15 PROCEDURE — 71250 CT THORAX DX C-: CPT

## 2022-05-15 PROCEDURE — 2580000003 HC RX 258: Performed by: INTERNAL MEDICINE

## 2022-05-15 PROCEDURE — 2580000003 HC RX 258: Performed by: STUDENT IN AN ORGANIZED HEALTH CARE EDUCATION/TRAINING PROGRAM

## 2022-05-15 PROCEDURE — C9113 INJ PANTOPRAZOLE SODIUM, VIA: HCPCS | Performed by: STUDENT IN AN ORGANIZED HEALTH CARE EDUCATION/TRAINING PROGRAM

## 2022-05-15 PROCEDURE — 82962 GLUCOSE BLOOD TEST: CPT

## 2022-05-15 PROCEDURE — 2500000003 HC RX 250 WO HCPCS: Performed by: CLINICAL NURSE SPECIALIST

## 2022-05-15 PROCEDURE — 2580000003 HC RX 258: Performed by: SPECIALIST

## 2022-05-15 PROCEDURE — 84100 ASSAY OF PHOSPHORUS: CPT

## 2022-05-15 PROCEDURE — 94003 VENT MGMT INPAT SUBQ DAY: CPT

## 2022-05-15 PROCEDURE — 94640 AIRWAY INHALATION TREATMENT: CPT

## 2022-05-15 RX ORDER — SULFAMETHOXAZOLE AND TRIMETHOPRIM 800; 160 MG/1; MG/1
2 TABLET ORAL DAILY
Status: DISCONTINUED | OUTPATIENT
Start: 2022-05-15 | End: 2022-05-26 | Stop reason: HOSPADM

## 2022-05-15 RX ORDER — CARVEDILOL 3.12 MG/1
3.12 TABLET ORAL 2 TIMES DAILY
Status: DISCONTINUED | OUTPATIENT
Start: 2022-05-15 | End: 2022-05-16

## 2022-05-15 RX ADMIN — SODIUM PHOSPHATE, MONOBASIC, MONOHYDRATE AND SODIUM PHOSPHATE, DIBASIC, ANHYDROUS 20 MMOL: 276; 142 INJECTION, SOLUTION INTRAVENOUS at 10:44

## 2022-05-15 RX ADMIN — ACETYLCYSTEINE 400 MG: 100 INHALANT RESPIRATORY (INHALATION) at 16:27

## 2022-05-15 RX ADMIN — INSULIN LISPRO 2 UNITS: 100 INJECTION, SOLUTION INTRAVENOUS; SUBCUTANEOUS at 05:59

## 2022-05-15 RX ADMIN — POLYETHYLENE GLYCOL 3350 17 G: 17 POWDER, FOR SOLUTION ORAL at 08:16

## 2022-05-15 RX ADMIN — IPRATROPIUM BROMIDE AND ALBUTEROL SULFATE 1 AMPULE: .5; 2.5 SOLUTION RESPIRATORY (INHALATION) at 08:15

## 2022-05-15 RX ADMIN — IPRATROPIUM BROMIDE AND ALBUTEROL SULFATE 1 AMPULE: .5; 2.5 SOLUTION RESPIRATORY (INHALATION) at 03:37

## 2022-05-15 RX ADMIN — DOCUSATE SODIUM LIQUID 100 MG: 100 LIQUID ORAL at 21:21

## 2022-05-15 RX ADMIN — INSULIN GLARGINE 20 UNITS: 100 INJECTION, SOLUTION SUBCUTANEOUS at 21:21

## 2022-05-15 RX ADMIN — DOCUSATE SODIUM LIQUID 100 MG: 100 LIQUID ORAL at 08:15

## 2022-05-15 RX ADMIN — SULFAMETHOXAZOLE AND TRIMETHOPRIM 2 TABLET: 800; 160 TABLET ORAL at 17:50

## 2022-05-15 RX ADMIN — CARVEDILOL 3.12 MG: 3.12 TABLET, FILM COATED ORAL at 21:22

## 2022-05-15 RX ADMIN — SODIUM CHLORIDE, PRESERVATIVE FREE 40 MG: 5 INJECTION INTRAVENOUS at 10:42

## 2022-05-15 RX ADMIN — MUPIROCIN: 20 OINTMENT TOPICAL at 14:08

## 2022-05-15 RX ADMIN — SODIUM CHLORIDE 100 MG: 9 INJECTION, SOLUTION INTRAVENOUS at 16:51

## 2022-05-15 RX ADMIN — CHLORHEXIDINE GLUCONATE 0.12% ORAL RINSE 15 ML: 1.2 LIQUID ORAL at 21:42

## 2022-05-15 RX ADMIN — Medication 50 MCG/HR: at 18:45

## 2022-05-15 RX ADMIN — IPRATROPIUM BROMIDE AND ALBUTEROL SULFATE 1 AMPULE: .5; 2.5 SOLUTION RESPIRATORY (INHALATION) at 11:39

## 2022-05-15 RX ADMIN — INSULIN LISPRO 4 UNITS: 100 INJECTION, SOLUTION INTRAVENOUS; SUBCUTANEOUS at 19:17

## 2022-05-15 RX ADMIN — ACETYLCYSTEINE 400 MG: 100 INHALANT RESPIRATORY (INHALATION) at 03:37

## 2022-05-15 RX ADMIN — AMIKACIN SULFATE 750 MG: 250 INJECTION, SOLUTION INTRAMUSCULAR; INTRAVENOUS at 15:55

## 2022-05-15 RX ADMIN — MUPIROCIN: 20 OINTMENT TOPICAL at 21:21

## 2022-05-15 RX ADMIN — IPRATROPIUM BROMIDE AND ALBUTEROL SULFATE 1 AMPULE: .5; 2.5 SOLUTION RESPIRATORY (INHALATION) at 00:03

## 2022-05-15 RX ADMIN — ACETYLCYSTEINE 400 MG: 100 INHALANT RESPIRATORY (INHALATION) at 21:11

## 2022-05-15 RX ADMIN — INSULIN LISPRO 2 UNITS: 100 INJECTION, SOLUTION INTRAVENOUS; SUBCUTANEOUS at 23:36

## 2022-05-15 RX ADMIN — IPRATROPIUM BROMIDE AND ALBUTEROL SULFATE 1 AMPULE: .5; 2.5 SOLUTION RESPIRATORY (INHALATION) at 21:11

## 2022-05-15 RX ADMIN — CHLORHEXIDINE GLUCONATE 0.12% ORAL RINSE 15 ML: 1.2 LIQUID ORAL at 08:15

## 2022-05-15 RX ADMIN — IPRATROPIUM BROMIDE AND ALBUTEROL SULFATE 1 AMPULE: .5; 2.5 SOLUTION RESPIRATORY (INHALATION) at 16:27

## 2022-05-15 RX ADMIN — LEVOTHYROXINE SODIUM 175 MCG: 125 TABLET ORAL at 06:55

## 2022-05-15 RX ADMIN — Medication 10 ML: at 21:44

## 2022-05-15 RX ADMIN — LEVETIRACETAM 500 MG: 100 SOLUTION ORAL at 21:21

## 2022-05-15 RX ADMIN — ACETYLCYSTEINE 400 MG: 100 INHALANT RESPIRATORY (INHALATION) at 11:40

## 2022-05-15 RX ADMIN — Medication 10 ML: at 08:16

## 2022-05-15 RX ADMIN — SENNOSIDES 8.8 MG: 8.8 SYRUP ORAL at 21:22

## 2022-05-15 RX ADMIN — ACETYLCYSTEINE 400 MG: 100 INHALANT RESPIRATORY (INHALATION) at 00:03

## 2022-05-15 RX ADMIN — INSULIN LISPRO 4 UNITS: 100 INJECTION, SOLUTION INTRAVENOUS; SUBCUTANEOUS at 13:00

## 2022-05-15 RX ADMIN — INSULIN GLARGINE 20 UNITS: 100 INJECTION, SOLUTION SUBCUTANEOUS at 10:38

## 2022-05-15 RX ADMIN — CARVEDILOL 3.12 MG: 3.12 TABLET, FILM COATED ORAL at 10:42

## 2022-05-15 RX ADMIN — LINEZOLID 600 MG: 600 INJECTION, SOLUTION INTRAVENOUS at 13:00

## 2022-05-15 RX ADMIN — ACETYLCYSTEINE 400 MG: 100 INHALANT RESPIRATORY (INHALATION) at 08:15

## 2022-05-15 RX ADMIN — LEVETIRACETAM 500 MG: 100 SOLUTION ORAL at 08:15

## 2022-05-15 ASSESSMENT — PULMONARY FUNCTION TESTS
PIF_VALUE: 19
PIF_VALUE: 20
PIF_VALUE: 19
PIF_VALUE: 19
PIF_VALUE: 26
PIF_VALUE: 28
PIF_VALUE: 24
PIF_VALUE: 19
PIF_VALUE: 29
PIF_VALUE: 26
PIF_VALUE: 28
PIF_VALUE: 19
PIF_VALUE: 19
PIF_VALUE: 28
PIF_VALUE: 19
PIF_VALUE: 29
PIF_VALUE: 29
PIF_VALUE: 19
PIF_VALUE: 19
PIF_VALUE: 30
PIF_VALUE: 27
PIF_VALUE: 29
PIF_VALUE: 23
PIF_VALUE: 29

## 2022-05-15 ASSESSMENT — PAIN SCALES - GENERAL
PAINLEVEL_OUTOF10: 2
PAINLEVEL_OUTOF10: 0
PAINLEVEL_OUTOF10: 0

## 2022-05-15 NOTE — PLAN OF CARE
Problem: Respiratory - Adult  Goal: Achieves optimal ventilation and oxygenation  5/15/2022 0245 by Binu Phillips RCP  Outcome: Progressing  5/15/2022 0244 by Binu Phillips RCP  Outcome: Progressing  Flowsheets (Taken 5/15/2022 0244)  Achieves optimal ventilation and oxygenation:   Assess for changes in respiratory status   Position to facilitate oxygenation and minimize respiratory effort   Oxygen supplementation based on oxygen saturation or arterial blood gases   Assess the need for suctioning and aspirate as needed   Respiratory therapy support as indicated      Pt weaning on ps during day as tolerated

## 2022-05-15 NOTE — PLAN OF CARE
Problem: Respiratory - Adult  Goal: Achieves optimal ventilation and oxygenation  5/15/2022 1224 by Torrie Poe RCP  Outcome: Progressing     Problem: Respiratory - Adult  Goal: Achieves optimal ventilation and oxygenation  5/15/2022 1224 by Torrie Poe RCP  Flowsheets  Taken 5/15/2022 1224 by Torrie Poe RCP  Achieves optimal ventilation and oxygenation:   Assess for changes in respiratory status   Position to facilitate oxygenation and minimize respiratory effort   Assess the need for suctioning and aspirate as needed   Respiratory therapy support as indicated

## 2022-05-15 NOTE — PATIENT CARE CONFERENCE
Intensive Care Daily Quality Rounding Checklist        ICU Team Members: Dr. Radha Ireland, resident, bedside nurse, charge nurse     ICU Day #: 5     Intubation Date: 5/11/2022     Ventilator Day #: 5     Central Line Insertion Date: 5/11/22                                                    Day #: 5      Arterial Line Insertion Date: n/a                             Day #: n/a     Temporary Hemodialysis Catheter Insertion Date: n/a                             Day # admitted with tunneled dialysis cath     DVT Prophylaxis: Eliquis on hold for anemia/ SCD's    GI Prophylaxis: Protonix     Roberts Catheter Insertion Date: HD patient                                        MLJ #:                              Continued need (if yes, reason documented and discussed with physician):     Skin Issues/ Wounds and ordered treatment discussed on rounds:      Goals/ Plans for the Day: Daily labs and replace/transfuse as needed. Wean vent as able. Wean sedation as able. HD per nephrology. Resume beta blocker. Continue critical care management.

## 2022-05-15 NOTE — PROGRESS NOTES
Critical Care Team - Daily Progress Note      Date and time: 5/15/2022 6:22 AM  Patient's name:  Karon Echevarria  Medical Record Number: [id number]  Patient's account/billing number: [de-identified]  Patient's YOB: 1955  Age: 74 y.o. Date of Admission: 5/10/2022 11:58 PM  Length of stay during current admission: 4      Primary Care Physician: Otilio Steele MD  ICU Attending Physician: Dr. Olmedo Sink    Code Status: Full Code    Reason for ICU admission: Status postcardiac arrest      SUBJECTIVE:     OVERNIGHT EVENTS:       Patient awake today. Patient remained afebrile, had episodes of tachycardia. Patient remains intubated, sleeping but easily able to awake. Patient able to nod and deny any headaches, chest pain, abdominal pain, fever or chills. Intake/Output:   I/O this shift:  In: 30 [NG/GT:30]  Out: -   I/O last 3 completed shifts: In: 5073.5 [I.V.:360.6; Blood:582.5; NG/GT:1832; IV Piggyback:1498.4]  Out: 2400     Awake and following commands: No  Current Ventilation: - Ventilator Settings:    Vt (Set, mL): 400 mL  Resp Rate (Set): 18 bmp  FiO2 : 30 %    PEEP/CPAP (cmH2O): 6  Pressure Support: 0 cmH20  Secretions: Thick, yellow secretions, blood  Sedation: fentanyl  Paralyzed: No  Vasopressors: No    Initial HPI + past overnight events: 77-year-old male resident of [facility] with significant past medical history of A. fib on Eliquis, aspirin, anxiety, anemia, CHF, insulin-dependent type 2 diabetes, CKD on hemodialysis, sacral decubitus ulcers with wound VAC, hyperlipidemia, hypothyroidism. Patient presented to the ICU after having a cardiac arrest, required CPR 2 rounds of epinephrine, and intubated.     OBJECTIVE:     VITAL SIGNS:  /71   Pulse 80   Temp 98.5 °F (36.9 °C)   Resp 21   Ht 6' 5\" (1.956 m)   Wt 220 lb 10.9 oz (100.1 kg)   SpO2 93%   BMI 26.17 kg/m²   Tmax over 24 hours:  Temp (24hrs), Av.8 °F (36.6 °C), Min:97.3 °F (36.3 °C), Max:98.5 °F (36.9 °C)      Patient Vitals for the past 6 hrs:   BP Temp Pulse Resp SpO2   05/15/22 0600 113/71 -- 80 21 93 %   05/15/22 0500 117/64 -- 82 18 94 %   05/15/22 0400 112/62 98.5 °F (36.9 °C) 70 17 93 %   05/15/22 0338 -- -- -- 25 94 %   05/15/22 0337 -- -- -- (!) 34 94 %   05/15/22 0335 -- -- 85 22 94 %   05/15/22 0300 125/64 -- 72 17 94 %   05/15/22 0200 107/67 -- 82 18 94 %   05/15/22 0100 120/62 -- 82 18 94 %         Intake/Output Summary (Last 24 hours) at 5/15/2022 0622  Last data filed at 5/14/2022 2000  Gross per 24 hour   Intake 2211.97 ml   Output 2400 ml   Net -188.03 ml     Wt Readings from Last 2 Encounters:   05/14/22 220 lb 10.9 oz (100.1 kg)   04/27/22 212 lb (96.2 kg)     Body mass index is 26.17 kg/m². Physical Exam  Vitals and nursing note reviewed. Constitutional:       General: He is awake. Appearance: He is ill-appearing. Comments:  intubated   HENT:      Head: Normocephalic and atraumatic. Nose: Nose normal. No congestion or rhinorrhea. Mouth/Throat:      Mouth: Mucous membranes are moist.      Pharynx: Oropharynx is clear. Eyes:      General: No scleral icterus. Extraocular Movements: Extraocular movements intact. Conjunctiva/sclera: Conjunctivae normal.   Cardiovascular:      Rate and Rhythm: Normal rate and regular rhythm. Pulses: Normal pulses. Heart sounds: Normal heart sounds. No murmur heard. Pulmonary:      Breath sounds: No stridor. Rhonchi present. Comments: intubated  Abdominal:      General: Bowel sounds are normal. There is no distension. Palpations: Abdomen is soft. There is no mass. Tenderness: There is no abdominal tenderness. There is no guarding or rebound. Comments: PEG tube   Musculoskeletal:         General: Normal range of motion. Cervical back: Normal range of motion and neck supple. No tenderness. Right lower leg: Edema present. Left lower leg: Edema present.    Skin:     Findings: Lesion (Sacral decubitus) present. Comments: Chronic changes -Dark-colored lower legs b/l  Feet are cold to touch    Neurological:      Mental Status: He is alert. Cranial Nerves: No cranial nerve deficit. Sensory: No sensory deficit. Comments: Patient awake and alert, orientated to name. Not sure about location and season. Psychiatric:         Attention and Perception: Attention normal.         Behavior: Behavior is cooperative.            MEDICATIONS:    Scheduled Meds:   acetylcysteine  4 mL Inhalation Q4H    ipratropium-albuterol  1 ampule Inhalation Q4H    polyethylene glycol  17 g Oral Daily    pantoprazole (PROTONIX) 40 mg injection  40 mg IntraVENous Daily    chlorhexidine  15 mL Mouth/Throat BID    senna  5 mL Per G Tube Nightly    docusate  100 mg Per G Tube BID    levothyroxine  175 mcg Per G Tube Daily    insulin glargine  20 Units SubCUTAneous BID    insulin lispro  0-12 Units SubCUTAneous Q6H    levETIRAcetam  500 mg PEG Tube BID    linezolid  600 mg IntraVENous Q12H    meropenem  1,000 mg IntraVENous Q24H    sodium chloride flush  5-40 mL IntraVENous 2 times per day     Continuous Infusions:   sodium chloride      sodium chloride      norepinephrine Stopped (05/12/22 1520)    fentaNYL 25 mcg/hr (05/14/22 1807)    midazolam Stopped (05/12/22 0924)    dextrose      sodium chloride Stopped (05/13/22 1631)     PRN Meds:   sodium chloride, , PRN  sodium chloride, , PRN  anticoagulant sodium citrate, 4.2 mL, PRN  perflutren lipid microspheres, 1.5 mL, ONCE PRN  glucose, 4 tablet, PRN  dextrose bolus, 125 mL, PRN   Or  dextrose bolus, 250 mL, PRN  glucagon (rDNA), 1 mg, PRN  dextrose, 100 mL/hr, PRN  sodium chloride flush, 5-40 mL, PRN  sodium chloride, , PRN          VENT SETTINGS (Comprehensive) (if applicable):  Vent Information  Ventilator ID: [id number]  Vent Mode: AC/VC  Ventilator Initiate: Yes  Additional Respiratory Assessments  Pulse: 80  Resp: 21  SpO2: 93 %  End Tidal CO2: 42 intubated   History of seizures on Keppra    Respiratory   Acute hypercapnic respiratory failure with hypoxia   Intubated 5/10/2022   Thoracentesis completed 5/15/2022 - 450-cytology, protein 3.6 , , glucose 53, culture-positive for staph epidermidis and staph species and gram negative   MRSA nasal colonization positive - start Bactroban    Pneumonia    Cardiovascular   History of A. fib on Eliquis   EKG concerning for heart block.  Elevated troponin most likely secondary to CKD   Start coreg 3.125    GI   History of a PEG tube   Concern for GI bleed    Renal   CKD on dialysis Monday Wednesday Friday  [facility] following    Infectious disease   Sepsis with septic shock   decubitus ulcer-surgery, wound care following   Linezolid and Merrem day 5   Abx per ID     Heme/Onc   PRBC received X2, 05/12/2022, 05/14/2022    trend H&H, transfuse if <7.   Surgery following    Endocrine   Type 2 diabetes on Lantus 20 units,MDSS   Hypothyroidism on synthroid    Social/Spiritual/DNR/Other   Code status: Full  Diet Diet NPO   ADULT TUBE FEEDING; PEG; Immune Enhancing; Continuous; 20; Yes; 20; Q 4 hours; 55; 30; Q 4 hours; Protein; Once daily   Stress ulcer prophylaxis: protonix 40mg   DVT prophylaxis: SCDS    Consultations needed: Yes   Transfer out of ICU today? No    Other: monitor off the fentanyl, Ativan PRN        Lines/catheters   Date 05/10  o ETT  o CVC triple lumen R femoral vein   o Urinary cath   Tunneled HD cath right subclavian 4 days      Rita Mata MD  6:22 AM  05/15/22    I personally saw, examined and provided care for the patient. Radiographs, labs and medication list were reviewed by me independently. Review of Residents documentation was conducted and revisions were made as appropriate. I agree with the above documented exam, problem list and plan of care. SBT as tolerated. Pleural fluid does show gram-negative rad as well as in the sputum.   Check CT chest to evaluate for loculated effusion or empyema.   Move CVC in next 24 hr.    CCT excluding procedures 38 minutes    Amalia Curl, DO

## 2022-05-15 NOTE — PROGRESS NOTES
RT makes two attempts to obtain ABG, unsuccessful. Dr. Raheem Rose notified of several failed attempts to obtain ABG by previous shift RN and RT this morning. Other labs and patient evaluated by Dr. Raheem Rose. Per Dr. Raheem Rose, ABG can be cancelled for today.

## 2022-05-15 NOTE — PROGRESS NOTES
[address] [place] [address] [facility]  [facility]UC[facility]  Progress Note    SUBJECTIVE:  Chief Complaint   Patient presents with    Loss of Consciousness     arrived from [facility] via [facility] unresponsive     Patient is in the ICU. Here is intubated and sedated fentanyl  FiO2 30%  Tolerating antibiotics. Afebrile. No vasopressors. Opens eyes  S/p thoracentesis on the left, serous.-Cultures negative      Review of systems:  A 10 point review of systems was done. As stated above, otherwise negative.     Medications:   sodium phosphate IVPB  20 mmol IntraVENous Once    mupirocin   Topical BID    carvedilol  3.125 mg Oral BID    acetylcysteine  4 mL Inhalation Q4H    ipratropium-albuterol  1 ampule Inhalation Q4H    polyethylene glycol  17 g Oral Daily    pantoprazole (PROTONIX) 40 mg injection  40 mg IntraVENous Daily    chlorhexidine  15 mL Mouth/Throat BID    senna  5 mL Per G Tube Nightly    docusate  100 mg Per G Tube BID    levothyroxine  175 mcg Per G Tube Daily    insulin glargine  20 Units SubCUTAneous BID    insulin lispro  0-12 Units SubCUTAneous Q6H    levETIRAcetam  500 mg PEG Tube BID    linezolid  600 mg IntraVENous Q12H    meropenem  1,000 mg IntraVENous Q24H    sodium chloride flush  5-40 mL IntraVENous 2 times per day      sodium chloride      sodium chloride      norepinephrine Stopped (22 1520)    fentaNYL 50 mcg/hr (05/15/22 1301)    midazolam Stopped (22 0924)    dextrose      sodium chloride Stopped (22 1631)     sodium chloride, sodium chloride, anticoagulant sodium citrate, perflutren lipid microspheres, glucose, dextrose bolus **OR** dextrose bolus, glucagon (rDNA), dextrose, sodium chloride flush, sodium chloride    OBJECTIVE:  BP (!) 144/68   Pulse 78   Temp 98.4 °F (36.9 °C) (Axillary)   Resp 16   Ht 6' 5\" (1.956 m)   Wt 220 lb 10.9 oz (100.1 kg)   SpO2 95%   BMI 26.17 kg/m²   Temp  Av.3 °F (36.8 °C)  Min: 98 °F (36.7 °C)  Max: 98.5 °F (36.9

## 2022-05-15 NOTE — PROGRESS NOTES
Progress Note  5/15/2022 5:57 AM  Subjective:   Admit Date: 5/10/2022  PCP: Venus Lennox, MD  Per consult note: \"Full consult deferred as pt was being followed longitudinally at [facility]. 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 He was found to be unresponsive at [facility] 5/10/22, EMS was called and pt transferred to [facility] ED. In the ED the pt was found to be pulseless he was resuscitated from a Cardiac arrest and admitted to the MICU\"    5/14: Receiving HD treatment. Remains intubated and sedated. Eyes open but does not interact. 5/15/22: Remains intubated and sedated. More alert today, eyes open, shakes head no to all questions. Diet: Diet NPO  ADULT TUBE FEEDING; PEG; Immune Enhancing; Continuous; 20; Yes; 20; Q 4 hours; 55; 30; Q 4 hours; Protein;  Once daily    Data:   Scheduled Meds:   acetylcysteine  4 mL Inhalation Q4H    ipratropium-albuterol  1 ampule Inhalation Q4H    polyethylene glycol  17 g Oral Daily    pantoprazole (PROTONIX) 40 mg injection  40 mg IntraVENous Daily    chlorhexidine  15 mL Mouth/Throat BID    senna  5 mL Per G Tube Nightly    docusate  100 mg Per G Tube BID    levothyroxine  175 mcg Per G Tube Daily    insulin glargine  20 Units SubCUTAneous BID    insulin lispro  0-12 Units SubCUTAneous Q6H    levETIRAcetam  500 mg PEG Tube BID    linezolid  600 mg IntraVENous Q12H    meropenem  1,000 mg IntraVENous Q24H    sodium chloride flush  5-40 mL IntraVENous 2 times per day     Continuous Infusions:   sodium chloride      sodium chloride      norepinephrine Stopped (05/12/22 1520)    fentaNYL 25 mcg/hr (05/14/22 1807)    midazolam Stopped (05/12/22 0924)    dextrose      sodium chloride Stopped (05/13/22 1631)     PRN Meds:sodium chloride, sodium chloride, anticoagulant sodium citrate, perflutren lipid microspheres, glucose, dextrose bolus **OR** dextrose bolus, glucagon (rDNA), dextrose, sodium chloride flush, sodium chloride  I/O last 3 completed shifts: In: 5073.5 [I.V.:360.6; Blood:582.5; NG/GT:1832; IV Piggyback:1498.4]  Out: 2400   I/O this shift:  In: 30 [NG/GT:30]  Out: -     Intake/Output Summary (Last 24 hours) at 5/15/2022 0557  Last data filed at 5/14/2022 2000  Gross per 24 hour   Intake 2211.97 ml   Output 2400 ml   Net -188.03 ml     CBC:   Recent Labs     05/12/22  0630 05/12/22  1700 05/13/22  0535 05/14/22  0529 05/14/22  1423   WBC 16.0*  --  13.1* 10.7  --    HGB 6.8*   < > 7.1* 6.9* 8.0*     --  156 151  --     < > = values in this interval not displayed. BMP:    Recent Labs     05/12/22  0630 05/13/22  0535 05/14/22  0529   * 132 132   K 4.5 4.2 4.4   CL 94* 94* 92*   CO2 25 28 27   BUN 76* 50* 71*   CREATININE 2.7* 1.8* 2.2*   GLUCOSE 130* 219* 241*     Hepatic:   No results for input(s): AST, ALT, ALB, BILITOT, ALKPHOS in the last 72 hours. Troponin: No results for input(s): TROPONINI in the last 72 hours. BNP: No results for input(s): BNP in the last 72 hours. Lipids: No results for input(s): CHOL, HDL in the last 72 hours. Invalid input(s): LDLCALCU  ABGs: No results found for: PHART, PO2ART, XAM9WLS  INR:   No results for input(s): INR in the last 72 hours. -----------------------------------------------------------------  RAD:   EXAMINATION:   ONE XRAY VIEW OF THE CHEST       5/11/2022 12:38 am       COMPARISON:   None.       HISTORY:   ORDERING SYSTEM PROVIDED HISTORY: ET tube placement   TECHNOLOGIST PROVIDED HISTORY:   Reason for exam:->ET tube placement       FINDINGS:   Lines and tubes are unchanged.  Cardiomediastinal contours are stable. Persistent central pulmonary vascular congestion.       Diffuse bilateral parenchymal and interstitial opacities, in addition to   bilateral pleural effusions, stable.       No pneumothorax.  No acute osseous abnormality.           Impression   Stable congestive/consolidative changes, with bilateral pleural effusions. No significant change.          Objective: Vitals: /64   Pulse 82   Temp 98.5 °F (36.9 °C)   Resp 18   Ht 6' 5\" (1.956 m)   Wt 220 lb 10.9 oz (100.1 kg)   SpO2 94%   BMI 26.17 kg/m²     General appearance: Intubated on vent  Skin: No rashes or lesions on exposed skin  Neck: No JVD, RIJ TDC  Lungs: Coarse upper, diminished lower  Heart: irreg., no rub  Abdomen: Soft, + bowel sounds, +PEG  Extremities: Trace edema BlE  Neurologic: eyes open, shakes head no to all questions today        Assessment:   Patient Active Problem List:     Cardiac arrest ([facility] [address]    Plan:   1. 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 Next IHD on Monday 5/16  2. Follow Labs    2. Anemia in the TRUMAN-with episodes GI loss at [facility]  HgB 6.8-->7.5-->7.1-->6.9 S/P 1 unit PRBC 5/14/22-->7.6  PLAN:  1. Follow H/H  2. Continue SHANIQUA  3. Transfuse  for hgb <7      3. 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 Control Ca++ with IHD  2. IV supplement the PO4 today    4-S/P Cardio-Pulm Arrest-remains intubated  S/P thoracentesis 450ml 5/13/22  PLAN:  1. Defer to [facility] and Cardiology    5- Sepsis  BC -(+) Staph epi-methicillin resistant  PLAN:  1.  Plan as per ID        LILY Young - CNS   Pt seen and examined agree with above  For hd mwf  Sp trf 5/14  Annita Little MD

## 2022-05-15 NOTE — PROGRESS NOTES
Internal Medicine Progress Note      Synopsis: Patient admitted on 5/10/2022     Mr. Sheeba Soliman, a 74y.o. year old male who has a past medical history of A-fib ([address] TRUMAN (acute kidney injury) ([address] Anemia, Anxiety, Bipolar 1 disorder ([address] Charcot foot due to diabetes mellitus ([address] CHF (congestive heart failure) ([address] CKD (chronic kidney disease), Diabetes mellitus ([address] Dialysis patient ([address] Hyperlipidemia, Hypertension, Right sided weakness, Sacral decubitus ulcer, and Seizure ([address] This is a 75-year-old male with progressive dementia and spinal stenosis. Has been bedbound for a long time. He has had in the past bouts of kidney failure that did not require long-term resuscitation with dialysis however he was at acute care for decompensation from his large sacral decubitus and acute renal failure that did require renal replacement therapy of hemodialysis. He finishes antibiotics. His sacral wound was slow to heal because of poor oral intake and he was given a PEG tube for nutritional supplementation purposes since his wife wanted to. Patient had been stable till about 48 hours ago. Monday he was noticed to have a large amount of bloody stool. He was started on an intravenous proton pump inhibitor and surgery services were asked to see him. His anticoagulation was held. Patient was being watched and he did not have any more episodes. Yesterday he had an increase in his WBCs without having any localizing symptoms. Infectious disease did see him and started him immediately on broad-spectrum antibiotics. Patient became unresponsive and virtually reportedly evening more towards the early hours of this morning. Fluids were started unfortunately patient could not be resuscitated despite best efforts of the on-call intensive virtual specialist. Patient was sent over to acute care where he was intubated for cardiorespiratory failure.  He was found to be in cardiac arrest.   Appropriate O2 resuscitation resulted in resumption of heart rhythm. Patient was started on pressors moved to the ICU   Imaging reveals a significant pleural effusion and HCAP    Subjective    Still in the ICU. Pressors just weaned off. Still little bit soft on his blood pressure. Less sedation    May 13  Patient appeared more alert. Still hypotensive. Thoracentesis on schedule  May 14  Uneventful night. He was on vent support at night although he did tolerate trials in the day. Centesis pulled off some fluid   May 15 th he is still intubated   exam:  BP (!) 144/68   Pulse 78   Temp 98.4 °F (36.9 °C) (Axillary)   Resp 16   Ht 6' 5\" (1.956 m)   Wt 220 lb 10.9 oz (100.1 kg)   SpO2 95%   BMI 26.17 kg/m²   General appearance: Orally intubated HEENT: eyes are More open     Respiratory: Significantly decreased bibasilar bases   Cardiovascular: Irregular heart rate rhythm   Abdomen: Nontender positive for distention positive for PEG   Musculoskeletal: Some thickening of the skin on his lower extremities.  Dorsalis pedis hard to palpate   skin: Large clean sacral decubitus   neurologic: Sleepy      Medications:  Reviewed    Infusion Medications    sodium chloride      sodium chloride      norepinephrine Stopped (05/12/22 1520)    fentaNYL 50 mcg/hr (05/15/22 1301)    midazolam Stopped (05/12/22 0924)    dextrose      sodium chloride Stopped (05/13/22 1631)     Scheduled Medications    sodium phosphate IVPB  20 mmol IntraVENous Once    mupirocin   Topical BID    carvedilol  3.125 mg Oral BID    tigecycline (TYGACIL) IVPB  100 mg IntraVENous Once    Followed by   Brooke Fossa ON 5/16/2022] tigecycline (TYGACIL) IVPB  50 mg IntraVENous Q12H    amikacin (AMIKIN) IVPB  7.5 mg/kg IntraVENous Once    sulfamethoxazole-trimethoprim  2 tablet Oral Daily    acetylcysteine  4 mL Inhalation Q4H    ipratropium-albuterol  1 ampule Inhalation Q4H    polyethylene glycol  17 g Oral Daily    pantoprazole (PROTONIX) 40 mg injection 40 mg IntraVENous Daily    chlorhexidine  15 mL Mouth/Throat BID    senna  5 mL Per G Tube Nightly    docusate  100 mg Per G Tube BID    levothyroxine  175 mcg Per G Tube Daily    insulin glargine  20 Units SubCUTAneous BID    insulin lispro  0-12 Units SubCUTAneous Q6H    levETIRAcetam  500 mg PEG Tube BID    sodium chloride flush  5-40 mL IntraVENous 2 times per day     PRN Meds: sodium chloride, sodium chloride, anticoagulant sodium citrate, perflutren lipid microspheres, glucose, dextrose bolus **OR** dextrose bolus, glucagon (rDNA), dextrose, sodium chloride flush, sodium chloride    I/O    Intake/Output Summary (Last 24 hours) at 5/15/2022 1429  Last data filed at 5/15/2022 0800  Gross per 24 hour   Intake 1595.01 ml   Output --   Net 1595.01 ml       Labs:   Recent Labs     05/13/22 0535 05/13/22  0535 05/14/22  0529 05/14/22  1423 05/15/22  0600   WBC 13.1*  --  10.7  --  10.0   HGB 7.1*   < > 6.9* 8.0* 7.6*   HCT 23.1*   < > 22.7* 25.8* 24.8*     --  151  --  130    < > = values in this interval not displayed. Recent Labs     05/13/22 0535 05/14/22  0529 05/15/22  0600    132 132   K 4.2 4.4 4.0   CL 94* 92* 91*   CO2 28 27 31*   BUN 50* 71* 52*   CREATININE 1.8* 2.2* 1.7*   CALCIUM 9.0 9.0 8.9   PHOS 1.5* 3.2 2.2*       No results for input(s): PROT, ALB, ALKPHOS, ALT, AST, BILITOT, AMYLASE, LIPASE in the last 72 hours. No results for input(s): INR in the last 72 hours. No results for input(s): Jadene Moh in the last 72 hours. Chronic labs:  Lab Results   Component Value Date    TSH 2.700 05/11/2022    INR 1.2 05/11/2022       Radiology:  CT ABDOMEN PELVIS WO CONTRAST Additional Contrast? None    Result Date: 5/11/2022  Bilateral pleural effusions, greater on the left than right, with superimposed consolidation. In the appropriate setting, a pneumonia with associated pleural effusions cannot be excluded. No bowel obstruction, free air or obstructive uropathy. Additional findings involving the abdomen and pelvis, as detailed above. CT HEAD WO CONTRAST    Result Date: 5/11/2022  No acute findings. Chronic changes as above. CT CHEST WO CONTRAST    Result Date: 5/11/2022  Findings suggesting pulmonary edema versus a multifocal pneumonia, with associated bilateral pleural effusions. Generalized mediastinal adenopathy, which may represent reactive changes. This is a nonspecific finding. Differential diagnosis would include infection, inflammation or neoplastic causes. XR CHEST PORTABLE    Result Date: 5/12/2022  1. Partial interval clearing of the lungs when compared with the patient's prior study. 2. Vague patchy right perihilar airspace disease and patchy residual airspace disease within the left lung. 3. Trace right pleural effusion and small left pleural effusion. XR CHEST PORTABLE    Result Date: 5/11/2022  Stable congestive/consolidative changes, with bilateral pleural effusions. No significant change. XR CHEST 1 VIEW    Result Date: 4/27/2022  Bibasilar pleural thickening   ASSESSMENT:    Principal Problem:    Cardiac arrest [facility])  Resolved Problems:    * No resolved hospital problems. *  Sepsis  Respiratory failure  Hypotension  Long-term dialysis  Pleural effusion  Pneumonia     PLAN:    1. Received a dialysis with ultrafiltration and fluid boluses  2. Continue to follow hemoglobin and appropriate transfusion  3. Remains full code per discussion with family  4. Plans for pleural effusion to be tapped to allow for better weaning  5. Glycemic control with insulin  6. Maintain Keppra for seizures  7. Maintaining linezolid and meropenem  May 13  Continue dialysis support with limiting hypotension. Thoracentesis per pulmonary services to allow for ventilator weaning. 2 beats can be started when agreeable with critical care. Glycemic control to be treated with insulin.  Antibiotics continue per infectious disease  Being treated for sepsis from

## 2022-05-16 ENCOUNTER — APPOINTMENT (OUTPATIENT)
Dept: GENERAL RADIOLOGY | Age: 67
DRG: 003 | End: 2022-05-16
Payer: MEDICARE

## 2022-05-16 ENCOUNTER — ANESTHESIA EVENT (OUTPATIENT)
Dept: OPERATING ROOM | Age: 67
DRG: 003 | End: 2022-05-16
Payer: MEDICARE

## 2022-05-16 LAB
ANION GAP SERPL CALCULATED.3IONS-SCNC: 11 MMOL/L (ref 7–16)
ANISOCYTOSIS: ABNORMAL
BASOPHILIC STIPPLING: ABNORMAL
BASOPHILS ABSOLUTE: 0.02 E9/L (ref 0–0.2)
BASOPHILS RELATIVE PERCENT: 0.2 % (ref 0–2)
BUN BLDV-MCNC: 83 MG/DL (ref 6–23)
BURR CELLS: ABNORMAL
CALCIUM SERPL-MCNC: 8.7 MG/DL (ref 8.6–10.2)
CHLORIDE BLD-SCNC: 92 MMOL/L (ref 98–107)
CO2: 29 MMOL/L (ref 22–29)
CREAT SERPL-MCNC: 2.1 MG/DL (ref 0.7–1.2)
CULTURE CATHETER TIP: NORMAL
CULTURE, BLOOD 2: ABNORMAL
CULTURE, RESPIRATORY: ABNORMAL
CULTURE, RESPIRATORY: ABNORMAL
EOSINOPHILS ABSOLUTE: 0.14 E9/L (ref 0.05–0.5)
EOSINOPHILS RELATIVE PERCENT: 1.5 % (ref 0–6)
GFR AFRICAN AMERICAN: 38
GFR NON-AFRICAN AMERICAN: 32 ML/MIN/1.73
GLUCOSE BLD-MCNC: 146 MG/DL (ref 74–99)
HCT VFR BLD CALC: 23.6 % (ref 37–54)
HEMOGLOBIN: 7.2 G/DL (ref 12.5–16.5)
HYPOCHROMIA: ABNORMAL
IMMATURE GRANULOCYTES #: 0.18 E9/L
IMMATURE GRANULOCYTES %: 1.9 % (ref 0–5)
LYMPHOCYTES ABSOLUTE: 0.51 E9/L (ref 1.5–4)
LYMPHOCYTES RELATIVE PERCENT: 5.3 % (ref 20–42)
MAGNESIUM: 2.2 MG/DL (ref 1.6–2.6)
MCH RBC QN AUTO: 26.9 PG (ref 26–35)
MCHC RBC AUTO-ENTMCNC: 30.5 % (ref 32–34.5)
MCV RBC AUTO: 88.1 FL (ref 80–99.9)
METER GLUCOSE: 121 MG/DL (ref 74–99)
METER GLUCOSE: 156 MG/DL (ref 74–99)
METER GLUCOSE: 46 MG/DL (ref 74–99)
METER GLUCOSE: 49 MG/DL (ref 74–99)
METER GLUCOSE: 52 MG/DL (ref 74–99)
METER GLUCOSE: 52 MG/DL (ref 74–99)
METER GLUCOSE: 61 MG/DL (ref 74–99)
METER GLUCOSE: 73 MG/DL (ref 74–99)
METER GLUCOSE: 83 MG/DL (ref 74–99)
METER GLUCOSE: 86 MG/DL (ref 74–99)
METER GLUCOSE: 91 MG/DL (ref 74–99)
MONOCYTES ABSOLUTE: 0.83 E9/L (ref 0.1–0.95)
MONOCYTES RELATIVE PERCENT: 8.7 % (ref 2–12)
NEUTROPHILS ABSOLUTE: 7.88 E9/L (ref 1.8–7.3)
NEUTROPHILS RELATIVE PERCENT: 82.4 % (ref 43–80)
ORGANISM: ABNORMAL
OVALOCYTES: ABNORMAL
PDW BLD-RTO: 21.4 FL (ref 11.5–15)
PHOSPHORUS: 4.2 MG/DL (ref 2.5–4.5)
PLATELET # BLD: 125 E9/L (ref 130–450)
PMV BLD AUTO: 9.8 FL (ref 7–12)
POIKILOCYTES: ABNORMAL
POLYCHROMASIA: ABNORMAL
POTASSIUM SERPL-SCNC: 4.7 MMOL/L (ref 3.5–5)
RBC # BLD: 2.68 E12/L (ref 3.8–5.8)
SCHISTOCYTES: ABNORMAL
SMEAR, RESPIRATORY: ABNORMAL
SODIUM BLD-SCNC: 132 MMOL/L (ref 132–146)
WBC # BLD: 9.6 E9/L (ref 4.5–11.5)

## 2022-05-16 PROCEDURE — 6360000002 HC RX W HCPCS: Performed by: INTERNAL MEDICINE

## 2022-05-16 PROCEDURE — P9047 ALBUMIN (HUMAN), 25%, 50ML: HCPCS

## 2022-05-16 PROCEDURE — 94640 AIRWAY INHALATION TREATMENT: CPT

## 2022-05-16 PROCEDURE — 83735 ASSAY OF MAGNESIUM: CPT

## 2022-05-16 PROCEDURE — 2500000003 HC RX 250 WO HCPCS: Performed by: STUDENT IN AN ORGANIZED HEALTH CARE EDUCATION/TRAINING PROGRAM

## 2022-05-16 PROCEDURE — 94003 VENT MGMT INPAT SUBQ DAY: CPT

## 2022-05-16 PROCEDURE — 6370000000 HC RX 637 (ALT 250 FOR IP): Performed by: INTERNAL MEDICINE

## 2022-05-16 PROCEDURE — 6360000002 HC RX W HCPCS

## 2022-05-16 PROCEDURE — 2580000003 HC RX 258: Performed by: SPECIALIST

## 2022-05-16 PROCEDURE — 71045 X-RAY EXAM CHEST 1 VIEW: CPT

## 2022-05-16 PROCEDURE — 2720000010 HC SURG SUPPLY STERILE

## 2022-05-16 PROCEDURE — A4216 STERILE WATER/SALINE, 10 ML: HCPCS | Performed by: STUDENT IN AN ORGANIZED HEALTH CARE EDUCATION/TRAINING PROGRAM

## 2022-05-16 PROCEDURE — 87077 CULTURE AEROBIC IDENTIFY: CPT

## 2022-05-16 PROCEDURE — 32551 INSERTION OF CHEST TUBE: CPT

## 2022-05-16 PROCEDURE — 99231 SBSQ HOSP IP/OBS SF/LOW 25: CPT | Performed by: NURSE PRACTITIONER

## 2022-05-16 PROCEDURE — 2580000003 HC RX 258: Performed by: INTERNAL MEDICINE

## 2022-05-16 PROCEDURE — 36415 COLL VENOUS BLD VENIPUNCTURE: CPT

## 2022-05-16 PROCEDURE — 6360000002 HC RX W HCPCS: Performed by: STUDENT IN AN ORGANIZED HEALTH CARE EDUCATION/TRAINING PROGRAM

## 2022-05-16 PROCEDURE — 6360000002 HC RX W HCPCS: Performed by: SPECIALIST

## 2022-05-16 PROCEDURE — 84100 ASSAY OF PHOSPHORUS: CPT

## 2022-05-16 PROCEDURE — 82962 GLUCOSE BLOOD TEST: CPT

## 2022-05-16 PROCEDURE — C9113 INJ PANTOPRAZOLE SODIUM, VIA: HCPCS | Performed by: STUDENT IN AN ORGANIZED HEALTH CARE EDUCATION/TRAINING PROGRAM

## 2022-05-16 PROCEDURE — 2000000000 HC ICU R&B

## 2022-05-16 PROCEDURE — 0W9B30Z DRAINAGE OF LEFT PLEURAL CAVITY WITH DRAINAGE DEVICE, PERCUTANEOUS APPROACH: ICD-10-PCS | Performed by: INTERNAL MEDICINE

## 2022-05-16 PROCEDURE — 99233 SBSQ HOSP IP/OBS HIGH 50: CPT | Performed by: INTERNAL MEDICINE

## 2022-05-16 PROCEDURE — 2580000003 HC RX 258: Performed by: STUDENT IN AN ORGANIZED HEALTH CARE EDUCATION/TRAINING PROGRAM

## 2022-05-16 PROCEDURE — 36592 COLLECT BLOOD FROM PICC: CPT

## 2022-05-16 PROCEDURE — 90935 HEMODIALYSIS ONE EVALUATION: CPT

## 2022-05-16 PROCEDURE — 6370000000 HC RX 637 (ALT 250 FOR IP): Performed by: SPECIALIST

## 2022-05-16 PROCEDURE — 87186 SC STD MICRODIL/AGAR DIL: CPT

## 2022-05-16 PROCEDURE — 85025 COMPLETE CBC W/AUTO DIFF WBC: CPT

## 2022-05-16 PROCEDURE — 80048 BASIC METABOLIC PNL TOTAL CA: CPT

## 2022-05-16 PROCEDURE — 87070 CULTURE OTHR SPECIMN AEROBIC: CPT

## 2022-05-16 PROCEDURE — 87206 SMEAR FLUORESCENT/ACID STAI: CPT

## 2022-05-16 RX ORDER — FENTANYL CITRATE 50 UG/ML
50 INJECTION, SOLUTION INTRAMUSCULAR; INTRAVENOUS ONCE
Status: COMPLETED | OUTPATIENT
Start: 2022-05-16 | End: 2022-05-16

## 2022-05-16 RX ORDER — FENTANYL CITRATE 50 UG/ML
INJECTION, SOLUTION INTRAMUSCULAR; INTRAVENOUS
Status: COMPLETED
Start: 2022-05-16 | End: 2022-05-16

## 2022-05-16 RX ORDER — ALBUMIN (HUMAN) 12.5 G/50ML
25 SOLUTION INTRAVENOUS
Status: COMPLETED | OUTPATIENT
Start: 2022-05-16 | End: 2022-05-16

## 2022-05-16 RX ORDER — ALBUMIN (HUMAN) 12.5 G/50ML
SOLUTION INTRAVENOUS
Status: COMPLETED
Start: 2022-05-16 | End: 2022-05-16

## 2022-05-16 RX ORDER — MIDAZOLAM HYDROCHLORIDE 2 MG/2ML
2 INJECTION, SOLUTION INTRAMUSCULAR; INTRAVENOUS ONCE
Status: COMPLETED | OUTPATIENT
Start: 2022-05-16 | End: 2022-05-16

## 2022-05-16 RX ADMIN — DEXTROSE MONOHYDRATE 125 ML: 100 INJECTION, SOLUTION INTRAVENOUS at 13:04

## 2022-05-16 RX ADMIN — FENTANYL CITRATE 50 MCG: 50 INJECTION, SOLUTION INTRAMUSCULAR; INTRAVENOUS at 11:35

## 2022-05-16 RX ADMIN — MUPIROCIN: 20 OINTMENT TOPICAL at 09:24

## 2022-05-16 RX ADMIN — IPRATROPIUM BROMIDE AND ALBUTEROL SULFATE 1 AMPULE: .5; 2.5 SOLUTION RESPIRATORY (INHALATION) at 00:08

## 2022-05-16 RX ADMIN — TIGECYCLINE 50 MG: 50 INJECTION, POWDER, LYOPHILIZED, FOR SOLUTION INTRAVENOUS at 20:26

## 2022-05-16 RX ADMIN — EPOETIN ALFA-EPBX 8000 UNITS: 4000 INJECTION, SOLUTION INTRAVENOUS; SUBCUTANEOUS at 22:50

## 2022-05-16 RX ADMIN — ACETYLCYSTEINE 400 MG: 100 INHALANT RESPIRATORY (INHALATION) at 22:31

## 2022-05-16 RX ADMIN — DEXTROSE MONOHYDRATE 125 ML: 100 INJECTION, SOLUTION INTRAVENOUS at 18:16

## 2022-05-16 RX ADMIN — Medication 75 MCG/HR: at 12:18

## 2022-05-16 RX ADMIN — DOCUSATE SODIUM LIQUID 100 MG: 100 LIQUID ORAL at 20:37

## 2022-05-16 RX ADMIN — ACETYLCYSTEINE 400 MG: 100 INHALANT RESPIRATORY (INHALATION) at 16:48

## 2022-05-16 RX ADMIN — ACETYLCYSTEINE 400 MG: 100 INHALANT RESPIRATORY (INHALATION) at 12:13

## 2022-05-16 RX ADMIN — METOPROLOL TARTRATE 25 MG: 25 TABLET, FILM COATED ORAL at 20:37

## 2022-05-16 RX ADMIN — ALBUMIN (HUMAN) 25 G: 12.5 SOLUTION INTRAVENOUS at 16:23

## 2022-05-16 RX ADMIN — MUPIROCIN: 20 OINTMENT TOPICAL at 21:01

## 2022-05-16 RX ADMIN — IPRATROPIUM BROMIDE AND ALBUTEROL SULFATE 1 AMPULE: .5; 2.5 SOLUTION RESPIRATORY (INHALATION) at 16:48

## 2022-05-16 RX ADMIN — METOPROLOL TARTRATE 25 MG: 25 TABLET, FILM COATED ORAL at 09:22

## 2022-05-16 RX ADMIN — EPOETIN ALFA-EPBX 2000 UNITS: 2000 INJECTION, SOLUTION INTRAVENOUS; SUBCUTANEOUS at 22:49

## 2022-05-16 RX ADMIN — CHLORHEXIDINE GLUCONATE 0.12% ORAL RINSE 15 ML: 1.2 LIQUID ORAL at 09:22

## 2022-05-16 RX ADMIN — MIDAZOLAM HYDROCHLORIDE 2 MG: 1 INJECTION, SOLUTION INTRAMUSCULAR; INTRAVENOUS at 11:35

## 2022-05-16 RX ADMIN — LEVETIRACETAM 500 MG: 100 SOLUTION ORAL at 20:37

## 2022-05-16 RX ADMIN — TIGECYCLINE 50 MG: 50 INJECTION, POWDER, LYOPHILIZED, FOR SOLUTION INTRAVENOUS at 01:31

## 2022-05-16 RX ADMIN — DEXTROSE MONOHYDRATE 100 ML/HR: 50 INJECTION, SOLUTION INTRAVENOUS at 21:43

## 2022-05-16 RX ADMIN — LEVOTHYROXINE SODIUM 175 MCG: 125 TABLET ORAL at 06:35

## 2022-05-16 RX ADMIN — SODIUM CHLORIDE, PRESERVATIVE FREE 40 MG: 5 INJECTION INTRAVENOUS at 09:22

## 2022-05-16 RX ADMIN — INSULIN GLARGINE 20 UNITS: 100 INJECTION, SOLUTION SUBCUTANEOUS at 09:22

## 2022-05-16 RX ADMIN — SENNOSIDES 8.8 MG: 8.8 SYRUP ORAL at 20:38

## 2022-05-16 RX ADMIN — NOREPINEPHRINE BITARTRATE 2 MCG/MIN: 1 INJECTION, SOLUTION, CONCENTRATE INTRAVENOUS at 14:40

## 2022-05-16 RX ADMIN — Medication 10 ML: at 20:37

## 2022-05-16 RX ADMIN — INSULIN LISPRO 2 UNITS: 100 INJECTION, SOLUTION INTRAVENOUS; SUBCUTANEOUS at 06:08

## 2022-05-16 RX ADMIN — IPRATROPIUM BROMIDE AND ALBUTEROL SULFATE 1 AMPULE: .5; 2.5 SOLUTION RESPIRATORY (INHALATION) at 22:31

## 2022-05-16 RX ADMIN — DEXTROSE MONOHYDRATE 125 ML: 100 INJECTION, SOLUTION INTRAVENOUS at 22:54

## 2022-05-16 RX ADMIN — CHLORHEXIDINE GLUCONATE 0.12% ORAL RINSE 15 ML: 1.2 LIQUID ORAL at 20:38

## 2022-05-16 RX ADMIN — SULFAMETHOXAZOLE AND TRIMETHOPRIM 2 TABLET: 800; 160 TABLET ORAL at 09:22

## 2022-05-16 RX ADMIN — IPRATROPIUM BROMIDE AND ALBUTEROL SULFATE 1 AMPULE: .5; 2.5 SOLUTION RESPIRATORY (INHALATION) at 12:13

## 2022-05-16 RX ADMIN — ACETYLCYSTEINE 400 MG: 100 INHALANT RESPIRATORY (INHALATION) at 00:08

## 2022-05-16 RX ADMIN — ACETYLCYSTEINE 400 MG: 100 INHALANT RESPIRATORY (INHALATION) at 19:27

## 2022-05-16 RX ADMIN — Medication 10 ML: at 09:25

## 2022-05-16 RX ADMIN — IPRATROPIUM BROMIDE AND ALBUTEROL SULFATE 1 AMPULE: .5; 2.5 SOLUTION RESPIRATORY (INHALATION) at 08:09

## 2022-05-16 RX ADMIN — ACETYLCYSTEINE 400 MG: 100 INHALANT RESPIRATORY (INHALATION) at 04:00

## 2022-05-16 RX ADMIN — ACETYLCYSTEINE 400 MG: 100 INHALANT RESPIRATORY (INHALATION) at 08:09

## 2022-05-16 RX ADMIN — LEVETIRACETAM 500 MG: 100 SOLUTION ORAL at 09:22

## 2022-05-16 RX ADMIN — IPRATROPIUM BROMIDE AND ALBUTEROL SULFATE 1 AMPULE: .5; 2.5 SOLUTION RESPIRATORY (INHALATION) at 19:27

## 2022-05-16 RX ADMIN — ALBUMIN (HUMAN) 25 G: 0.25 INJECTION, SOLUTION INTRAVENOUS at 16:23

## 2022-05-16 RX ADMIN — DEXTROSE MONOHYDRATE 250 ML: 100 INJECTION, SOLUTION INTRAVENOUS at 20:36

## 2022-05-16 RX ADMIN — IPRATROPIUM BROMIDE AND ALBUTEROL SULFATE 1 AMPULE: .5; 2.5 SOLUTION RESPIRATORY (INHALATION) at 04:00

## 2022-05-16 ASSESSMENT — PULMONARY FUNCTION TESTS
PIF_VALUE: 21
PIF_VALUE: 29
PIF_VALUE: 26
PIF_VALUE: 28
PIF_VALUE: 27
PIF_VALUE: 28
PIF_VALUE: 18
PIF_VALUE: 27
PIF_VALUE: 26
PIF_VALUE: 29
PIF_VALUE: 27
PIF_VALUE: 31
PIF_VALUE: 28
PIF_VALUE: 27
PIF_VALUE: 28
PIF_VALUE: 26
PIF_VALUE: 27
PIF_VALUE: 27
PIF_VALUE: 34
PIF_VALUE: 31
PIF_VALUE: 28
PIF_VALUE: 15
PIF_VALUE: 27
PIF_VALUE: 24
PIF_VALUE: 28
PIF_VALUE: 30
PIF_VALUE: 28
PIF_VALUE: 16

## 2022-05-16 ASSESSMENT — LIFESTYLE VARIABLES: SMOKING_STATUS: 0

## 2022-05-16 ASSESSMENT — PAIN SCALES - GENERAL
PAINLEVEL_OUTOF10: 0
PAINLEVEL_OUTOF10: 0

## 2022-05-16 NOTE — PROGRESS NOTES
[address] [place] [address] [facility]  LUCY  Progress Note    SUBJECTIVE:  Chief Complaint   Patient presents with    Loss of Consciousness     arrived from [facility] via [facility] unresponsive     The patient is still in the ICU. Not he is intubated and sedated. No fevers. Tolerating antibiotics. Review of systems:  A 10 point review of systems was done. As stated above, otherwise negative. Medications:   metoprolol tartrate  25 mg Oral BID    midazolam  2 mg IntraVENous Once    fentanNYL  50 mcg IntraVENous Once    fentaNYL        mupirocin   Topical BID    tigecycline (TYGACIL) IVPB  50 mg IntraVENous Q12H    sulfamethoxazole-trimethoprim  2 tablet Oral Daily    acetylcysteine  4 mL Inhalation Q4H    ipratropium-albuterol  1 ampule Inhalation Q4H    polyethylene glycol  17 g Oral Daily    pantoprazole (PROTONIX) 40 mg injection  40 mg IntraVENous Daily    chlorhexidine  15 mL Mouth/Throat BID    senna  5 mL Per G Tube Nightly    docusate  100 mg Per G Tube BID    levothyroxine  175 mcg Per G Tube Daily    insulin glargine  20 Units SubCUTAneous BID    insulin lispro  0-12 Units SubCUTAneous Q6H    levETIRAcetam  500 mg PEG Tube BID    sodium chloride flush  5-40 mL IntraVENous 2 times per day      furosemide (LASIX) infusion      sodium chloride      sodium chloride      fentaNYL 50 mcg/hr (05/15/22 1845)    dextrose      sodium chloride Stopped (22 1631)     sodium chloride, sodium chloride, anticoagulant sodium citrate, perflutren lipid microspheres, glucose, dextrose bolus **OR** dextrose bolus, glucagon (rDNA), dextrose, sodium chloride flush, sodium chloride    OBJECTIVE:  /61   Pulse 110   Temp 98.5 °F (36.9 °C)   Resp 13   Ht 6' 5\" (1.956 m)   Wt 220 lb 10.9 oz (100.1 kg)   SpO2 92%   BMI 26.17 kg/m²   Temp  Av.6 °F (37 °C)  Min: 98.5 °F (36.9 °C)  Max: 98.6 °F (37 °C)  Constitutional: The patient is sedated, on ventilator. FiO2 30%.   PEEP 6.  Skin: Warm and dry. No rashes were noted. HEENT: Eyes show round, and reactive pupils. No jaundice. Moist mucous membranes, no ulcerations, no thrush. ETT. OGT  Neck: Supple to movements. No lymphadenopathy. Chest: No use of accessory muscles to breathe. Symmetrical expansion. Auscultation reveals coarse breath sounds. scattered rhonchi. Right tunneled HD catheter. Cardiovascular: Heart sounds arrhythmic and irregular. No murmurs appreciated. Abdomen: Positive bowel sounds to auscultation. Benign to palpation. No masses felt. No hepatosplenomegaly. Extremities: Minimal edema. Back: Stage IV decubitus ulcer with dressing. There is undermining. Necrosis of the edges. Lines: Right femoral TLC 5/11/2022.     Laboratory and Tests Review:  Lab Results   Component Value Date    WBC 9.6 05/16/2022    WBC 10.0 05/15/2022    WBC 10.7 05/14/2022    HGB 7.2 (L) 05/16/2022    HCT 23.6 (L) 05/16/2022    MCV 88.1 05/16/2022     (L) 05/16/2022     Lab Results   Component Value Date    NEUTROABS 7.88 (H) 05/16/2022    NEUTROABS 8.62 (H) 05/15/2022    NEUTROABS 9.31 (H) 05/14/2022     Lab Results   Component Value Date    CRP 13.9 (H) 05/11/2022     No results found for: CRPHS  Lab Results   Component Value Date    SEDRATE 78 (H) 05/11/2022     Lab Results   Component Value Date    ALT 30 05/11/2022    AST 31 05/11/2022    ALKPHOS 591 (H) 05/11/2022    BILITOT 0.3 05/11/2022     Lab Results   Component Value Date     05/16/2022    K 4.7 05/16/2022    K 4.5 05/11/2022    CL 92 05/16/2022    CO2 29 05/16/2022    BUN 83 05/16/2022    CREATININE 2.1 05/16/2022    CREATININE 1.7 05/15/2022    CREATININE 2.2 05/14/2022    GFRAA 38 05/16/2022    LABGLOM 32 05/16/2022    GLUCOSE 146 05/16/2022    PROT 6.9 05/11/2022    LABALBU 2.4 05/11/2022    CALCIUM 8.7 05/16/2022    BILITOT 0.3 05/11/2022    ALKPHOS 591 05/11/2022    AST 31 05/11/2022    ALT 30 05/11/2022     Radiology:    Reviewed  Microbiology:   [facility]:  Blood culture 5/10/2022: Negative so far  Decubitus ulcer 5/10/2022: MRSA, mixed GNR  Respiratory panel: Pending  Nares screen MRSA: Pending  Blood cultures 5/11/2022: Negative so far      [facility]:  Respiratory panel: Negative  Blood cultures 5/11/2022: Staphylococcus epidermidis in 2 of 2 sets  Nares screen MRSA: Positive   Respiratory culture 5/12/2022: MDR Acinetobacter baumanii (sensitive to Bactrim, Tigecycline, Cefiderocol. Intermediate to Avycaz and Eravacycline. Resistant to all others)  Pleural fluid 5/13/2022: Pseudomonas aeruginosa (Resistant to Levofloxacin and Meropenem. Intermediate to Zosyn. Sensitive to Avycaz and Gentamicin)    ASSESSMENT:  · HCAP with MDR Acinetobacter baumanii  · Sepsis with septic shock associated to HCAP  · Status postcardiac arrest  · Acute respiratory failure  · Leukocytosis secondary to HCAP- improving  · Hypothermia, improved  · History of sacral decubitus ulcer infection with Pseudomonas and Enterococcus. Treated with IV antibiotic between March and April 2022. There is more necrosis requiring debridement  · CKD, on HD  · Acinetobacter in respiratory cultures and gram-negative's in the pleural fluid from the thoracentesis  · Acinetobacter in the respiratory cultures has a same sensitivity pattern as a respiratory cultures of the Acinetobacter and the patient in room 10 even rule out an outbreak  · Pleural fluid infection with Pseudomonas. Possible empyema    PLAN:  · Continue Tigecycline, Bactrim and Amikacin, day 2  · Thoracentesis  · We will follow with you    Discussed with Dr. Sebastian Jimenez.     Neetu Gary MD  11:19 AM  5/16/2022

## 2022-05-16 NOTE — PATIENT CARE CONFERENCE
Intensive Care Daily Quality Rounding Checklist        ICU Team Members: Dr. Roxanna Tyson, resident, bedside nurse, charge nurse     ICU Day #: 6     Intubation Date: 5/11/2022     Ventilator Day #: 6     Central Line Insertion Date: 5/11/22                                                    Day #: 6      Arterial Line Insertion Date: n/a                             Day #: n/a     Temporary Hemodialysis Catheter Insertion Date: n/a                             Day # admitted with tunneled dialysis cath     DVT Prophylaxis: Eliquis on hold for anemia/ SCD's    GI Prophylaxis: Protonix     Roberts Catheter Insertion Date: HD patient                                        EYB #:                              Continued need (if yes, reason documented and discussed with physician):     Skin Issues/ Wounds and ordered treatment discussed on rounds:      Goals/ Plans for the Day: Daily labs and replace/transfuse as needed. Wean vent as able. Wean sedation as able. HD per nephrology.  Resume beta blocker, daily restraint order, HOLD TF today, sx to evaluate need of diverting colostomy  And GI Bleed ongoing

## 2022-05-16 NOTE — ANESTHESIA PRE PROCEDURE
Department of Anesthesiology  Preprocedure Note       Name:  Inocente Finnegan   Age:  74 y.o.  :  1955                                          MRN:  [id number]         Date:  2022      Surgeon: Hudson Triplett):  Tara Araiza MD    Procedure: Procedure(s):  SACRAL WOUND DEBRIDEMENT   +++CONTACT ISOLATION+++    Medications prior to admission:   Prior to Admission medications    Medication Sig Start Date End Date Taking? Authorizing Provider   insulin glargine (LANTUS) 100 UNIT/ML injection vial Inject 7 Units into the skin daily Give in a.m. Yes Historical Provider, MD   levETIRAcetam (KEPPRA) 250 MG tablet Take 250 mg by mouth daily    Historical Provider, MD   levETIRAcetam (KEPPRA) 500 MG tablet Take 500 mg by mouth nightly    Historical Provider, MD   apixaban (ELIQUIS) 5 MG TABS tablet Take 5 mg by mouth 2 times daily    Historical Provider, MD   chlorhexidine (PERIDEX) 0.12 % solution Take 15 mLs by mouth 2 times daily    Historical Provider, MD   nystatin (MYCOSTATIN) 176418 UNIT/ML suspension Take 500,000 Units by mouth 3 times daily    Historical Provider, MD   oxyCODONE (OXYCONTIN) 10 MG extended release tablet Take 10 mg by mouth every 12 hours. Historical Provider, MD   NYSTATIN IN AQUAPHOR OINTMENT Apply topically 2 times daily    Historical Provider, MD   clonazePAM (KLONOPIN) 0.5 MG tablet Take 0.5 mg by mouth 2 times daily as needed. Historical Provider, MD   insulin glargine (LANTUS) 100 UNIT/ML injection vial Inject 40 Units into the skin nightly     Historical Provider, MD   dronabinol (MARINOL) 2.5 MG capsule Take 2.5 mg by mouth 2 times daily (before meals).   Patient not taking: Reported on 2022    Historical Provider, MD   alteplase (CATHFLO) 2 MG injection 2 mg by IntraCATHeter route as needed Per cathflo protocol at [facility] Provider, MD   cloNIDine (CATAPRES) 0.1 MG tablet Take 0.1 mg by mouth 2 times daily    Historical Provider, MD   HYDROmorphone (DILAUDID) 2 MG tablet Take 2 mg by mouth every 8 hours as needed for Pain. Historical Provider, MD   anticoagulant sodium citrate 4 GM/100ML SOLN by CRRT route continuous    Historical Provider, MD   carvedilol (COREG) 3.125 MG tablet Take 3.125 mg by mouth 2 times daily (with meals)    Historical Provider, MD   hydrALAZINE (APRESOLINE) 25 MG tablet Take 100 mg by mouth 3 times daily     Historical Provider, MD   docusate sodium (COLACE) 100 MG capsule Take 100 mg by mouth 2 times daily    Historical Provider, MD   levETIRAcetam (KEPPRA) 500 MG tablet Take 500 mg by mouth three times a week Monday, Wednesday and Friday after dialysis    Historical Provider, MD   aspirin 81 MG EC tablet Take 81 mg by mouth daily    Historical Provider, MD   memantine (NAMENDA) 5 MG tablet Take 5 mg by mouth 2 times daily    Historical Provider, MD   glucagon, rDNA, 1 MG injection as needed for Low blood sugar    Historical Provider, MD   dextrose 50 % solution Infuse 25 g intravenously as needed    Historical Provider, MD   glucose (GLUTOSE) 40 % GEL Take 15 g by mouth See Admin Instructions    Historical Provider, MD   mineral oil-hydrophilic petrolatum (AQUAPHOR) ointment Apply topically as needed for Dry Skin Apply topically as needed.     Historical Provider, MD   ondansetron (ZOFRAN-ODT) 4 MG disintegrating tablet Take 4 mg by mouth every 8 hours as needed for Nausea or Vomiting    Historical Provider, MD   darbepoetin kandi-polysorbate (ARANESP) 60 MCG/0.3ML SOSY injection Inject 60 mcg into the skin every 14 days    Historical Provider, MD   levothyroxine (SYNTHROID) 175 MCG tablet Take 175 mcg by mouth Daily    Historical Provider, MD   rosuvastatin (CRESTOR) 5 MG tablet Take 5 mg by mouth daily    Historical Provider, MD   ondansetron (ZOFRAN) 4 MG/2ML injection Infuse 4 mg intravenously every 8 hours as needed for Nausea or Vomiting    Historical Provider, MD   omeprazole (PRILOSEC) 20 MG delayed release capsule Take 40 mg by mouth 2 times daily     Historical Provider, MD   VORTIoxetine HBr (TRINTELLIX) 20 MG TABS tablet Take 20 mg by mouth daily     Historical Provider, MD   polyethylene glycol (MIRALAX) 17 g PACK packet Take 17 g by mouth daily    Historical Provider, MD   B complex-vitamin C-folic acid (NEPHRO-JESSY) 1 MG tablet Take 1 tablet by mouth daily (with breakfast)    Historical Provider, MD   lactulose (CEPHULAC) 20 g packet Take 20 g by mouth 2 times daily    Historical Provider, MD   insulin lispro (HUMALOG) 100 UNIT/ML injection vial Inject into the skin every 6 hours Sliding scale  150-199 1 unit  200-249 2 units  250-299 3 units  300-349 4 units  Above 350 call physician     Historical Provider, MD   dilTIAZem (CARDIZEM) 60 MG tablet Take 60 mg by mouth 3 times daily     Historical Provider, MD   ARIPiprazole (ABILIFY) 5 MG tablet Take 5 mg by mouth daily    Historical Provider, MD   acetaminophen (TYLENOL) 325 MG tablet Take 650 mg by mouth every 6 hours as needed for Pain    Historical Provider, MD       Current medications:    Current Facility-Administered Medications   Medication Dose Route Frequency Provider Last Rate Last Admin    metoprolol tartrate (LOPRESSOR) tablet 25 mg  25 mg Oral BID Brina Lawrence MD   25 mg at 05/16/22 5873    norepinephrine (LEVOPHED) 16 mg in dextrose 5 % 250 mL infusion  1-100 mcg/min IntraVENous Continuous Svetlana Luong DO        mupirocin (BACTROBAN) 2 % ointment   Topical BID Jennette Heimlich, DO   Given at 05/16/22 4243    tigecycline (TYGACIL) 50 mg in sodium chloride 0.9 % 100 mL IVPB  50 mg IntraVENous Q12H Ryne Bunch MD   Stopped at 05/16/22 0235    sulfamethoxazole-trimethoprim (BACTRIM DS;SEPTRA DS) 800-160 MG per tablet 2 tablet  2 tablet Oral Daily Ryne Bunch MD   2 tablet at 05/16/22 0922    0.9 % sodium chloride infusion   IntraVENous PRN Svetlana Luong DO        acetylcysteine (MUCOMYST) 10 % solution 400 mg  4 mL Inhalation Q4H Amada Pack DO Belen   400 mg at 05/16/22 1213    ipratropium-albuterol (DUONEB) nebulizer solution 1 ampule  1 ampule Inhalation Q4H Luke Glover DO   1 ampule at 05/16/22 1213    0.9 % sodium chloride infusion   IntraVENous PRN Rae Callahan MD        anticoagulant sodium citrate 4 GM/100ML solution 0.168 g  4.2 mL IntraCATHeter PRN Abel Haywood MD        fentaNYL 10 mcg/ml in 0.9%  ml infusion   mcg/hr IntraVENous Continuous Mariella Quiñonez MD 7.5 mL/hr at 05/16/22 1218 75 mcg/hr at 05/16/22 1218    polyethylene glycol (GLYCOLAX) packet 17 g  17 g Oral Daily Roxana Monique MD   17 g at 05/15/22 0816    pantoprazole (PROTONIX) 40 mg in sodium chloride (PF) 10 mL injection  40 mg IntraVENous Daily Roxana Monique MD   40 mg at 05/16/22 0922    chlorhexidine (PERIDEX) 0.12 % solution 15 mL  15 mL Mouth/Throat BID Thelma Osuna MD   15 mL at 05/16/22 0922    senna (SENOKOT) 8.8 MG/5ML syrup 8.8 mg  5 mL Per G Tube Nightly Thelma Osuna MD   8.8 mg at 05/15/22 2122    docusate (COLACE) 50 MG/5ML liquid 100 mg  100 mg Per G Tube BID Thelma Osuna MD   100 mg at 05/15/22 2121    levothyroxine (SYNTHROID) tablet 175 mcg  175 mcg Per G Tube Daily Thelma Osuna MD   175 mcg at 05/16/22 2742    perflutren lipid microspheres (DEFINITY) injection 1.65 mg  1.5 mL IntraVENous ONCE PRN Thelma Osuna MD        insulin glargine (LANTUS) injection vial 20 Units  20 Units SubCUTAneous BID Thelma Osuna MD   20 Units at 05/16/22 0922    insulin lispro (HUMALOG) injection vial 0-12 Units  0-12 Units SubCUTAneous Q6H Thelma Osuna MD   2 Units at 05/16/22 7428    glucose chewable tablet 16 g  4 tablet Oral PRN Thelma Osuna MD        dextrose bolus 10% 125 mL  125 mL IntraVENous PRN Thelma Osuna .5 mL/hr at 05/16/22 1304 125 mL at 05/16/22 1304    Or    dextrose bolus 10% 250 mL  250 mL IntraVENous PRN Thelma Osuna MD       [facility] glucagon (rDNA) injection 1 mg  1 mg IntraMUSCular PRN Arleth Raymond MD        dextrose 5 % solution  100 mL/hr IntraVENous PRN Arleth Raymond MD        levETIRAcetam (KEPPRA) 100 MG/ML solution 500 mg  500 mg PEG Tube BID Arleth Raymond MD   500 mg at 05/16/22 0611    sodium chloride flush 0.9 % injection 5-40 mL  5-40 mL IntraVENous 2 times per day Arleth Raymond MD   10 mL at 05/16/22 0925    sodium chloride flush 0.9 % injection 5-40 mL  5-40 mL IntraVENous PRN Arleth Raymond MD        0.9 % sodium chloride infusion   IntraVENous PRN Arleth Raymond MD   Stopped at 05/13/22 1631       Allergies:     Allergies   Allergen Reactions    Other Nausea Only     \"narcotics\" reaction unknown       Problem List:    Patient Active Problem List   Diagnosis Code    Cardiac arrest ([facility] I46.9    Pneumonia due to infectious organism J18.9    Pleural effusion J90       Past Medical History:        Diagnosis Date    [facility]fib [facility]     TRUMAN (acute kidney injury) ([facility]     Anemia     Anxiety     Bipolar 1 disorder ([facility]     Charcot foot due to diabetes mellitus ([facility]     CHF (congestive heart failure) ([facility]     CKD (chronic kidney disease)     Diabetes mellitus ([facility]     Dialysis patient ([facility]     Hyperlipidemia     Hypertension     Right sided weakness     Sacral decubitus ulcer     Seizure ([facility]        Past Surgical History:        Procedure Laterality Date    AMPUTATION Right 2017    right toe    DIALYSIS CATHETER INSERTION  2020    right chest    HYDROCELE EXCISION  2010    JOINT REPLACEMENT Right 2011    TONSILLECTOMY      UPPER GASTROINTESTINAL ENDOSCOPY N/A 4/27/2022    EGD BIOPSY performed by Janna Valdivia MD at [address] [place] [address] [address]  2010    venous ablation       Social History:    Social History     Tobacco Use    Smoking status: Former Smoker    Smokeless tobacco: Never Used   Substance Use Topics    Alcohol use: Not Currently Counseling given: Not Answered      Vital Signs (Current):   Vitals:    05/16/22 1217 05/16/22 1219 05/16/22 1300 05/16/22 1400   BP:   (!) 139/93 (!) 73/49   Pulse: 99  96 88   Resp: 10  16 18   Temp:       TempSrc:       SpO2: 98%  98% 96%   Weight:       Height:  6' 5\" (1.956 m)                                                BP Readings from Last 3 Encounters:   05/16/22 (!) 73/49   04/27/22 131/69   04/27/22 (!) 99/54       NPO Status:  RN was informed about NPO status after 2359 at 05/16/2022. BMI:   Wt Readings from Last 3 Encounters:   05/14/22 220 lb 10.9 oz (100.1 kg)   04/27/22 212 lb (96.2 kg)     Body mass index is 26.17 kg/m². CBC:   Lab Results   Component Value Date    WBC 9.6 05/16/2022    RBC 2.68 05/16/2022    HGB 7.2 05/16/2022    HCT 23.6 05/16/2022    MCV 88.1 05/16/2022    RDW 21.4 05/16/2022     05/16/2022       CMP:   Lab Results   Component Value Date     05/16/2022    K 4.7 05/16/2022    K 4.5 05/11/2022    CL 92 05/16/2022    CO2 29 05/16/2022    BUN 83 05/16/2022    CREATININE 2.1 05/16/2022    GFRAA 38 05/16/2022    LABGLOM 32 05/16/2022    GLUCOSE 146 05/16/2022    PROT 6.9 05/11/2022    CALCIUM 8.7 05/16/2022    BILITOT 0.3 05/11/2022    ALKPHOS 591 05/11/2022    AST 31 05/11/2022    ALT 30 05/11/2022       POC Tests: No results for input(s): POCGLU, POCNA, POCK, POCCL, POCBUN, POCHEMO, POCHCT in the last 72 hours.     Coags:   Lab Results   Component Value Date    PROTIME 13.0 05/11/2022    INR 1.2 05/11/2022    APTT 32.8 05/11/2022       HCG (If Applicable): No results found for: PREGTESTUR, PREGSERUM, HCG, HCGQUANT     ABGs: No results found for: PHART, PO2ART, ZMT2LGK, CIF5LXI, BEART, O9UCZQZO     Type & Screen (If Applicable):  No results found for: LABABO, LABRH    Drug/Infectious Status (If Applicable):  No results found for: HIV, HEPCAB    COVID-19 Screening (If Applicable):   Lab Results   Component Value Date    COVID19 Not Detected 05/11/2022     EKG 05/11/2022:  Narrative & Impression  Atrial flutter  Nonspecific T wave abnormality  Abnormal ECG  When compared with ECG of 11-MAY-2022 00:57,  Right bundle branch block resolved  Confirmed by Makayla Salvador ([id number]) on 5/11/2022 2:34:47 PM         ECHO 05/11/2022:  Findings   Left Ventricle   Left ventricle was not well visualized. Micro-bubble contrast injected to enhance left ventricular visualization. Left ventricle is normal in size . No regional wall motion abnormalities seen. Normal left ventricular ejection fraction. Ejection fraction is visually estimated at 60-65%. Indeterminate diastolic function. Right Ventricle   The right ventricle was not clearly visualized. Grossly normal right ventricular size. Left Atrium   Left atrium was not clearly visualized. Normal sized left atrium. Right Atrium   Right atrium is not clearly visualized. Mitral Valve   The mitral valve was not well visualized. Tricuspid Valve   The tricuspid valve was not well visualized. Aortic Valve   The aortic valve leaflets were not well visualized. Pulmonic Valve   The pulmonic valve was not well visualized. Aorta   Aorta was not clearly visualized. Conclusions      Summary   Technically suboptimal and limited study with imaging limited to apical   window. Left ventricle was not well visualized. Left ventricle is normal in size . No regional wall motion abnormalities seen. Normal left ventricular ejection fraction.       Signature      ----------------------------------------------------------------   Electronically signed by John Pereira MD(Interpreting   physician) on 05/11/2022 02:24 PM   ----------------------------------------------------------------        Chest X-ray 05/16/2022:  HISTORY:   ORDERING SYSTEM PROVIDED HISTORY: eval left pleural chest drain   TECHNOLOGIST PROVIDED HISTORY: Reason for exam:->eval left pleural chest drain       FINDINGS:   There is interval insertion of a small caliber left-sided chest tube.  The   tip of the tube overlies the left hilum.  The cardiac silhouette is enlarged,   which may be in part or entirely due to technique.  Bibasilar pulmonary   opacities are seen. [facility] pulmonary vascular congestion noted.  No   pneumothorax.  Mild decrease in left pleural effusion.  Small bilateral   pleural effusions persist.       The endotracheal tube is well positioned, with the tip approximately 4.6 cm   above the yariel.  Tips of the right IJ dialysis catheter overlie distal SVC.         Impression   1. Interval placement of a left chest tube.  Mild decrease in left pleural   effusion. 2. Small bilateral pleural effusions are noted.  No pneumothorax. 3. Pulmonary opacities in the lower lungs which may atelectasis or pneumonia. 4.  The life-support lines and tubes are well positioned. Anesthesia Evaluation  Patient summary reviewed and Nursing notes reviewed  Airway: Mallampati: Unable to assess / NA       Comment: Patient is intubated   Dental:      Comment: Unable to assess    Pulmonary:   (+) pneumonia (Left pleural effusion): unresolved,  rhonchi,  decreased breath sounds,      (-) not a current smoker                          ROS comment: Left sided chest tube   Patient intubated and sedated with Fentanyl in the ICU. Not on pressors at this time. Cardiovascular:  Exercise tolerance: poor (<4 METS),   (+) hypertension: mild, dysrhythmias: atrial fibrillation and atrial flutter, CHF:, hyperlipidemia      ECG reviewed  Rhythm: irregular  Rate: abnormal  Echocardiogram reviewed               ROS comment: Out of hospital arrest before current admission. Currently hypotensive.       Neuro/Psych:   (+) neuromuscular disease:, psychiatric history (Hx Bipolar disorder):depression/anxiety              ROS comment: Charcot foot due to diabetes mellitus  Hx right sided weakness - unable to assess origin GI/Hepatic/Renal:   (+) renal disease: ESRD and dialysis,           Endo/Other:    (+) Diabetes (BGM's have been in the 50's)Type II DM, using insulin, blood dyscrasia (S/P PRBC transfusion; Eliquis): anemia and anticoagulation therapy:., .          Pt had no PAT visit        ROS comment: Sacral decubitus ulcer Abdominal:         (-) obese       Vascular:   + PVD, aortic or cerebral, . ROS comment: Sacral wound from prolonged debility and intubation. Other Findings: Intubated and non responsive          Anesthesia Plan      general     ASA 4     (Information obtained from the chart. Patient currently intubated  History from chart  Consent from wife who is at bedside)        Anesthetic plan and risks discussed with spouse. Plan discussed with attending and CRNA. Ling Manzano RN, [facility]   5/16/2022        DOS STAFF ADDENDUM:    Patient seen and chart reviewed on DOS. No interval change in history or exam.   I agree with the anesthesia pre-operative assessment written above and have made the appropriate addendums and/or changes. Anesthesia plan discussed and risks/benefits addressed with the patient's significant other. Questions answered. NPO >8 hours.      Mariana Parson DO  May 17, 2022  11:47 AM

## 2022-05-16 NOTE — PLAN OF CARE
Problem: Respiratory - Adult  Goal: Achieves optimal ventilation and oxygenation  5/16/2022 0046 by Abraham Avila RCP  Outcome: Progressing  Flowsheets (Taken 5/16/2022 0046)  Achieves optimal ventilation and oxygenation:   Assess for changes in respiratory status   Position to facilitate oxygenation and minimize respiratory effort   Oxygen supplementation based on oxygen saturation or arterial blood gases   Assess the need for suctioning and aspirate as needed   Respiratory therapy support as indicated  Note: According to report pt tolerated ps 12 all day

## 2022-05-16 NOTE — PROGRESS NOTES
GENERAL SURGERY  DAILY PROGRESS NOTE  5/16/2022  Chief Complaint   Patient presents with    Loss of Consciousness     arrived from [facility] via [facility] unresponsive       Subjective:  General surgery re-consulted for GI bleed and diverting colostomy. Patient HgB stable. Having brown bowel movements. Tolerating tube feeds    Objective:  /61   Pulse 110   Temp 98.5 °F (36.9 °C)   Resp 13   Ht 6' 5\" (1.956 m)   Wt 220 lb 10.9 oz (100.1 kg)   SpO2 92%   BMI 26.17 kg/m²     General appearance: in no acute distress. Eyes: grossly normal  Lungs: on ventilator  Heart: tachycardic, normotensive  Abdomen: soft, non-tender, non distended. PEG in place  Skin:   Musculoskeletal: No clubbing cyanosis or edema    Assessment/Plan:  74 y.o. male with sacral wound in need of further debridement. Will hold off on diverting colostomy for now. Patient was evaluated for GI bleed last week. PEG lavage did not reveal gross blood. Patient has had multiple brown, non-bloody bowel movements. Continue sitz baths for anal fissure  Monitor H/H  No plans for endoscopy at this time. Hold anticoagulation  NPO at midnight  Debridement in OR 5/17    Risks, benefits, and complications of the procedure discussed with the patient's wife Rudi Betancur who expresses understanding and agrees to proceed. Patient findings and plan discussed with Dr. Dorie Lucero. Electronically signed by Dez Mercedes MD on 5/16/2022 at 10:49 AM     Pt seen and examined; agree w above  For operative debridement of sacral wound tomorrow (5/17/22)    Kiet Trejo MD  Minimally Invasive General Surgery and Endoscopy  [address] [place] [address].  [address]: [phone]  F: [phone]    Electronically signed by Margarita Spain MD on 5/16/2022 at 2:09 PM

## 2022-05-16 NOTE — PROGRESS NOTES
Comprehensive Nutrition Assessment    Type and Reason for Visit:  Reassess    Nutrition Recommendations/Plan:   1. Continue current TF, as tolerated (postop 5/17 after NPO for OR)     Malnutrition Assessment:  Malnutrition Status: At risk for malnutrition (Comment) (05/11/22 0894)    Context:  Chronic Illness     Findings of the 6 clinical characteristics of malnutrition:  Energy Intake:  Mild decrease in energy intake (Comment) (NPO/prior to TF order)  Weight Loss:  Greater than 7.5% over 3 months     Body Fat Loss:  No significant body fat loss     Muscle Mass Loss:  No significant muscle mass loss    Fluid Accumulation:  No significant fluid accumulation     Strength:  Not Performed    Nutrition Assessment:    Pt remains intubated/sedated and on TF via PEG. S/p CT place 5/16 and planned I+D 5/17 of sacral PI. Recommend continue current TF order, postop 5/17 when pt does not need NPO    Nutrition Related Findings:    intubated, +1-+2 edema, PEG to TF, CT, +I/O 8.5L Wound Type: Stage IV,Pressure Injury,Deep Tissue Injury (sacral PI with distal DTI per wound care)       Current Nutrition Intake & Therapies:    Average Meal Intake: NPO  Average Supplements Intake: NPO  Current Tube Feeding (TF) Orders:  · Feeding Route: PEG  · Formula: Immune Enhancing  · Schedule: Continuous  · Feeding Regimen: goal = 55 ml/hr = 1320 ml/d  · Additives/Modulars: Protein (once daily)  · Water Flushes: 30 ml Q 4 hr = 180 ml/d  · Current TF & Flush Orders Provides: at goal  · Goal TF & Flush Orders Provides: 2080 total regine, 150 g total protein, 1182 ml total free water      Anthropometric Measures:  Height: 6' 5\" (195.6 cm)  Ideal Body Weight (IBW): 208 lbs (95 kg)    Admission Body Weight: 206 lb 9.1 oz (93.7 kg) (5/11)  Current Body Weight: 220 lb 10.9 oz (100.1 kg), 106.1 % IBW.  Weight Source: Bed Scale (5/16)  Current BMI (kg/m2): 26.2  Usual Body Weight: 249 lb 1.9 oz (113 kg) (2/12 at [facility])  % Weight Change (Calculated): -17.1                    BMI Categories: Normal Weight (BMI 22.0 to 24.9) age over 72    Estimated Daily Nutrient Needs:  Energy Requirements Based On: Formula ([address] 2003b)  Weight Used for Energy Requirements: Admission  Energy (kcal/day):   Weight Used for Protein Requirements: Admission  Protein (g/day): 140-155  Method Used for Fluid Requirements: Standard Renal  Fluid (ml/day): per Renal and Critical Care    Nutrition Diagnosis:   · Inadequate oral intake related to impaired respiratory function as evidenced by intubation,nutrition support - enteral nutrition,NPO or clear liquid status due to medical condition,wounds      Nutrition Interventions:   Food and/or Nutrient Delivery: Continue Current Tube Feeding,Continue NPO  Nutrition Education/Counseling: Education not indicated  Coordination of Nutrition Care: Continue to monitor while inpatient       Goals:  Previous Goal Met: Progressing toward Goal(s)  Goals:  Tolerate nutrition support at goal rate       Nutrition Monitoring and Evaluation:   Behavioral-Environmental Outcomes: None Identified  Food/Nutrient Intake Outcomes: Enteral Nutrition Intake/Tolerance  Physical Signs/Symptoms Outcomes: Biochemical Data,GI Status,Fluid Status or Edema,Nutrition Focused Physical Findings,Skin,Weight,Hemodynamic Status    Discharge Planning:    Enteral Nutrition     Rosalinda Husain RD, [address] , [place]  Contact: [phone]

## 2022-05-16 NOTE — PLAN OF CARE
Problem: Pain  Goal: Verbalizes/displays adequate comfort level or baseline comfort level  Outcome: Progressing     Problem: Skin/Tissue Integrity  Goal: Absence of new skin breakdown  Description: 1. Monitor for areas of redness and/or skin breakdown  2. Assess vascular access sites hourly  3. Every 4-6 hours minimum:  Change oxygen saturation probe site  4. Every 4-6 hours:  If on nasal continuous positive airway pressure, respiratory therapy assess nares and determine need for appliance change or resting period.   Outcome: Progressing     Problem: Safety - Adult  Goal: Free from fall injury  Outcome: Progressing  Flowsheets (Taken 5/15/2022 0800)  Free From Fall Injury: Based on caregiver fall risk screen, instruct family/caregiver to ask for assistance with transferring infant if caregiver noted to have fall risk factors     Problem: ABCDS Injury Assessment  Goal: Absence of physical injury  Outcome: Progressing  Flowsheets (Taken 5/15/2022 0800)  Absence of Physical Injury: Implement safety measures based on patient assessment     Problem: Respiratory - Adult  Goal: Achieves optimal ventilation and oxygenation  5/15/2022 2057 by Antoinette Engel RN  Outcome: Progressing  5/15/2022 1224 by Thanh Molina RCP  Outcome: Progressing  5/15/2022 1224 by Thanh Molina RCP  Flowsheets  Taken 5/15/2022 1224 by Thanh Molina RCP  Achieves optimal ventilation and oxygenation:   Assess for changes in respiratory status   Position to facilitate oxygenation and minimize respiratory effort   Assess the need for suctioning and aspirate as needed   Respiratory therapy support as indicated  Taken 5/15/2022 0800 by Antoinette Engel RN  Achieves optimal ventilation and oxygenation:   Assess for changes in respiratory status   Position to facilitate oxygenation and minimize respiratory effort   Assess the need for suctioning and aspirate as needed   Respiratory therapy support as indicated   Oxygen supplementation based on oxygen saturation or arterial blood gases     Problem: Chronic Conditions and Co-morbidities  Goal: Patient's chronic conditions and co-morbidity symptoms are monitored and maintained or improved  Outcome: Progressing  Flowsheets (Taken 5/15/2022 0800)  Care Plan - Patient's Chronic Conditions and Co-Morbidity Symptoms are Monitored and Maintained or Improved: Monitor and assess patient's chronic conditions and comorbid symptoms for stability, deterioration, or improvement     Problem: Safety - Medical Restraint  Goal: Remains free of injury from restraints (Restraint for Interference with Medical Device)  Description: INTERVENTIONS:  1. Determine that other, less restrictive measures have been tried or would not be effective before applying the restraint  2. Evaluate the patient's condition at the time of restraint application  3. Inform patient/family regarding the reason for restraint  4.  Q2H: Monitor safety, psychosocial status, comfort, nutrition and hydration  Outcome: Progressing  Flowsheets (Taken 5/15/2022 0730)  Remains free of injury from restraints (restraint for interference with medical device):   Determine that other, less restrictive measures have been tried or would not be effective before applying the restraint   Inform patient/family regarding the reason for restraint   Every 2 hours: Monitor safety, psychosocial status, comfort, nutrition and hydration

## 2022-05-16 NOTE — PROGRESS NOTES
Critical Care Team - Daily Progress Note      Date and time: 5/16/2022 8:49 AM  Patient's name:  Sheeba Soliman  Medical Record Number: [id number]  Patient's account/billing number: [de-identified]  Patient's YOB: 1955  Age: 74 y.o. Date of Admission: 5/10/2022 11:58 PM  Length of stay during current admission: 5      Primary Care Physician: Colby Ritter MD  ICU Attending Physician: Dr. Fox Majano    Code Status: Full Code    Reason for ICU admission: Status postcardiac arrest      SUBJECTIVE:     OVERNIGHT EVENTS:       Patient awake today. Patient remained afebrile. Patient remains intubated, sleeping but easily able to awake. Patient able to nod and deny any headaches, chest pain, abdominal pain, fever or chills. Had two bowel movements overnight. Vomit suctioned from NG tube. Unsuccessful weaning trials. Intake/Output:   No intake/output data recorded. I/O last 3 completed shifts: In: 2552.9 [I.V.:132.2; NG/GT:1374; IV Piggyback:1046.7]  Out: -     Awake and following commands: No  Current Ventilation: - Ventilator Settings:    Vt (Set, mL): 400 mL  Resp Rate (Set): 18 bmp  FiO2 : 30 %    PEEP/CPAP (cmH2O): 6  Pressure Support: 0 cmH20  Secretions: Thick, yellow secretions, blood  Sedation: fentanyl  Paralyzed: No  Vasopressors: No    Initial HPI + past overnight events: 57-year-old male resident of [facility] with significant past medical history of A. fib on Eliquis, aspirin, anxiety, anemia, CHF, insulin-dependent type 2 diabetes, CKD on hemodialysis, sacral decubitus ulcers with wound VAC, hyperlipidemia, hypothyroidism. Patient presented to the ICU after having a cardiac arrest, required CPR 2 rounds of epinephrine, and intubated. He is currently on Tigacyline and Amikacin per ID for treatment of HCAP.      OBJECTIVE:     VITAL SIGNS:  /61   Pulse 107   Temp 98.5 °F (36.9 °C)   Resp 26   Ht 6' 5\" (1.956 m)   Wt 220 lb 10.9 oz (100.1 kg)   SpO2 (!) 76%   BMI 26.17 kg/m²   Tmax over 24 hours:  Temp (24hrs), Av.6 °F (37 °C), Min:98.5 °F (36.9 °C), Max:98.6 °F (37 °C)      Patient Vitals for the past 6 hrs:   BP Temp Pulse Resp SpO2   22 0819 -- -- 107 26 (!) 76 %   22 0812 -- -- -- 13 91 %   22 0811 -- -- -- 18 92 %   22 0600 112/61 -- 121 19 94 %   22 0500 118/88 -- 88 19 95 %   22 0402 -- -- -- 17 95 %   22 0401 -- -- -- 17 95 %   22 0400 125/69 98.5 °F (36.9 °C) 90 23 93 %   22 0300 128/63 -- 84 18 94 %         Intake/Output Summary (Last 24 hours) at 2022 0849  Last data filed at 2022 0600  Gross per 24 hour   Intake 1438.37 ml   Output --   Net 1438.37 ml     Wt Readings from Last 2 Encounters:   22 220 lb 10.9 oz (100.1 kg)   22 212 lb (96.2 kg)     Body mass index is 26.17 kg/m². Physical Exam  Vitals and nursing note reviewed. Constitutional:       General: He is awake. Appearance: He is ill-appearing. Comments:  intubated   HENT:      Head: Normocephalic and atraumatic. Nose: Nose normal. No congestion or rhinorrhea. Mouth/Throat:      Mouth: Mucous membranes are moist.      Pharynx: Oropharynx is clear. Eyes:      General: No scleral icterus. Extraocular Movements: Extraocular movements intact. Conjunctiva/sclera: Conjunctivae normal.   Cardiovascular:      Rate and Rhythm: Normal rate and regular rhythm. Pulses: Normal pulses. Heart sounds: Normal heart sounds. No murmur heard. Pulmonary:      Breath sounds: No stridor. Rhonchi present. Comments: intubated  Abdominal:      General: Bowel sounds are normal. There is no distension. Palpations: Abdomen is soft. There is no mass. Tenderness: There is no abdominal tenderness. There is no guarding or rebound. Comments: PEG tube   Musculoskeletal:         General: Normal range of motion. Cervical back: Normal range of motion and neck supple. No tenderness.       Right lower leg: Edema present. Left lower leg: Edema present. Skin:     Findings: Lesion (Sacral decubitus) present. Comments: Chronic changes -Dark-colored lower legs b/l  Feet are cold to touch    Neurological:      Mental Status: He is alert. Cranial Nerves: No cranial nerve deficit. Sensory: No sensory deficit. Comments: Patient awake and alert, orientated to name. Not sure about location and season. Psychiatric:         Attention and Perception: Attention normal.         Behavior: Behavior is cooperative.            MEDICATIONS:    Scheduled Meds:   metoprolol tartrate  25 mg Oral BID    mupirocin   Topical BID    tigecycline (TYGACIL) IVPB  50 mg IntraVENous Q12H    sulfamethoxazole-trimethoprim  2 tablet Oral Daily    acetylcysteine  4 mL Inhalation Q4H    ipratropium-albuterol  1 ampule Inhalation Q4H    polyethylene glycol  17 g Oral Daily    pantoprazole (PROTONIX) 40 mg injection  40 mg IntraVENous Daily    chlorhexidine  15 mL Mouth/Throat BID    senna  5 mL Per G Tube Nightly    docusate  100 mg Per G Tube BID    levothyroxine  175 mcg Per G Tube Daily    insulin glargine  20 Units SubCUTAneous BID    insulin lispro  0-12 Units SubCUTAneous Q6H    levETIRAcetam  500 mg PEG Tube BID    sodium chloride flush  5-40 mL IntraVENous 2 times per day     Continuous Infusions:   sodium chloride      sodium chloride      fentaNYL 50 mcg/hr (05/15/22 1845)    dextrose      sodium chloride Stopped (05/13/22 1631)     PRN Meds:   sodium chloride, , PRN  sodium chloride, , PRN  anticoagulant sodium citrate, 4.2 mL, PRN  perflutren lipid microspheres, 1.5 mL, ONCE PRN  glucose, 4 tablet, PRN  dextrose bolus, 125 mL, PRN   Or  dextrose bolus, 250 mL, PRN  glucagon (rDNA), 1 mg, PRN  dextrose, 100 mL/hr, PRN  sodium chloride flush, 5-40 mL, PRN  sodium chloride, , PRN          VENT SETTINGS (Comprehensive) (if applicable):  Vent Information  Ventilator ID: [id number]  Vent Mode: AC/VC  Ventilator Initiate: Yes  Additional Respiratory Assessments  Pulse: 107  Resp: 26  SpO2: (!) 76 %  End Tidal CO2: 42 (%)  Position: Semi-Fajardo's  Humidification Source: Heated wire  Humidification Temp: 37  Circuit Condensation: Drained  Cuff Pressure (cm H2O): 29 cm H2O    Arterial Blood Gas 5/16/2022  No new ABG       Laboratory findings:    Complete Blood Count:   Recent Labs     05/14/22  0529 05/14/22  0529 05/14/22  1423 05/15/22  0600 05/16/22  0605   WBC 10.7  --   --  10.0 9.6   HGB 6.9*   < > 8.0* 7.6* 7.2*   HCT 22.7*   < > 25.8* 24.8* 23.6*     --   --  130 125*    < > = values in this interval not displayed. Last 3 Blood Glucose:   Recent Labs     05/14/22  0529 05/15/22  0600 05/16/22  0605   GLUCOSE 241* 183* 146*        PT/INR:    Lab Results   Component Value Date    PROTIME 13.0 05/11/2022    INR 1.2 05/11/2022     PTT:    Lab Results   Component Value Date    APTT 32.8 05/11/2022       Comprehensive Metabolic Profile:   Recent Labs     05/14/22  0529 05/15/22  0600 05/16/22  0605    132 132   K 4.4 4.0 4.7   CL 92* 91* 92*   CO2 27 31* 29   BUN 71* 52* 83*   CREATININE 2.2* 1.7* 2.1*   GLUCOSE 241* 183* 146*   CALCIUM 9.0 8.9 8.7      Magnesium:   Lab Results   Component Value Date    MG 2.2 05/16/2022     Phosphorus:   Lab Results   Component Value Date    PHOS 4.2 05/16/2022     Ionized Calcium: No results found for: CAION     Urinalysis:     Troponin: No results for input(s): TROPONINI in the last 72 hours. Microbiology:  Cultures drawn: Gram stain respiratory from sputum suction pending, MRSA nasal colonization positive, blood cultures 24 hours no growth-positive contaminant 1 bottle shows gram-positive cocci in clusters. Respiratory panel negative    Radiology/Imaging:     Chest Xray (5/16/2022): Stable chest with lines and tubes unchanged. Increasing markings throughout the lung fields no change in bilateral pleural effusion.     ASSESSMENT:     Patient Active Problem List    Diagnosis Date Noted    Pneumonia due to infectious organism     Pleural effusion     Cardiac arrest ([facility] [address] 05/11/2022         PLAN:     Neuro   Patient intubated   History of seizures on Keppra   Mental status somewhat improved since arrival.     Respiratory   Acute hypercapnic respiratory failure with hypoxia   Intubated 5/10/2022   Thoracentesis completed 5/15/2022 - 450-cytology, protein 3.6 , , glucose 53, culture-positive for staph epidermidis and staph species and gram negative   MRSA nasal colonization positive - start Bactroban    Pneumonia   Plan for left chest tube placement today     Cardiovascular   History of A. fib    Hold Eliquis   EKG concerning for heart block.  Elevated troponin most likely secondary to CKD   Start coreg 3.125   Cardiology following recommended starting pt on Metoprolol for Afib and dc carvedilol      GI   History of a PEG tube   Concern for GI bleed   Stable Hgb   Vomit suctioned from ET Tube. Likely due to increasing pressure support   Hold tube feeds.  Diarrhea noted-FMS   Occult positive stools. Continue to monitor.  Surgery consult for possible diverting colostomy. Renal   CKD on dialysis Monday Wednesday Friday   Dialysis due today   Nephrology following    Infectious disease   Sepsis with septic shock   decubitus ulcer-surgery, wound care following   Linezolid and Merrem switched to Tigecycline and Amikcacin   Abx per ID     Heme/Onc   PRBC received X2, 05/12/2022, 05/14/2022    trend H&H, transfuse if <7.   Surgery following    Endocrine   Type 2 diabetes on Lantus 20 units,MDSS   Hypothyroidism on synthroid    Social/Spiritual/DNR/Other   Code status: Full  Diet Diet NPO   ADULT TUBE FEEDING; PEG; Immune Enhancing; Continuous; 20; Yes; 20; Q 4 hours; 55; 30; Q 4 hours; Protein;  Once daily   Stress ulcer prophylaxis: protonix 40mg   DVT prophylaxis: SCDS    Consultations needed: Yes   Transfer out of ICU today? No    Other: monitor off the fentanyl, Ativan PRN        Lines/catheters   Date 05/10  o ETT  o CVC triple lumen R femoral vein   o Urinary cath   Tunneled HD cath right subclavian 4 days      Prem Amor DO  8:49 AM  05/16/22      I personally saw, examined and provided care for the patient. Radiographs, labs and medication list were reviewed by me independently. Review of Residents documentation was conducted and revisions were made as appropriate. I agree with the above documented exam, problem list and plan of care. Pigtail chest tube at bedside today for further pleural drainage given parapneumonic effusion with Pseudomonas from pleural fluid. Patient with sacral wound, consult general surgery with plan for debridement tomorrow. Having ongoing stooling with concern of sacral wound. Okay for FMS.     CCT excluding procedures 38 minutes    Ramya Zaragoza DO

## 2022-05-16 NOTE — FLOWSHEET NOTE
05/16/22 1927   Vital Signs   BP (!) 155/74   Pulse 82   Resp (!) 78   SpO2 100 %   Weight 221 lb 12.5 oz (100.6 kg)   Weight Method Estimated; Bed scale   Percent Weight Change -1.66   Post-Hemodialysis Assessment   Post-Treatment Procedures Blood returned;Catheter capped, clamped with Citrate x 2 ports   Machine Disinfection Process Acid/Vinegar Clean;Exterior Machine Disinfection; Heat Disinfect   Dialyzer Clearance Moderately streaked   Duration of Treatment (minutes) 180 minutes   Heparin amount administered during treatment (units) 0 units   Hemodialysis Intake (ml) 400 ml   Hemodialysis Output (ml) 1900 ml   NET Removed (ml) 1500 ml   Tolerated Treatment Good   Patient Response to Treatment Pt tolerated tx well. Fluid balance -1500ml. Heparin used to close HD CVC ports to fill volume. Report given to ICU RN. Pt stable   Bilateral Breath Sounds Diminished   Edema Right upper extremity; Left upper extremity;Right lower extremity; Left lower extremity   RUE Edema +2   LUE Edema +2   RLE Edema +1   LLE Edema +1   Patient Disposition Remain in ICU/ED

## 2022-05-16 NOTE — PROGRESS NOTES
INPATIENT CARDIOLOGY FOLLOW-UP    Name: Daysi Cordero    Age: 74 y.o. Date of Admission: 5/10/2022 11:58 PM    Date of Service: 5/16/2022    Chief Complaint: Follow-up for sepsis with resolving septic shock, possible type II non-ST elevation myocardial infarction, atrial fibrillation/flutter, chronic kidney disease, pneumonia with associated pleural effusion    Interim History: The patient presently remains intubated mechanically ventilated and is presently slightly agitated in spite of sedation. Interim findings of his pleural effusion and management recommendations have been reviewed with persistent bilateral effusions and diffuse interstitial infiltrates radiographically. Marginal rate control of his atrial fibrillation is presently noted      Review of Systems: The remainder of a complete multisystem review including consitutional, central nervous, respiratory, circulatory, gastrointestinal, genitourinary, endocrinologic, hematologic, musculoskeletal and psychiatric are negative. Problem List:  Patient Active Problem List   Diagnosis    Cardiac arrest [facility])       Allergies:   Allergies   Allergen Reactions    Other Nausea Only     \"narcotics\" reaction unknown       Current Medications:  Current Facility-Administered Medications   Medication Dose Route Frequency Provider Last Rate Last Admin    mupirocin (BACTROBAN) 2 % ointment   Topical BID Ez Jones DO   Given at 05/15/22 2121    carvedilol (COREG) tablet 3.125 mg  3.125 mg Oral BID Mela Glover DO   3.125 mg at 05/15/22 2122    tigecycline (TYGACIL) 50 mg in sodium chloride 0.9 % 100 mL IVPB  50 mg IntraVENous Q12H Rebecca Victor MD   Stopped at 05/16/22 0235    sulfamethoxazole-trimethoprim (BACTRIM DS;SEPTRA DS) 800-160 MG per tablet 2 tablet  2 tablet Oral Daily Rebecca Victor MD   2 tablet at 05/15/22 1750    0.9 % sodium chloride infusion   IntraVENous PRN Svetlana Luong DO        acetylcysteine (MUCOMYST) 10 % solution 400 mg  4 mL Inhalation Q4H Luke Glover, DO   400 mg at 05/16/22 0400    ipratropium-albuterol (DUONEB) nebulizer solution 1 ampule  1 ampule Inhalation Q4H Luke Glover, DO   1 ampule at 05/16/22 0400    0.9 % sodium chloride infusion   IntraVENous PRN Magui Waters MD        anticoagulant sodium citrate 4 GM/100ML solution 0.168 g  4.2 mL IntraCATHeter PRN Noam Ricks MD        norepinephrine (LEVOPHED) 16 mg in dextrose 5 % 250 mL infusion  1-100 mcg/min IntraVENous Continuous Monroe Meredith MD   Paused at 05/12/22 1520    fentaNYL 10 mcg/ml in 0.9%  ml infusion   mcg/hr IntraVENous Continuous Monroe Meredith MD 5 mL/hr at 05/15/22 1845 50 mcg/hr at 05/15/22 1845    midazolam (VERSED) 1 mg/mL in D5W infusion  1-10 mg/hr IntraVENous Continuous Bre Machado MD   Stopped at 05/12/22 0924    polyethylene glycol (GLYCOLAX) packet 17 g  17 g Oral Daily Marshal Ruiz MD   17 g at 05/15/22 0816    pantoprazole (PROTONIX) 40 mg in sodium chloride (PF) 10 mL injection  40 mg IntraVENous Daily Marshal Ruiz MD   40 mg at 05/15/22 1042    chlorhexidine (PERIDEX) 0.12 % solution 15 mL  15 mL Mouth/Throat BID Raquel Medeiros MD   15 mL at 05/15/22 2142    senna (SENOKOT) 8.8 MG/5ML syrup 8.8 mg  5 mL Per G Tube Nightly Raquel Medeiros MD   8.8 mg at 05/15/22 2122    docusate (COLACE) 50 MG/5ML liquid 100 mg  100 mg Per G Tube BID Raquel Medeiros MD   100 mg at 05/15/22 2121    levothyroxine (SYNTHROID) tablet 175 mcg  175 mcg Per G Tube Daily Raquel Medeiros MD   175 mcg at 05/16/22 8384    perflutren lipid microspheres (DEFINITY) injection 1.65 mg  1.5 mL IntraVENous ONCE PRN Raquel Medeiros MD        insulin glargine (LANTUS) injection vial 20 Units  20 Units SubCUTAneous BID Raquel Medeiros MD   20 Units at 05/15/22 2121    insulin lispro (HUMALOG) injection vial 0-12 Units  0-12 Units SubCUTAneous Q6H Raquel Medeiros MD   2 Units at 05/16/22 9063    glucose chewable tablet 16 g  4 tablet Oral PRN Mary Goodwin MD        dextrose bolus 10% 125 mL  125 mL IntraVENous PRN Mary Goodwin MD   Stopped at 05/14/22 1804    Or    dextrose bolus 10% 250 mL  250 mL IntraVENous PRN Mary Goodwin MD        glucagon (rDNA) injection 1 mg  1 mg IntraMUSCular PRN Mary Goodwin MD        dextrose 5 % solution  100 mL/hr IntraVENous PRN Mary Goodwin MD        levETIRAcetam (KEPPRA) 100 MG/ML solution 500 mg  500 mg PEG Tube BID Mary Goodwin MD   500 mg at 05/15/22 2121    sodium chloride flush 0.9 % injection 5-40 mL  5-40 mL IntraVENous 2 times per day Mary Goodwin MD   10 mL at 05/15/22 2144    sodium chloride flush 0.9 % injection 5-40 mL  5-40 mL IntraVENous PRN Mary Goodwin MD        0.9 % sodium chloride infusion   IntraVENous PRN Mary Goodwin MD   Stopped at 05/13/22 1631      sodium chloride      sodium chloride      norepinephrine Stopped (05/12/22 1520)    fentaNYL 50 mcg/hr (05/15/22 1845)    midazolam Stopped (05/12/22 0924)    dextrose      sodium chloride Stopped (05/13/22 1631)       Physical Exam:  /61   Pulse 121   Temp 98.5 °F (36.9 °C)   Resp 19   Ht 6' 5\" (1.956 m)   Wt 220 lb 10.9 oz (100.1 kg)   SpO2 94%   BMI 26.17 kg/m²   Weight change: Wt Readings from Last 3 Encounters:   05/14/22 220 lb 10.9 oz (100.1 kg)   04/27/22 212 lb (96.2 kg)     The patient is awake and alert, albeit somewhat agitated and in no discomfort or distress. No gross musculoskeletal deformity is present. No significant skin or nail changes are present. Gross examination of head, eyes, nose and throat are negative. Jugular venous pressure is normal and no carotid bruits are present. Normal respiratory effort is noted with no accessory muscle usage present. Lung fields coarse breath sounds throughout all lung fields to ascultation.  Cardiac examination is notable for an irregular rhythm with no palpable thrill. No gallop rhythm or cardiac murmur are identified. A benign abdominal examination is present with no masses or organomegaly. Intact pulses are present throughout all extremities and no peripheral edema is present. No focal neurologic deficits are present. Intake/Output:    Intake/Output Summary (Last 24 hours) at 5/16/2022 0728  Last data filed at 5/16/2022 0600  Gross per 24 hour   Intake 2522.91 ml   Output --   Net 2522.91 ml     No intake/output data recorded. Laboratory Tests:  Lab Results   Component Value Date    CREATININE 2.1 (H) 05/16/2022    BUN 83 (H) 05/16/2022     05/16/2022    K 4.7 05/16/2022    CL 92 (L) 05/16/2022    CO2 29 05/16/2022     No results for input(s): CKTOTAL, CKMB in the last 72 hours. Invalid input(s): TROPONONI  No results found for: BNP  Lab Results   Component Value Date    WBC 9.6 05/16/2022    RBC 2.68 05/16/2022    HGB 7.2 05/16/2022    HCT 23.6 05/16/2022    MCV 88.1 05/16/2022    MCH 26.9 05/16/2022    MCHC 30.5 05/16/2022    RDW 21.4 05/16/2022     05/16/2022    MPV 9.8 05/16/2022     No results for input(s): ALKPHOS, ALT, AST, PROT, BILITOT, BILIDIR, LABALBU in the last 72 hours.   Lab Results   Component Value Date    MG 2.2 05/16/2022     Lab Results   Component Value Date    PROTIME 13.0 05/11/2022    INR 1.2 05/11/2022     Lab Results   Component Value Date    TSH 2.700 05/11/2022     No components found for: CHLPL  No results found for: TRIG  No results found for: HDL  No results found for: [facility]    Cardiac Tests:  Telemetry findings reviewed: atrial fibrillation with a mean ventricular response of approximately 100 bpm, no new tachy/bradyarrhythmias overnight  Chest X-ray: A repeat chest x-ray continues to demonstrate evidence of cardiomegaly with diffuse interstitial infiltrates and bilateral pleural effusion      ASSESSMENT / PLAN: Clinical basis, the patient remains compensated from cardiovascular standpoint with persistent acute hypoxic respiratory failure in the face of his pneumonia and associated exudative effusion with cultures presently negative. Ongoing supportive care will be necessary and from a cardiovascular standpoint predominately limited that of rate control of his atrial arrhythmias with recommendations of the conversion of his existing carvedilol to that of metoprolol tartrate in the face of normal left ventricular systolic function to assist rate control without adverse effects of tachycardia with ongoing dose modification. On short-term basis, if no additional procedures are immediately proposed potential consideration of initiation of low molecular weight heparin would be advisable until therapeutic anticoagulation can be restored to assist reducing risk of embolic events. No additional cardiovascular assessment is presently anticipated with the majority of ongoing care deferred to primary care, the pulmonary/critical care service, infectious disease and nephrology services. Note: This report was completed utilizing computer voice recognition software. Every effort has been made to ensure accuracy, however; inadvertent computerized transcription errors may be present. Marlon Zheng.  Beth Burks, [facility]

## 2022-05-17 ENCOUNTER — APPOINTMENT (OUTPATIENT)
Dept: GENERAL RADIOLOGY | Age: 67
DRG: 003 | End: 2022-05-17
Payer: MEDICARE

## 2022-05-17 ENCOUNTER — ANESTHESIA (OUTPATIENT)
Dept: OPERATING ROOM | Age: 67
DRG: 003 | End: 2022-05-17
Payer: MEDICARE

## 2022-05-17 LAB
ABO/RH: NORMAL
ANION GAP SERPL CALCULATED.3IONS-SCNC: 9 MMOL/L (ref 7–16)
ANION GAP SERPL CALCULATED.3IONS-SCNC: 9 MMOL/L (ref 7–16)
ANISOCYTOSIS: ABNORMAL
ANTIBODY SCREEN: NORMAL
BASOPHILS ABSOLUTE: 0.01 E9/L (ref 0–0.2)
BASOPHILS RELATIVE PERCENT: 0.1 % (ref 0–2)
BLOOD BANK DISPENSE STATUS: NORMAL
BLOOD BANK PRODUCT CODE: NORMAL
BODY FLUID CULTURE, STERILE: ABNORMAL
BPU ID: NORMAL
BUN BLDV-MCNC: 51 MG/DL (ref 6–23)
BUN BLDV-MCNC: 53 MG/DL (ref 6–23)
CALCIUM SERPL-MCNC: 8.7 MG/DL (ref 8.6–10.2)
CALCIUM SERPL-MCNC: 8.8 MG/DL (ref 8.6–10.2)
CHLORIDE BLD-SCNC: 93 MMOL/L (ref 98–107)
CHLORIDE BLD-SCNC: 95 MMOL/L (ref 98–107)
CO2: 29 MMOL/L (ref 22–29)
CO2: 29 MMOL/L (ref 22–29)
CREAT SERPL-MCNC: 1.5 MG/DL (ref 0.7–1.2)
CREAT SERPL-MCNC: 1.6 MG/DL (ref 0.7–1.2)
DESCRIPTION BLOOD BANK: NORMAL
EOSINOPHILS ABSOLUTE: 0.14 E9/L (ref 0.05–0.5)
EOSINOPHILS RELATIVE PERCENT: 1.7 % (ref 0–6)
GFR AFRICAN AMERICAN: 52
GFR AFRICAN AMERICAN: 56
GFR NON-AFRICAN AMERICAN: 43 ML/MIN/1.73
GFR NON-AFRICAN AMERICAN: 46 ML/MIN/1.73
GLUCOSE BLD-MCNC: 52 MG/DL (ref 74–99)
GLUCOSE BLD-MCNC: 63 MG/DL (ref 74–99)
GRAM STAIN RESULT: ABNORMAL
HCT VFR BLD CALC: 22 % (ref 37–54)
HCT VFR BLD CALC: 24.4 % (ref 37–54)
HEMOGLOBIN: 6.8 G/DL (ref 12.5–16.5)
HEMOGLOBIN: 7.6 G/DL (ref 12.5–16.5)
HYPOCHROMIA: ABNORMAL
IMMATURE GRANULOCYTES #: 0.09 E9/L
IMMATURE GRANULOCYTES %: 1.1 % (ref 0–5)
INR BLD: 1.4
LACTIC ACID: 0.7 MMOL/L (ref 0.5–2.2)
LYMPHOCYTES ABSOLUTE: 0.61 E9/L (ref 1.5–4)
LYMPHOCYTES RELATIVE PERCENT: 7.6 % (ref 20–42)
MAGNESIUM: 2.1 MG/DL (ref 1.6–2.6)
MCH RBC QN AUTO: 27.1 PG (ref 26–35)
MCHC RBC AUTO-ENTMCNC: 30.9 % (ref 32–34.5)
MCV RBC AUTO: 87.6 FL (ref 80–99.9)
METER GLUCOSE: 110 MG/DL (ref 74–99)
METER GLUCOSE: 164 MG/DL (ref 74–99)
METER GLUCOSE: 41 MG/DL (ref 74–99)
METER GLUCOSE: 49 MG/DL (ref 74–99)
METER GLUCOSE: 53 MG/DL (ref 74–99)
METER GLUCOSE: 54 MG/DL (ref 74–99)
METER GLUCOSE: 60 MG/DL (ref 74–99)
METER GLUCOSE: 64 MG/DL (ref 74–99)
METER GLUCOSE: 65 MG/DL (ref 74–99)
METER GLUCOSE: 66 MG/DL (ref 74–99)
METER GLUCOSE: 69 MG/DL (ref 74–99)
METER GLUCOSE: 75 MG/DL (ref 74–99)
METER GLUCOSE: 75 MG/DL (ref 74–99)
METER GLUCOSE: 77 MG/DL (ref 74–99)
METER GLUCOSE: 77 MG/DL (ref 74–99)
METER GLUCOSE: 81 MG/DL (ref 74–99)
METER GLUCOSE: 81 MG/DL (ref 74–99)
METER GLUCOSE: 82 MG/DL (ref 74–99)
METER GLUCOSE: 89 MG/DL (ref 74–99)
METER GLUCOSE: 89 MG/DL (ref 74–99)
METER GLUCOSE: 91 MG/DL (ref 74–99)
METER GLUCOSE: 93 MG/DL (ref 74–99)
METER GLUCOSE: 95 MG/DL (ref 74–99)
MONOCYTES ABSOLUTE: 0.75 E9/L (ref 0.1–0.95)
MONOCYTES RELATIVE PERCENT: 9.3 % (ref 2–12)
NEUTROPHILS ABSOLUTE: 6.44 E9/L (ref 1.8–7.3)
NEUTROPHILS RELATIVE PERCENT: 80.2 % (ref 43–80)
ORGANISM: ABNORMAL
OVALOCYTES: ABNORMAL
PDW BLD-RTO: 20.7 FL (ref 11.5–15)
PHOSPHORUS: 4 MG/DL (ref 2.5–4.5)
PLATELET # BLD: 98 E9/L (ref 130–450)
PLATELET CONFIRMATION: NORMAL
PMV BLD AUTO: 9.5 FL (ref 7–12)
POIKILOCYTES: ABNORMAL
POLYCHROMASIA: ABNORMAL
POTASSIUM SERPL-SCNC: 4 MMOL/L (ref 3.5–5)
POTASSIUM SERPL-SCNC: 4.1 MMOL/L (ref 3.5–5)
PROTHROMBIN TIME: 15.5 SEC (ref 9.3–12.4)
RBC # BLD: 2.51 E12/L (ref 3.8–5.8)
SODIUM BLD-SCNC: 131 MMOL/L (ref 132–146)
SODIUM BLD-SCNC: 133 MMOL/L (ref 132–146)
TARGET CELLS: ABNORMAL
WBC # BLD: 8 E9/L (ref 4.5–11.5)

## 2022-05-17 PROCEDURE — 87102 FUNGUS ISOLATION CULTURE: CPT

## 2022-05-17 PROCEDURE — 2580000003 HC RX 258: Performed by: INTERNAL MEDICINE

## 2022-05-17 PROCEDURE — 2500000003 HC RX 250 WO HCPCS: Performed by: STUDENT IN AN ORGANIZED HEALTH CARE EDUCATION/TRAINING PROGRAM

## 2022-05-17 PROCEDURE — 36592 COLLECT BLOOD FROM PICC: CPT

## 2022-05-17 PROCEDURE — 6360000002 HC RX W HCPCS: Performed by: SPECIALIST

## 2022-05-17 PROCEDURE — P9016 RBC LEUKOCYTES REDUCED: HCPCS

## 2022-05-17 PROCEDURE — 6360000002 HC RX W HCPCS

## 2022-05-17 PROCEDURE — 6370000000 HC RX 637 (ALT 250 FOR IP): Performed by: INTERNAL MEDICINE

## 2022-05-17 PROCEDURE — 6360000002 HC RX W HCPCS: Performed by: INTERNAL MEDICINE

## 2022-05-17 PROCEDURE — 2580000003 HC RX 258: Performed by: SPECIALIST

## 2022-05-17 PROCEDURE — 86901 BLOOD TYPING SEROLOGIC RH(D): CPT

## 2022-05-17 PROCEDURE — 88311 DECALCIFY TISSUE: CPT

## 2022-05-17 PROCEDURE — 02HV33Z INSERTION OF INFUSION DEVICE INTO SUPERIOR VENA CAVA, PERCUTANEOUS APPROACH: ICD-10-PCS | Performed by: INTERNAL MEDICINE

## 2022-05-17 PROCEDURE — A4216 STERILE WATER/SALINE, 10 ML: HCPCS | Performed by: STUDENT IN AN ORGANIZED HEALTH CARE EDUCATION/TRAINING PROGRAM

## 2022-05-17 PROCEDURE — 87015 SPECIMEN INFECT AGNT CONCNTJ: CPT

## 2022-05-17 PROCEDURE — 2000000000 HC ICU R&B

## 2022-05-17 PROCEDURE — 85014 HEMATOCRIT: CPT

## 2022-05-17 PROCEDURE — 3600000002 HC SURGERY LEVEL 2 BASE: Performed by: SURGERY

## 2022-05-17 PROCEDURE — 82962 GLUCOSE BLOOD TEST: CPT

## 2022-05-17 PROCEDURE — 3700000000 HC ANESTHESIA ATTENDED CARE: Performed by: SURGERY

## 2022-05-17 PROCEDURE — 87206 SMEAR FLUORESCENT/ACID STAI: CPT

## 2022-05-17 PROCEDURE — 99233 SBSQ HOSP IP/OBS HIGH 50: CPT | Performed by: INTERNAL MEDICINE

## 2022-05-17 PROCEDURE — 2580000003 HC RX 258: Performed by: STUDENT IN AN ORGANIZED HEALTH CARE EDUCATION/TRAINING PROGRAM

## 2022-05-17 PROCEDURE — 36430 TRANSFUSION BLD/BLD COMPNT: CPT

## 2022-05-17 PROCEDURE — 88304 TISSUE EXAM BY PATHOLOGIST: CPT

## 2022-05-17 PROCEDURE — 2500000003 HC RX 250 WO HCPCS: Performed by: NURSE ANESTHETIST, CERTIFIED REGISTERED

## 2022-05-17 PROCEDURE — 71045 X-RAY EXAM CHEST 1 VIEW: CPT

## 2022-05-17 PROCEDURE — 6370000000 HC RX 637 (ALT 250 FOR IP): Performed by: STUDENT IN AN ORGANIZED HEALTH CARE EDUCATION/TRAINING PROGRAM

## 2022-05-17 PROCEDURE — 87205 SMEAR GRAM STAIN: CPT

## 2022-05-17 PROCEDURE — 2780000010 HC IMPLANT OTHER: Performed by: SURGERY

## 2022-05-17 PROCEDURE — 85018 HEMOGLOBIN: CPT

## 2022-05-17 PROCEDURE — 87186 SC STD MICRODIL/AGAR DIL: CPT

## 2022-05-17 PROCEDURE — 2709999900 HC NON-CHARGEABLE SUPPLY: Performed by: SURGERY

## 2022-05-17 PROCEDURE — 83605 ASSAY OF LACTIC ACID: CPT

## 2022-05-17 PROCEDURE — 6360000002 HC RX W HCPCS: Performed by: STUDENT IN AN ORGANIZED HEALTH CARE EDUCATION/TRAINING PROGRAM

## 2022-05-17 PROCEDURE — 87116 MYCOBACTERIA CULTURE: CPT

## 2022-05-17 PROCEDURE — 94640 AIRWAY INHALATION TREATMENT: CPT

## 2022-05-17 PROCEDURE — 36415 COLL VENOUS BLD VENIPUNCTURE: CPT

## 2022-05-17 PROCEDURE — 85610 PROTHROMBIN TIME: CPT

## 2022-05-17 PROCEDURE — 83735 ASSAY OF MAGNESIUM: CPT

## 2022-05-17 PROCEDURE — 6370000000 HC RX 637 (ALT 250 FOR IP): Performed by: SPECIALIST

## 2022-05-17 PROCEDURE — 86900 BLOOD TYPING SEROLOGIC ABO: CPT

## 2022-05-17 PROCEDURE — C9113 INJ PANTOPRAZOLE SODIUM, VIA: HCPCS | Performed by: STUDENT IN AN ORGANIZED HEALTH CARE EDUCATION/TRAINING PROGRAM

## 2022-05-17 PROCEDURE — 87075 CULTR BACTERIA EXCEPT BLOOD: CPT

## 2022-05-17 PROCEDURE — 3700000001 HC ADD 15 MINUTES (ANESTHESIA): Performed by: SURGERY

## 2022-05-17 PROCEDURE — 87106 FUNGI IDENTIFICATION YEAST: CPT

## 2022-05-17 PROCEDURE — 86923 COMPATIBILITY TEST ELECTRIC: CPT

## 2022-05-17 PROCEDURE — 87077 CULTURE AEROBIC IDENTIFY: CPT

## 2022-05-17 PROCEDURE — 85025 COMPLETE CBC W/AUTO DIFF WBC: CPT

## 2022-05-17 PROCEDURE — 84100 ASSAY OF PHOSPHORUS: CPT

## 2022-05-17 PROCEDURE — 3600000012 HC SURGERY LEVEL 2 ADDTL 15MIN: Performed by: SURGERY

## 2022-05-17 PROCEDURE — 86850 RBC ANTIBODY SCREEN: CPT

## 2022-05-17 PROCEDURE — 87070 CULTURE OTHR SPECIMN AEROBIC: CPT

## 2022-05-17 PROCEDURE — 94003 VENT MGMT INPAT SUBQ DAY: CPT

## 2022-05-17 PROCEDURE — 36556 INSERT NON-TUNNEL CV CATH: CPT

## 2022-05-17 PROCEDURE — 80048 BASIC METABOLIC PNL TOTAL CA: CPT

## 2022-05-17 RX ORDER — MIDAZOLAM HYDROCHLORIDE 2 MG/2ML
2 INJECTION, SOLUTION INTRAMUSCULAR; INTRAVENOUS ONCE
Status: COMPLETED | OUTPATIENT
Start: 2022-05-17 | End: 2022-05-17

## 2022-05-17 RX ORDER — KETAMINE HYDROCHLORIDE 10 MG/ML
INJECTION, SOLUTION INTRAMUSCULAR; INTRAVENOUS PRN
Status: DISCONTINUED | OUTPATIENT
Start: 2022-05-17 | End: 2022-05-17 | Stop reason: SDUPTHER

## 2022-05-17 RX ORDER — SODIUM CHLORIDE 1000 MG
1 TABLET, SOLUBLE MISCELLANEOUS DAILY
Status: DISCONTINUED | OUTPATIENT
Start: 2022-05-17 | End: 2022-05-18

## 2022-05-17 RX ORDER — MIDAZOLAM HYDROCHLORIDE 1 MG/ML
INJECTION INTRAMUSCULAR; INTRAVENOUS
Status: COMPLETED
Start: 2022-05-17 | End: 2022-05-17

## 2022-05-17 RX ORDER — PHENYLEPHRINE HCL IN 0.9% NACL 1 MG/10 ML
SYRINGE (ML) INTRAVENOUS PRN
Status: DISCONTINUED | OUTPATIENT
Start: 2022-05-17 | End: 2022-05-17 | Stop reason: SDUPTHER

## 2022-05-17 RX ORDER — FENTANYL CITRATE 50 UG/ML
100 INJECTION, SOLUTION INTRAMUSCULAR; INTRAVENOUS ONCE
Status: COMPLETED | OUTPATIENT
Start: 2022-05-17 | End: 2022-05-17

## 2022-05-17 RX ORDER — SODIUM CHLORIDE 9 MG/ML
INJECTION, SOLUTION INTRAVENOUS PRN
Status: COMPLETED | OUTPATIENT
Start: 2022-05-17 | End: 2022-05-23

## 2022-05-17 RX ORDER — DEXTROSE MONOHYDRATE 100 MG/ML
INJECTION, SOLUTION INTRAVENOUS CONTINUOUS
Status: DISCONTINUED | OUTPATIENT
Start: 2022-05-17 | End: 2022-05-19

## 2022-05-17 RX ADMIN — TIGECYCLINE 50 MG: 50 INJECTION, POWDER, LYOPHILIZED, FOR SOLUTION INTRAVENOUS at 08:08

## 2022-05-17 RX ADMIN — LEVETIRACETAM 500 MG: 100 SOLUTION ORAL at 08:35

## 2022-05-17 RX ADMIN — MUPIROCIN: 20 OINTMENT TOPICAL at 08:51

## 2022-05-17 RX ADMIN — ACETYLCYSTEINE 400 MG: 100 INHALANT RESPIRATORY (INHALATION) at 19:40

## 2022-05-17 RX ADMIN — DEXTROSE MONOHYDRATE: 100 INJECTION, SOLUTION INTRAVENOUS at 04:00

## 2022-05-17 RX ADMIN — DEXTROSE MONOHYDRATE: 100 INJECTION, SOLUTION INTRAVENOUS at 20:36

## 2022-05-17 RX ADMIN — Medication 1 G: at 11:14

## 2022-05-17 RX ADMIN — DEXTROSE MONOHYDRATE 125 ML: 100 INJECTION, SOLUTION INTRAVENOUS at 14:34

## 2022-05-17 RX ADMIN — FENTANYL CITRATE 100 MCG: 50 INJECTION, SOLUTION INTRAMUSCULAR; INTRAVENOUS at 11:08

## 2022-05-17 RX ADMIN — IPRATROPIUM BROMIDE AND ALBUTEROL SULFATE 1 AMPULE: .5; 2.5 SOLUTION RESPIRATORY (INHALATION) at 15:56

## 2022-05-17 RX ADMIN — DEXTROSE MONOHYDRATE 125 ML: 100 INJECTION, SOLUTION INTRAVENOUS at 20:13

## 2022-05-17 RX ADMIN — SULFAMETHOXAZOLE AND TRIMETHOPRIM 2 TABLET: 800; 160 TABLET ORAL at 11:14

## 2022-05-17 RX ADMIN — MUPIROCIN: 20 OINTMENT TOPICAL at 21:29

## 2022-05-17 RX ADMIN — ACETYLCYSTEINE 400 MG: 100 INHALANT RESPIRATORY (INHALATION) at 08:06

## 2022-05-17 RX ADMIN — DEXTROSE MONOHYDRATE 250 ML: 100 INJECTION, SOLUTION INTRAVENOUS at 02:00

## 2022-05-17 RX ADMIN — METOPROLOL TARTRATE 25 MG: 25 TABLET, FILM COATED ORAL at 08:36

## 2022-05-17 RX ADMIN — SODIUM CHLORIDE 10 ML: 9 INJECTION, SOLUTION INTRAVENOUS at 12:01

## 2022-05-17 RX ADMIN — DEXTROSE MONOHYDRATE 250 ML: 100 INJECTION, SOLUTION INTRAVENOUS at 02:54

## 2022-05-17 RX ADMIN — IPRATROPIUM BROMIDE AND ALBUTEROL SULFATE 1 AMPULE: .5; 2.5 SOLUTION RESPIRATORY (INHALATION) at 08:06

## 2022-05-17 RX ADMIN — TIGECYCLINE 50 MG: 50 INJECTION, POWDER, LYOPHILIZED, FOR SOLUTION INTRAVENOUS at 20:06

## 2022-05-17 RX ADMIN — KETAMINE HYDROCHLORIDE 20 MG: 10 INJECTION INTRAMUSCULAR; INTRAVENOUS at 13:15

## 2022-05-17 RX ADMIN — IPRATROPIUM BROMIDE AND ALBUTEROL SULFATE 1 AMPULE: .5; 2.5 SOLUTION RESPIRATORY (INHALATION) at 19:40

## 2022-05-17 RX ADMIN — IPRATROPIUM BROMIDE AND ALBUTEROL SULFATE 1 AMPULE: .5; 2.5 SOLUTION RESPIRATORY (INHALATION) at 03:13

## 2022-05-17 RX ADMIN — POLYETHYLENE GLYCOL 3350 17 G: 17 POWDER, FOR SOLUTION ORAL at 08:35

## 2022-05-17 RX ADMIN — ACETYLCYSTEINE 400 MG: 100 INHALANT RESPIRATORY (INHALATION) at 23:04

## 2022-05-17 RX ADMIN — SODIUM CHLORIDE, PRESERVATIVE FREE 40 MG: 5 INJECTION INTRAVENOUS at 08:36

## 2022-05-17 RX ADMIN — DEXTROSE MONOHYDRATE 125 ML: 100 INJECTION, SOLUTION INTRAVENOUS at 18:41

## 2022-05-17 RX ADMIN — Medication 200 MCG/HR: at 08:10

## 2022-05-17 RX ADMIN — DEXTROSE MONOHYDRATE 125 ML: 100 INJECTION, SOLUTION INTRAVENOUS at 17:03

## 2022-05-17 RX ADMIN — DOCUSATE SODIUM LIQUID 100 MG: 100 LIQUID ORAL at 21:02

## 2022-05-17 RX ADMIN — MIDAZOLAM HYDROCHLORIDE 2 MG: 2 INJECTION, SOLUTION INTRAMUSCULAR; INTRAVENOUS at 10:50

## 2022-05-17 RX ADMIN — Medication 200 MCG/HR: at 19:12

## 2022-05-17 RX ADMIN — DOCUSATE SODIUM LIQUID 100 MG: 100 LIQUID ORAL at 08:35

## 2022-05-17 RX ADMIN — DEXTROSE MONOHYDRATE: 100 INJECTION, SOLUTION INTRAVENOUS at 09:03

## 2022-05-17 RX ADMIN — Medication 10 ML: at 09:04

## 2022-05-17 RX ADMIN — METOPROLOL TARTRATE 25 MG: 25 TABLET, FILM COATED ORAL at 21:02

## 2022-05-17 RX ADMIN — LEVOTHYROXINE SODIUM 175 MCG: 125 TABLET ORAL at 08:35

## 2022-05-17 RX ADMIN — DEXTROSE MONOHYDRATE: 100 INJECTION, SOLUTION INTRAVENOUS at 12:47

## 2022-05-17 RX ADMIN — CEFTOLOZANE AND TAZOBACTAM 1500 MG: 1; .5 INJECTION, POWDER, LYOPHILIZED, FOR SOLUTION INTRAVENOUS at 12:05

## 2022-05-17 RX ADMIN — MIDAZOLAM HYDROCHLORIDE 2 MG: 1 INJECTION, SOLUTION INTRAMUSCULAR; INTRAVENOUS at 10:50

## 2022-05-17 RX ADMIN — Medication 10 ML: at 21:29

## 2022-05-17 RX ADMIN — ACETYLCYSTEINE 400 MG: 100 INHALANT RESPIRATORY (INHALATION) at 15:56

## 2022-05-17 RX ADMIN — DEXTROSE MONOHYDRATE 100 ML/HR: 50 INJECTION, SOLUTION INTRAVENOUS at 08:00

## 2022-05-17 RX ADMIN — DEXTROSE MONOHYDRATE 125 ML: 100 INJECTION, SOLUTION INTRAVENOUS at 04:48

## 2022-05-17 RX ADMIN — Medication 200 MCG/HR: at 01:01

## 2022-05-17 RX ADMIN — CHLORHEXIDINE GLUCONATE 0.12% ORAL RINSE 15 ML: 1.2 LIQUID ORAL at 21:02

## 2022-05-17 RX ADMIN — ACETYLCYSTEINE 400 MG: 100 INHALANT RESPIRATORY (INHALATION) at 12:25

## 2022-05-17 RX ADMIN — IPRATROPIUM BROMIDE AND ALBUTEROL SULFATE 1 AMPULE: .5; 2.5 SOLUTION RESPIRATORY (INHALATION) at 23:04

## 2022-05-17 RX ADMIN — CEFTOLOZANE AND TAZOBACTAM 1500 MG: 1; .5 INJECTION, POWDER, LYOPHILIZED, FOR SOLUTION INTRAVENOUS at 21:24

## 2022-05-17 RX ADMIN — IPRATROPIUM BROMIDE AND ALBUTEROL SULFATE 1 AMPULE: .5; 2.5 SOLUTION RESPIRATORY (INHALATION) at 12:25

## 2022-05-17 RX ADMIN — DEXTROSE MONOHYDRATE 125 ML: 100 INJECTION, SOLUTION INTRAVENOUS at 11:24

## 2022-05-17 RX ADMIN — DEXTROSE MONOHYDRATE 100 ML/HR: 50 INJECTION, SOLUTION INTRAVENOUS at 04:46

## 2022-05-17 RX ADMIN — SENNOSIDES 8.8 MG: 8.8 SYRUP ORAL at 21:02

## 2022-05-17 RX ADMIN — Medication 100 MCG: at 13:34

## 2022-05-17 RX ADMIN — ACETYLCYSTEINE 400 MG: 100 INHALANT RESPIRATORY (INHALATION) at 03:13

## 2022-05-17 RX ADMIN — HYDROCORTISONE SODIUM SUCCINATE 100 MG: 100 INJECTION, POWDER, FOR SOLUTION INTRAMUSCULAR; INTRAVENOUS at 22:27

## 2022-05-17 RX ADMIN — DEXTROSE MONOHYDRATE 125 ML: 100 INJECTION, SOLUTION INTRAVENOUS at 21:32

## 2022-05-17 RX ADMIN — Medication 200 MCG/HR: at 12:49

## 2022-05-17 RX ADMIN — DEXTROSE MONOHYDRATE 125 ML: 100 INJECTION, SOLUTION INTRAVENOUS at 03:35

## 2022-05-17 RX ADMIN — LEVETIRACETAM 500 MG: 100 SOLUTION ORAL at 21:02

## 2022-05-17 RX ADMIN — CHLORHEXIDINE GLUCONATE 0.12% ORAL RINSE 15 ML: 1.2 LIQUID ORAL at 08:49

## 2022-05-17 ASSESSMENT — PULMONARY FUNCTION TESTS
PIF_VALUE: 37
PIF_VALUE: 27
PIF_VALUE: 28
PIF_VALUE: 29
PIF_VALUE: 27
PIF_VALUE: 27
PIF_VALUE: 29
PIF_VALUE: 29
PIF_VALUE: 38
PIF_VALUE: 32
PIF_VALUE: 29

## 2022-05-17 ASSESSMENT — PAIN SCALES - GENERAL
PAINLEVEL_OUTOF10: 0

## 2022-05-17 NOTE — PROGRESS NOTES
Department of Internal Medicine  Nephrology Attending Progress Note        SUBJECTIVE: Mr. Courtney Briseno remains n.p.o. for debridement later today.,  Blood sugars have been problematic he was on D5% which has been changed to D10%. ,  Not very interactive today.   His thoracentesis was consistent with infectious process    PMH  History of CKD 4 baseline creatinine approximately 2  TRUMAN superimposed on CKD 4 no evidence of renal recovery has been dialysis dependent since February  Transfer from [facility] after being found unresponsive and undergoing 5 minutes of CPR  Anemia of chronic kidney disease  History of decubitus ulcer  History of PEG tube placement for nutrition  History of type 2 diabetes with nephropathy, neuropathy, Charcot joint disease  History of heart failure  Chronic atrial fibrillation  Gastritis duodenitis  History of seizure disorder  History of anxiety disorder  History of right toe amputation        Physical Exam:    Vitals:    05/17/22 1500   BP: 115/76   Pulse: 75   Resp: 16   Temp:    SpO2: 97%       I/O last 24 hours:  Intake/Output 3762/2440    Weight: 232    General appearance: Intubated on vent  Skin: large sacral decubitus, stasis dermatitis bilateral lower extremities  Neck: No JVD, RIJ TDC  Lungs: Coarse upper, diminished lower  Heart: irreg., no rub  Abdomen: Soft, + bowel sounds, +PEG  Extremities: Trace edema lower extremities  Neurologic: opens eyes to stimuli,     DATA:    CBC with Differential:    Lab Results   Component Value Date    WBC 8.0 05/17/2022    RBC 2.51 05/17/2022    HGB 7.6 05/17/2022    HCT 24.4 05/17/2022    PLT 98 05/17/2022    MCV 87.6 05/17/2022    MCH 27.1 05/17/2022    MCHC 30.9 05/17/2022    RDW 20.7 05/17/2022    NRBC 0.9 05/13/2022    METASPCT 1.7 05/11/2022    LYMPHOPCT 7.6 05/17/2022    MONOPCT 9.3 05/17/2022    BASOPCT 0.1 05/17/2022    MONOSABS 0.75 05/17/2022    LYMPHSABS 0.61 05/17/2022    EOSABS 0.14 05/17/2022    BASOSABS 0.01 05/17/2022     CMP:    Lab Results Component Value Date     05/17/2022    K 4.0 05/17/2022    K 4.5 05/11/2022    CL 93 05/17/2022    CO2 29 05/17/2022    BUN 53 05/17/2022    CREATININE 1.6 05/17/2022    GFRAA 52 05/17/2022    LABGLOM 43 05/17/2022    GLUCOSE 63 05/17/2022    PROT 6.9 05/11/2022    LABALBU 2.4 05/11/2022    CALCIUM 8.7 05/17/2022    BILITOT 0.3 05/11/2022    ALKPHOS 591 05/11/2022    AST 31 05/11/2022    ALT 30 05/11/2022     Magnesium:    Lab Results   Component Value Date    MG 2.1 05/17/2022     Phosphorus:    Lab Results   Component Value Date    PHOS 4.0 05/17/2022          sodium chloride  1 g Oral Daily    ceftolozane-tazobactam (ZERBAXA) IVPB  1,500 mg IntraVENous Q8H    metoprolol tartrate  25 mg Oral BID    epoetin kandi-epbx  8,000 Units SubCUTAneous Once per day on Mon Wed Fri    And    epoetin kandi-epbx  2,000 Units SubCUTAneous Once per day on Mon Wed Fri    mupirocin   Topical BID    tigecycline (TYGACIL) IVPB  50 mg IntraVENous Q12H    sulfamethoxazole-trimethoprim  2 tablet Oral Daily    acetylcysteine  4 mL Inhalation Q4H    ipratropium-albuterol  1 ampule Inhalation Q4H    polyethylene glycol  17 g Oral Daily    pantoprazole (PROTONIX) 40 mg injection  40 mg IntraVENous Daily    chlorhexidine  15 mL Mouth/Throat BID    senna  5 mL Per G Tube Nightly    docusate  100 mg Per G Tube BID    levothyroxine  175 mcg Per G Tube Daily    [Held by provider] insulin glargine  20 Units SubCUTAneous BID    insulin lispro  0-12 Units SubCUTAneous Q6H    levETIRAcetam  500 mg PEG Tube BID    sodium chloride flush  5-40 mL IntraVENous 2 times per day      dextrose 100 mL/hr at 05/17/22 1309    sodium chloride      fentaNYL 200 mcg/hr (05/17/22 1309)    sodium chloride 10 mL/hr at 05/17/22 1309     sodium chloride, anticoagulant sodium citrate, perflutren lipid microspheres, glucose, dextrose bolus **OR** dextrose bolus, glucagon (rDNA), sodium chloride flush, sodium chloride    IMPRESSION/RECOMMENDATIONS:      End-stage renal disease continue dialysis Monday Wednesday Friday  Anemia of chronic kidney disease continue SHANIQUA therapy  Respiratory failure work-up for empyema in progress  Secondary hyperparathyroid disease of renal origin phosphorus has improved  Nutrition on tube feedings  Chronic A. fib rate controlled  Hypoglycemia Lantus insulin held on D10      Thomas Xiong MD  5/17/2022 3:21 PM

## 2022-05-17 NOTE — PROGRESS NOTES
[address] [place] [address] [facility]  [facility]UC[facility]  Progress Note    SUBJECTIVE:  Chief Complaint   Patient presents with    Loss of Consciousness     arrived from [facility] via [facility] unresponsive     The patient is still in the ICU. Not he is intubated and sedated. No fevers. Tolerating antibiotics. Review of systems:  A 10 point review of systems was done. As stated above, otherwise negative.     Medications:   sodium chloride  1 g Oral Daily    metoprolol tartrate  25 mg Oral BID    epoetin kandi-epbx  8,000 Units SubCUTAneous Once per day on     And    epoetin kandi-epbx  2,000 Units SubCUTAneous Once per day on     mupirocin   Topical BID    tigecycline (TYGACIL) IVPB  50 mg IntraVENous Q12H    sulfamethoxazole-trimethoprim  2 tablet Oral Daily    acetylcysteine  4 mL Inhalation Q4H    ipratropium-albuterol  1 ampule Inhalation Q4H    polyethylene glycol  17 g Oral Daily    pantoprazole (PROTONIX) 40 mg injection  40 mg IntraVENous Daily    chlorhexidine  15 mL Mouth/Throat BID    senna  5 mL Per G Tube Nightly    docusate  100 mg Per G Tube BID    levothyroxine  175 mcg Per G Tube Daily    [Held by provider] insulin glargine  20 Units SubCUTAneous BID    insulin lispro  0-12 Units SubCUTAneous Q6H    levETIRAcetam  500 mg PEG Tube BID    sodium chloride flush  5-40 mL IntraVENous 2 times per day      dextrose 100 mL/hr at 22 0925    sodium chloride      fentaNYL 200 mcg/hr (22 0810)    sodium chloride Stopped (22 1631)     sodium chloride, anticoagulant sodium citrate, perflutren lipid microspheres, glucose, dextrose bolus **OR** dextrose bolus, glucagon (rDNA), sodium chloride flush, sodium chloride    OBJECTIVE:  /78   Pulse 80   Temp 98.1 °F (36.7 °C)   Resp 18   Ht 6' 5\" (1.956 m)   Wt 232 lb 2.3 oz (105.3 kg)   SpO2 91%   BMI 27.53 kg/m²   Temp  Av.2 °F (36.8 °C)  Min: 97.3 °F (36.3 °C)  Max: 100.2 °F (37.9 °C)  Constitutional: The patient is sedated, on ventilator. FiO2 35%. PEEP 6. Opens eyes. No distress. Skin: Warm and dry. No rashes were noted. HEENT: Eyes show round, and reactive pupils. No jaundice. Moist mucous membranes, no ulcerations, no thrush. ETT. OGT  Neck: Supple to movements. No lymphadenopathy. Chest: No use of accessory muscles to breathe. Symmetrical expansion. Auscultation reveals coarse breath sounds. scattered rhonchi. Right tunneled HD catheter. Cardiovascular: Heart sounds arrhythmic and irregular. Abdomen: Positive bowel sounds to auscultation. Benign to palpation. Extremities: Minimal edema. Left third toe missing. Back: Stage IV decubitus ulcer with dressing. There is undermining. Necrosis of the edges. Lines: Right femoral TLC 5/11/2022.     Laboratory and Tests Review:  Lab Results   Component Value Date    WBC 8.0 05/17/2022    WBC 9.6 05/16/2022    WBC 10.0 05/15/2022    HGB 6.8 (LL) 05/17/2022    HCT 22.0 (L) 05/17/2022    MCV 87.6 05/17/2022    PLT 98 (L) 05/17/2022     Lab Results   Component Value Date    NEUTROABS 6.44 05/17/2022    NEUTROABS 7.88 (H) 05/16/2022    NEUTROABS 8.62 (H) 05/15/2022     Lab Results   Component Value Date    CRP 13.9 (H) 05/11/2022     No results found for: [facility]  Lab Results   Component Value Date    SEDRATE 78 (H) 05/11/2022     Lab Results   Component Value Date    ALT 30 05/11/2022    AST 31 05/11/2022    ALKPHOS 591 (H) 05/11/2022    BILITOT 0.3 05/11/2022     Lab Results   Component Value Date     05/17/2022    K 4.0 05/17/2022    K 4.5 05/11/2022    CL 93 05/17/2022    CO2 29 05/17/2022    BUN 53 05/17/2022    CREATININE 1.6 05/17/2022    CREATININE 1.5 05/17/2022    CREATININE 2.1 05/16/2022    GFRAA 52 05/17/2022    LABGLOM 43 05/17/2022    GLUCOSE 63 05/17/2022    PROT 6.9 05/11/2022    LABALBU 2.4 05/11/2022    CALCIUM 8.7 05/17/2022    BILITOT 0.3 05/11/2022    ALKPHOS 591 05/11/2022    AST 31 05/11/2022    ALT 30 05/11/2022     Radiology:    Reviewed  Microbiology:   [facility]:  Blood culture 5/10/2022: Negative so far  Decubitus ulcer 5/10/2022: MRSA, mixed GNR  Respiratory panel: Pending  Nares screen MRSA: Pending  Blood cultures 5/11/2022: Negative so far      [facility]:  Respiratory panel: Negative  Blood cultures 5/11/2022: Staphylococcus epidermidis in 2 of 2 sets  Nares screen MRSA: Positive   Respiratory culture 5/12/2022: MDR Acinetobacter baumanii (sensitive to Bactrim, Tigecycline, Cefiderocol. Intermediate to Avycaz and Eravacycline. Resistant to all others)  Pleural fluid 5/13/2022: Pseudomonas aeruginosa (Resistant to Levofloxacin and Meropenem. Intermediate to Zosyn. Sensitive to Avycaz and Gentamicin)    ASSESSMENT:  · HCAP with MDR Acinetobacter baumanii  · Sepsis with septic shock associated to HCAP  · Status postcardiac arrest  · Acute respiratory failure  · Leukocytosis secondary to HCAP- improving  · Hypothermia, improved  · History of sacral decubitus ulcer infection with Pseudomonas and Enterococcus. Treated with IV antibiotic between March and April 2022. There is more necrosis requiring debridement  · CKD, on HD  · MDR Acinetobacter in respiratory culture  · Acinetobacter in the respiratory cultures. Possible outbreak in the ICU  · Pleural fluid infection with Pseudomonas. Possible empyema    PLAN:  · Continue Tigecycline, Bactrim and Amikacin, day 3  · Start Zerbaxa for Pseudomonas  · Debridement of sacral decubitus ulcer today  · We will follow with you    Spoke with nursing.     Prabhu Wya MD  10:39 AM  5/17/2022

## 2022-05-17 NOTE — PLAN OF CARE
Problem: Respiratory - Adult  Goal: Achieves optimal ventilation and oxygenation  5/17/2022 0359 by Florinda Winston RCP  Outcome: Progressing  Flowsheets (Taken 5/17/2022 0359)  Achieves optimal ventilation and oxygenation:   Assess for changes in respiratory status   Position to facilitate oxygenation and minimize respiratory effort   Respiratory therapy support as indicated   Oxygen supplementation based on oxygen saturation or arterial blood gases  Note: Pt continues to wean on PS. PS weaned from 15 to 10, pt tolerating well.

## 2022-05-17 NOTE — PROGRESS NOTES
INPATIENT CARDIOLOGY FOLLOW-UP    Name: Rolando Swain    Age: 74 y.o. Date of Admission: 5/10/2022 11:58 PM    Date of Service: 5/17/2022    Chief Complaint: Follow-up for sepsis with resolving septic shock, possible type II non-ST elevation myocardial infarction, atrial fibrillation/flutter, chronic kidney disease, pneumonia with parapneumonic pleural effusion, anemia, thrombocytopenia    Interim History: The patient remains intubated and sedated with present rate control of his atrial fibrillation and tolerance of his beta-blocker. Interim assessments of the pulmonary, nephrology and infectious disease services have been reviewed. A persistent anemia is noted with the development of mild thrombocytopenia. Review of Systems: The remainder of a complete multisystem review including consitutional, central nervous, respiratory, circulatory, gastrointestinal, genitourinary, endocrinologic, hematologic, musculoskeletal and psychiatric are negative. Problem List:  Patient Active Problem List   Diagnosis    Cardiac arrest ([facility] [address]    Pneumonia due to infectious organism    Pleural effusion       Allergies:   Allergies   Allergen Reactions    Other Nausea Only     \"narcotics\" reaction unknown       Current Medications:  Current Facility-Administered Medications   Medication Dose Route Frequency Provider Last Rate Last Admin    dextrose 10 % infusion   IntraVENous Continuous Nakul Nixon MD 75 mL/hr at 05/17/22 0400 New Bag at 05/17/22 0400    metoprolol tartrate (LOPRESSOR) tablet 25 mg  25 mg Oral BID Charlotte Ochoa MD   25 mg at 05/16/22 2037    norepinephrine (LEVOPHED) 16 mg in dextrose 5 % 250 mL infusion  1-100 mcg/min IntraVENous Continuous Svetlana Luong DO   Stopped at 05/16/22 2305    epoetin kandi-epbx (RETACRIT) injection 8,000 Units  8,000 Units SubCUTAneous Once per day on Mon Wed Fri Brandon Miles MD   8,000 Units at 05/16/22 2250    And    epoetin kandi-epbx (RETACRIT) injection 2,000 Units  2,000 Units SubCUTAneous Once per day on Mon Wed Fri Winston Cortes MD   2,000 Units at 05/16/22 2249    mupirocin (BACTROBAN) 2 % ointment   Topical BID Mekhi Henry DO   Given at 05/16/22 2101    tigecycline (TYGACIL) 50 mg in sodium chloride 0.9 % 100 mL IVPB  50 mg IntraVENous Q12H Burt Giang MD   Stopped at 05/16/22 2125    sulfamethoxazole-trimethoprim (BACTRIM DS;SEPTRA DS) 800-160 MG per tablet 2 tablet  2 tablet Oral Daily Burt Giang MD   2 tablet at 05/16/22 6000    acetylcysteine (MUCOMYST) 10 % solution 400 mg  4 mL Inhalation Q4H Luke Glover DO   400 mg at 05/17/22 0313    ipratropium-albuterol (DUONEB) nebulizer solution 1 ampule  1 ampule Inhalation Q4H Luke Glover DO   1 ampule at 05/17/22 0313    anticoagulant sodium citrate 4 GM/100ML solution 0.168 g  4.2 mL IntraCATHeter PRN Jamila Schulz MD        fentaNYL 10 mcg/ml in 0.9%  ml infusion   mcg/hr IntraVENous Continuous Kim German MD 20 mL/hr at 05/17/22 0101 200 mcg/hr at 05/17/22 0101    polyethylene glycol (GLYCOLAX) packet 17 g  17 g Oral Daily Karena Mcdaniel MD   17 g at 05/15/22 0816    pantoprazole (PROTONIX) 40 mg in sodium chloride (PF) 10 mL injection  40 mg IntraVENous Daily Karena Mcdaniel MD   40 mg at 05/16/22 0922    chlorhexidine (PERIDEX) 0.12 % solution 15 mL  15 mL Mouth/Throat BID Adrián Feliz MD   15 mL at 05/16/22 2038    senna (SENOKOT) 8.8 MG/5ML syrup 8.8 mg  5 mL Per G Tube Nightly Adrián Feliz MD   8.8 mg at 05/16/22 2038    docusate (COLACE) 50 MG/5ML liquid 100 mg  100 mg Per G Tube BID Adrián Feliz MD   100 mg at 05/16/22 2037    levothyroxine (SYNTHROID) tablet 175 mcg  175 mcg Per G Tube Daily Adrián Feliz MD   175 mcg at 05/16/22 6589    perflutren lipid microspheres (DEFINITY) injection 1.65 mg  1.5 mL IntraVENous ONCE PRN Adrián Feliz MD        [Held by provider] insulin glargine (LANTUS) injection vial 20 Units  20 Units SubCUTAneous BID Nena Cherry MD   20 Units at 05/16/22 2866    insulin lispro (HUMALOG) injection vial 0-12 Units  0-12 Units SubCUTAneous Q6H Nena Cherry MD   2 Units at 05/16/22 6686    glucose chewable tablet 16 g  4 tablet Oral PRN Nena Cherry MD        dextrose bolus 10% 125 mL  125 mL IntraVENous PRN Nena Cherry MD   Stopped at 05/17/22 0456    Or    dextrose bolus 10% 250 mL  250 mL IntraVENous PRN Nena Cherry MD   Stopped at 05/17/22 0308    glucagon (rDNA) injection 1 mg  1 mg IntraMUSCular PRN Nena Cherry MD        dextrose 5 % solution  100 mL/hr IntraVENous PRN Nena Cherry  mL/hr at 05/17/22 0446 100 mL/hr at 05/17/22 0446    levETIRAcetam (KEPPRA) 100 MG/ML solution 500 mg  500 mg PEG Tube BID Nena Cherry MD   500 mg at 05/16/22 2037    sodium chloride flush 0.9 % injection 5-40 mL  5-40 mL IntraVENous 2 times per day Nena Cherry MD   10 mL at 05/16/22 2037    sodium chloride flush 0.9 % injection 5-40 mL  5-40 mL IntraVENous PRN Nena Cherry MD        0.9 % sodium chloride infusion   IntraVENous PRN Nena Cherry MD   Stopped at 05/13/22 1631      dextrose 75 mL/hr at 05/17/22 0400    norepinephrine Stopped (05/16/22 2305)    fentaNYL 200 mcg/hr (05/17/22 0101)    dextrose 100 mL/hr (05/17/22 0446)    sodium chloride Stopped (05/13/22 1631)       Physical Exam:  /73   Pulse 79   Temp 97.8 °F (36.6 °C) (Infrared)   Resp 16   Ht 6' 5\" (1.956 m)   Wt 232 lb 2.3 oz (105.3 kg)   SpO2 98%   BMI 27.53 kg/m²   Weight change: Wt Readings from Last 3 Encounters:   05/17/22 232 lb 2.3 oz (105.3 kg)   04/27/22 212 lb (96.2 kg)     The patient is intubated and sedated and in no discomfort or distress. No gross musculoskeletal deformity is present. No significant skin or nail changes are present.  Gross examination of head, eyes, nose and throat are negative. Jugular venous pressure is normal and no carotid bruits are present. Normal respiratory effort is noted with no accessory muscle usage present. Lung fields demonstrate slightly coarse breath sounds to ascultation. Cardiac examination is notable for an irregular rhythm with no palpable thrill. No gallop rhythm or cardiac murmur are identified. A benign abdominal examination is present with no masses or organomegaly. Intact pulses are present throughout all extremities and no peripheral edema is present. No focal neurologic deficits are present. Intake/Output:    Intake/Output Summary (Last 24 hours) at 5/17/2022 0751  Last data filed at 5/17/2022 0700  Gross per 24 hour   Intake 3762.41 ml   Output 2440 ml   Net 1322.41 ml     No intake/output data recorded. Laboratory Tests:  Lab Results   Component Value Date    CREATININE 1.6 (H) 05/17/2022    BUN 53 (H) 05/17/2022     (L) 05/17/2022    K 4.0 05/17/2022    CL 93 (L) 05/17/2022    CO2 29 05/17/2022     No results for input(s): CKTOTAL, CKMB in the last 72 hours. Invalid input(s): TROPONONI  No results found for: BNP  Lab Results   Component Value Date    WBC 8.0 05/17/2022    RBC 2.51 05/17/2022    HGB 6.8 05/17/2022    HCT 22.0 05/17/2022    MCV 87.6 05/17/2022    MCH 27.1 05/17/2022    MCHC 30.9 05/17/2022    RDW 20.7 05/17/2022    PLT 98 05/17/2022    MPV 9.5 05/17/2022     No results for input(s): ALKPHOS, ALT, AST, PROT, BILITOT, BILIDIR, LABALBU in the last 72 hours.   Lab Results   Component Value Date    MG 2.1 05/17/2022     Lab Results   Component Value Date    PROTIME 15.5 05/17/2022    INR 1.4 05/17/2022     Lab Results   Component Value Date    TSH 2.700 05/11/2022     No components found for: CHLPL  No results found for: TRIG  No results found for: HDL  No results found for: [address]    Cardiac Tests:    Telemetry findings reviewed: atrial fibrillation with a mean ventricular response of approximately 90 bpm, no new tachy/bradyarrhythmias overnight  Chest X-ray: A repeat chest x-ray reviewed time of evaluation demonstrates no evidence of significant cardiomegaly with a persistent infiltrate more prominently in the left and right lung and small bilateral effusions      ASSESSMENT / PLAN: On a clinical basis, the patient continues to manifest evidence of acute hypoxic respiratory failure in the face of his pneumonia and parapneumonic pleural effusion with present acceptable rate control and present tolerance of existing beta-blocker therapy. Presently remains devoid of anticoagulation with recommended consideration of initiation of low molecular weight heparin if additional intervention is necessary with eventual plans of resumption of his oral anticoagulation and the present maintenance of a rate control and anticoagulation strategy during his acute illness. Continue careful monitoring of his volume status will be necessary with nutrition essential to fluid mobilization reducing risk of progressive debilitation. No additional cardiovascular assessment is presently anticipated with his care predominately deferred to that of primary care in addition to the pulmonary/critical care, infectious disease and nephrology services. Ongoing appropriate risk factor modification of blood pressure, diabetes and serum lipids will remain essential to reducing risk of future atherosclerotic development. We will further evaluate him should additional cardiovascular difficulties or concerns arise. Note: This report was completed utilizing computer voice recognition software. Every effort has been made to ensure accuracy, however; inadvertent computerized transcription errors may be present. Ruben Broussard.  Ayush Dorsey, [id number] [facility]

## 2022-05-17 NOTE — CARE COORDINATION
Social Work/Discharge Planning:  Chart reviewed. Patient to have a I&D of his sacral wound. Patient started on IV zerbaxa and is on IV tygacil. He remains on ventilator at 35% FiO2. Plan to return to [address] [place] [address] pending progress. Will continue to follow.   Electronically signed by SHAHNAZ Arteaga on 5/17/2022 at 12:26 PM

## 2022-05-17 NOTE — PROGRESS NOTES
Internal Medicine Progress Note      Synopsis: Patient admitted on 5/10/2022     Mr. Rema Warren, a 74y.o. year old male who has a past medical history of A-fib ([address] TRUMAN (acute kidney injury) ([address] Anemia, Anxiety, Bipolar 1 disorder ([address] Charcot foot due to diabetes mellitus ([address] CHF (congestive heart failure) ([address] CKD (chronic kidney disease), Diabetes mellitus ([address] Dialysis patient ([address] Hyperlipidemia, Hypertension, Right sided weakness, Sacral decubitus ulcer, and Seizure ([address] This is a 78-year-old male with progressive dementia and spinal stenosis. Has been bedbound for a long time. He has had in the past bouts of kidney failure that did not require long-term resuscitation with dialysis however he was at acute care for decompensation from his large sacral decubitus and acute renal failure that did require renal replacement therapy of hemodialysis. He finishes antibiotics. His sacral wound was slow to heal because of poor oral intake and he was given a PEG tube for nutritional supplementation purposes since his wife wanted to. Patient had been stable till about 48 hours ago. Monday he was noticed to have a large amount of bloody stool. He was started on an intravenous proton pump inhibitor and surgery services were asked to see him. His anticoagulation was held. Patient was being watched and he did not have any more episodes. Yesterday he had an increase in his WBCs without having any localizing symptoms. Infectious disease did see him and started him immediately on broad-spectrum antibiotics. Patient became unresponsive and virtually reportedly evening more towards the early hours of this morning. Fluids were started unfortunately patient could not be resuscitated despite best efforts of the on-call intensive virtual specialist. Patient was sent over to acute care where he was intubated for cardiorespiratory failure.  He was found to be in cardiac arrest.   Appropriate O2 resuscitation resulted in resumption of heart rhythm. Patient was started on pressors moved to the ICU   Imaging reveals a significant pleural effusion and HCAP    Subjective    Still in the ICU. Pressors just weaned off. Still little bit soft on his blood pressure. Less sedation    May 13  Patient appeared more alert. Still hypotensive. Thoracentesis on schedule  May 14  Uneventful night. He was on vent support at night although he did tolerate trials in the day. Centesis pulled off some fluid   May 15 th he is still intubated   May 16  Chest tube for pleural effusion that appears to be infected  Tomorrow for debridement of his decubitus  exam:  BP (!) 155/74   Pulse 81   Temp 98.2 °F (36.8 °C) (Axillary)   Resp (!) 48   Ht 6' 5\" (1.956 m)   Wt 221 lb 12.5 oz (100.6 kg)   SpO2 92%   BMI 26.30 kg/m²   General appearance: Orally intubated HEENT: eyes are More open     Respiratory: Significantly decreased bibasilar bases   Cardiovascular: Irregular heart rate rhythm   Abdomen: Nontender positive for distention positive for PEG   Musculoskeletal: Some thickening of the skin on his lower extremities.  Dorsalis pedis hard to palpate   skin: Large clean sacral decubitus   neurologic: Sleepy      Medications:  Reviewed    Infusion Medications    norepinephrine 2 mcg/min (05/16/22 1440)    sodium chloride      sodium chloride      fentaNYL 100 mcg/hr (05/16/22 1502)    dextrose      sodium chloride Stopped (05/13/22 1631)     Scheduled Medications    metoprolol tartrate  25 mg Oral BID    mupirocin   Topical BID    tigecycline (TYGACIL) IVPB  50 mg IntraVENous Q12H    sulfamethoxazole-trimethoprim  2 tablet Oral Daily    acetylcysteine  4 mL Inhalation Q4H    ipratropium-albuterol  1 ampule Inhalation Q4H    polyethylene glycol  17 g Oral Daily    pantoprazole (PROTONIX) 40 mg injection  40 mg IntraVENous Daily    chlorhexidine  15 mL Mouth/Throat BID    senna  5 mL Per G Tube Nightly    HEAD WO CONTRAST    Result Date: 5/11/2022  No acute findings. Chronic changes as above. CT CHEST WO CONTRAST    Result Date: 5/11/2022  Findings suggesting pulmonary edema versus a multifocal pneumonia, with associated bilateral pleural effusions. Generalized mediastinal adenopathy, which may represent reactive changes. This is a nonspecific finding. Differential diagnosis would include infection, inflammation or neoplastic causes. XR CHEST PORTABLE    Result Date: 5/12/2022  1. Partial interval clearing of the lungs when compared with the patient's prior study. 2. Vague patchy right perihilar airspace disease and patchy residual airspace disease within the left lung. 3. Trace right pleural effusion and small left pleural effusion. XR CHEST PORTABLE    Result Date: 5/11/2022  Stable congestive/consolidative changes, with bilateral pleural effusions. No significant change. XR CHEST 1 VIEW    Result Date: 4/27/2022  Bibasilar pleural thickening   ASSESSMENT:    Principal Problem:    Cardiac arrest [facility])  Active Problems:    Pneumonia due to infectious organism    Pleural effusion  Resolved Problems:    * No resolved hospital problems. *  Sepsis  Respiratory failure  Hypotension  Long-term dialysis  Pleural effusion  Pneumonia     PLAN:    1. Received a dialysis with ultrafiltration and fluid boluses  2. Continue to follow hemoglobin and appropriate transfusion  3. Remains full code per discussion with family  4. Plans for pleural effusion to be tapped to allow for better weaning  5. Glycemic control with insulin  6. Maintain Keppra for seizures  7. Maintaining linezolid and meropenem  May 13  Continue dialysis support with limiting hypotension. Thoracentesis per pulmonary services to allow for ventilator weaning. 2 beats can be started when agreeable with critical care. Glycemic control to be treated with insulin.  Antibiotics continue per infectious disease  Being treated for sepsis from however, inadvertent computerized transcription errors may be present.

## 2022-05-17 NOTE — PATIENT CARE CONFERENCE
Intensive Care Daily Quality Rounding Checklist        ICU Team Members: Dr. Jhaveri Agent, residents, bedside nurse, charge nurse,clinical pharmacist     ICU Day #: 7     Intubation Date: 5/11/2022     Ventilator Day #: 7     Central Line Insertion Date: 5/11/22                                                    Day #: 7      Arterial Line Insertion Date: n/a                             Day #: n/a     Temporary Hemodialysis Catheter Insertion Date: n/a                             Day # admitted with tunneled dialysis cath     DVT Prophylaxis: Eliquis on hold for anemia/ SCD's    GI Prophylaxis: Protonix     Roberts Catheter Insertion Date: HD patient                                        IEL #:                              Continued need (if yes, reason documented and discussed with physician):     Skin Issues/ Wounds and ordered treatment discussed on rounds:      Goals/ Plans for the Day: Daily labs and replace/transfuse as needed. Wean vent as able. Wean sedation as able.  HD per nephrology, daily restraint order, for wound debridement today

## 2022-05-17 NOTE — PROGRESS NOTES
CL 92 05/16/2022    CO2 29 05/16/2022    BUN 83 05/16/2022    CREATININE 2.1 05/16/2022    GFRAA 38 05/16/2022    LABGLOM 32 05/16/2022    GLUCOSE 146 05/16/2022    PROT 6.9 05/11/2022    LABALBU 2.4 05/11/2022    CALCIUM 8.7 05/16/2022    BILITOT 0.3 05/11/2022    ALKPHOS 591 05/11/2022    AST 31 05/11/2022    ALT 30 05/11/2022     Magnesium:    Lab Results   Component Value Date    MG 2.2 05/16/2022     Phosphorus:    Lab Results   Component Value Date    PHOS 4.2 05/16/2022          metoprolol tartrate  25 mg Oral BID    mupirocin   Topical BID    tigecycline (TYGACIL) IVPB  50 mg IntraVENous Q12H    sulfamethoxazole-trimethoprim  2 tablet Oral Daily    acetylcysteine  4 mL Inhalation Q4H    ipratropium-albuterol  1 ampule Inhalation Q4H    polyethylene glycol  17 g Oral Daily    pantoprazole (PROTONIX) 40 mg injection  40 mg IntraVENous Daily    chlorhexidine  15 mL Mouth/Throat BID    senna  5 mL Per G Tube Nightly    docusate  100 mg Per G Tube BID    levothyroxine  175 mcg Per G Tube Daily    [Held by provider] insulin glargine  20 Units SubCUTAneous BID    insulin lispro  0-12 Units SubCUTAneous Q6H    levETIRAcetam  500 mg PEG Tube BID    sodium chloride flush  5-40 mL IntraVENous 2 times per day      norepinephrine 2 mcg/min (05/16/22 1440)    sodium chloride      sodium chloride      fentaNYL 100 mcg/hr (05/16/22 1502)    dextrose      sodium chloride Stopped (05/13/22 1631)     sodium chloride, sodium chloride, anticoagulant sodium citrate, perflutren lipid microspheres, glucose, dextrose bolus **OR** dextrose bolus, glucagon (rDNA), dextrose, sodium chloride flush, sodium chloride    IMPRESSION/RECOMMENDATIONS:      End-stage renal disease continue dialysis Monday Wednesday Friday  Anemia of chronic kidney disease continue SHANIQUA therapy  Respiratory failure work-up for empyema in progress  Secondary hyperparathyroid disease of renal origin phosphorus has improved  Nutrition on tube feedings  Chronic A. fib rate controlled  Hypotensive remains problematic may need resumption of some midodrine therapy in order to wean Jose Miramontes MD  5/16/2022 8:47 PM

## 2022-05-17 NOTE — PROGRESS NOTES
Internal Medicine Progress Note      Synopsis: Patient admitted on 5/10/2022     Mr. Denae Proctor, a 74y.o. year old male who has a past medical history of A-fib ([address] TRUMAN (acute kidney injury) ([address] Anemia, Anxiety, Bipolar 1 disorder ([address] Charcot foot due to diabetes mellitus ([address] CHF (congestive heart failure) ([address] CKD (chronic kidney disease), Diabetes mellitus ([address] Dialysis patient ([address] Hyperlipidemia, Hypertension, Right sided weakness, Sacral decubitus ulcer, and Seizure ([address] This is a 58-year-old male with progressive dementia and spinal stenosis. Has been bedbound for a long time. He has had in the past bouts of kidney failure that did not require long-term resuscitation with dialysis however he was at acute care for decompensation from his large sacral decubitus and acute renal failure that did require renal replacement therapy of hemodialysis. He finishes antibiotics. His sacral wound was slow to heal because of poor oral intake and he was given a PEG tube for nutritional supplementation purposes since his wife wanted to. Patient had been stable till about 48 hours ago. Monday he was noticed to have a large amount of bloody stool. He was started on an intravenous proton pump inhibitor and surgery services were asked to see him. His anticoagulation was held. Patient was being watched and he did not have any more episodes. Yesterday he had an increase in his WBCs without having any localizing symptoms. Infectious disease did see him and started him immediately on broad-spectrum antibiotics. Patient became unresponsive and virtually reportedly evening more towards the early hours of this morning. Fluids were started unfortunately patient could not be resuscitated despite best efforts of the on-call intensive virtual specialist. Patient was sent over to acute care where he was intubated for cardiorespiratory failure.  He was found to be in cardiac arrest.   Appropriate O2 resuscitation resulted in resumption of heart rhythm. Patient was started on pressors moved to the ICU   Imaging reveals a significant pleural effusion and HCAP    Subjective    Still in the ICU. Pressors just weaned off. Still little bit soft on his blood pressure. Less sedation    May 13  Patient appeared more alert. Still hypotensive. Thoracentesis on schedule  May 14  Uneventful night. He was on vent support at night although he did tolerate trials in the day. Centesis pulled off some fluid   May 15 th he is still intubated   May 16  Chest tube for pleural effusion that appears to be infected  Tomorrow for debridement of his decubitus  May 17  Patient still looks sickly    A. fib controlled with metoprolol. Eliquis on hold. exam:  /80   Pulse 83   Temp 97.5 °F (36.4 °C) (Infrared)   Resp 15   Ht 6' 5\" (1.956 m)   Wt 232 lb 2.3 oz (105.3 kg)   SpO2 92%   BMI 27.53 kg/m²   General appearance: Orally intubated HEENT: eyes are More open     Respiratory: Significantly decreased bibasilar bases   Cardiovascular: Irregular heart rate rhythm   Abdomen: Nontender positive for distention positive for PEG   Musculoskeletal: Some thickening of the skin on his lower extremities.  Dorsalis pedis hard to palpate   skin: Large clean sacral decubitus   neurologic: Sleepy      Medications:  Reviewed    Infusion Medications    dextrose 100 mL/hr at 05/17/22 1728    sodium chloride      fentaNYL 200 mcg/hr (05/17/22 1912)    sodium chloride 10 mL/hr at 05/17/22 1309     Scheduled Medications    sodium chloride  1 g Oral Daily    ceftolozane-tazobactam (ZERBAXA) IVPB  1,500 mg IntraVENous Q8H    metoprolol tartrate  25 mg Oral BID    epoetin kandi-epbx  8,000 Units SubCUTAneous Once per day on Mon Wed Fri    And    epoetin kandi-epbx  2,000 Units SubCUTAneous Once per day on Mon Wed Fri    mupirocin   Topical BID    tigecycline (TYGACIL) IVPB  50 mg IntraVENous Q12H    sulfamethoxazole-trimethoprim  2 tablet Oral Daily    acetylcysteine  4 mL Inhalation Q4H    ipratropium-albuterol  1 ampule Inhalation Q4H    polyethylene glycol  17 g Oral Daily    pantoprazole (PROTONIX) 40 mg injection  40 mg IntraVENous Daily    chlorhexidine  15 mL Mouth/Throat BID    senna  5 mL Per G Tube Nightly    docusate  100 mg Per G Tube BID    levothyroxine  175 mcg Per G Tube Daily    [Held by provider] insulin glargine  20 Units SubCUTAneous BID    insulin lispro  0-12 Units SubCUTAneous Q6H    levETIRAcetam  500 mg PEG Tube BID    sodium chloride flush  5-40 mL IntraVENous 2 times per day     PRN Meds: sodium chloride, anticoagulant sodium citrate, perflutren lipid microspheres, glucose, dextrose bolus **OR** dextrose bolus, glucagon (rDNA), sodium chloride flush, sodium chloride    I/O    Intake/Output Summary (Last 24 hours) at 5/17/2022 1924  Last data filed at 5/17/2022 1236  Gross per 24 hour   Intake 2991.03 ml   Output 2130 ml   Net 861.03 ml       Labs:   Recent Labs     05/15/22  0600 05/15/22  0600 05/16/22  0605 05/17/22  0440 05/17/22  1447   WBC 10.0  --  9.6 8.0  --    HGB 7.6*   < > 7.2* 6.8* 7.6*   HCT 24.8*   < > 23.6* 22.0* 24.4*     --  125* 98*  --     < > = values in this interval not displayed. Recent Labs     05/15/22  0600 05/15/22  0600 05/16/22  0605 05/17/22  0059 05/17/22  0440      < > 132 133 131*   K 4.0   < > 4.7 4.1 4.0   CL 91*   < > 92* 95* 93*   CO2 31*   < > 29 29 29   BUN 52*   < > 83* 51* 53*   CREATININE 1.7*   < > 2.1* 1.5* 1.6*   CALCIUM 8.9   < > 8.7 8.8 8.7   PHOS 2.2*  --  4.2  --  4.0    < > = values in this interval not displayed. No results for input(s): PROT, ALB, ALKPHOS, ALT, AST, BILITOT, AMYLASE, LIPASE in the last 72 hours. Recent Labs     05/17/22  0440   INR 1.4       No results for input(s): Cristofer Crane in the last 72 hours.     Chronic labs:  Lab Results   Component Value Date    TSH 2.700 05/11/2022    INR 1.4 05/17/2022 Radiology:  CT ABDOMEN PELVIS WO CONTRAST Additional Contrast? None    Result Date: 5/11/2022  Bilateral pleural effusions, greater on the left than right, with superimposed consolidation. In the appropriate setting, a pneumonia with associated pleural effusions cannot be excluded. No bowel obstruction, free air or obstructive uropathy. Additional findings involving the abdomen and pelvis, as detailed above. CT HEAD WO CONTRAST    Result Date: 5/11/2022  No acute findings. Chronic changes as above. CT CHEST WO CONTRAST    Result Date: 5/11/2022  Findings suggesting pulmonary edema versus a multifocal pneumonia, with associated bilateral pleural effusions. Generalized mediastinal adenopathy, which may represent reactive changes. This is a nonspecific finding. Differential diagnosis would include infection, inflammation or neoplastic causes. XR CHEST PORTABLE    Result Date: 5/12/2022  1. Partial interval clearing of the lungs when compared with the patient's prior study. 2. Vague patchy right perihilar airspace disease and patchy residual airspace disease within the left lung. 3. Trace right pleural effusion and small left pleural effusion. XR CHEST PORTABLE    Result Date: 5/11/2022  Stable congestive/consolidative changes, with bilateral pleural effusions. No significant change. XR CHEST 1 VIEW    Result Date: 4/27/2022  Bibasilar pleural thickening   ASSESSMENT:    Principal Problem:    Cardiac arrest [facility])  Active Problems:    Pneumonia due to infectious organism    Pleural effusion  Resolved Problems:    * No resolved hospital problems. *  Sepsis  Respiratory failure  Hypotension  Long-term dialysis  Pleural effusion  Pneumonia     PLAN:    1. Received a dialysis with ultrafiltration and fluid boluses  2. Continue to follow hemoglobin and appropriate transfusion  3. Remains full code per discussion with family  4.   Plans for pleural effusion to be tapped to allow for better weaning  5. Glycemic control with insulin  6. Maintain Keppra for seizures  7. Maintaining linezolid and meropenem  May 13  Continue dialysis support with limiting hypotension. Thoracentesis per pulmonary services to allow for ventilator weaning. 2 beats can be started when agreeable with critical care. Glycemic control to be treated with insulin. Antibiotics continue per infectious disease  Being treated for sepsis from healthcare acquired pneumonia and septic shock. .  Remains in controlled A. Fib. Hypothermia appears slightly better. Still with significant sacral decubitus although he has finished treatment. Baseline history with dementia. Prognosis guarded. WifeWants a full code  May 14  Hemoglobin slightly low. Storm stool per nursing staff. Weaning when okay  Blood pressure still soft  Pressors are being weaned off along with sedation  Antibiotic coverage per infectious disease for sepsis from HCAP   Blood sugars cover with sliding scale  Watch hemoglobin and CBC  May 15 th   Tapped pleural fluid is positive for growth  Culture is positive for staph epidermidis and staph species and gram-negative. ID still following. Maintain Keppra  bs are ok for now  Renal failure ( creat is 1.7 ) pvr ? Straight cath? Removal of line and cx tip  May 16  Now under treatment for empyema  Significant sacral decubitus  Some degree of intolerance of tube feeds  Surgery tomorrow for debridement so insulin can be held  Looking about the same  Pale. Does tolerate his eyes open but not really following any commands  Cardiology is following. Supportive care recommended only. Bridging for anticoagulation as per the recommendation. Watch for GI bleed. May 17  Sacral wound has been debrided. Chest tube is still draining. Remains on vent support. Remains quite sickly. Palliative services did see. Dialysis support per nephrology services. Overall prognosis is poor.   bs are tight still   Bp is sl better  ID noted   Diet: Diet NPO Exceptions are: Sips of Water with Meds  Code Status: Full Code  PT/OT Eval Status:   Unable  DVT Prophylaxis:   Resume when okay with critical care and due to procedures and decreased hemoglobin with recent history of bloody stool  Recommended disposition at discharge:   Unsure yet    +++++++++++++++++++++++++++++++++++++++++++++++++  Emil Kumar MD   [facility].  +++++++++++++++++++++++++++++++++++++++++++++++++  NOTE: This report was transcribed using voice recognition software. Every effort was made to ensure accuracy; however, inadvertent computerized transcription errors may be present.

## 2022-05-17 NOTE — PLAN OF CARE
Problem: Discharge Planning  Goal: Discharge to home or other facility with appropriate resources  Outcome: Progressing  Flowsheets (Taken 5/16/2022 2000)  Discharge to home or other facility with appropriate resources: Identify barriers to discharge with patient and caregiver     Problem: Pain  Goal: Verbalizes/displays adequate comfort level or baseline comfort level  Outcome: Progressing  Flowsheets (Taken 5/17/2022 0000)  Verbalizes/displays adequate comfort level or baseline comfort level: Assess pain using appropriate pain scale     Problem: Skin/Tissue Integrity  Goal: Absence of new skin breakdown  Description: 1. Monitor for areas of redness and/or skin breakdown  2. Assess vascular access sites hourly  3. Every 4-6 hours minimum:  Change oxygen saturation probe site  4. Every 4-6 hours:  If on nasal continuous positive airway pressure, respiratory therapy assess nares and determine need for appliance change or resting period.   Outcome: Progressing     Problem: Safety - Adult  Goal: Free from fall injury  Outcome: Progressing  Flowsheets (Taken 5/17/2022 0010)  Free From Fall Injury: Based on caregiver fall risk screen, instruct family/caregiver to ask for assistance with transferring infant if caregiver noted to have fall risk factors     Problem: ABCDS Injury Assessment  Goal: Absence of physical injury  Outcome: Progressing  Flowsheets (Taken 5/17/2022 0010)  Absence of Physical Injury: Implement safety measures based on patient assessment     Problem: Respiratory - Adult  Goal: Achieves optimal ventilation and oxygenation  5/17/2022 0013 by Felix Verdin RN  Outcome: Progressing  Flowsheets (Taken 5/16/2022 2000)  Achieves optimal ventilation and oxygenation:   Assess for changes in respiratory status   Assess for changes in mentation and behavior   Oxygen supplementation based on oxygen saturation or arterial blood gases  5/16/2022 1653 by Yumi Schwartz RCP  Outcome: Progressing Problem: Chronic Conditions and Co-morbidities  Goal: Patient's chronic conditions and co-morbidity symptoms are monitored and maintained or improved  Outcome: Progressing  Flowsheets (Taken 5/16/2022 2000)  Care Plan - Patient's Chronic Conditions and Co-Morbidity Symptoms are Monitored and Maintained or Improved: Monitor and assess patient's chronic conditions and comorbid symptoms for stability, deterioration, or improvement     Problem: Safety - Medical Restraint  Goal: Remains free of injury from restraints (Restraint for Interference with Medical Device)  Description: INTERVENTIONS:  1. Determine that other, less restrictive measures have been tried or would not be effective before applying the restraint  2. Evaluate the patient's condition at the time of restraint application  3. Inform patient/family regarding the reason for restraint  4.  Q2H: Monitor safety, psychosocial status, comfort, nutrition and hydration  Outcome: Progressing  Flowsheets  Taken 5/17/2022 0000  Remains free of injury from restraints (restraint for interference with medical device): Determine that other, less restrictive measures have been tried or would not be effective before applying the restraint  Taken 5/16/2022 2200  Remains free of injury from restraints (restraint for interference with medical device): Determine that other, less restrictive measures have been tried or would not be effective before applying the restraint     Problem: Nutrition Deficit:  Goal: Optimize nutritional status  Outcome: Progressing  Flowsheets (Taken 5/16/2022 1219 by Anjel Castellanos RD, CNSC, LD)  Nutrient intake appropriate for improving, restoring, or maintaining nutritional needs:   Recommend, monitor, and adjust tube feedings and TPN/PPN based on assessed needs   Assess nutritional status and recommend course of action

## 2022-05-17 NOTE — PLAN OF CARE
Problem: Respiratory - Adult  Goal: Achieves optimal ventilation and oxygenation  5/17/2022 0403 by Delores Schwarz RCP  Outcome: Not Progressing  Flowsheets (Taken 5/17/2022 0403)  Achieves optimal ventilation and oxygenation:   Assess the need for suctioning and aspirate as needed   Oxygen supplementation based on oxygen saturation or arterial blood gases   Position to facilitate oxygenation and minimize respiratory effort  Note: Weaning held on 5/16 do to pt vomiting.

## 2022-05-17 NOTE — OP NOTE
Operative Note      Patient: Gabriella Huddleston  YOB: 1955  MRN: [id number]    Date of Procedure: 5/17/2022    Pre-Op Diagnosis: sacral wound    Post-Op Diagnosis: Same       Procedure(s): 1. SHARP EXCISIONAL DEBRIDEMENT OF STAGE IV SACRAL WOUND TO BONE [id number] WITH VESSEL LOOP   2. SACRAL BONE BIOPSY    Surgeon(s):  Adelso Austin MD    Assistant:   Resident: Kelvin Schmitz MD    Anesthesia: Monitor Anesthesia Care    Estimated Blood Loss (mL): less than 378     Complications: None    Specimens:   ID Type Source Tests Collected by Time Destination   1 : SACRAL WOUND TISSUE Tissue Tissue CULTURE, ANAEROBIC, CULTURE, FUNGUS, GRAM STAIN, CULTURE, SURGICAL, CULTURE WITH SMEAR, ACID FAST Juliette White MD 5/17/2022 1333    A : SACRAL WOUND TISSUE Tissue Tissue SURGICAL PATHOLOGY Adelso Austin MD 5/17/2022 1337    B : sacral bone Bone Bone SURGICAL PATHOLOGY Adelso Austin MD 5/17/2022 1349        Implants:  * No implants in log *      Drains:   Chest Tube Left (Active)   $ Chest tube insertion $ Yes 05/16/22 1130   Chest Tube Airleak No 05/17/22 1200   Status Gravity 05/17/22 0400   Suction To water seal 05/17/22 0400   Drainage Description Yellow 05/17/22 1200   Dressing Status Clean, dry & intact 05/17/22 1200   Chest Tube Dressing Dry 05/16/22 2000   Site Assessment Clean, dry & intact 05/17/22 1200   Output (ml) 100 ml 05/17/22 0700       Gastrostomy/Enterostomy/Jejunostomy Tube PEG-Jejunostomy LUQ 20 fr (Active)   Drainage Appearance Ignacio 05/17/22 1200   Site Description Clean, dry & intact 05/17/22 1200   G Port Status Clamped 05/17/22 1200   Surrounding Skin Reddened 05/17/22 1200   Dressing Status Clean, dry & intact 05/17/22 1200   Dressing Type Split gauze 05/17/22 1200   G-Tube Care Completed Yes 05/17/22 1200   Tube Feeding Immune Enhancing 05/16/22 1200   Tube feeding/verify rate (mL/hr) 55 mL/hr 05/15/22 2000   Tube Feeding Intake (mL) 704 ml 05/16/22 1500   Tube Feeding Supplement Amount (mL) 90 mL 05/16/22 1500   Free Water/Flush (mL) 120 mL 05/16/22 2200   Residual Volume (ml) 5 ml 05/16/22 0800       Fecal Management System 05/16/22 (Active)   Stool Appearance Loose 05/17/22 0400   Stool Color Brown 05/17/22 0400   Balloon Volume Verified Yes 05/17/22 0400   Skin Assessment of the Anal Area Unremarkable 05/17/22 0400   Tube Irrigated Yes 05/17/22 0400   Fecal Management Tube Output 0 ml 05/16/22 1500       [REMOVED] NG/OG/NJ/NE Tube Nasogastric Left nostril (Removed)   Surrounding Skin Clean, dry & intact 05/11/22 0745   Securement device Tape 05/11/22 0745   Status Clamped 05/11/22 0745   Placement Verified X-Ray (Initial) 05/11/22 0645   NG/OG/NJ/NE External Measurement (cm) 63 cm 05/11/22 0745   Drainage Appearance Straw 05/11/22 0645   Action Taken Placement verified (comment) 05/11/22 0645       [REMOVED] Urinary Catheter (Removed)   Catheter Indications Need for fluid volume management of the critically ill patient in a critical care setting 05/11/22 0645   Site Assessment No urethral drainage 05/11/22 0645   Urine Color Madyson 05/11/22 0645   Urine Appearance Sediment 05/11/22 0645   Collection Container Standard 05/11/22 0645   Securement Method Securing device (Describe) 05/11/22 0645   Catheter Care Completed Yes 05/11/22 0645   Catheter Best Practices  Drainage tube clipped to bed;Catheter secured to thigh; Tamper seal intact; Bag below bladder;Bag not on floor; Lack of dependent loop in tubing;Drainage bag less than half full 05/11/22 0645   Status Draining 05/11/22 0645       Findings: 15 cm x 12 cm x 4 cm stage 4 sacral decubitus ulcer down to bone. Area of necrotic tissue inferolaterally excised to healthy bleeding tissue. Area of tunneling left side without abscess and a vessel loop placed for continued drainage. Multiple areas of bleeding requiring oversewing with 3-0 vicryl. Bone biopsy taken. Tissue cultures obtained.      Detailed Description of Procedure: Indications: The patient was taken to the operating room and placed in the right lateral decubitus position. Sequential compression devices were applied and patient was already receiving antibiotics. Anesthesia was administered per anesthesia record. Immediately prior to the procedure a time out was called. A 15 blade and electrocautery were used to sharply debride the necrotic, dead, and hyperkeratotic tissue until bleeding, healthy, viable tissue was observed. The wound went through skin, subcutaneous fat, muscle, and went down to bone. The specimen was sent to pathology as well as a bone biopsy. The wound was noted to tunnel to the left lateral gluteus. A 1 cm incision was made with a 15 blade. A vessel loop was tunneled to the wound from the incision and secured to itself using 3-0 Nylon. The wound measure 15 cm x 12 cm x 4 cm. The wound was copiously irrigated with saline and bleeding was controlled with hemostasis and 3-0 Vicryl figure of 8 sutures. The wound was irrigated again and no obvious active bleeding was appreciated. Eric was applied. The wound was packed with saline soaked Kerlex and dressed with ABD. Instrument and material count was correct x 2 at the end of the case. The patient tolerated the procedure well and was brought to PACU in stable condition. Dr. Yvon Kent was present and scrubbed for the entire procedure. I was present and scrubbed for entire procedure. Flor Richards MD  [facility] Surgery [facility] Endoscopy  [address] [place] [address].  [address]  [address] [address]: [phone]  F: [phone]    Electronically signed by Jarret Davidson MD on 5/17/2022 at 2:51 PM

## 2022-05-17 NOTE — PROGRESS NOTES
Critical Care Team - Daily Progress Note      Date and time: 2022 7:48 AM  Patient's name:  Manuel Asa  Medical Record Number: [id number]  Patient's account/billing number: [de-identified]  Patient's YOB: 1955  Age: 74 y.o. Date of Admission: 5/10/2022 11:58 PM  Length of stay during current admission: 6      Primary Care Physician: Usha Miller MD  ICU Attending Physician: Dr. Rudi Gordon    Code Status: Full Code    Reason for ICU admission: Status postcardiac arrest      SUBJECTIVE:     OVERNIGHT EVENTS:       Patient awake today. Sedated on Fentanyl. L wrist restraint. Patient remained afebrile. Patient remains intubated, sleeping but easily able to awake. Hypoglycemic episodes overnight. Pt started on D5 and D10. Lantus held. Intake/Output:   No intake/output data recorded. I/O last 3 completed shifts: In: 3925.2 [I.V.:2313.7; NG/GT:914; IV Piggyback:297.5]  Out: 2440 [Chest Tube:540]    Awake and following commands: No  Current Ventilation: - Ventilator Settings:    Vt (Set, mL): 400 mL  Resp Rate (Set): 18 bmp  FiO2 : 35 %    PEEP/CPAP (cmH2O): 6  Pressure Support: 0 cmH20  Secretions: Thick, yellow secretions, blood  Sedation: fentanyl  Paralyzed: No  Vasopressors: No    Initial HPI + past overnight events: 75-year-old male resident of [facility] with significant past medical history of A. fib on Eliquis, aspirin, anxiety, anemia, CHF, insulin-dependent type 2 diabetes, CKD on hemodialysis, sacral decubitus ulcers with wound VAC, hyperlipidemia, hypothyroidism. Patient presented to the ICU after having a cardiac arrest, required CPR 2 rounds of epinephrine, and intubated. He is currently on Tigacyline and Amikacin per ID for treatment of HCAP.      OBJECTIVE:     VITAL SIGNS:  /73   Pulse 79   Temp 97.8 °F (36.6 °C) (Infrared)   Resp 16   Ht 6' 5\" (1.956 m)   Wt 232 lb 2.3 oz (105.3 kg)   SpO2 98%   BMI 27.53 kg/m²   Tmax over 24 hours:  Temp (24hrs), Av.6 °F (37 °C), Min:97.3 °F (36.3 °C), Max:100.2 °F (37.9 °C)      Patient Vitals for the past 6 hrs:   BP Temp Temp src Pulse Resp SpO2 Weight   05/17/22 0700 111/73 -- -- 79 16 98 % --   05/17/22 0600 116/72 -- -- 88 24 95 % --   05/17/22 0500 124/67 -- -- 78 16 98 % 232 lb 2.3 oz (105.3 kg)   05/17/22 0400 (!) 149/88 97.8 °F (36.6 °C) Infrared 94 17 98 % --   05/17/22 0305 -- -- -- 81 17 -- --   05/17/22 0300 120/77 -- -- 94 17 -- --   05/17/22 0200 116/64 -- -- 98 23 98 % --         Intake/Output Summary (Last 24 hours) at 5/17/2022 0748  Last data filed at 5/17/2022 0700  Gross per 24 hour   Intake 3762.41 ml   Output 2440 ml   Net 1322.41 ml     Wt Readings from Last 2 Encounters:   05/17/22 232 lb 2.3 oz (105.3 kg)   04/27/22 212 lb (96.2 kg)     Body mass index is 27.53 kg/m². Physical Exam  Vitals and nursing note reviewed. Constitutional:       General: He is awake. Appearance: He is ill-appearing. Comments:  intubated   HENT:      Head: Normocephalic and atraumatic. Nose: Nose normal. No congestion or rhinorrhea. Mouth/Throat:      Mouth: Mucous membranes are moist.      Pharynx: Oropharynx is clear. Eyes:      General: No scleral icterus. Extraocular Movements: Extraocular movements intact. Conjunctiva/sclera: Conjunctivae normal.   Cardiovascular:      Rate and Rhythm: Normal rate and regular rhythm. Pulses: Normal pulses. Heart sounds: Normal heart sounds. No murmur heard. Pulmonary:      Breath sounds: No stridor. Rhonchi present. Comments: intubated  Abdominal:      General: Bowel sounds are normal. There is no distension. Palpations: Abdomen is soft. There is no mass. Tenderness: There is no abdominal tenderness. There is no guarding or rebound. Comments: PEG tube   Musculoskeletal:         General: Normal range of motion. Cervical back: Normal range of motion and neck supple. No tenderness. Right lower leg: Edema present. Left lower leg: Edema present. Skin:     Findings: Lesion (Sacral decubitus) present. Comments: Chronic changes -Dark-colored lower legs b/l  Feet are cold to touch    Neurological:      Mental Status: He is alert. Cranial Nerves: No cranial nerve deficit. Sensory: No sensory deficit. Comments: Patient awake and alert, orientated to name. Not sure about location and season. Psychiatric:         Attention and Perception: Attention normal.         Behavior: Behavior is cooperative.            MEDICATIONS:    Scheduled Meds:   metoprolol tartrate  25 mg Oral BID    epoetin kandi-epbx  8,000 Units SubCUTAneous Once per day on Mon Wed Fri    And    epoetin kandi-epbx  2,000 Units SubCUTAneous Once per day on Mon Wed Fri    mupirocin   Topical BID    tigecycline (TYGACIL) IVPB  50 mg IntraVENous Q12H    sulfamethoxazole-trimethoprim  2 tablet Oral Daily    acetylcysteine  4 mL Inhalation Q4H    ipratropium-albuterol  1 ampule Inhalation Q4H    polyethylene glycol  17 g Oral Daily    pantoprazole (PROTONIX) 40 mg injection  40 mg IntraVENous Daily    chlorhexidine  15 mL Mouth/Throat BID    senna  5 mL Per G Tube Nightly    docusate  100 mg Per G Tube BID    levothyroxine  175 mcg Per G Tube Daily    [Held by provider] insulin glargine  20 Units SubCUTAneous BID    insulin lispro  0-12 Units SubCUTAneous Q6H    levETIRAcetam  500 mg PEG Tube BID    sodium chloride flush  5-40 mL IntraVENous 2 times per day     Continuous Infusions:   dextrose 75 mL/hr at 05/17/22 0400    norepinephrine Stopped (05/16/22 2305)    fentaNYL 200 mcg/hr (05/17/22 0101)    dextrose 100 mL/hr (05/17/22 0446)    sodium chloride Stopped (05/13/22 1631)     PRN Meds:   anticoagulant sodium citrate, 4.2 mL, PRN  perflutren lipid microspheres, 1.5 mL, ONCE PRN  glucose, 4 tablet, PRN  dextrose bolus, 125 mL, PRN   Or  dextrose bolus, 250 mL, PRN  glucagon (rDNA), 1 mg, PRN  dextrose, 100 mL/hr, PRN  sodium chloride flush, 5-40 mL, PRN  sodium chloride, , PRN          VENT SETTINGS (Comprehensive) (if applicable):  Vent Information  Ventilator ID: [id number]  Vent Mode: AC/VC  Ventilator Initiate: Yes  Additional Respiratory Assessments  Pulse: 79  Resp: 16  SpO2: 98 %  End Tidal CO2: 42 (%)  Position: Semi-Fajardo's  Humidification Source: Heated wire  Humidification Temp: 37.3  Circuit Condensation: Drained  Cuff Pressure (cm H2O): 28 cm H2O    Arterial Blood Gas 5/17/2022  No new ABG       Laboratory findings:    Complete Blood Count:   Recent Labs     05/15/22  0600 05/16/22  0605 05/17/22  0440   WBC 10.0 9.6 8.0   HGB 7.6* 7.2* 6.8*   HCT 24.8* 23.6* 22.0*    125* 98*        Last 3 Blood Glucose:   Recent Labs     05/16/22  0605 05/17/22  0059 05/17/22  0440   GLUCOSE 146* 52* 63*        PT/INR:    Lab Results   Component Value Date    PROTIME 15.5 05/17/2022    INR 1.4 05/17/2022     PTT:    Lab Results   Component Value Date    APTT 32.8 05/11/2022       Comprehensive Metabolic Profile:   Recent Labs     05/16/22  0605 05/17/22  0059 05/17/22  0440    133 131*   K 4.7 4.1 4.0   CL 92* 95* 93*   CO2 29 29 29   BUN 83* 51* 53*   CREATININE 2.1* 1.5* 1.6*   GLUCOSE 146* 52* 63*   CALCIUM 8.7 8.8 8.7      Magnesium:   Lab Results   Component Value Date    MG 2.1 05/17/2022     Phosphorus:   Lab Results   Component Value Date    PHOS 4.0 05/17/2022     Ionized Calcium: No results found for: CAION     Urinalysis:     Troponin: No results for input(s): TROPONINI in the last 72 hours. Microbiology:  Cultures drawn: Gram stain respiratory from sputum suction pending, MRSA nasal colonization positive, blood cultures 24 hours no growth-positive contaminant 1 bottle shows gram-positive cocci in clusters. Respiratory panel negative    Radiology/Imaging:     Chest Xray (5/17/2022): Stable chest with lines and tubes unchanged.   Increasing markings throughout the lung fields no change in bilateral pleural with Meds   Stress ulcer prophylaxis: protonix 40mg   DVT prophylaxis: SCDS    Consultations needed: Yes   Transfer out of ICU today? No    Other: monitor off the fentanyl, Ativan PRN        Lines/catheters   Date 05/10  o ETT  o CVC triple lumen R femoral vein- REMOVED   o Urinary cath   Tunneled HD cath right subclavian 4 days  · Date 05/17   · CVC Triple lumen L IJ        Svetlana Luong DO  7:48 AM  05/17/22      I personally saw, examined and provided care for the patient. Radiographs, labs and medication list were reviewed by me independently. Review of Residents documentation was conducted and revisions were made as appropriate. I agree with the above documented exam, problem list and plan of care.     Continue left chest tube to waterseal as continues to drain  OR today for debridement  Remains hypoglycemic continue dextrose infusion  New central line  abx adjusted per ID    CCT excluding procedures 38 minutes    Carl Barber DO

## 2022-05-17 NOTE — ANESTHESIA POSTPROCEDURE EVALUATION
Department of Anesthesiology  Postprocedure Note    Patient: Kristyn Lipscomb  MRN: [id number]  YOB: 1955  Date of evaluation: 5/17/2022  Time:  2:55 PM     Procedure Summary     Date: 05/17/22 Room / Location: [facility] OR [address] [place] [address]    Anesthesia Start: 7223 Anesthesia Stop: 0660    Procedure: SACRAL WOUND DEBRIDEMENT (N/A ) Diagnosis: (/)    Surgeons: Yun Robertson MD Responsible Provider: Gab Chiang MD    Anesthesia Type: general ASA Status: 4          Anesthesia Type: No value filed. Sally Phase I:      Sally Phase II:      Last vitals: Reviewed and per EMR flowsheets.        Anesthesia Post Evaluation    Patient location during evaluation: ICU  Patient participation: complete - patient cannot participate  Level of consciousness: sedated and ventilated  Airway patency: patent  Nausea & Vomiting: no vomiting and no nausea  Complications: no  Cardiovascular status: blood pressure returned to baseline  Respiratory status: intubated and ventilator  Hydration status: euvolemic  Multimodal analgesia pain management approach

## 2022-05-17 NOTE — PROGRESS NOTES
No acute events overnight. Plan for OR debridement today. Electronically signed by Kev Gibson MD on 5/17/2022 at 6:26 AM     Agree; for excisional debridement today  R/b/a discussed, all questions answered, informed consent in chart    J Mariaa Byrd MD  Minimally Invasive General Surgery and Endoscopy  [address] [place] [address].  [address]: [phone]  F: [phone]    Electronically signed by Yisel Hutson MD on 5/17/2022 at 1:11 PM

## 2022-05-17 NOTE — PROCEDURES
PROCEDURE  5/17/22       Time: 10:45     CENTRAL LINE INSERTION  Risks, benefits and alternatives (for applicable procedures below) described. Performed By: EM Resident. Indication: long term access and centrally administered medications. Informed consent: Written consent obtained. The patient's wife counseled regarding the procedure in person, it's indications, risks, potential complications and alternatives and any questions were answered. Consent was obtained. .  Procedure: After routine sterile preparation, local anesthesia obtained by infiltration using 1% Lidocaine without epinephrine. A left 3-Lumen 7F Central Venous Catheter was placed by internal jugular vein approach and secured by standard fashion. Ultrasound Guidance:   used. Number of Attempts: 1  Post-procedure Findings: A post procedural chest x-ray  was ordered and showed good line position. Patient tolerated the procedure well.      Amalia Muir DO

## 2022-05-17 NOTE — PROGRESS NOTES
Chart reviewed. No acute events overnight. No immediate needs identified from a palliative medicine standpoint at this time. Will continue to follow for ongoing goals of care and CODE STATUS discussion as well as support for the patient and family.     LILY Fam CNP 5/17/2022 10:27 AM

## 2022-05-17 NOTE — PROGRESS NOTES
Palliative Care Department  [phone]  Palliative Care Progress Note  Provider Evelia Lott, APRN - CNP     Brianne Simon  [id number]  Hospital Day: 7  Date of Initial Consult: 5/11/2022  Referring Provider: Rosana Lemus MD  Palliative Medicine was consulted for assistance with: Goals of Care, Code Status Discussion, Family Support    HPI:   Brianne Simon is a 74 y.o. with a medical history of atrial fibrillation on Eliquis and aspirin, anxiety, anemia, CHF, DM, CKD on HD, sacral decubitus ulcer with wound VAC, HLD, hypothyroidism who was admitted on 5/10/2022 from facility with a CHIEF COMPLAINT of unresponsiveness. Patient reportedly was unresponsive at facility and lost pulses while in the ED. CPR was initiated, patient was intubated and 2 rounds of epinephrine were given. ROSC was achieved and patient was transferred to ICU for further management. Stools were noted to be maroon-colored and patient was started on Protonix. Patient was also started on antibiotics for HCAP/sepsis with septic shock. Palliative medicine is consulted for goals of care, CODE STATUS discussion, and family support. ASSESSMENT/PLAN:     Pertinent Hospital Diagnoses      Status post cardiac arrest   Acute respiratory failure   Sepsis with septic shock   HCAP   CKD on HD   Sacral decubitus ulcer      Palliative Care Encounter / Counseling Regarding Goals of Care  Please see detailed goals of care discussion as below   At this time, Brianne Simon, Does Not have capacity for medical decision-making.   Capacity is time limited and situation/question specific   During encounter, Zhang Ross, spouse, was surrogate medical decision-maker   Outcome of goals of care meeting: Continue full code and current management   Code status Full Code   Advanced Directives: no POA or living will in [facility]   Surrogate/Legal NOK:  shira Magana, spouse, [phone]    Spiritual assessment: no spiritual distress identified  Bereavement and grief: to be determined  Referrals to: none today  SUBJECTIVE:     Current medical issues leading to Palliative Medicine involvement include   Active Hospital Problems    Diagnosis Date Noted    Pneumonia due to infectious organism [J18.9]      Priority: Medium    Pleural effusion [J90]      Priority: Medium    Cardiac arrest [facility]) [I46.9] 05/11/2022     Priority: Medium       Details of Conversation: Chart reviewed and met with the patient, no family at bedside. Received an update from the patient's bedside nurse. Plan is for chest tube placement. The nurse explained that there was some concern about aspiration and no plans for weaning trials today. I met with the patient's wife in the waiting room. Support provided. She denied any needs at this time. Plan is to continue current medical management. OBJECTIVE:   Prognosis: depends upon goals and unknown    Physical Exam:  /64   Pulse 63   Temp 97.3 °F (36.3 °C) (Infrared)   Resp 18   Ht 6' 5\" (1.956 m)   Wt 221 lb 12.5 oz (100.6 kg)   SpO2 92%   BMI 26.30 kg/m²   Constitutional:  Ill-appearing, awakens to voice  Lungs:  Intubated  Heart:  IRRR  Abd:  Soft, non tender, non distended, bowel sounds present  Ext:  Moving all extremities, + edema, pulses present  Skin:  Warm and dry  Neuro: Awakens to voice, follows simple commands    Objective data reviewed: labs, images, records, medication use, vitals and chart    Discussed patient and the plan of care with the other IDT members: Palliative Medicine IDT Team    Time/Communication  Greater than 50% of time spent, total 15 minutes in counseling and coordination of care at the bedside regarding goals of care, diagnosis and prognosis and see above. Thank you for allowing Palliative Medicine to participate in the care of Nolberto Morales

## 2022-05-17 NOTE — PROGRESS NOTES
New central line placed left IJ. Medicated for procedure. X-ray obtained to confirm placement. Wife at bedside, anesthesia in to speak with her regarding upcoming debridement of wound surgery today.

## 2022-05-18 ENCOUNTER — APPOINTMENT (OUTPATIENT)
Dept: GENERAL RADIOLOGY | Age: 67
DRG: 003 | End: 2022-05-18
Payer: MEDICARE

## 2022-05-18 LAB
ANION GAP SERPL CALCULATED.3IONS-SCNC: 14 MMOL/L (ref 7–16)
ANION GAP SERPL CALCULATED.3IONS-SCNC: 7 MMOL/L (ref 7–16)
ANISOCYTOSIS: ABNORMAL
BASOPHILS ABSOLUTE: 0.01 E9/L (ref 0–0.2)
BASOPHILS RELATIVE PERCENT: 0.1 % (ref 0–2)
BUN BLDV-MCNC: 24 MG/DL (ref 6–23)
BUN BLDV-MCNC: 68 MG/DL (ref 6–23)
BURR CELLS: ABNORMAL
CALCIUM SERPL-MCNC: 8 MG/DL (ref 8.6–10.2)
CALCIUM SERPL-MCNC: 8.3 MG/DL (ref 8.6–10.2)
CHLORIDE BLD-SCNC: 102 MMOL/L (ref 98–107)
CHLORIDE BLD-SCNC: 85 MMOL/L (ref 98–107)
CO2: 23 MMOL/L (ref 22–29)
CO2: 25 MMOL/L (ref 22–29)
CREAT SERPL-MCNC: 0.8 MG/DL (ref 0.7–1.2)
CREAT SERPL-MCNC: 2 MG/DL (ref 0.7–1.2)
EOSINOPHILS ABSOLUTE: 0 E9/L (ref 0.05–0.5)
EOSINOPHILS RELATIVE PERCENT: 0 % (ref 0–6)
GFR AFRICAN AMERICAN: 40
GFR AFRICAN AMERICAN: >60
GFR NON-AFRICAN AMERICAN: 33 ML/MIN/1.73
GFR NON-AFRICAN AMERICAN: >60 ML/MIN/1.73
GLUCOSE BLD-MCNC: 119 MG/DL (ref 74–99)
GLUCOSE BLD-MCNC: 132 MG/DL (ref 74–99)
HCT VFR BLD CALC: 24.9 % (ref 37–54)
HEMOGLOBIN: 7.8 G/DL (ref 12.5–16.5)
IMMATURE GRANULOCYTES #: 0.11 E9/L
IMMATURE GRANULOCYTES %: 1.2 % (ref 0–5)
LYMPHOCYTES ABSOLUTE: 0.3 E9/L (ref 1.5–4)
LYMPHOCYTES RELATIVE PERCENT: 3.3 % (ref 20–42)
MAGNESIUM: 2 MG/DL (ref 1.6–2.6)
MCH RBC QN AUTO: 27.2 PG (ref 26–35)
MCHC RBC AUTO-ENTMCNC: 31.3 % (ref 32–34.5)
MCV RBC AUTO: 86.8 FL (ref 80–99.9)
METER GLUCOSE: 113 MG/DL (ref 74–99)
METER GLUCOSE: 114 MG/DL (ref 74–99)
METER GLUCOSE: 126 MG/DL (ref 74–99)
METER GLUCOSE: 131 MG/DL (ref 74–99)
METER GLUCOSE: 136 MG/DL (ref 74–99)
METER GLUCOSE: 154 MG/DL (ref 74–99)
METER GLUCOSE: 160 MG/DL (ref 74–99)
METER GLUCOSE: 179 MG/DL (ref 74–99)
METER GLUCOSE: 183 MG/DL (ref 74–99)
METER GLUCOSE: 191 MG/DL (ref 74–99)
METER GLUCOSE: 96 MG/DL (ref 74–99)
MONOCYTES ABSOLUTE: 0.14 E9/L (ref 0.1–0.95)
MONOCYTES RELATIVE PERCENT: 1.5 % (ref 2–12)
NEUTROPHILS ABSOLUTE: 8.56 E9/L (ref 1.8–7.3)
NEUTROPHILS RELATIVE PERCENT: 93.9 % (ref 43–80)
OVALOCYTES: ABNORMAL
PDW BLD-RTO: 19.9 FL (ref 11.5–15)
PHOSPHORUS: 5.9 MG/DL (ref 2.5–4.5)
PLATELET # BLD: 97 E9/L (ref 130–450)
PLATELET CONFIRMATION: NORMAL
PMV BLD AUTO: 9.5 FL (ref 7–12)
POIKILOCYTES: ABNORMAL
POLYCHROMASIA: ABNORMAL
POTASSIUM SERPL-SCNC: 3.6 MMOL/L (ref 3.5–5)
POTASSIUM SERPL-SCNC: 4.8 MMOL/L (ref 3.5–5)
RBC # BLD: 2.87 E12/L (ref 3.8–5.8)
SODIUM BLD-SCNC: 122 MMOL/L (ref 132–146)
SODIUM BLD-SCNC: 134 MMOL/L (ref 132–146)
TARGET CELLS: ABNORMAL
WBC # BLD: 9.1 E9/L (ref 4.5–11.5)

## 2022-05-18 PROCEDURE — 2000000000 HC ICU R&B

## 2022-05-18 PROCEDURE — 99231 SBSQ HOSP IP/OBS SF/LOW 25: CPT | Performed by: NURSE PRACTITIONER

## 2022-05-18 PROCEDURE — 94003 VENT MGMT INPAT SUBQ DAY: CPT

## 2022-05-18 PROCEDURE — 71045 X-RAY EXAM CHEST 1 VIEW: CPT

## 2022-05-18 PROCEDURE — 36592 COLLECT BLOOD FROM PICC: CPT

## 2022-05-18 PROCEDURE — 6360000002 HC RX W HCPCS: Performed by: SPECIALIST

## 2022-05-18 PROCEDURE — 6370000000 HC RX 637 (ALT 250 FOR IP): Performed by: INTERNAL MEDICINE

## 2022-05-18 PROCEDURE — 2580000003 HC RX 258: Performed by: STUDENT IN AN ORGANIZED HEALTH CARE EDUCATION/TRAINING PROGRAM

## 2022-05-18 PROCEDURE — 85025 COMPLETE CBC W/AUTO DIFF WBC: CPT

## 2022-05-18 PROCEDURE — 6360000002 HC RX W HCPCS: Performed by: INTERNAL MEDICINE

## 2022-05-18 PROCEDURE — 80048 BASIC METABOLIC PNL TOTAL CA: CPT

## 2022-05-18 PROCEDURE — C9113 INJ PANTOPRAZOLE SODIUM, VIA: HCPCS | Performed by: STUDENT IN AN ORGANIZED HEALTH CARE EDUCATION/TRAINING PROGRAM

## 2022-05-18 PROCEDURE — 2580000003 HC RX 258: Performed by: SPECIALIST

## 2022-05-18 PROCEDURE — A4216 STERILE WATER/SALINE, 10 ML: HCPCS | Performed by: STUDENT IN AN ORGANIZED HEALTH CARE EDUCATION/TRAINING PROGRAM

## 2022-05-18 PROCEDURE — 2580000003 HC RX 258: Performed by: INTERNAL MEDICINE

## 2022-05-18 PROCEDURE — 2500000003 HC RX 250 WO HCPCS: Performed by: INTERNAL MEDICINE

## 2022-05-18 PROCEDURE — 84100 ASSAY OF PHOSPHORUS: CPT

## 2022-05-18 PROCEDURE — 6360000002 HC RX W HCPCS: Performed by: STUDENT IN AN ORGANIZED HEALTH CARE EDUCATION/TRAINING PROGRAM

## 2022-05-18 PROCEDURE — 83735 ASSAY OF MAGNESIUM: CPT

## 2022-05-18 PROCEDURE — 90935 HEMODIALYSIS ONE EVALUATION: CPT | Performed by: INTERNAL MEDICINE

## 2022-05-18 PROCEDURE — 94640 AIRWAY INHALATION TREATMENT: CPT

## 2022-05-18 PROCEDURE — 36415 COLL VENOUS BLD VENIPUNCTURE: CPT

## 2022-05-18 PROCEDURE — 6370000000 HC RX 637 (ALT 250 FOR IP): Performed by: STUDENT IN AN ORGANIZED HEALTH CARE EDUCATION/TRAINING PROGRAM

## 2022-05-18 PROCEDURE — 2500000003 HC RX 250 WO HCPCS: Performed by: STUDENT IN AN ORGANIZED HEALTH CARE EDUCATION/TRAINING PROGRAM

## 2022-05-18 PROCEDURE — 82962 GLUCOSE BLOOD TEST: CPT

## 2022-05-18 PROCEDURE — 6370000000 HC RX 637 (ALT 250 FOR IP): Performed by: SPECIALIST

## 2022-05-18 RX ORDER — MIDAZOLAM HYDROCHLORIDE 2 MG/2ML
1 INJECTION, SOLUTION INTRAMUSCULAR; INTRAVENOUS
Status: DISCONTINUED | OUTPATIENT
Start: 2022-05-18 | End: 2022-05-20

## 2022-05-18 RX ADMIN — LEVETIRACETAM 500 MG: 100 SOLUTION ORAL at 22:14

## 2022-05-18 RX ADMIN — IPRATROPIUM BROMIDE AND ALBUTEROL SULFATE 1 AMPULE: .5; 2.5 SOLUTION RESPIRATORY (INHALATION) at 03:07

## 2022-05-18 RX ADMIN — IPRATROPIUM BROMIDE AND ALBUTEROL SULFATE 1 AMPULE: .5; 2.5 SOLUTION RESPIRATORY (INHALATION) at 08:02

## 2022-05-18 RX ADMIN — POLYETHYLENE GLYCOL 3350 17 G: 17 POWDER, FOR SOLUTION ORAL at 08:21

## 2022-05-18 RX ADMIN — CEFTOLOZANE AND TAZOBACTAM 1500 MG: 1; .5 INJECTION, POWDER, LYOPHILIZED, FOR SOLUTION INTRAVENOUS at 12:26

## 2022-05-18 RX ADMIN — CEFTOLOZANE AND TAZOBACTAM 1500 MG: 1; .5 INJECTION, POWDER, LYOPHILIZED, FOR SOLUTION INTRAVENOUS at 03:35

## 2022-05-18 RX ADMIN — DOCUSATE SODIUM LIQUID 100 MG: 100 LIQUID ORAL at 22:14

## 2022-05-18 RX ADMIN — Medication 10 ML: at 08:25

## 2022-05-18 RX ADMIN — MUPIROCIN: 20 OINTMENT TOPICAL at 08:25

## 2022-05-18 RX ADMIN — METOPROLOL TARTRATE 25 MG: 25 TABLET, FILM COATED ORAL at 22:13

## 2022-05-18 RX ADMIN — MUPIROCIN: 20 OINTMENT TOPICAL at 22:14

## 2022-05-18 RX ADMIN — TIGECYCLINE 50 MG: 50 INJECTION, POWDER, LYOPHILIZED, FOR SOLUTION INTRAVENOUS at 06:33

## 2022-05-18 RX ADMIN — SULFAMETHOXAZOLE AND TRIMETHOPRIM 2 TABLET: 800; 160 TABLET ORAL at 08:21

## 2022-05-18 RX ADMIN — SENNOSIDES 8.8 MG: 8.8 SYRUP ORAL at 22:14

## 2022-05-18 RX ADMIN — SODIUM CHLORIDE, PRESERVATIVE FREE 40 MG: 5 INJECTION INTRAVENOUS at 08:22

## 2022-05-18 RX ADMIN — IPRATROPIUM BROMIDE AND ALBUTEROL SULFATE 1 AMPULE: .5; 2.5 SOLUTION RESPIRATORY (INHALATION) at 16:13

## 2022-05-18 RX ADMIN — EPOETIN ALFA-EPBX 2000 UNITS: 2000 INJECTION, SOLUTION INTRAVENOUS; SUBCUTANEOUS at 08:24

## 2022-05-18 RX ADMIN — ACETYLCYSTEINE 400 MG: 100 INHALANT RESPIRATORY (INHALATION) at 12:23

## 2022-05-18 RX ADMIN — ACETYLCYSTEINE 400 MG: 100 INHALANT RESPIRATORY (INHALATION) at 08:03

## 2022-05-18 RX ADMIN — ACETYLCYSTEINE 400 MG: 100 INHALANT RESPIRATORY (INHALATION) at 19:22

## 2022-05-18 RX ADMIN — MIDAZOLAM HYDROCHLORIDE 1 MG: 1 INJECTION, SOLUTION INTRAMUSCULAR; INTRAVENOUS at 15:21

## 2022-05-18 RX ADMIN — EPOETIN ALFA-EPBX 8000 UNITS: 4000 INJECTION, SOLUTION INTRAVENOUS; SUBCUTANEOUS at 08:21

## 2022-05-18 RX ADMIN — METOPROLOL TARTRATE 25 MG: 25 TABLET, FILM COATED ORAL at 08:21

## 2022-05-18 RX ADMIN — CHLORHEXIDINE GLUCONATE 0.12% ORAL RINSE 15 ML: 1.2 LIQUID ORAL at 21:01

## 2022-05-18 RX ADMIN — IPRATROPIUM BROMIDE AND ALBUTEROL SULFATE 1 AMPULE: .5; 2.5 SOLUTION RESPIRATORY (INHALATION) at 23:28

## 2022-05-18 RX ADMIN — DEXTROSE MONOHYDRATE: 100 INJECTION, SOLUTION INTRAVENOUS at 03:35

## 2022-05-18 RX ADMIN — Medication 200 MCG/HR: at 00:47

## 2022-05-18 RX ADMIN — Medication 200 MCG/HR: at 13:32

## 2022-05-18 RX ADMIN — ACETYLCYSTEINE 400 MG: 100 INHALANT RESPIRATORY (INHALATION) at 16:13

## 2022-05-18 RX ADMIN — IPRATROPIUM BROMIDE AND ALBUTEROL SULFATE 1 AMPULE: .5; 2.5 SOLUTION RESPIRATORY (INHALATION) at 19:22

## 2022-05-18 RX ADMIN — Medication 200 MCG/HR: at 20:03

## 2022-05-18 RX ADMIN — Medication 1 G: at 08:21

## 2022-05-18 RX ADMIN — DOCUSATE SODIUM LIQUID 100 MG: 100 LIQUID ORAL at 08:22

## 2022-05-18 RX ADMIN — TIGECYCLINE 50 MG: 50 INJECTION, POWDER, LYOPHILIZED, FOR SOLUTION INTRAVENOUS at 20:04

## 2022-05-18 RX ADMIN — CHLORHEXIDINE GLUCONATE 0.12% ORAL RINSE 15 ML: 1.2 LIQUID ORAL at 08:23

## 2022-05-18 RX ADMIN — IPRATROPIUM BROMIDE AND ALBUTEROL SULFATE 1 AMPULE: .5; 2.5 SOLUTION RESPIRATORY (INHALATION) at 12:23

## 2022-05-18 RX ADMIN — COLLAGENASE SANTYL: 250 OINTMENT TOPICAL at 12:27

## 2022-05-18 RX ADMIN — Medication 200 MCG/HR: at 06:56

## 2022-05-18 RX ADMIN — ANTICOAGULANT SODIUM CITRATE SOLUTION 0.17 G: 4 SOLUTION INTRAVENOUS at 14:38

## 2022-05-18 RX ADMIN — CEFTOLOZANE AND TAZOBACTAM 1500 MG: 1; .5 INJECTION, POWDER, LYOPHILIZED, FOR SOLUTION INTRAVENOUS at 20:12

## 2022-05-18 RX ADMIN — ACETYLCYSTEINE 400 MG: 100 INHALANT RESPIRATORY (INHALATION) at 23:28

## 2022-05-18 RX ADMIN — MIDAZOLAM HYDROCHLORIDE 1 MG: 1 INJECTION, SOLUTION INTRAMUSCULAR; INTRAVENOUS at 10:32

## 2022-05-18 RX ADMIN — ACETYLCYSTEINE 400 MG: 100 INHALANT RESPIRATORY (INHALATION) at 03:07

## 2022-05-18 RX ADMIN — LEVETIRACETAM 500 MG: 100 SOLUTION ORAL at 08:21

## 2022-05-18 RX ADMIN — LEVOTHYROXINE SODIUM 175 MCG: 125 TABLET ORAL at 05:03

## 2022-05-18 ASSESSMENT — PULMONARY FUNCTION TESTS
PIF_VALUE: 29
PIF_VALUE: 27
PIF_VALUE: 25
PIF_VALUE: 24
PIF_VALUE: 36
PIF_VALUE: 30
PIF_VALUE: 26
PIF_VALUE: 27
PIF_VALUE: 27
PIF_VALUE: 22
PIF_VALUE: 25
PIF_VALUE: 25
PIF_VALUE: 28
PIF_VALUE: 26
PIF_VALUE: 29

## 2022-05-18 ASSESSMENT — PAIN SCALES - GENERAL: PAINLEVEL_OUTOF10: 0

## 2022-05-18 NOTE — PROGRESS NOTES
Patient seen and examined. Wound with healthy granulation tissue. Vitals:    05/18/22 0804   BP:    Pulse:    Resp: 10   Temp:    SpO2: 92%     Plan:  Santyl daily to wounds. Please change dressing daily with saline soaked Kerlix and dress with ABD/heavy drainage pack  Appreciate wound care team recommendations  No further plans from a general surgery standpoint. Will be available as needed. Patient findings and plan discussed with Dr. Hebert Terry. Electronically signed by Jennifer Schaefer MD on 5/18/2022 at 8:04 AM       Pt seen and examined this am; agree w above  No issues with wound overnight  Dressing changed this am    Appreciate wound care recs  I do think he will ultimately need a diverting colostomy in the near future  Will see how he does from this debridement at this time  Will be available should he in fact need diverting colostomy    STELLA Hou MD  Minimally Invasive General Surgery and Endoscopy  [address] [place] [address].  [address]: [phone]  F: [phone]    Electronically signed by Maria Teresa Tolbert MD on 5/18/2022 at 8:23 AM

## 2022-05-18 NOTE — PROGRESS NOTES
Nephrology Progress Note  [facility]    Reason for Consult: ESRD  Date of Service: 5/18/2022     Subjective    Patient seen and examined in ICU  On dialysis and tolerating well  ROS limited by medical condition         Past Medical History:        Diagnosis Date    A-fib ([facility]     [facility]UMAN [facility]) ([facility]     Anemia     Anxiety     Bipolar 1 disorder ([facility]     Charcot foot due to diabetes mellitus ([facility]     CHF (congestive heart failure) ([facility]     CKD (chronic kidney disease)     Diabetes mellitus ([facility]     Dialysis patient ([facility]     Hyperlipidemia     Hypertension     Right sided weakness     Sacral decubitus ulcer     Seizure ([facility]        Past Surgical History:        Procedure Laterality Date    AMPUTATION Right 2017    right toe    BACK SURGERY N/A 5/17/2022    SACRAL WOUND DEBRIDEMENT performed by Denis Melissa MD at [address] [place] [address]    right chest    HYDROCELE EXCISION  2010    JOINT REPLACEMENT Right 2011    TONSILLECTOMY      UPPER GASTROINTESTINAL ENDOSCOPY N/A 4/27/2022    EGD BIOPSY performed by Erica Rodriguez MD at [place] [address]  2010    venous ablation       Current Medications:    Current Facility-Administered Medications: collagenase ointment, , Topical, Daily  midazolam PF (VERSED) injection 1 mg, 1 mg, IntraVENous, Q3H PRN  dextrose 10 % infusion, , IntraVENous, Continuous  0.9 % sodium chloride infusion, , IntraVENous, PRN  sodium chloride tablet 1 g, 1 g, Oral, Daily  ceftolozane-tazobactam (ZERBAXA) 1,500 mg in sodium chloride 0.9 % 100 mL IVPB, 1,500 mg, IntraVENous, Q8H  metoprolol tartrate (LOPRESSOR) tablet 25 mg, 25 mg, Oral, BID  epoetin kandi-epbx (RETACRIT) injection 8,000 Units, 8,000 Units, SubCUTAneous, Once per day on Mon Wed Fri **AND** epoetin kandi-epbx (RETACRIT) injection 2,000 Units, 2,000 Units, SubCUTAneous, Once per day on Mon Wed Fri  mupirocin (BACTROBAN) 2 % ointment, , Topical, BID  [COMPLETED] tigecycline (TYGACIL) 100 mg in sodium chloride 0.9 % 100 mL IVPB, 100 mg, IntraVENous, Once **FOLLOWED BY** tigecycline (TYGACIL) 50 mg in sodium chloride 0.9 % 100 mL IVPB, 50 mg, IntraVENous, Q12H  sulfamethoxazole-trimethoprim (BACTRIM DS;SEPTRA DS) 800-160 MG per tablet 2 tablet, 2 tablet, Oral, Daily  acetylcysteine (MUCOMYST) 10 % solution 400 mg, 4 mL, Inhalation, Q4H  ipratropium-albuterol (DUONEB) nebulizer solution 1 ampule, 1 ampule, Inhalation, Q4H  anticoagulant sodium citrate 4 GM/100ML solution 0.168 g, 4.2 mL, IntraCATHeter, PRN  fentaNYL 10 mcg/ml in 0.9%  ml infusion,  mcg/hr, IntraVENous, Continuous  polyethylene glycol (GLYCOLAX) packet 17 g, 17 g, Oral, Daily  pantoprazole (PROTONIX) 40 mg in sodium chloride (PF) 10 mL injection, 40 mg, IntraVENous, Daily  chlorhexidine (PERIDEX) 0.12 % solution 15 mL, 15 mL, Mouth/Throat, BID  senna (SENOKOT) 8.8 MG/5ML syrup 8.8 mg, 5 mL, Per G Tube, Nightly  docusate (COLACE) 50 MG/5ML liquid 100 mg, 100 mg, Per G Tube, BID  levothyroxine (SYNTHROID) tablet 175 mcg, 175 mcg, Per G Tube, Daily  perflutren lipid microspheres (DEFINITY) injection 1.65 mg, 1.5 mL, IntraVENous, ONCE PRN  [Held by provider] insulin glargine (LANTUS) injection vial 20 Units, 20 Units, SubCUTAneous, BID  insulin lispro (HUMALOG) injection vial 0-12 Units, 0-12 Units, SubCUTAneous, Q6H  glucose chewable tablet 16 g, 4 tablet, Oral, PRN  dextrose bolus 10% 125 mL, 125 mL, IntraVENous, PRN **OR** dextrose bolus 10% 250 mL, 250 mL, IntraVENous, PRN  glucagon (rDNA) injection 1 mg, 1 mg, IntraMUSCular, PRN  levETIRAcetam (KEPPRA) 100 MG/ML solution 500 mg, 500 mg, PEG Tube, BID  sodium chloride flush 0.9 % injection 5-40 mL, 5-40 mL, IntraVENous, 2 times per day  sodium chloride flush 0.9 % injection 5-40 mL, 5-40 mL, IntraVENous, PRN  0.9 % sodium chloride infusion, , IntraVENous, PRN    Allergies:   Other      Physical exam:   Constitutional:  VITALS:  BP 113/72   Pulse 100   Temp 97.4 °F (36.3 °C) (Infrared)   Resp 22   Ht 6' 5\" (1.956 m)   Wt 244 lb 14.9 oz (111.1 kg)   SpO2 99%   BMI 29.04 kg/m²     Gen: alert, awake, no acute distress  Eyes: eomi  HEENT: atraumatic/normocephalic, +ETT   Lungs: clear to ascultation bilaterally, equal lung expansion  CV: RRR, no murmurs  Abdomen: soft, nontender, nondistended, +PEG  Extremitiy: + edema  Skin: no rash, tugor wnl  Neuro: limited by medical condition   Psych: limited by medical condition        Data:         Last 3 BMP  Recent Labs     05/17/22  0059 05/17/22  0440 05/18/22  0525    131* 122*   K 4.1 4.0 4.8   CL 95* 93* 85*   CO2 29 29 23   BUN 51* 53* 68*   CREATININE 1.5* 1.6* 2.0*   GLUCOSE 52* 63* 132*   CALCIUM 8.8 8.7 8.3*         Last 3 CMP:    Recent Labs     05/17/22  0059 05/17/22 0440 05/18/22  0525    131* 122*   K 4.1 4.0 4.8   CL 95* 93* 85*   CO2 29 29 23   BUN 51* 53* 68*   CREATININE 1.5* 1.6* 2.0*   GLUCOSE 52* 63* 132*   CALCIUM 8.8 8.7 8.3*         Last 3 Glucose:     Recent Labs     05/17/22  0059 05/17/22 0440 05/18/22  0525   GLUCOSE 52* 63* 132*         Last 3 POC Glucose:     No results for input(s): POCGLU in the last 72 hours. Last 3 CK, CKMB, Troponin  No results for input(s): CKTOTAL, CKMB, TROPONINI in the last 72 hours. Last 3 CBC:  Recent Labs     05/16/22  0605 05/16/22  0605 05/17/22  0440 05/17/22  1447 05/18/22  0525   WBC 9.6  --  8.0  --  9.1   RBC 2.68*  --  2.51*  --  2.87*   HGB 7.2*   < > 6.8* 7.6* 7.8*   HCT 23.6*   < > 22.0* 24.4* 24.9*   MCV 88.1  --  87.6  --  86.8   MCH 26.9  --  27.1  --  27.2   MCHC 30.5*  --  30.9*  --  31.3*   RDW 21.4*  --  20.7*  --  19.9*   *  --  98*  --  97*   MPV 9.8  --  9.5  --  9.5    < > = values in this interval not displayed. Last 3 Hepatic Function Panel:  No results for input(s): ALKPHOS, ALT, AST, PROT, BILITOT, BILIDIR, LABALBU in the last 72 hours.     Albumin:  No results for input(s): LABALBU in the last 72 hours. Calcium:  Recent Labs     05/18/22  0525   CALCIUM 8.3*       Ionized Calcium:  No results for input(s): IONCA in the last 72 hours. Magnesium:    Recent Labs     05/18/22  0525   MG 2.0         ABGs:  No results for input(s): PHART, PO2ART, FOC1TXH, LPF8YFM, BEART, [id number] in the last 72 hours. Lactic Acid:  Recent Labs     05/17/22  0440   LACTA 0.7       Last 3 Amylase:  No results for input(s): AMYLASE in the last 72 hours. Last 3 Lipase:  No results for input(s): LIPASE in the last 72 hours. Last 3 BNP:  No results for input(s): BNP in the last 72 hours.                   Assessment/Plan      1 ESRD  Follow daily for dialysis needs  On dialysis currently and tolerating well    2 Volume overload  UF as tolerated    3 Hypotension  Maintain MAP>65mmHg    4 Hyponatremia  With volume overload and hypotonic fluids  Follow with UF    5 Anemia  No reported blood loss  On SHANIQUA  Follow Hgb and transfuse as needed  With thrombocytopenia        Thank you for the opportunity to participate in the care of Mr Linh Osman     ______________________________      Alphonse Serna MD  5/18/2022  1:20 PM

## 2022-05-18 NOTE — PATIENT CARE CONFERENCE
Intensive Care Daily Quality Rounding Checklist        ICU Team Members: Dr. Darrius Almaguer, residents, bedside nurse, charge nurse,clinical pharmacist     ICU Day #: 8     Intubation Date: 5/11/2022     Ventilator Day #: 8     Central Line Insertion Date: 5/11/22                                                    Day #: 8      Arterial Line Insertion Date: n/a                             Day #: n/a     Temporary Hemodialysis Catheter Insertion Date: n/a                             Day # admitted with tunneled dialysis cath     DVT Prophylaxis: Eliquis on hold for anemia/ SCD's    GI Prophylaxis: Protonix     Roberts Catheter Insertion Date: HD patient                                        XWL #:                              Continued need (if yes, reason documented and discussed with physician):     Skin Issues/ Wounds and ordered treatment discussed on rounds: recent debridement to sacral wound     Goals/ Plans for the Day: Daily labs and replace/transfuse as needed. Wean vent as able. HD per nephrology, daily restraint order. Additional sedation orders for aggressive behavior.  Possible palliative to talk to patient and wife together,

## 2022-05-18 NOTE — PROGRESS NOTES
Critical Care Team - Daily Progress Note      Date and time: 5/18/2022 8:18 AM  Patient's name:  Lacy Omalley  Medical Record Number: [id number]  Patient's account/billing number: [de-identified]  Patient's YOB: 1955  Age: 74 y.o. Date of Admission: 5/10/2022 11:58 PM  Length of stay during current admission: 7      Primary Care Physician: Angelica Huston MD  ICU Attending Physician: Dr. Madison Belcher    Code Status: Full Code    Reason for ICU admission: Status postcardiac arrest      SUBJECTIVE:     OVERNIGHT EVENTS:       Patient continues to be sedated with fentanyl. Left wrist restraint. Patient did have some hypoglycemic episodes overnight and was started on D10 at 150. Intermittent tube feeds. Hydrocortisone given. Glucose checks every hour. Did have improvement of hypoglycemia. In terms of hemodynamics patient has been afebrile overnight. He is maintaining a good blood pressure. Remains intubated. Intake/Output:   No intake/output data recorded. I/O last 3 completed shifts: In: 9268.8 [I.V.:7771.1; Blood:300; NG/GT:217; IV Piggyback:580.7]  Out: 2230 [Chest Tube:330]    Awake and following commands: No  Current Ventilation: - Ventilator Settings:    Vt (Set, mL): 0 mL  Resp Rate (Set): 0 bmp  FiO2 : 35 %    PEEP/CPAP (cmH2O): 6  Pressure Support: (S) 15 cmH20  Secretions: Thick, yellow secretions, blood  Sedation: fentanyl  Paralyzed: No  Vasopressors: No    Initial HPI + past overnight events: 49-year-old male resident of [facility] with significant past medical history of A. fib on Eliquis, aspirin, anxiety, anemia, CHF, insulin-dependent type 2 diabetes, CKD on hemodialysis, sacral decubitus ulcers with wound VAC, hyperlipidemia, hypothyroidism. Patient presented to the ICU after having a cardiac arrest, required CPR 2 rounds of epinephrine, and intubated. He is currently on Tigacyline and Amikacin per ID for treatment of HCAP. D10 started for hypoglycemia.     OBJECTIVE:     VITAL Melvi called and needs a new referral to [facility] behavior health for her anxiety. [facility] Discharged her for no show.   SIGNS:  /75   Pulse 90   Temp 97.1 °F (36.2 °C) (Infrared)   Resp 10   Ht 6' 5\" (1.956 m)   Wt 242 lb 4.6 oz (109.9 kg)   SpO2 92%   BMI 28.73 kg/m²   Tmax over 24 hours:  Temp (24hrs), Av.4 °F (36.3 °C), Min:97.1 °F (36.2 °C), Max:98.1 °F (36.7 °C)      Patient Vitals for the past 6 hrs:   BP Temp Temp src Pulse Resp SpO2 Weight   22 0804 -- -- -- -- 10 92 % --   22 0800 -- -- -- 90 13 95 % --   22 0700 131/75 -- -- 111 14 96 % --   22 0600 122/70 -- -- 99 17 (!) 88 % --   22 0500 110/77 -- -- 81 18 98 % 242 lb 4.6 oz (109.9 kg)   22 0400 110/66 97.1 °F (36.2 °C) Infrared 65 17 98 % --   22 0308 -- -- -- 107 20 (!) 74 % --   22 0300 104/71 -- -- 78 18 -- --         Intake/Output Summary (Last 24 hours) at 2022 0818  Last data filed at 2022 0656  Gross per 24 hour   Intake 6577.76 ml   Output 100 ml   Net 6477.76 ml     Wt Readings from Last 2 Encounters:   22 242 lb 4.6 oz (109.9 kg)   22 212 lb (96.2 kg)     Body mass index is 28.73 kg/m². Physical Exam  Vitals and nursing note reviewed. Constitutional:       General: He is awake. Appearance: He is ill-appearing. Comments:  intubated   HENT:      Head: Normocephalic and atraumatic. Nose: Nose normal. No congestion or rhinorrhea. Mouth/Throat:      Mouth: Mucous membranes are moist.      Pharynx: Oropharynx is clear. Eyes:      General: No scleral icterus. Extraocular Movements: Extraocular movements intact. Conjunctiva/sclera: Conjunctivae normal.   Cardiovascular:      Rate and Rhythm: Normal rate and regular rhythm. Pulses: Normal pulses. Heart sounds: Normal heart sounds. No murmur heard. Pulmonary:      Breath sounds: No stridor. Rhonchi present. Comments: intubated  Abdominal:      General: Bowel sounds are normal. There is no distension. Palpations: Abdomen is soft. There is no mass. Tenderness:  There is fentaNYL 200 mcg/hr (05/18/22 0656)    sodium chloride 10 mL/hr at 05/17/22 1309     PRN Meds:   sodium chloride, , PRN  anticoagulant sodium citrate, 4.2 mL, PRN  perflutren lipid microspheres, 1.5 mL, ONCE PRN  glucose, 4 tablet, PRN  dextrose bolus, 125 mL, PRN   Or  dextrose bolus, 250 mL, PRN  glucagon (rDNA), 1 mg, PRN  sodium chloride flush, 5-40 mL, PRN  sodium chloride, , PRN          VENT SETTINGS (Comprehensive) (if applicable):  Vent Information  Ventilator ID: [id number]  Vent Mode: (S) PS/CPAP  Ventilator Initiate: Yes  Additional Respiratory Assessments  Pulse: 90  Resp: 10  SpO2: 92 %  End Tidal CO2: 42 (%)  Position: Semi-Fajardo's  Humidification Source: Heated wire  Humidification Temp: 37  Circuit Condensation: Drained  Cuff Pressure (cm H2O): 28 cm H2O    Arterial Blood Gas 5/18/2022  No new ABG       Laboratory findings:    Complete Blood Count:   Recent Labs     05/16/22  0605 05/16/22  0605 05/17/22 0440 05/17/22  1447 05/18/22  0525   WBC 9.6  --  8.0  --  9.1   HGB 7.2*   < > 6.8* 7.6* 7.8*   HCT 23.6*   < > 22.0* 24.4* 24.9*   *  --  98*  --  97*    < > = values in this interval not displayed.         Last 3 Blood Glucose:   Recent Labs     05/17/22  0059 05/17/22 0440 05/18/22  0525   GLUCOSE 52* 63* 132*        PT/INR:    Lab Results   Component Value Date    PROTIME 15.5 05/17/2022    INR 1.4 05/17/2022     PTT:    Lab Results   Component Value Date    APTT 32.8 05/11/2022       Comprehensive Metabolic Profile:   Recent Labs     05/17/22 0059 05/17/22  0440 05/18/22  0525    131* 122*   K 4.1 4.0 4.8   CL 95* 93* 85*   CO2 29 29 23   BUN 51* 53* 68*   CREATININE 1.5* 1.6* 2.0*   GLUCOSE 52* 63* 132*   CALCIUM 8.8 8.7 8.3*      Magnesium:   Lab Results   Component Value Date    MG 2.0 05/18/2022     Phosphorus:   Lab Results   Component Value Date    PHOS 5.9 05/18/2022     Ionized Calcium: No results found for: MARGUERITE     Urinalysis:     Troponin: No results for input(s): TROPONINI in the last 72 hours. Microbiology:  Cultures drawn: Cultures re-drawn yesterday. Results pending. Radiology/Imaging:     Chest Xray (5/18/2022): Stable chest with lines and tubes unchanged. Increasing markings throughout the lung fields no change in bilateral pleural effusion. ASSESSMENT:     Patient Active Problem List    Diagnosis Date Noted    Pneumonia due to infectious organism     Pleural effusion     Cardiac arrest ([facility] 05/11/2022         PLAN:     Neuro   Patient intubated   History of seizures on Keppra   Mental status somewhat improved since arrival.     Respiratory   Acute hypercapnic respiratory failure with hypoxia   Intubated 5/10/2022   Thoracentesis completed 5/15/2022 - 450-cytology, protein 3.6 , , glucose 53, culture-positive for staph epidermidis and staph species and gram negative   MRSA nasal colonization positive - start Bactroban    Pneumonia   S/P chest tube placement yesterday for parapneumonic pleural effusion with significant drainage.  Continue left chest tube to waterseal    Attempt weaning trail after dialysis today     Cardiovascular   History of A. fib    Hold Eliquis   EKG concerning for heart block.  Elevated troponin most likely secondary to CKD  [facility] following recommended starting pt on Metoprolol for Afib and dc carvedilol      GI   History of a PEG tube   Concern for GI bleed   Stable Hgb   Vomit suctioned from ET Tube. Likely due to increasing pressure support   Hold tube feeds.  Diarrhea noted-FMS   Occult positive stools. Continue to monitor.  Surgery consult for possible diverting colostomy.      Renal   CKD on dialysis Monday Wednesday Friday   Dialysis due today   Nephrology following    Infectious disease   Sepsis with septic shock   decubitus ulcer-surgery, wound care following   Status postsurgical debridement yesterday in the OR   Linezolid and Merrem switched to Tigecycline and Amikcacin for HCAP.  Abx per ID     Heme/Onc   PRBC received X2, 05/12/2022, 05/14/2022    trend H&H, transfuse if <7.   EPO started    Surgery following    Endocrine   Type 2 diabetes    Lantus 20 units,MDSS held due to hypoglycemic episodes.  D10 started, hydrocortisone started   Improved hypoglycemia. Wean off dextrose. Slowly advance diet.  Hypothyroidism on synthroid    Social/Spiritual/DNR/Other   Code status: Full  Diet Diet NPO Exceptions are: Sips of Water with Meds   ADULT TUBE FEEDING; PEG; Immune Enhancing; Continuous; 10; No; 30; Q 4 hours   Stress ulcer prophylaxis: protonix 40mg   DVT prophylaxis: SCDS    Consultations needed: Yes   Transfer out of ICU today? No    Other: monitor off the fentanyl, Ativan PRN        Lines/catheters   Date 05/10  o ETT  o CVC triple lumen R femoral vein- REMOVED   o Urinary cath   Tunneled HD cath right subclavian 4 days  · Date 05/17   · CVC Triple lumen L IJ        Svetlana Luong DO  8:18 AM  05/18/22      I personally saw, examined and provided care for the patient. Radiographs, labs and medication list were reviewed by me independently. Review of Residents documentation was conducted and revisions were made as appropriate. I agree with the above documented exam, problem list and plan of care. Patient will ultimately need a diverting colostomy. General surgery notified and discussing with family. Continues to have pleural fluid drainage. SBT as tolerated. Patient with poor overall functional status with significant decline over the past years per discussed with primary. Positive care input appreciated. Advance to feeds as tolerated. Decrease D5W. Will continue to wean as tolerated. Overall poor prognosis.     CCT excluding procedures 42 minutes    Maryann Girard DO

## 2022-05-18 NOTE — PROGRESS NOTES
Internal Medicine Progress Note      Synopsis: Patient admitted on 5/10/2022     Mr. Rema Warren, a 74y.o. year old male who has a past medical history of A-fib ([address] TRUMAN (acute kidney injury) ([address] Anemia, Anxiety, Bipolar 1 disorder ([address] Charcot foot due to diabetes mellitus ([address] CHF (congestive heart failure) ([address] CKD (chronic kidney disease), Diabetes mellitus ([address] Dialysis patient ([address] Hyperlipidemia, Hypertension, Right sided weakness, Sacral decubitus ulcer, and Seizure ([address] This is a 77-year-old male with progressive dementia and spinal stenosis. Has been bedbound for a long time. He has had in the past bouts of kidney failure that did not require long-term resuscitation with dialysis however he was at acute care for decompensation from his large sacral decubitus and acute renal failure that did require renal replacement therapy of hemodialysis. He finishes antibiotics. His sacral wound was slow to heal because of poor oral intake and he was given a PEG tube for nutritional supplementation purposes since his wife wanted to. Patient had been stable till about 48 hours ago. Monday he was noticed to have a large amount of bloody stool. He was started on an intravenous proton pump inhibitor and surgery services were asked to see him. His anticoagulation was held. Patient was being watched and he did not have any more episodes. Yesterday he had an increase in his WBCs without having any localizing symptoms. Infectious disease did see him and started him immediately on broad-spectrum antibiotics. Patient became unresponsive and virtually reportedly evening more towards the early hours of this morning. Fluids were started unfortunately patient could not be resuscitated despite best efforts of the on-call intensive virtual specialist. Patient was sent over to acute care where he was intubated for cardiorespiratory failure.  He was found to be in cardiac arrest.   Appropriate O2 resuscitation resulted in resumption of heart rhythm. Patient was started on pressors moved to the ICU   Imaging reveals a significant pleural effusion and HCAP    Subjective    Still in the ICU. Pressors just weaned off. Still little bit soft on his blood pressure. Less sedation    May 13  Patient appeared more alert. Still hypotensive. Thoracentesis on schedule  May 14  Uneventful night. He was on vent support at night although he did tolerate trials in the day. Centesis pulled off some fluid   May 15 th he is still intubated   May 16  Chest tube for pleural effusion that appears to be infected  Tomorrow for debridement of his decubitus  May 17  Patient still looks sickly    A. fib controlled with metoprolol. Eliquis on hold. May 18  Still looks not well. Blood pressure is low. Wife is made him a DNR CCA. He has had some agitation overnight    exam:  BP (!) 85/59   Pulse 95   Temp 97.4 °F (36.3 °C)   Resp 16   Ht 6' 5\" (1.956 m)   Wt 244 lb 14.9 oz (111.1 kg)   SpO2 98%   BMI 29.04 kg/m²   Eyes are closed orally intubated face looks pale    Respiratory: Significantly decreased bibasilar bases   Left-sided chest tube  Cardiovascular: Irregular heart rate rhythm   Abdomen: Nontender positive for distention positive for PEG   Musculoskeletal: Some thickening of the skin on his lower extremities.  Dorsalis pedis hard to palpate   skin: Large clean sacral decubitus   neurologic: Sleepy      Medications:  Reviewed    Infusion Medications    dextrose 60 mL/hr at 05/18/22 0946    sodium chloride      fentaNYL 200 mcg/hr (05/18/22 0656)    sodium chloride 10 mL/hr at 05/17/22 1309     Scheduled Medications    collagenase   Topical Daily    sodium chloride  1 g Oral Daily    ceftolozane-tazobactam (ZERBAXA) IVPB  1,500 mg IntraVENous Q8H    metoprolol tartrate  25 mg Oral BID    epoetin kandi-epbx  8,000 Units SubCUTAneous Once per day on Mon Wed Fri    And    epoetin kandi-epbx  2,000 Units SubCUTAneous Once per day on Mon Wed Fri    mupirocin   Topical BID    tigecycline (TYGACIL) IVPB  50 mg IntraVENous Q12H    sulfamethoxazole-trimethoprim  2 tablet Oral Daily    acetylcysteine  4 mL Inhalation Q4H    ipratropium-albuterol  1 ampule Inhalation Q4H    polyethylene glycol  17 g Oral Daily    pantoprazole (PROTONIX) 40 mg injection  40 mg IntraVENous Daily    chlorhexidine  15 mL Mouth/Throat BID    senna  5 mL Per G Tube Nightly    docusate  100 mg Per G Tube BID    levothyroxine  175 mcg Per G Tube Daily    [Held by provider] insulin glargine  20 Units SubCUTAneous BID    insulin lispro  0-12 Units SubCUTAneous Q6H    levETIRAcetam  500 mg PEG Tube BID    sodium chloride flush  5-40 mL IntraVENous 2 times per day     PRN Meds: midazolam, sodium chloride, anticoagulant sodium citrate, perflutren lipid microspheres, glucose, dextrose bolus **OR** dextrose bolus, glucagon (rDNA), sodium chloride flush, sodium chloride    I/O    Intake/Output Summary (Last 24 hours) at 5/18/2022 1110  Last data filed at 5/18/2022 0656  Gross per 24 hour   Intake 6577.76 ml   Output 100 ml   Net 6477.76 ml       Labs:   Recent Labs     05/16/22  0605 05/16/22  0605 05/17/22  0440 05/17/22  1447 05/18/22  0525   WBC 9.6  --  8.0  --  9.1   HGB 7.2*   < > 6.8* 7.6* 7.8*   HCT 23.6*   < > 22.0* 24.4* 24.9*   *  --  98*  --  97*    < > = values in this interval not displayed. Recent Labs     05/16/22  0605 05/16/22  0605 05/17/22  0059 05/17/22  0440 05/18/22  0525      < > 133 131* 122*   K 4.7   < > 4.1 4.0 4.8   CL 92*   < > 95* 93* 85*   CO2 29   < > 29 29 23   BUN 83*   < > 51* 53* 68*   CREATININE 2.1*   < > 1.5* 1.6* 2.0*   CALCIUM 8.7   < > 8.8 8.7 8.3*   PHOS 4.2  --   --  4.0 5.9*    < > = values in this interval not displayed. No results for input(s): PROT, ALB, ALKPHOS, ALT, AST, BILITOT, AMYLASE, LIPASE in the last 72 hours.     Recent Labs     05/17/22  0440   INR 1.4 No results for input(s): Jorge L Suárez in the last 72 hours. Chronic labs:  Lab Results   Component Value Date    TSH 2.700 05/11/2022    INR 1.4 05/17/2022       Radiology:  CT ABDOMEN PELVIS WO CONTRAST Additional Contrast? None    Result Date: 5/11/2022  Bilateral pleural effusions, greater on the left than right, with superimposed consolidation. In the appropriate setting, a pneumonia with associated pleural effusions cannot be excluded. No bowel obstruction, free air or obstructive uropathy. Additional findings involving the abdomen and pelvis, as detailed above. CT HEAD WO CONTRAST    Result Date: 5/11/2022  No acute findings. Chronic changes as above. CT CHEST WO CONTRAST    Result Date: 5/11/2022  Findings suggesting pulmonary edema versus a multifocal pneumonia, with associated bilateral pleural effusions. Generalized mediastinal adenopathy, which may represent reactive changes. This is a nonspecific finding. Differential diagnosis would include infection, inflammation or neoplastic causes. XR CHEST PORTABLE    Result Date: 5/12/2022  1. Partial interval clearing of the lungs when compared with the patient's prior study. 2. Vague patchy right perihilar airspace disease and patchy residual airspace disease within the left lung. 3. Trace right pleural effusion and small left pleural effusion. XR CHEST PORTABLE    Result Date: 5/11/2022  Stable congestive/consolidative changes, with bilateral pleural effusions. No significant change. XR CHEST 1 VIEW    Result Date: 4/27/2022  Bibasilar pleural thickening   ASSESSMENT:    Principal Problem:    Cardiac arrest [facility])  Active Problems:    Pneumonia due to infectious organism    Pleural effusion  Resolved Problems:    * No resolved hospital problems. *  Sepsis  Respiratory failure  Hypotension  Long-term dialysis  Pleural effusion  Pneumonia     PLAN:    1. Received a dialysis with ultrafiltration and fluid boluses  2.   Continue to follow hemoglobin and appropriate transfusion  3. Remains full code per discussion with family  4. Plans for pleural effusion to be tapped to allow for better weaning  5. Glycemic control with insulin  6. Maintain Keppra for seizures  7. Maintaining linezolid and meropenem  May 13  Continue dialysis support with limiting hypotension. Thoracentesis per pulmonary services to allow for ventilator weaning. 2 beats can be started when agreeable with critical care. Glycemic control to be treated with insulin. Antibiotics continue per infectious disease  Being treated for sepsis from healthcare acquired pneumonia and septic shock. .  Remains in controlled A. Fib. Hypothermia appears slightly better. Still with significant sacral decubitus although he has finished treatment. Baseline history with dementia. Prognosis guarded. WifeWants a full code  May 14  Hemoglobin slightly low. Storm stool per nursing staff. Weaning when okay  Blood pressure still soft  Pressors are being weaned off along with sedation  Antibiotic coverage per infectious disease for sepsis from HCAP   Blood sugars cover with sliding scale  Watch hemoglobin and CBC  May 15 th   Tapped pleural fluid is positive for growth  Culture is positive for staph epidermidis and staph species and gram-negative. ID still following. Maintain Keppra  bs are ok for now  Renal failure ( creat is 1.7 ) pvr ? Straight cath? Removal of line and cx tip  May 16  Now under treatment for empyema  Significant sacral decubitus  Some degree of intolerance of tube feeds  Surgery tomorrow for debridement so insulin can be held  Looking about the same  Pale. Does tolerate his eyes open but not really following any commands  Cardiology is following. Supportive care recommended only. Bridging for anticoagulation as per the recommendation. Watch for GI bleed. May 17  Sacral wound has been debrided. Chest tube is still draining.   Remains on vent support. Remains quite sickly. Palliative services did see. Dialysis support per nephrology services. Overall prognosis is poor. bs are tight still   Bp is sl better  ID noted   May 18  Maintaining multiple antibiotics to cover his empyema and septic shock. He is now hypotensive. Sacral decubitus was debrided. Blood sugars were running low yesterday despite  D5 drips. He is on dialysis right now. He is neurologically compromised from premorbid dementia and lack of mobility for many years. He remains in a poor prognostic zone. It is up to the family to keep him comfortable. Wife has had extensive discussions especially with palliative care  She does not want him to have a tracheostomy. She wants another day to make a decision if he is unable to wean. Patient would be appropriate for comfort care if family desires. Diet: Diet NPO Exceptions are: Sips of Water with Meds  ADULT TUBE FEEDING; PEG; Immune Enhancing; Continuous; 10; No; 30; Q 4 hours  Code Status: DNR-CCA  PT/OT Eval Status:   Unable  DVT Prophylaxis:   Resume when okay with critical care and due to procedures and decreased hemoglobin with recent history of bloody stool  Recommended disposition at discharge:   Unsure yet    +++++++++++++++++++++++++++++++++++++++++++++++++  Abdirahman Sim MD   Formerly [facility].  +++++++++++++++++++++++++++++++++++++++++++++++++  NOTE: This report was transcribed using voice recognition software. Every effort was made to ensure accuracy; however, inadvertent computerized transcription errors may be present.

## 2022-05-18 NOTE — FLOWSHEET NOTE
05/18/22 1433   Vital Signs   /73   Temp 97.2 °F (36.2 °C)   Pulse 102   Resp 16   Weight 242 lb 11.6 oz (110.1 kg)   Weight Method Bed scale   Percent Weight Change -0.9   Post-Hemodialysis Assessment   Post-Treatment Procedures Blood returned;Catheter capped, clamped with Citrate x 2 ports   Machine Disinfection Process Acid/Vinegar Clean;Heat Disinfect; Exterior Machine Disinfection   Rinseback Volume (ml) 300 ml   Total Liters Processed (l/min) 70.3 l/min   Dialyzer Clearance Lightly streaked   Duration of Treatment (minutes) 240 minutes   Heparin amount administered during treatment (units) 0 units   Hemodialysis Intake (ml) 700 ml   Hemodialysis Output (ml) 1900 ml   NET Removed (ml) 1200   Tolerated Treatment Fair   Patient Response to Treatment rinse back given. 4hr ordered hemo completed. all blood returned to pt. lines disconnected. luer ends scrubbed with alcohol. catheter flushed with saline. closed with citrate. report given to Holley LAZCANO R.N   Bilateral Breath Sounds Clear;Diminished   Edema Right lower extremity; Left lower extremity   RLE Edema +2   LLE Edema +2   Physician Notified Yes   Time Off 8281   Patient Disposition Remain in ICU/ED

## 2022-05-18 NOTE — PROGRESS NOTES
Extensive discussion had with patient's wife Susy  about diverting colostomy. Procedure discussed in detail. All question/concerns answered. Risks, benefits, and complications of the procedure discussed. Susy  would like to have some time to think about the procedure before committing to the procedure for her . Please message general surgery should patient and wife desire diverting colostomy.     Electronically signed by Trina Galindo MD on 5/18/2022 at 11:40 AM

## 2022-05-18 NOTE — PLAN OF CARE
Problem: Discharge Planning  Goal: Discharge to home or other facility with appropriate resources  5/18/2022 0010 by Dez Bautista RN  Outcome: Progressing  5/17/2022 1013 by Avery Clemens RN  Outcome: Progressing     Problem: Pain  Goal: Verbalizes/displays adequate comfort level or baseline comfort level  5/18/2022 0010 by Dez Bautista RN  Outcome: Progressing  Flowsheets  Taken 5/17/2022 2000 by Dez Bautista RN  Verbalizes/displays adequate comfort level or baseline comfort level: Assess pain using appropriate pain scale  Taken 5/17/2022 1200 by Avery Clemens RN  Verbalizes/displays adequate comfort level or baseline comfort level: Assess pain using appropriate pain scale  5/17/2022 1013 by Avery Clemens RN  Outcome: Progressing  Flowsheets (Taken 5/17/2022 0400 by Dez Bautista RN)  Verbalizes/displays adequate comfort level or baseline comfort level: Assess pain using appropriate pain scale     Problem: Skin/Tissue Integrity  Goal: Absence of new skin breakdown  Description: 1. Monitor for areas of redness and/or skin breakdown  2. Assess vascular access sites hourly  3. Every 4-6 hours minimum:  Change oxygen saturation probe site  4. Every 4-6 hours:  If on nasal continuous positive airway pressure, respiratory therapy assess nares and determine need for appliance change or resting period.   5/18/2022 0010 by Dez Bautista RN  Outcome: Progressing  5/17/2022 1013 by Avery Clemens RN  Outcome: Progressing     Problem: Safety - Adult  Goal: Free from fall injury  5/18/2022 0010 by Dez Bautista RN  Outcome: Progressing  Flowsheets (Taken 5/18/2022 0009)  Free From Fall Injury: Based on caregiver fall risk screen, instruct family/caregiver to ask for assistance with transferring infant if caregiver noted to have fall risk factors  5/17/2022 1013 by Avery Clemens RN  Outcome: Progressing     Problem: ABCDS Injury Assessment  Goal: Absence of physical injury  5/18/2022 0010 by Dez Bautista RN  Outcome: Progressing  Flowsheets (Taken 5/18/2022 0009)  Absence of Physical Injury: Implement safety measures based on patient assessment  5/17/2022 1013 by Koffi Jernigan RN  Outcome: Progressing  Flowsheets (Taken 5/17/2022 0930)  Absence of Physical Injury: Implement safety measures based on patient assessment     Problem: Respiratory - Adult  Goal: Achieves optimal ventilation and oxygenation  5/18/2022 0010 by Jeremy Martell RN  Outcome: Progressing  5/17/2022 2022 by Johanna Daniel RCP  Outcome: Not Progressing  Flowsheets  Taken 5/17/2022 2000 by Jeremy Martell RN  Achieves optimal ventilation and oxygenation: Assess for changes in respiratory status  Taken 5/17/2022 0403 by Slade Rogel RCP  Achieves optimal ventilation and oxygenation:   Assess the need for suctioning and aspirate as needed   Oxygen supplementation based on oxygen saturation or arterial blood gases   Position to facilitate oxygenation and minimize respiratory effort  5/17/2022 1013 by Koffi Jernigan RN  Outcome: Progressing     Problem: Chronic Conditions and Co-morbidities  Goal: Patient's chronic conditions and co-morbidity symptoms are monitored and maintained or improved  5/18/2022 0010 by Jeremy Martell RN  Outcome: Progressing  Flowsheets (Taken 5/17/2022 2000)  Care Plan - Patient's Chronic Conditions and Co-Morbidity Symptoms are Monitored and Maintained or Improved: Monitor and assess patient's chronic conditions and comorbid symptoms for stability, deterioration, or improvement  5/17/2022 1013 by Koffi Jernigan RN  Outcome: Progressing     Problem: Safety - Medical Restraint  Goal: Remains free of injury from restraints (Restraint for Interference with Medical Device)  Description: INTERVENTIONS:  1. Determine that other, less restrictive measures have been tried or would not be effective before applying the restraint  2. Evaluate the patient's condition at the time of restraint application  3.  Inform patient/family regarding the reason for restraint  4.  Q2H: Monitor safety, psychosocial status, comfort, nutrition and hydration  5/18/2022 0010 by Osmel Carranza RN  Outcome: Progressing  Flowsheets  Taken 5/18/2022 0000 by Osmel Carranza RN  Remains free of injury from restraints (restraint for interference with medical device): Every 2 hours: Monitor safety, psychosocial status, comfort, nutrition and hydration  Taken 5/17/2022 2200 by Osmel Carranza RN  Remains free of injury from restraints (restraint for interference with medical device): Every 2 hours: Monitor safety, psychosocial status, comfort, nutrition and hydration  Taken 5/17/2022 2000 by Rohit Nice RN  Remains free of injury from restraints (restraint for interference with medical device): Every 2 hours: Monitor safety, psychosocial status, comfort, nutrition and hydration  Taken 5/17/2022 1600 by Luis Pemberton RN  Remains free of injury from restraints (restraint for interference with medical device): Every 2 hours: Monitor safety, psychosocial status, comfort, nutrition and hydration  5/17/2022 1013 by Gisele Hatchet, RN  Outcome: Progressing  Flowsheets (Taken 5/17/2022 0647 by Rohit Nice RN)  Remains free of injury from restraints (restraint for interference with medical device): Every 2 hours: Monitor safety, psychosocial status, comfort, nutrition and hydration     Problem: Nutrition Deficit:  Goal: Optimize nutritional status  5/18/2022 0010 by Osmel Carranza RN  Outcome: Progressing  5/17/2022 1013 by Gisele Hatchet, RN  Outcome: Not Progressing

## 2022-05-18 NOTE — PROGRESS NOTES
[address] [place] [address] [facility]  LUCY  Progress Note    SUBJECTIVE:  Chief Complaint   Patient presents with    Loss of Consciousness     arrived from [facility] via [facility] unresponsive     The patient is still in the ICU. Not he is intubated and sedated. No fevers. Tolerating antibiotics. Review of systems:  A 10 point review of systems was done. As stated above, otherwise negative.     Medications:   collagenase   Topical Daily    sodium chloride  1 g Oral Daily    ceftolozane-tazobactam (ZERBAXA) IVPB  1,500 mg IntraVENous Q8H    metoprolol tartrate  25 mg Oral BID    epoetin kandi-epbx  8,000 Units SubCUTAneous Once per day on Mon Wed Fri    And    epoetin kandi-epbx  2,000 Units SubCUTAneous Once per day on Mon Wed Fri    mupirocin   Topical BID    tigecycline (TYGACIL) IVPB  50 mg IntraVENous Q12H    sulfamethoxazole-trimethoprim  2 tablet Oral Daily    acetylcysteine  4 mL Inhalation Q4H    ipratropium-albuterol  1 ampule Inhalation Q4H    polyethylene glycol  17 g Oral Daily    pantoprazole (PROTONIX) 40 mg injection  40 mg IntraVENous Daily    chlorhexidine  15 mL Mouth/Throat BID    senna  5 mL Per G Tube Nightly    docusate  100 mg Per G Tube BID    levothyroxine  175 mcg Per G Tube Daily    [Held by provider] insulin glargine  20 Units SubCUTAneous BID    insulin lispro  0-12 Units SubCUTAneous Q6H    levETIRAcetam  500 mg PEG Tube BID    sodium chloride flush  5-40 mL IntraVENous 2 times per day      dextrose 60 mL/hr at 05/18/22 0946    sodium chloride      fentaNYL 200 mcg/hr (05/18/22 0656)    sodium chloride 10 mL/hr at 05/17/22 1309     midazolam, sodium chloride, anticoagulant sodium citrate, perflutren lipid microspheres, glucose, dextrose bolus **OR** dextrose bolus, glucagon (rDNA), sodium chloride flush, sodium chloride    OBJECTIVE:  /77   Pulse 111   Temp 97.1 °F (36.2 °C) (Infrared)   Resp 16   Ht 6' 5\" (1.956 m)   Wt 242 lb 4.6 oz (109.9 kg)   SpO2 98%   BMI 28.73 kg/m²   Temp  Av.4 °F (36.3 °C)  Min: 97.1 °F (36.2 °C)  Max: 98.1 °F (36.7 °C)  Constitutional: The patient is sedated, on ventilator. FiO2 35%. PEEP 6. Opens eyes. No distress. Skin: Warm and dry. No rashes were noted. HEENT: Eyes show round, and reactive pupils. No jaundice. Moist mucous membranes, no ulcerations, no thrush. ETT. OGT  Neck: Supple to movements. No lymphadenopathy. Chest: No use of accessory muscles to breathe. Symmetrical expansion. Auscultation reveals coarse breath sounds. scattered rhonchi. Right tunneled HD catheter. Cardiovascular: Heart sounds arrhythmic and irregular. Abdomen: Positive bowel sounds to auscultation. Benign to palpation. Extremities: Minimal edema. Left third toe missing. Back: Stage IV decubitus ulcer with dressing. There is undermining. Necrosis of the edges. Lines: Right femoral TLC 2022.     Laboratory and Tests Review:  Lab Results   Component Value Date    WBC 9.1 2022    WBC 8.0 2022    WBC 9.6 2022    HGB 7.8 (L) 2022    HCT 24.9 (L) 2022    MCV 86.8 2022    PLT 97 (L) 2022     Lab Results   Component Value Date    NEUTROABS 8.56 (H) 2022    NEUTROABS 6.44 2022    NEUTROABS 7.88 (H) 2022     Lab Results   Component Value Date    CRP 13.9 (H) 2022     No results found for: CRPHS  Lab Results   Component Value Date    SEDRATE 78 (H) 2022     Lab Results   Component Value Date    ALT 30 2022    AST 31 2022    ALKPHOS 591 (H) 2022    BILITOT 0.3 2022     Lab Results   Component Value Date     2022    K 4.8 2022    K 4.5 2022    CL 85 2022    CO2 23 2022    BUN 68 2022    CREATININE 2.0 2022    CREATININE 1.6 2022    CREATININE 1.5 2022    GFRAA 40 2022    LABGLOM 33 2022    GLUCOSE 132 2022    PROT 6.9 2022    LABALBU 2.4 2022 CALCIUM 8.3 05/18/2022    BILITOT 0.3 05/11/2022    ALKPHOS 591 05/11/2022    AST 31 05/11/2022    ALT 30 05/11/2022     Radiology:    Reviewed  Microbiology:   [facility]:  Blood culture 5/10/2022: Negative so far  Decubitus ulcer 5/10/2022: MRSA, mixed GNR  Respiratory panel: Pending  Nares screen MRSA: Pending  Blood cultures 5/11/2022: Negative so far      [facility]:  Respiratory panel: Negative  Blood cultures 5/11/2022: Staphylococcus epidermidis in 2 of 2 sets  Nares screen MRSA: Positive   Respiratory culture 5/12/2022: MDR Acinetobacter baumanii (sensitive to Bactrim, Tigecycline, Cefiderocol. Intermediate to Avycaz and Eravacycline. Resistant to all others)  Respiratory culture 5/16/2022: OP patricia reduced. GNR  Pleural fluid 5/13/2022: Pseudomonas aeruginosa (Resistant to Levofloxacin and Meropenem. Intermediate to Zosyn. Sensitive to Avycaz and Gentamicin)    ASSESSMENT:  · HCAP with MDR Acinetobacter baumanii  · Sepsis with septic shock associated to HCAP  · Status postcardiac arrest  · Acute respiratory failure  · Leukocytosis secondary to HCAP- improving  · Hypothermia, improved  · History of sacral decubitus ulcer infection with Pseudomonas and Enterococcus. Treated with IV antibiotic between March and April 2022. Status postdebridement 5/17/2022  · CKD, on HD  · MDR Acinetobacter in respiratory culture  · Acinetobacter in the respiratory cultures. Possible outbreak in the ICU  · Pleural fluid infection with Pseudomonas. Possible empyema    PLAN:  · Continue Tigecycline, Bactrim and Amikacin, day 4  · Continue Zerbaxa, day 2  · We will follow with you    Spoke with nursing.     Evelyne Mathew MD  10:17 AM  5/18/2022

## 2022-05-18 NOTE — PROGRESS NOTES
Palliative Care Department  [phone]  Palliative Care Progress Note  Provider LILY Wei - CNP     Brianne Simon  [id number]  Hospital Day: 9  Date of Initial Consult: 5/11/2022  Referring Provider: Rosana Lemus MD  Palliative Medicine was consulted for assistance with: Goals of Care, Code Status Discussion, Family Support    HPI:   Brianne Simon is a 74 y.o. with a medical history of atrial fibrillation on Eliquis and aspirin, anxiety, anemia, CHF, DM, CKD on HD, sacral decubitus ulcer with wound VAC, HLD, hypothyroidism who was admitted on 5/10/2022 from facility with a CHIEF COMPLAINT of unresponsiveness. Patient reportedly was unresponsive at facility and lost pulses while in the ED. CPR was initiated, patient was intubated and 2 rounds of epinephrine were given. ROSC was achieved and patient was transferred to ICU for further management. Stools were noted to be maroon-colored and patient was started on Protonix. Patient was also started on antibiotics for HCAP/sepsis with septic shock. Palliative medicine is consulted for goals of care, CODE STATUS discussion, and family support. ASSESSMENT/PLAN:     Pertinent Hospital Diagnoses      Status post cardiac arrest   Acute respiratory failure   Sepsis with septic shock   HCAP   CKD on HD   Sacral decubitus ulcer      Palliative Care Encounter / Counseling Regarding Goals of Care  Please see detailed goals of care discussion as below   At this time, Brianne Simon, Does Not have capacity for medical decision-making.   Capacity is time limited and situation/question specific   During encounter, Zhang Ross, spouse, was surrogate medical decision-maker   Outcome of goals of care meeting: Continue full code and current management   Code status DNR-CCA   Advanced Directives: no POA or living will in [facility]   Surrogate/Legal NOK:  o Aisha Magana, spouse, [phone]    Spiritual assessment: no spiritual distress identified  Bereavement and grief: to be determined  Referrals to: none today  SUBJECTIVE:   Details of Conversation:   Chart reviewed. Patient seen Jyothi Duque in bed, intubated. Alert and able to follow commands. No family present at bedside. Spoke with patient's wife, Dariusz Wolf, on the phone. Updated on patient's current condition and projected treatment course. Discussed possible tracheostomy and diverting colostomy procedures. Wife states patient would not want a tracheostomy. Wife stated she would like to wait one more day to see if patient is able to successfully weaning from ventilator before she makes decision regarding treatment course. Wife tearful on the phone. Discussion regarding CODE STATUS options. At this time wife would like to change CODE STATUS to DNR CCA. Emotional support given and all questions addressed. Plan is to continue current medical management at this time. We will follow for ongoing goals of care discussion as well as support for the patient and family. OBJECTIVE:   Prognosis: Guarded    Physical Exam:  /62   Pulse 100   Temp 97.4 °F (36.3 °C)   Resp 16   Ht 6' 5\" (1.956 m)   Wt 244 lb 14.9 oz (111.1 kg)   SpO2 98%   BMI 29.04 kg/m²   Constitutional:  Ill-appearing, awakens to voice  Lungs:  Intubated  Heart:  IRRR  Abd:  Soft, non tender, non distended, bowel sounds present  Ext:  Moving all extremities, + edema, pulses present  Skin:  Warm and dry  Neuro: Awakens to voice, follows simple commands    Objective data reviewed: labs, images, records, medication use, vitals and chart    Discussed patient and the plan of care with the other IDT members: Palliative Medicine IDT Team    Time/Communication  Greater than 50% of time spent, total 15 minutes in counseling and coordination of care at the bedside regarding goals of care, diagnosis and prognosis and see above. Thank you for allowing Palliative Medicine to participate in the care of Denae Proctor.

## 2022-05-19 LAB
ANION GAP SERPL CALCULATED.3IONS-SCNC: 12 MMOL/L (ref 7–16)
ANISOCYTOSIS: ABNORMAL
BASOPHILS ABSOLUTE: 0 E9/L (ref 0–0.2)
BASOPHILS RELATIVE PERCENT: 0 % (ref 0–2)
BUN BLDV-MCNC: 48 MG/DL (ref 6–23)
BURR CELLS: ABNORMAL
CALCIUM SERPL-MCNC: 8.5 MG/DL (ref 8.6–10.2)
CHLORIDE BLD-SCNC: 94 MMOL/L (ref 98–107)
CO2: 24 MMOL/L (ref 22–29)
CREAT SERPL-MCNC: 1.5 MG/DL (ref 0.7–1.2)
EOSINOPHILS ABSOLUTE: 0 E9/L (ref 0.05–0.5)
EOSINOPHILS RELATIVE PERCENT: 0 % (ref 0–6)
GFR AFRICAN AMERICAN: 56
GFR NON-AFRICAN AMERICAN: 46 ML/MIN/1.73
GLUCOSE BLD-MCNC: 104 MG/DL (ref 74–99)
GRAM STAIN ORDERABLE: NORMAL
HCT VFR BLD CALC: 24.3 % (ref 37–54)
HEMOGLOBIN: 7.6 G/DL (ref 12.5–16.5)
HYPOCHROMIA: ABNORMAL
LYMPHOCYTES ABSOLUTE: 0.34 E9/L (ref 1.5–4)
LYMPHOCYTES RELATIVE PERCENT: 4.4 % (ref 20–42)
MAGNESIUM: 2.1 MG/DL (ref 1.6–2.6)
MCH RBC QN AUTO: 27.2 PG (ref 26–35)
MCHC RBC AUTO-ENTMCNC: 31.3 % (ref 32–34.5)
MCV RBC AUTO: 87.1 FL (ref 80–99.9)
METER GLUCOSE: 102 MG/DL (ref 74–99)
METER GLUCOSE: 102 MG/DL (ref 74–99)
METER GLUCOSE: 104 MG/DL (ref 74–99)
METER GLUCOSE: 104 MG/DL (ref 74–99)
METER GLUCOSE: 115 MG/DL (ref 74–99)
METER GLUCOSE: 99 MG/DL (ref 74–99)
MONOCYTES ABSOLUTE: 0.25 E9/L (ref 0.1–0.95)
MONOCYTES RELATIVE PERCENT: 2.6 % (ref 2–12)
NEUTROPHILS ABSOLUTE: 7.81 E9/L (ref 1.8–7.3)
NEUTROPHILS RELATIVE PERCENT: 93 % (ref 43–80)
NUCLEATED RED BLOOD CELLS: 0 /100 WBC
OVALOCYTES: ABNORMAL
PDW BLD-RTO: 20.1 FL (ref 11.5–15)
PHOSPHORUS: 4.4 MG/DL (ref 2.5–4.5)
PLATELET # BLD: 98 E9/L (ref 130–450)
PLATELET CONFIRMATION: NORMAL
PMV BLD AUTO: 9.9 FL (ref 7–12)
POIKILOCYTES: ABNORMAL
POLYCHROMASIA: ABNORMAL
POTASSIUM SERPL-SCNC: 4.4 MMOL/L (ref 3.5–5)
RBC # BLD: 2.79 E12/L (ref 3.8–5.8)
SODIUM BLD-SCNC: 130 MMOL/L (ref 132–146)
TARGET CELLS: ABNORMAL
WBC # BLD: 8.4 E9/L (ref 4.5–11.5)

## 2022-05-19 PROCEDURE — 36415 COLL VENOUS BLD VENIPUNCTURE: CPT

## 2022-05-19 PROCEDURE — 94640 AIRWAY INHALATION TREATMENT: CPT

## 2022-05-19 PROCEDURE — 6360000002 HC RX W HCPCS: Performed by: SPECIALIST

## 2022-05-19 PROCEDURE — 2500000003 HC RX 250 WO HCPCS: Performed by: STUDENT IN AN ORGANIZED HEALTH CARE EDUCATION/TRAINING PROGRAM

## 2022-05-19 PROCEDURE — A4216 STERILE WATER/SALINE, 10 ML: HCPCS | Performed by: STUDENT IN AN ORGANIZED HEALTH CARE EDUCATION/TRAINING PROGRAM

## 2022-05-19 PROCEDURE — 2580000003 HC RX 258: Performed by: INTERNAL MEDICINE

## 2022-05-19 PROCEDURE — 6370000000 HC RX 637 (ALT 250 FOR IP): Performed by: SPECIALIST

## 2022-05-19 PROCEDURE — 85025 COMPLETE CBC W/AUTO DIFF WBC: CPT

## 2022-05-19 PROCEDURE — 83735 ASSAY OF MAGNESIUM: CPT

## 2022-05-19 PROCEDURE — 36592 COLLECT BLOOD FROM PICC: CPT

## 2022-05-19 PROCEDURE — 6370000000 HC RX 637 (ALT 250 FOR IP): Performed by: INTERNAL MEDICINE

## 2022-05-19 PROCEDURE — 6370000000 HC RX 637 (ALT 250 FOR IP): Performed by: STUDENT IN AN ORGANIZED HEALTH CARE EDUCATION/TRAINING PROGRAM

## 2022-05-19 PROCEDURE — 2000000000 HC ICU R&B

## 2022-05-19 PROCEDURE — 2580000003 HC RX 258: Performed by: SPECIALIST

## 2022-05-19 PROCEDURE — C9113 INJ PANTOPRAZOLE SODIUM, VIA: HCPCS | Performed by: STUDENT IN AN ORGANIZED HEALTH CARE EDUCATION/TRAINING PROGRAM

## 2022-05-19 PROCEDURE — 82962 GLUCOSE BLOOD TEST: CPT

## 2022-05-19 PROCEDURE — 84100 ASSAY OF PHOSPHORUS: CPT

## 2022-05-19 PROCEDURE — 6360000002 HC RX W HCPCS: Performed by: INTERNAL MEDICINE

## 2022-05-19 PROCEDURE — 6360000002 HC RX W HCPCS: Performed by: STUDENT IN AN ORGANIZED HEALTH CARE EDUCATION/TRAINING PROGRAM

## 2022-05-19 PROCEDURE — 94003 VENT MGMT INPAT SUBQ DAY: CPT

## 2022-05-19 PROCEDURE — 2580000003 HC RX 258: Performed by: STUDENT IN AN ORGANIZED HEALTH CARE EDUCATION/TRAINING PROGRAM

## 2022-05-19 PROCEDURE — 80048 BASIC METABOLIC PNL TOTAL CA: CPT

## 2022-05-19 RX ORDER — HEPARIN SODIUM 10000 [USP'U]/ML
5000 INJECTION, SOLUTION INTRAVENOUS; SUBCUTANEOUS EVERY 8 HOURS
Status: DISCONTINUED | OUTPATIENT
Start: 2022-05-19 | End: 2022-05-26 | Stop reason: HOSPADM

## 2022-05-19 RX ORDER — LANSOPRAZOLE
30 KIT
Status: DISCONTINUED | OUTPATIENT
Start: 2022-05-20 | End: 2022-05-26 | Stop reason: HOSPADM

## 2022-05-19 RX ADMIN — IPRATROPIUM BROMIDE AND ALBUTEROL SULFATE 1 AMPULE: .5; 2.5 SOLUTION RESPIRATORY (INHALATION) at 11:55

## 2022-05-19 RX ADMIN — SENNOSIDES 8.8 MG: 8.8 SYRUP ORAL at 20:44

## 2022-05-19 RX ADMIN — MIDAZOLAM HYDROCHLORIDE 1 MG: 1 INJECTION, SOLUTION INTRAMUSCULAR; INTRAVENOUS at 20:44

## 2022-05-19 RX ADMIN — Medication 10 ML: at 08:37

## 2022-05-19 RX ADMIN — SODIUM CHLORIDE, PRESERVATIVE FREE 40 MG: 5 INJECTION INTRAVENOUS at 08:35

## 2022-05-19 RX ADMIN — Medication 200 MCG/HR: at 09:29

## 2022-05-19 RX ADMIN — Medication 10 ML: at 20:44

## 2022-05-19 RX ADMIN — DOCUSATE SODIUM LIQUID 100 MG: 100 LIQUID ORAL at 08:35

## 2022-05-19 RX ADMIN — IPRATROPIUM BROMIDE AND ALBUTEROL SULFATE 1 AMPULE: .5; 2.5 SOLUTION RESPIRATORY (INHALATION) at 03:24

## 2022-05-19 RX ADMIN — ACETYLCYSTEINE 400 MG: 100 INHALANT RESPIRATORY (INHALATION) at 08:39

## 2022-05-19 RX ADMIN — ACETYLCYSTEINE 400 MG: 100 INHALANT RESPIRATORY (INHALATION) at 17:08

## 2022-05-19 RX ADMIN — MIDAZOLAM HYDROCHLORIDE 1 MG: 1 INJECTION, SOLUTION INTRAMUSCULAR; INTRAVENOUS at 15:06

## 2022-05-19 RX ADMIN — CEFTOLOZANE AND TAZOBACTAM 1500 MG: 1; .5 INJECTION, POWDER, LYOPHILIZED, FOR SOLUTION INTRAVENOUS at 11:21

## 2022-05-19 RX ADMIN — Medication 200 MCG/HR: at 02:23

## 2022-05-19 RX ADMIN — METOPROLOL TARTRATE 25 MG: 25 TABLET, FILM COATED ORAL at 08:36

## 2022-05-19 RX ADMIN — HEPARIN SODIUM 5000 UNITS: 10000 INJECTION, SOLUTION INTRAVENOUS; SUBCUTANEOUS at 17:42

## 2022-05-19 RX ADMIN — LEVETIRACETAM 500 MG: 100 SOLUTION ORAL at 20:43

## 2022-05-19 RX ADMIN — IPRATROPIUM BROMIDE AND ALBUTEROL SULFATE 1 AMPULE: .5; 2.5 SOLUTION RESPIRATORY (INHALATION) at 08:39

## 2022-05-19 RX ADMIN — DOCUSATE SODIUM LIQUID 100 MG: 100 LIQUID ORAL at 20:43

## 2022-05-19 RX ADMIN — CEFTOLOZANE AND TAZOBACTAM 1500 MG: 1; .5 INJECTION, POWDER, LYOPHILIZED, FOR SOLUTION INTRAVENOUS at 19:00

## 2022-05-19 RX ADMIN — LEVETIRACETAM 500 MG: 100 SOLUTION ORAL at 08:35

## 2022-05-19 RX ADMIN — CHLORHEXIDINE GLUCONATE 0.12% ORAL RINSE 15 ML: 1.2 LIQUID ORAL at 08:35

## 2022-05-19 RX ADMIN — TIGECYCLINE 50 MG: 50 INJECTION, POWDER, LYOPHILIZED, FOR SOLUTION INTRAVENOUS at 06:35

## 2022-05-19 RX ADMIN — IPRATROPIUM BROMIDE AND ALBUTEROL SULFATE 1 AMPULE: .5; 2.5 SOLUTION RESPIRATORY (INHALATION) at 19:46

## 2022-05-19 RX ADMIN — LEVOTHYROXINE SODIUM 175 MCG: 125 TABLET ORAL at 06:25

## 2022-05-19 RX ADMIN — ACETYLCYSTEINE 400 MG: 100 INHALANT RESPIRATORY (INHALATION) at 03:24

## 2022-05-19 RX ADMIN — MUPIROCIN: 20 OINTMENT TOPICAL at 08:36

## 2022-05-19 RX ADMIN — Medication 200 MCG/HR: at 15:47

## 2022-05-19 RX ADMIN — TIGECYCLINE 50 MG: 50 INJECTION, POWDER, LYOPHILIZED, FOR SOLUTION INTRAVENOUS at 18:59

## 2022-05-19 RX ADMIN — Medication 200 MCG/HR: at 22:05

## 2022-05-19 RX ADMIN — SULFAMETHOXAZOLE AND TRIMETHOPRIM 2 TABLET: 800; 160 TABLET ORAL at 08:45

## 2022-05-19 RX ADMIN — IPRATROPIUM BROMIDE AND ALBUTEROL SULFATE 1 AMPULE: .5; 2.5 SOLUTION RESPIRATORY (INHALATION) at 17:08

## 2022-05-19 RX ADMIN — ACETYLCYSTEINE 400 MG: 100 INHALANT RESPIRATORY (INHALATION) at 19:46

## 2022-05-19 RX ADMIN — CHLORHEXIDINE GLUCONATE 0.12% ORAL RINSE 15 ML: 1.2 LIQUID ORAL at 20:44

## 2022-05-19 RX ADMIN — COLLAGENASE SANTYL: 250 OINTMENT TOPICAL at 08:38

## 2022-05-19 RX ADMIN — ACETYLCYSTEINE 400 MG: 100 INHALANT RESPIRATORY (INHALATION) at 11:55

## 2022-05-19 RX ADMIN — MUPIROCIN: 20 OINTMENT TOPICAL at 20:44

## 2022-05-19 RX ADMIN — POLYETHYLENE GLYCOL 3350 17 G: 17 POWDER, FOR SOLUTION ORAL at 08:37

## 2022-05-19 RX ADMIN — CEFTOLOZANE AND TAZOBACTAM 1500 MG: 1; .5 INJECTION, POWDER, LYOPHILIZED, FOR SOLUTION INTRAVENOUS at 03:08

## 2022-05-19 RX ADMIN — METOPROLOL TARTRATE 25 MG: 25 TABLET, FILM COATED ORAL at 20:44

## 2022-05-19 RX ADMIN — HEPARIN SODIUM 5000 UNITS: 10000 INJECTION, SOLUTION INTRAVENOUS; SUBCUTANEOUS at 11:09

## 2022-05-19 ASSESSMENT — PULMONARY FUNCTION TESTS
PIF_VALUE: 26
PIF_VALUE: 30
PIF_VALUE: 29
PIF_VALUE: 30
PIF_VALUE: 31
PIF_VALUE: 26
PIF_VALUE: 31
PIF_VALUE: 26
PIF_VALUE: 27
PIF_VALUE: 30
PIF_VALUE: 31
PIF_VALUE: 29
PIF_VALUE: 27
PIF_VALUE: 30
PIF_VALUE: 30
PIF_VALUE: 27
PIF_VALUE: 26
PIF_VALUE: 30
PIF_VALUE: 28
PIF_VALUE: 29
PIF_VALUE: 22
PIF_VALUE: 29
PIF_VALUE: 26
PIF_VALUE: 27
PIF_VALUE: 26
PIF_VALUE: 30
PIF_VALUE: 30

## 2022-05-19 ASSESSMENT — PAIN SCALES - GENERAL
PAINLEVEL_OUTOF10: 0

## 2022-05-19 NOTE — PATIENT CARE CONFERENCE
Intensive Care Daily Quality Rounding Checklist        ICU Team Members: bedside nurse, charge nurse, Yessenia Mcdonald, resident     ICU Day #: 9     Intubation Date: 5/11/2022     Ventilator Day #: 9     Central Line Insertion Date: 5/17/22                                                    Day #: 3      Arterial Line Insertion Date: n/a                             Day #: n/a     Temporary Hemodialysis Catheter Insertion Date: n/a                             Day # admitted with tunneled dialysis cath     DVT Prophylaxis: Eliquis on hold for anemia/ SCD's    GI Prophylaxis: Protonix     Roberts Catheter Insertion Date: HD patient                                        ULE #:                              Continued need (if yes, reason documented and discussed with physician):     Skin Issues/ Wounds and ordered treatment discussed on rounds: recent debridement to sacral wound     Goals/ Plans for the Day: continue to wean vent as tolerated, HD per nephrology, labs

## 2022-05-19 NOTE — PLAN OF CARE
Problem: Discharge Planning  Goal: Discharge to home or other facility with appropriate resources  5/19/2022 1316 by Taurus Han RN  Outcome: Progressing  5/19/2022 0415 by Eduardo Fitzgerald RCP  Outcome: Progressing  Flowsheets (Taken 5/18/2022 2000 by Bryan Bowens RN)  Discharge to home or other facility with appropriate resources:   Identify barriers to discharge with patient and caregiver   Arrange for needed discharge resources and transportation as appropriate     Problem: Pain  Goal: Verbalizes/displays adequate comfort level or baseline comfort level  5/19/2022 1316 by Taurus Han RN  Outcome: Progressing  5/19/2022 0415 by Eduardo Fitzgerald RCP  Outcome: Progressing     Problem: Skin/Tissue Integrity  Goal: Absence of new skin breakdown  Description: 1. Monitor for areas of redness and/or skin breakdown  2. Assess vascular access sites hourly  3. Every 4-6 hours minimum:  Change oxygen saturation probe site  4. Every 4-6 hours:  If on nasal continuous positive airway pressure, respiratory therapy assess nares and determine need for appliance change or resting period.   5/19/2022 1316 by Taurus Han RN  Outcome: Progressing  5/19/2022 0415 by Eduardo Fitzgerald RCP  Outcome: Progressing     Problem: Safety - Adult  Goal: Free from fall injury  5/19/2022 1316 by Taurus Han RN  Outcome: Progressing  Flowsheets (Taken 5/19/2022 1300)  Free From Fall Injury: Based on caregiver fall risk screen, instruct family/caregiver to ask for assistance with transferring infant if caregiver noted to have fall risk factors  5/19/2022 0415 by Eduardo Fitzgerald RCP  Outcome: Progressing     Problem: ABCDS Injury Assessment  Goal: Absence of physical injury  5/19/2022 1316 by Taurus Han RN  Outcome: Progressing  Flowsheets (Taken 5/19/2022 1300)  Absence of Physical Injury: Implement safety measures based on patient assessment  5/19/2022 0415 by Eduardo Fitzgerald RCP  Outcome: Progressing     Problem: Respiratory - Adult  Goal: Achieves optimal ventilation and oxygenation  5/19/2022 1316 by Mariya Spears RN  Outcome: Progressing  5/19/2022 0415 by Mane Norman RCP  Outcome: Progressing  Flowsheets  Taken 5/19/2022 0415 by Mane Norman RCP  Achieves optimal ventilation and oxygenation:   Assess for changes in respiratory status   Position to facilitate oxygenation and minimize respiratory effort   Assess the need for suctioning and aspirate as needed   Oxygen supplementation based on oxygen saturation or arterial blood gases  Taken 5/18/2022 2000 by Radha Mendez RN  Achieves optimal ventilation and oxygenation:   Assess for changes in respiratory status   Assess for changes in mentation and behavior   Position to facilitate oxygenation and minimize respiratory effort   Oxygen supplementation based on oxygen saturation or arterial blood gases     Problem: Chronic Conditions and Co-morbidities  Goal: Patient's chronic conditions and co-morbidity symptoms are monitored and maintained or improved  5/19/2022 1316 by Mariya Spears RN  Outcome: Progressing  5/19/2022 0415 by Mane Norman RCP  Outcome: Progressing  Flowsheets (Taken 5/18/2022 2000 by Radha Mendez RN)  Care Plan - Patient's Chronic Conditions and Co-Morbidity Symptoms are Monitored and Maintained or Improved: Monitor and assess patient's chronic conditions and comorbid symptoms for stability, deterioration, or improvement     Problem: Safety - Medical Restraint  Goal: Remains free of injury from restraints (Restraint for Interference with Medical Device)  Description: INTERVENTIONS:  1. Determine that other, less restrictive measures have been tried or would not be effective before applying the restraint  2. Evaluate the patient's condition at the time of restraint application  3. Inform patient/family regarding the reason for restraint  4.  Q2H: Monitor safety, psychosocial status, comfort, nutrition and hydration  5/19/2022 1316 by Kris Ovalles, JONELLE  Outcome: Progressing  5/19/2022 0415 by Yomi Davison RCP  Outcome: Progressing     Problem: Nutrition Deficit:  Goal: Optimize nutritional status  5/19/2022 1316 by Kris Ovalles RN  Outcome: Progressing  5/19/2022 0415 by Yomi Davison RCP  Outcome: Progressing

## 2022-05-19 NOTE — PLAN OF CARE
Problem: Respiratory - Adult  Goal: Achieves optimal ventilation and oxygenation  Outcome: Progressing  Flowsheets  Taken 5/19/2022 0415 by Juan Antoine RCP  Achieves optimal ventilation and oxygenation:   Assess for changes in respiratory status   Position to facilitate oxygenation and minimize respiratory effort   Assess the need for suctioning and aspirate as needed   Oxygen supplementation based on oxygen saturation or arterial blood gases  Taken 5/18/2022 2000 by James Newman RN  Achieves optimal ventilation and oxygenation:   Assess for changes in respiratory status   Assess for changes in mentation and behavior   Position to facilitate oxygenation and minimize respiratory effort   Oxygen supplementation based on oxygen saturation or arterial blood gases

## 2022-05-19 NOTE — PROGRESS NOTES
[address] [place] [address] [facility]  LUCY  Progress Note    SUBJECTIVE:  Chief Complaint   Patient presents with    Loss of Consciousness     arrived from [facility] via [facility] unresponsive     The patient is refusing dressing changes, or fingersticks. No fevers. Minimal secretions from the ET tube. Tube feeding like secretions from the oral cavity. No fever. Review of systems:  A 10 point review of systems was done. As stated above, otherwise negative.     Medications:   [START ON 5/20/2022] lansoprazole  30 mg Per G Tube QAM AC    heparin (porcine)  5,000 Units SubCUTAneous Q8H    collagenase   Topical Daily    ceftolozane-tazobactam (ZERBAXA) IVPB  1,500 mg IntraVENous Q8H    metoprolol tartrate  25 mg Oral BID    epoetin kandi-epbx  8,000 Units SubCUTAneous Once per day on Mon Wed Fri    And    epoetin kandi-epbx  2,000 Units SubCUTAneous Once per day on Mon Wed Fri    mupirocin   Topical BID    tigecycline (TYGACIL) IVPB  50 mg IntraVENous Q12H    sulfamethoxazole-trimethoprim  2 tablet Oral Daily    acetylcysteine  4 mL Inhalation Q4H    ipratropium-albuterol  1 ampule Inhalation Q4H    polyethylene glycol  17 g Oral Daily    chlorhexidine  15 mL Mouth/Throat BID    senna  5 mL Per G Tube Nightly    docusate  100 mg Per G Tube BID    levothyroxine  175 mcg Per G Tube Daily    [Held by provider] insulin glargine  20 Units SubCUTAneous BID    insulin lispro  0-12 Units SubCUTAneous Q6H    levETIRAcetam  500 mg PEG Tube BID    sodium chloride flush  5-40 mL IntraVENous 2 times per day      sodium chloride      fentaNYL 200 mcg/hr (05/19/22 0929)    sodium chloride 10 mL/hr at 05/18/22 1824     midazolam, sodium chloride, anticoagulant sodium citrate, perflutren lipid microspheres, glucose, dextrose bolus **OR** dextrose bolus, glucagon (rDNA), sodium chloride flush, sodium chloride    OBJECTIVE:  /67   Pulse 90   Temp 97.5 °F (36.4 °C)   Resp 10   Ht 6' 5\" (1.956 m) Wt 242 lb 11.6 oz (110.1 kg)   SpO2 (!) 84%   BMI 28.78 kg/m²   Temp  Av.6 °F (36.4 °C)  Min: 97.2 °F (36.2 °C)  Max: 98 °F (36.7 °C)  Constitutional: The patient is sedated, on ventilator. FiO2 35%. PEEP 6. Opens eyes. No distress. Skin: Warm and dry. No rashes were noted. HEENT: Eyes show round, and reactive pupils. No jaundice. Moist mucous membranes, no ulcerations, no thrush. ETT. OGT  Neck: Supple to movements. No lymphadenopathy. Chest: No use of accessory muscles to breathe. Symmetrical expansion. Auscultation reveals coarse breath sounds. scattered rhonchi. Right tunneled HD catheter. Cardiovascular: Heart sounds arrhythmic and irregular. Abdomen: Positive bowel sounds to auscultation. Benign to palpation. Extremities: Minimal edema. Left third toe missing. Back: Stage IV decubitus ulcer with dressing. There is undermining. Necrosis of the edges. Lines: Left IJ TLC 2022. Fecal management system.     Laboratory and Tests Review:  Lab Results   Component Value Date    WBC 8.4 2022    WBC 9.1 2022    WBC 8.0 2022    HGB 7.6 (L) 2022    HCT 24.3 (L) 2022    MCV 87.1 2022    PLT 98 (L) 2022     Lab Results   Component Value Date    NEUTROABS 7.81 (H) 2022    NEUTROABS 8.56 (H) 2022    NEUTROABS 6.44 2022     Lab Results   Component Value Date    CRP 13.9 (H) 2022     No results found for: CRP  Lab Results   Component Value Date    SEDRATE 78 (H) 2022     Lab Results   Component Value Date    ALT 30 2022    AST 31 2022    ALKPHOS 591 (H) 2022    BILITOT 0.3 2022     Lab Results   Component Value Date     2022    K 4.4 2022    K 4.5 2022    CL 94 2022    CO2 24 2022    BUN 48 2022    CREATININE 1.5 2022    CREATININE 0.8 2022    CREATININE 2.0 2022    GFRAA 56 2022    LABGLOM 46 2022    GLUCOSE 104 2022 PROT 6.9 05/11/2022    LABALBU 2.4 05/11/2022    CALCIUM 8.5 05/19/2022    BILITOT 0.3 05/11/2022    ALKPHOS 591 05/11/2022    AST 31 05/11/2022    ALT 30 05/11/2022     Radiology:    Reviewed  Microbiology:   [facility]:  Blood culture 5/10/2022: Negative so far  Decubitus ulcer 5/10/2022: MRSA, mixed GNR  Respiratory panel: Pending  Nares screen MRSA: Pending  Blood cultures 5/11/2022: Negative so far      [facility]  Respiratory panel: Negative  Blood cultures 5/11/2022: Staphylococcus epidermidis in 2 of 2 sets  Nares screen MRSA: Positive   Respiratory culture 5/12/2022: MDR Acinetobacter baumanii (sensitive to Bactrim, Tigecycline, Cefiderocol. Intermediate to Avycaz and Eravacycline. Resistant to all others)  Respiratory culture 5/16/2022: OP patricia reduced. GNR, GNR, GNR. Pleural fluid 5/13/2022: Pseudomonas aeruginosa (Resistant to Levofloxacin and Meropenem. Intermediate to Zosyn. Sensitive to Avycaz and Gentamicin)    ASSESSMENT:  · HCAP with MDR Acinetobacter baumanii  · Sepsis with septic shock associated to HCAP  · Status postcardiac arrest  · Acute respiratory failure. Unable to wean  · Leukocytosis secondary to HCAP, improved  · Hypothermia, improved  · History of sacral decubitus ulcer infection with Pseudomonas and Enterococcus. Treated with IV antibiotic between March and April 2022. Status postdebridement 5/17/2022  · CKD, on HD  · MDR Acinetobacter in respiratory culture  · Acinetobacter in the respiratory cultures. Possible outbreak in the ICU  · Pleural fluid infection with Pseudomonas. Possible empyema    PLAN:  · Continue Tigecycline, Bactrim, day 5  · Continue Zerbaxa, day 3  · Surgery following for possible diverting colostomy. Family weighing decision    Spoke with nursing.     Becky Augustin MD  11:29 AM  5/19/2022

## 2022-05-19 NOTE — CARE COORDINATION
CASE MANAGEMENT. ... Patient remains in icu intubated. Vent day 9. fio2 35% peep 6. Weaning as tolerated. On iv fentanyl. Left chest tube intact. POD 2. Had I+D of sacral wound to bone with bone bx. Continues on iv zerbaxa q8hrs and iv tygacil q12hrs. Tube feeds per peg. Has FMS. HD per renal. Has RIJ TDC. Palliative Care following. At this time, plan is continue with current medical treatment, but code status is now [facility]. Will follow and assist with needs ccordingly.

## 2022-05-19 NOTE — PROGRESS NOTES
Pt refusing care, refusing blood sugar checks, refusing wound dressing change. Shaking his head no, squeezing his hand super tight so we can't get his finger stick, resisting turns. I made Mr. Henna Valentine aware of situation, pt is alert and oriented. Had conversation with the pt about the consequences of not doing care. I also explained is current code status to him and he was not aware of the conversation with palliative. Pt understood and \"wants to be done\". I explained that I will pass along the conversation to next nurse and palliative and his wife can discuss goals of care further today if possible.

## 2022-05-19 NOTE — DISCHARGE INSTR - COC
Continuity of Care Form    Patient Name: Nolberto Taylor   :  1955  MRN:  [id number]    Admit date:  5/10/2022  Discharge date:  22    Code Status Order: DNR-CCA   Advance Directives:      Admitting Physician:  Meghan Rosenberg MD  PCP: Meghan Rosenberg MD    Discharging Nurse: Irish Stroud [facility] Unit/Room#: 0209/0209-A  Discharging Unit Phone Number: [phone]    Emergency Contact:   Extended Emergency Contact Information  Primary Emergency Contact: Deisi Kerr  Address: [address] [place] [address] Phone: [phone]  Mobile Phone: [phone]  Relation: Spouse    Past Surgical History:  Past Surgical History:   Procedure Laterality Date    AMPUTATION Right 2017    right toe    BACK SURGERY N/A 2022    SACRAL WOUND DEBRIDEMENT performed by Tasha Pleitez MD at [facility]      right chest    HYDROCELE EXCISION  2010    JOINT REPLACEMENT Right 2011    TONSILLECTOMY      UPPER GASTROINTESTINAL ENDOSCOPY N/A 2022    EGD BIOPSY performed by Janna Valdivia MD at [place] [address]      venous ablation       Immunization History: There is no immunization history on file for this patient.     Active Problems:  Patient Active Problem List   Diagnosis Code    Cardiac arrest ([facility] [address] I46.9    Pneumonia due to infectious organism J18.9    Pleural effusion J90       Isolation/Infection:   Isolation            Contact          Patient Infection Status       Infection Onset Added Last Indicated Last Indicated By Review Planned Expiration Resolved Resolved By    Pulmonary Tuberculosis (Rule Out) 22 Culture with Smear, Acid Fast Bacillius (Ordered)        MDRO (multi-drug resistant organism)  22 Zoran Perez RN        Acinetobacter baumannii sputum 2022  Pseudomonas aeruginosa pleural fluid 2022      CRE (Carbapenem-Resistant Enterobacteriaceae)  05/16/22 05/16/22 Henry Abrams, RN        Acinetobacter baumannii sputum 5/12/2022  Pseudomonas aeruginosa pleural fluid 5/13/2022    MRSA 05/11/22 05/12/22 05/11/22 Culture, MRSA, Screening        MRSA nares 5/11/2022    VRE  05/11/22 05/11/22 Henry Abrams, RN        Pseudomonas aeruginosa Wound sacral 3/13/2022 [facility])    Resolved    COVID-19 (Rule Out) 05/11/22 05/11/22 05/11/22 Respiratory Panel, Molecular, with COVID-19 (Restricted: peds pts or suitable admitted adults) (Ordered)   05/11/22 Rule-Out Test Resulted            Nurse Assessment:  Last Vital Signs: /67   Pulse 90   Temp 97.5 °F (36.4 °C)   Resp 10   Ht 6' 5\" (1.956 m)   Wt 242 lb 11.6 oz (110.1 kg)   SpO2 (!) 84%   BMI 28.78 kg/m²     Last documented pain score (0-10 scale): Pain Level: 0  Last Weight:   Wt Readings from Last 1 Encounters:   05/18/22 242 lb 11.6 oz (110.1 kg)     Mental Status:  oriented, alert, and trach/follows commands    IV Access:  - Peripheral IV - site  R Upper Arm, insertion date: 5/24/22    Nursing Mobility/ADLs:  Walking   Dependent  Transfer  Dependent  Bathing  Dependent  Dressing  Dependent  Toileting  Dependent  Feeding  Dependent  Med Admin  Dependent  Med Delivery    via peg tube    Wound Care Documentation and Therapy:  Wound 05/11/22 Sacrum (Active)   Wound Image   05/13/22 1543   Wound Etiology Pressure Stage 4 05/16/22 1200   Dressing Status Clean;Dry; Intact 05/18/22 2000   Wound Cleansed Irrigated with saline 05/18/22 0538   Dressing/Treatment ABD; Moist to dry 05/18/22 1600   Dressing Change Due 05/19/22 05/18/22 0538   Wound Length (cm) 15 cm 05/18/22 0538   Wound Width (cm) 12 cm 05/18/22 0538   Wound Depth (cm) 4 cm 05/18/22 0538   Wound Surface Area (cm^2) 180 cm^2 05/18/22 0538   Change in Wound Size % (l*w) -206.12 05/18/22 0538   Wound Volume (cm^3) 720 cm^3 05/18/22 0538   Wound Healing % -337 05/18/22 0538   Undermining Starts ___ O'Clock 6 05/18/22 5767 Undermining Ends___ O'Clock 12 05/18/22 0538   Undermining Maxium Distance (cm) 4 05/18/22 0538   Wound Assessment Purple/maroon;Bleeding 05/18/22 0538   Drainage Amount Moderate 05/18/22 1600   Drainage Description Serosanguinous 05/18/22 1600   Odor Moderate 05/18/22 1600   Gavi-wound Assessment Fragile 05/18/22 1600   Wound Thickness Description not for Pressure Injury Full thickness 05/18/22 1600   Number of days: 7       Incision 05/17/22 Back Lower;Medial (Active)   Dressing Status Clean;Dry; Intact 05/18/22 2000   Dressing/Treatment Roll gauze; Moist to dry 05/18/22 1600   Number of days: 1        Elimination:  Continence: Bowel: ostomy  Bladder: anuric  Urinary Catheter: None   Colostomy/Ileostomy/Ileal Conduit: Yes  Fecal Management System 05/16/22-Stool Appearance: Loose  Fecal Management System 05/16/22-Stool Color: Osielgab Graves    Date of Last BM: 5/25/22 via ostomy    Intake/Output Summary (Last 24 hours) at 5/19/2022 1103  Last data filed at 5/18/2022 1824  Gross per 24 hour   Intake 1214.15 ml   Output 1900 ml   Net -685.85 ml     I/O last 3 completed shifts:   In: 7491.9 [I.V.:6023.9; NG/GT:97; IV Piggyback:671]  Out: 2000 [Chest Tube:100]    Safety Concerns:     History of Falls (last 30 days)    Impairments/Disabilities:      Speech and Contractures - Right arm contracture, trach    Nutrition Therapy:  Current Nutrition Therapy:   - Tube Feedings:  Immune Enhancing    Routes of Feeding: Jejunal Tube  Liquids: No Liquids  Daily Fluid Restriction: no  Last Modified Barium Swallow with Video (Video Swallowing Test): not done    Treatments at the Time of Hospital Discharge:   Respiratory Treatments: ***  Oxygen Therapy:  {Therapy; copd oxygen:21049}  Ventilator:    - Ventilator Settings:    Vt (Set, mL): 0 mL  Resp Rate (Set): 0 bmp  FiO2 : 30 %    PEEP/CPAP (cmH2O): 5  Pressure Support: 12 cmH20    Rehab Therapies: {THERAPEUTIC INTERVENTION:4489872267}  Weight Bearing Status/Restrictions: { CC Weight Bearin}  Other Medical Equipment (for information only, NOT a DME order):  hospital bed  Other Treatments: ***    Patient's personal belongings (please select all that are sent with patient):  None    RN SIGNATURE:  Electronically signed by Deepa Tapia RN on 22 at 3:39 PM EDT    CASE MANAGEMENT/SOCIAL WORK SECTION    Inpatient Status Date: ***    Readmission Risk Assessment Score:  Readmission Risk              Risk of Unplanned Readmission:  34           Discharging to Facility/ Agency   Name: [facility]  Address:  [address] [place] [address]          Phone: [phone]          Dialysis Facility (if applicable)   Name:  Address:  Dialysis Schedule:  Phone:  Fax:    / signature: {Esignature:134223157}    PHYSICIAN SECTION    Prognosis: {Prognosis:2642569757}    Condition at Discharge: [address] Rasheed Patient Condition:268326658}    Rehab Potential (if transferring to Rehab): {Prognosis:2028821848}    Recommended Labs or Other Treatments After Discharge: ***    Physician Certification: I certify the above information and transfer of Karen Soler  is necessary for the continuing treatment of the diagnosis listed and that he requires {Admit to Appropriate Level of Care:69046} for {GREATER/LESS:498963274} 30 days.      Update Admission H&P: {CHP DME Changes in POJD:724885232}    PHYSICIAN SIGNATURE:  {Esignature:[id number]}

## 2022-05-19 NOTE — PROGRESS NOTES
Paged that family is now agreeable to diverting ostomy. Will set patient up for Monday @ 2pm for diagnostic laparoscopy with diverting ostomy, possible open. Discussed with Dr. Wilma Roblero.     Electronically signed by Gómez Marie MD on 5/19/2022 at 2:15 PM

## 2022-05-19 NOTE — PROGRESS NOTES
Critical Care Team - Daily Progress Note      Date and time: 2022 6:54 AM  Patient's name:  Pepe Lopez  Medical Record Number: [id number]  Patient's account/billing number: [de-identified]  Patient's YOB: 1955  Age: 74 y.o. Date of Admission: 5/10/2022 11:58 PM  Length of stay during current admission: 8      Primary Care Physician: Venus Lennox, MD  ICU Attending Physician: Dr. Kailey Mccarty    Code Status: DNR-CCA    Reason for ICU admission: Status postcardiac arrest      SUBJECTIVE:     OVERNIGHT EVENTS:         Patient remains afebrile, inubated. Patient is awake, alert and orientated. Discuss comfort measures       Intake/Output:   No intake/output data recorded. I/O last 3 completed shifts: In: 7791.9 [I.V.:6023.9; Blood:300; NG/GT:97; IV Piggyback:671]  Out: 2000 [Chest Tube:100]    Awake and following commands: No  Current Ventilation: - Ventilator Settings:    Vt (Set, mL): 400 mL  Resp Rate (Set): 18 bmp  FiO2 : 35 %    PEEP/CPAP (cmH2O): 6  Pressure Support: 0 cmH20  Secretions: Thick, yellow secretions, blood  Sedation: fentanyl  Paralyzed: No  Vasopressors: No    Initial HPI + past overnight events: 75-year-old male resident of [facility] with significant past medical history of A. fib on Eliquis, aspirin, anxiety, anemia, CHF, insulin-dependent type 2 diabetes, CKD on hemodialysis, sacral decubitus ulcers with wound VAC, hyperlipidemia, hypothyroidism. Patient presented to the ICU after having a cardiac arrest, required CPR 2 rounds of epinephrine, and intubated. He is currently on Tigacyline and Amikacin per ID for treatment of HCAP. D10 started for hypoglycemia.     OBJECTIVE:     VITAL SIGNS:  /74   Pulse 84   Temp 97.5 °F (36.4 °C)   Resp 10   Ht 6' 5\" (1.956 m)   Wt 242 lb 11.6 oz (110.1 kg)   SpO2 98%   BMI 28.78 kg/m²   Tmax over 24 hours:  Temp (24hrs), Av.5 °F (36.4 °C), Min:97.1 °F (36.2 °C), Max:98 °F (36.7 °C)      Patient Vitals for the past 6 hrs:   BP Temp Pulse Resp SpO2   05/19/22 0600 112/74 -- 84 10 98 %   05/19/22 0500 (!) 99/57 -- 89 (!) 7 97 %   05/19/22 0400 (!) 89/53 97.5 °F (36.4 °C) 80 17 97 %   05/19/22 0327 -- -- 83 13 98 %   05/19/22 0300 95/61 -- 108 10 96 %   05/19/22 0200 107/62 -- 84 11 99 %   05/19/22 0100 (!) 88/54 -- 77 16 97 %         Intake/Output Summary (Last 24 hours) at 5/19/2022 0654  Last data filed at 5/18/2022 1824  Gross per 24 hour   Intake 1311.15 ml   Output 1900 ml   Net -588.85 ml     Wt Readings from Last 2 Encounters:   05/18/22 242 lb 11.6 oz (110.1 kg)   04/27/22 212 lb (96.2 kg)     Body mass index is 28.78 kg/m². Physical Exam  Vitals and nursing note reviewed. Constitutional:       General: He is awake. Appearance: He is ill-appearing. Comments:  Intubated  Patient responds appropriately to all questions,   Attempting to mouth words   HENT:      Head: Normocephalic and atraumatic. Nose: Nose normal. No congestion or rhinorrhea. Mouth/Throat:      Mouth: Mucous membranes are moist.      Pharynx: Oropharynx is clear. Eyes:      General: No scleral icterus. Extraocular Movements: Extraocular movements intact. Conjunctiva/sclera: Conjunctivae normal.   Cardiovascular:      Rate and Rhythm: Normal rate and regular rhythm. Pulses: Normal pulses. Heart sounds: Normal heart sounds. No murmur heard. Pulmonary:      Breath sounds: No stridor. Rhonchi present. Comments: intubated  Abdominal:      General: Bowel sounds are normal. There is no distension. Palpations: Abdomen is soft. There is no mass. Tenderness: There is no abdominal tenderness. There is no guarding or rebound. Comments: PEG tube   Musculoskeletal:         General: Normal range of motion. Cervical back: Normal range of motion and neck supple. No tenderness. Right lower leg: Edema present. Left lower leg: Edema present. Skin:     Findings: Lesion (Sacral decubitus) present. Comments: Chronic changes -Dark-colored lower legs b/l  Feet are cold to touch    Neurological:      Mental Status: He is alert and oriented to person, place, and time. Cranial Nerves: No cranial nerve deficit. Sensory: No sensory deficit. Comments: Patient awake and alert, orientated to name. Not sure about location and season. Psychiatric:         Attention and Perception: Attention normal.         Mood and Affect: Affect is tearful. Behavior: Behavior is cooperative.            MEDICATIONS:    Scheduled Meds:   collagenase   Topical Daily    ceftolozane-tazobactam (ZERBAXA) IVPB  1,500 mg IntraVENous Q8H    metoprolol tartrate  25 mg Oral BID    epoetin kandi-epbx  8,000 Units SubCUTAneous Once per day on Mon Wed Fri    And    epoetin kandi-epbx  2,000 Units SubCUTAneous Once per day on Mon Wed Fri    mupirocin   Topical BID    tigecycline (TYGACIL) IVPB  50 mg IntraVENous Q12H    sulfamethoxazole-trimethoprim  2 tablet Oral Daily    acetylcysteine  4 mL Inhalation Q4H    ipratropium-albuterol  1 ampule Inhalation Q4H    polyethylene glycol  17 g Oral Daily    pantoprazole (PROTONIX) 40 mg injection  40 mg IntraVENous Daily    chlorhexidine  15 mL Mouth/Throat BID    senna  5 mL Per G Tube Nightly    docusate  100 mg Per G Tube BID    levothyroxine  175 mcg Per G Tube Daily    [Held by provider] insulin glargine  20 Units SubCUTAneous BID    insulin lispro  0-12 Units SubCUTAneous Q6H    levETIRAcetam  500 mg PEG Tube BID    sodium chloride flush  5-40 mL IntraVENous 2 times per day     Continuous Infusions:   dextrose Stopped (05/18/22 1824)    sodium chloride      fentaNYL 200 mcg/hr (05/19/22 0223)    sodium chloride 10 mL/hr at 05/18/22 1824     PRN Meds:   midazolam, 1 mg, Q3H PRN  sodium chloride, , PRN  anticoagulant sodium citrate, 4.2 mL, PRN  perflutren lipid microspheres, 1.5 mL, ONCE PRN  glucose, 4 tablet, PRN  dextrose bolus, 125 mL, PRN Or  dextrose bolus, 250 mL, PRN  glucagon (rDNA), 1 mg, PRN  sodium chloride flush, 5-40 mL, PRN  sodium chloride, , PRN          VENT SETTINGS (Comprehensive) (if applicable):  Vent Information  Ventilator ID: [id number]  Vent Mode: AC/VC  Ventilator Initiate: Yes  Additional Respiratory Assessments  Pulse: 84  Resp: 10  SpO2: 98 %  End Tidal CO2: 42 (%)  Position: Semi-Fajardo's  Humidification Source: Heated wire  Humidification Temp: 37  Circuit Condensation: Drained  Cuff Pressure (cm H2O): 28 cm H2O    Arterial Blood Gas 5/19/2022  No new ABG       Laboratory findings:    Complete Blood Count:   Recent Labs     05/17/22  0440 05/17/22  0440 05/17/22  1447 05/18/22  0525 05/19/22  0550   WBC 8.0  --   --  9.1 8.4   HGB 6.8*   < > 7.6* 7.8* 7.6*   HCT 22.0*   < > 24.4* 24.9* 24.3*   PLT 98*  --   --  97* 98*    < > = values in this interval not displayed. Last 3 Blood Glucose:   Recent Labs     05/18/22  0525 05/18/22  1414 05/19/22  0550   GLUCOSE 132* 119* 104*        PT/INR:    Lab Results   Component Value Date    PROTIME 15.5 05/17/2022    INR 1.4 05/17/2022     PTT:    Lab Results   Component Value Date    APTT 32.8 05/11/2022       Comprehensive Metabolic Profile:   Recent Labs     05/18/22  0525 05/18/22  1414 05/19/22  0550   * 134 130*   K 4.8 3.6 4.4   CL 85* 102 94*   CO2 23 25 24   BUN 68* 24* 48*   CREATININE 2.0* 0.8 1.5*   GLUCOSE 132* 119* 104*   CALCIUM 8.3* 8.0* 8.5*      Magnesium:   Lab Results   Component Value Date    MG 2.1 05/19/2022     Phosphorus:   Lab Results   Component Value Date    PHOS 4.4 05/19/2022     Ionized Calcium: No results found for: CAION     Urinalysis:     Troponin: No results for input(s): TROPONINI in the last 72 hours. Microbiology:  Cultures drawn:   Gram stain rare polymore for nuclear leukocytes, epithelial cells not seen. Abundant gram-negative rods, rare yeast and rare gram-positive diplococci.   Respiratory smear positive for moderate quality more for nuclear leukocytes, few yeast and rare gram-positive cocci. Surgical, anaerobic, fungi, acid-fast bacilli cultures pending      Radiology/Imaging:     Chest Xray (5/19/2022): Interval decrease in the left perihilar infiltrate, bilateral lower lung field infiltrates without significant interval change. Small bilateral pleural effusions. ASSESSMENT:     Patient Active Problem List    Diagnosis Date Noted    Pneumonia due to infectious organism     Pleural effusion     Cardiac arrest ([facility] [address] 05/11/2022         PLAN:     Neuro   Patient intubated   History of seizures on Keppra   Mental status somewhat improved since arrival.     Respiratory   Acute hypercapnic respiratory failure with hypoxia   Intubated 5/10/2022   Thoracentesis completed 5/15/2022 MRSA nasal colonization positive - Bactroban    Pneumonia   S/P chest tube placement for parapneumonic pleural effusion with significant drainage.  Continue left chest tube to waterseal    Attempt weaning trail, unsuccessful     Cardiovascular   History of A. fib    Hold Eliquis   EKG concerning for heart block.  Elevated troponin most likely secondary to CKD  [facility] following recommended starting pt on Metoprolol for Afib and dc carvedilol      GI   History of a PEG tube   Concern for GI bleed   Stable Hgb   Vomit suctioned from ET Tube. Likely due to increasing pressure support   Hold tube feeds.  Diarrhea noted-FMS   Occult positive stools. Continue to monitor.  Surgery consult for possible diverting colostomy. Renal   CKD on dialysis Monday ,Wednesday, Friday   Dialysis due today   On EPO    Nephrology following    Infectious disease   Sepsis with septic shock   decubitus ulcer-surgery, wound care following   Status postsurgical debridement yesterday in the OR   Linezolid and Merrem switched to Tigecycline and Amikcacin for HCAP.  Zerbaxa day 3,    Abx per ID     Heme/Onc   PRBC received X2, 05/12/2022, 05/14/2022    trend H&H, transfuse if <7.   Received EPO   Surgery following    Endocrine   Type 2 diabetes    Lantus 20 units,MDSS held due to hypoglycemic episodes.  D10 started, hydrocortisone started   Improved hypoglycemia. Wean off dextrose. Slowly advance diet.  Hypothyroidism on synthroid    Social/Spiritual/DNR/Other   Code status: Full  Diet Diet NPO Exceptions are: Sips of Water with Meds   ADULT TUBE FEEDING; PEG; Immune Enhancing; Continuous; 10; No; 30; Q 4 hours   Stress ulcer prophylaxis: protonix 40mg   DVT prophylaxis: SCDS , heparin started    Consultations needed: Yes   Transfer out of ICU today? No      Lines/catheters   Date 05/10  o ETT  o Urinary cath   Tunneled HD cath right subclavian 4 days  · Date 05/17   · CVC Triple lumen Belkys Brown MD  6:54 AM  05/19/22      I personally saw, examined and provided care for the patient. Radiographs, labs and medication list were reviewed by me independently. Review of Residents documentation was conducted and revisions were made as appropriate. I agree with the above documented exam, problem list and plan of care. Had discussion with spouse and then also with spouse and patient in the room. Discussed goals of care, treatment options, and prognosis. Discussed that he will need colostomy and will likely need trach if they want to continue to pursue treatment. Patient and spouse understand. They are agreeable to proceed with tracheostomy as well as colostomy.     CCT excluding procedures 44 minutes    Shea Valencia DO

## 2022-05-19 NOTE — PROGRESS NOTES
Nephrology Progress Note  [facility]    Reason for Consult: ESRD  Date of Service: 5/19/2022     Subjective    Patient seen and examined in ICU  ROS limited by medical condition         Past Medical History:        Diagnosis Date    [facility]fib ([facility] [address]     TRUMAN (acute kidney injury) ([facility] [address]     Anemia     Anxiety     Bipolar 1 disorder ([facility] [address]     Charcot foot due to diabetes mellitus ([facility] [address]     CHF (congestive heart failure) ([facility] [address]     CKD (chronic kidney disease)     [facility]     Dialysis patient ([facility] [address]     Hyperlipidemia     Hypertension     Right sided weakness     Sacral decubitus ulcer     Seizure [facility])        Past Surgical History:        Procedure Laterality Date    AMPUTATION Right 2017    right toe    BACK SURGERY N/A 5/17/2022    SACRAL WOUND DEBRIDEMENT performed by Fatoumata Mcpherson MD at [address] [place] [address]    right chest    HYDROCELE EXCISION  2010    JOINT REPLACEMENT Right 2011    TONSILLECTOMY      UPPER GASTROINTESTINAL ENDOSCOPY N/A 4/27/2022    EGD BIOPSY performed by Obey Owens MD at [place] [address]  2010    venous ablation       Current Medications:    Current Facility-Administered Medications: [START ON 5/20/2022] lansoprazole suspension SUSP 30 mg, 30 mg, Per G Tube, QAM AC  heparin (porcine) injection 5,000 Units, 5,000 Units, SubCUTAneous, Q8H  collagenase ointment, , Topical, Daily  midazolam PF (VERSED) injection 1 mg, 1 mg, IntraVENous, Q3H PRN  0.9 % sodium chloride infusion, , IntraVENous, PRN  ceftolozane-tazobactam (ZERBAXA) 1,500 mg in sodium chloride 0.9 % 100 mL IVPB, 1,500 mg, IntraVENous, Q8H  metoprolol tartrate (LOPRESSOR) tablet 25 mg, 25 mg, Oral, BID  epoetin kandi-epbx (RETACRIT) injection 8,000 Units, 8,000 Units, SubCUTAneous, Once per day on Mon Wed Fri **AND** epoetin kandi-epbx (RETACRIT) injection 2,000 Units, 2,000 Units, SubCUTAneous, Once per day on Mon Wed Fri  mupirocin (BACTROBAN) 2 % ointment, , Topical, BID  [COMPLETED] tigecycline (TYGACIL) 100 mg in sodium chloride 0.9 % 100 mL IVPB, 100 mg, IntraVENous, Once **FOLLOWED BY** tigecycline (TYGACIL) 50 mg in sodium chloride 0.9 % 100 mL IVPB, 50 mg, IntraVENous, Q12H  sulfamethoxazole-trimethoprim (BACTRIM DS;SEPTRA DS) 800-160 MG per tablet 2 tablet, 2 tablet, Oral, Daily  acetylcysteine (MUCOMYST) 10 % solution 400 mg, 4 mL, Inhalation, Q4H  ipratropium-albuterol (DUONEB) nebulizer solution 1 ampule, 1 ampule, Inhalation, Q4H  anticoagulant sodium citrate 4 GM/100ML solution 0.168 g, 4.2 mL, IntraCATHeter, PRN  fentaNYL 10 mcg/ml in 0.9%  ml infusion,  mcg/hr, IntraVENous, Continuous  polyethylene glycol (GLYCOLAX) packet 17 g, 17 g, Oral, Daily  chlorhexidine (PERIDEX) 0.12 % solution 15 mL, 15 mL, Mouth/Throat, BID  senna (SENOKOT) 8.8 MG/5ML syrup 8.8 mg, 5 mL, Per G Tube, Nightly  docusate (COLACE) 50 MG/5ML liquid 100 mg, 100 mg, Per G Tube, BID  levothyroxine (SYNTHROID) tablet 175 mcg, 175 mcg, Per G Tube, Daily  perflutren lipid microspheres (DEFINITY) injection 1.65 mg, 1.5 mL, IntraVENous, ONCE PRN  [Held by provider] insulin glargine (LANTUS) injection vial 20 Units, 20 Units, SubCUTAneous, BID  insulin lispro (HUMALOG) injection vial 0-12 Units, 0-12 Units, SubCUTAneous, Q6H  glucose chewable tablet 16 g, 4 tablet, Oral, PRN  dextrose bolus 10% 125 mL, 125 mL, IntraVENous, PRN **OR** dextrose bolus 10% 250 mL, 250 mL, IntraVENous, PRN  glucagon (rDNA) injection 1 mg, 1 mg, IntraMUSCular, PRN  levETIRAcetam (KEPPRA) 100 MG/ML solution 500 mg, 500 mg, PEG Tube, BID  sodium chloride flush 0.9 % injection 5-40 mL, 5-40 mL, IntraVENous, 2 times per day  sodium chloride flush 0.9 % injection 5-40 mL, 5-40 mL, IntraVENous, PRN  0.9 % sodium chloride infusion, , IntraVENous, PRN    Allergies:   Other      Physical exam:   Constitutional:  VITALS:  BP (!) 140/83   Pulse 92   Temp 97.5 °F (36.4 °C) (Infrared) Resp 13   Ht 6' 5\" (1.956 m)   Wt 242 lb 11.6 oz (110.1 kg)   SpO2 98%   BMI 28.78 kg/m²     Gen: alert, awake, no acute distress  Eyes: eomi  HEENT: atraumatic/normocephalic, +ETT   Lungs: clear to ascultation bilaterally, equal lung expansion  CV: RRR, no murmurs  Abdomen: soft, nontender, nondistended, +PEG  Extremitiy: + edema  Skin: no rash, tugor wnl  Neuro: limited by medical condition   Psych: limited by medical condition        Data:         Last 3 BMP  Recent Labs     05/18/22  0525 05/18/22  1414 05/19/22  0550   * 134 130*   K 4.8 3.6 4.4   CL 85* 102 94*   CO2 23 25 24   BUN 68* 24* 48*   CREATININE 2.0* 0.8 1.5*   GLUCOSE 132* 119* 104*   CALCIUM 8.3* 8.0* 8.5*         Last 3 CMP:    Recent Labs     05/18/22  0525 05/18/22  1414 05/19/22  0550   * 134 130*   K 4.8 3.6 4.4   CL 85* 102 94*   CO2 23 25 24   BUN 68* 24* 48*   CREATININE 2.0* 0.8 1.5*   GLUCOSE 132* 119* 104*   CALCIUM 8.3* 8.0* 8.5*         Last 3 Glucose:     Recent Labs     05/18/22  0525 05/18/22  1414 05/19/22  0550   GLUCOSE 132* 119* 104*         Last 3 POC Glucose:     No results for input(s): POCGLU in the last 72 hours. Last 3 CK, CKMB, Troponin  No results for input(s): CKTOTAL, CKMB, TROPONINI in the last 72 hours. Last 3 CBC:  Recent Labs     05/17/22  0440 05/17/22  0440 05/17/22  1447 05/18/22  0525 05/19/22  0550   WBC 8.0  --   --  9.1 8.4   RBC 2.51*  --   --  2.87* 2.79*   HGB 6.8*   < > 7.6* 7.8* 7.6*   HCT 22.0*   < > 24.4* 24.9* 24.3*   MCV 87.6  --   --  86.8 87.1   MCH 27.1  --   --  27.2 27.2   MCHC 30.9*  --   --  31.3* 31.3*   RDW 20.7*  --   --  19.9* 20.1*   PLT 98*  --   --  97* 98*   MPV 9.5  --   --  9.5 9.9    < > = values in this interval not displayed. Last 3 Hepatic Function Panel:  No results for input(s): ALKPHOS, ALT, AST, PROT, BILITOT, BILIDIR, LABALBU in the last 72 hours. Albumin:  No results for input(s): LABALBU in the last 72 hours.     Calcium:  Recent Labs 05/19/22  0550   CALCIUM 8.5*       Ionized Calcium:  No results for input(s): IONCA in the last 72 hours. Magnesium:    Recent Labs     05/19/22  0550   MG 2.1         ABGs:  No results for input(s): PHART, PO2ART, UFG6CYU, PAJ4DSU, BEART, D5EYHLHO in the last 72 hours. Lactic Acid:  Recent Labs     05/17/22  0440   LACTA 0.7       Last 3 Amylase:  No results for input(s): AMYLASE in the last 72 hours. Last 3 Lipase:  No results for input(s): LIPASE in the last 72 hours. Last 3 BNP:  No results for input(s): BNP in the last 72 hours.                   Assessment/Plan      1 ESRD  Follow daily for dialysis needs  Will plan dialysis tomorrow for clearance and volume removal     2 Volume overload  UF as tolerated  Remains overloaded    3 Hypotension  Maintain MAP>65mmHg    4 Hyponatremia  With volume overload and hypotonic fluids  Follow with UF    5 Anemia  No reported blood loss  On SHANIQUA  Follow Hgb and transfuse as needed  With thrombocytopenia        Thank you for the opportunity to participate in the care of Mr Saadia Gaitan     ______________________________      Kashmir Abdullahi MD  5/19/2022  1:33 PM

## 2022-05-19 NOTE — PROGRESS NOTES
Pt seen and examined  Family not present  Wife wanting to think about Mr. Irena Kelly undergoing a diverting ostomy 2/2 his large sacral wound  I am available for diverting ostomy when family decides    Blanca Mcleod MD  Minimally Invasive General Surgery and Endoscopy  [address] [facility].  [address]  [address] [address]: [phone]  F: [phone]

## 2022-05-20 ENCOUNTER — APPOINTMENT (OUTPATIENT)
Dept: GENERAL RADIOLOGY | Age: 67
DRG: 003 | End: 2022-05-20
Payer: MEDICARE

## 2022-05-20 LAB
ALBUMIN SERPL-MCNC: 2.3 G/DL (ref 3.5–5.2)
ANION GAP SERPL CALCULATED.3IONS-SCNC: 13 MMOL/L (ref 7–16)
ANISOCYTOSIS: ABNORMAL
B.E.: 1.6 MMOL/L
BASOPHILS ABSOLUTE: 0.01 E9/L (ref 0–0.2)
BASOPHILS RELATIVE PERCENT: 0.1 % (ref 0–2)
BUN BLDV-MCNC: 68 MG/DL (ref 6–23)
BURR CELLS: ABNORMAL
CALCIUM SERPL-MCNC: 8.7 MG/DL (ref 8.6–10.2)
CHLORIDE BLD-SCNC: 96 MMOL/L (ref 98–107)
CO2: 23 MMOL/L (ref 22–29)
COHB: 1.4 % (ref 0–1.5)
CREAT SERPL-MCNC: 2 MG/DL (ref 0.7–1.2)
CRITICAL: ABNORMAL
DATE ANALYZED: ABNORMAL
DATE OF COLLECTION: ABNORMAL
EOSINOPHILS ABSOLUTE: 0.01 E9/L (ref 0.05–0.5)
EOSINOPHILS RELATIVE PERCENT: 0.1 % (ref 0–6)
FIO2: 35 %
GFR AFRICAN AMERICAN: 40
GFR NON-AFRICAN AMERICAN: 33 ML/MIN/1.73
GLUCOSE BLD-MCNC: 111 MG/DL (ref 74–99)
HCO3: 26.6 MMOL/L
HCT VFR BLD CALC: 25.8 % (ref 37–54)
HEMOGLOBIN: 8.2 G/DL (ref 12.5–16.5)
HHB: 50.9 %
HYPOCHROMIA: ABNORMAL
IMMATURE GRANULOCYTES #: 0.1 E9/L
IMMATURE GRANULOCYTES %: 0.7 % (ref 0–5)
LAB: ABNORMAL
LYMPHOCYTES ABSOLUTE: 0.49 E9/L (ref 1.5–4)
LYMPHOCYTES RELATIVE PERCENT: 3.3 % (ref 20–42)
Lab: ABNORMAL
MAGNESIUM: 2.2 MG/DL (ref 1.6–2.6)
MCH RBC QN AUTO: 27.2 PG (ref 26–35)
MCHC RBC AUTO-ENTMCNC: 31.8 % (ref 32–34.5)
MCV RBC AUTO: 85.7 FL (ref 80–99.9)
METER GLUCOSE: 106 MG/DL (ref 74–99)
METER GLUCOSE: 113 MG/DL (ref 74–99)
METER GLUCOSE: 155 MG/DL (ref 74–99)
METHB: 0.3 % (ref 0–1.5)
MONOCYTES ABSOLUTE: 0.56 E9/L (ref 0.1–0.95)
MONOCYTES RELATIVE PERCENT: 3.8 % (ref 2–12)
NEUTROPHILS ABSOLUTE: 13.5 E9/L (ref 1.8–7.3)
NEUTROPHILS RELATIVE PERCENT: 92 % (ref 43–80)
O2 CONTENT: 6.5 ML/DL
O2 SATURATION: 48.2 %
O2HB: 47.4 %
OPERATOR ID: 1394
OVALOCYTES: ABNORMAL
PATIENT TEMP: 37 C
PCO2: 43.7 MMHG (ref 40–52)
PDW BLD-RTO: 20.5 FL (ref 11.5–15)
PFO2: 0.87 MMHG/%
PH BLOOD GAS: 7.4 (ref 7.3–7.42)
PHOSPHORUS: 5.8 MG/DL (ref 2.5–4.5)
PLATELET # BLD: 103 E9/L (ref 130–450)
PMV BLD AUTO: 9.1 FL (ref 7–12)
PO2: 30.6 MMHG (ref 30–50)
POIKILOCYTES: ABNORMAL
POTASSIUM SERPL-SCNC: 4.9 MMOL/L (ref 3.5–5)
RBC # BLD: 3.01 E12/L (ref 3.8–5.8)
SODIUM BLD-SCNC: 132 MMOL/L (ref 132–146)
SOURCE, BLOOD GAS: ABNORMAL
TARGET CELLS: ABNORMAL
THB: 9.7 G/DL (ref 11.5–16.5)
TIME ANALYZED: 1311
WBC # BLD: 14.7 E9/L (ref 4.5–11.5)

## 2022-05-20 PROCEDURE — 82040 ASSAY OF SERUM ALBUMIN: CPT

## 2022-05-20 PROCEDURE — 2580000003 HC RX 258: Performed by: SPECIALIST

## 2022-05-20 PROCEDURE — 82805 BLOOD GASES W/O2 SATURATION: CPT

## 2022-05-20 PROCEDURE — 82962 GLUCOSE BLOOD TEST: CPT

## 2022-05-20 PROCEDURE — 6360000002 HC RX W HCPCS: Performed by: INTERNAL MEDICINE

## 2022-05-20 PROCEDURE — 2500000003 HC RX 250 WO HCPCS: Performed by: STUDENT IN AN ORGANIZED HEALTH CARE EDUCATION/TRAINING PROGRAM

## 2022-05-20 PROCEDURE — 6370000000 HC RX 637 (ALT 250 FOR IP): Performed by: INTERNAL MEDICINE

## 2022-05-20 PROCEDURE — 6360000002 HC RX W HCPCS: Performed by: SPECIALIST

## 2022-05-20 PROCEDURE — 90935 HEMODIALYSIS ONE EVALUATION: CPT

## 2022-05-20 PROCEDURE — 94003 VENT MGMT INPAT SUBQ DAY: CPT

## 2022-05-20 PROCEDURE — 2000000000 HC ICU R&B

## 2022-05-20 PROCEDURE — 83735 ASSAY OF MAGNESIUM: CPT

## 2022-05-20 PROCEDURE — 2580000003 HC RX 258: Performed by: INTERNAL MEDICINE

## 2022-05-20 PROCEDURE — 36415 COLL VENOUS BLD VENIPUNCTURE: CPT

## 2022-05-20 PROCEDURE — 6360000002 HC RX W HCPCS

## 2022-05-20 PROCEDURE — 6370000000 HC RX 637 (ALT 250 FOR IP): Performed by: SPECIALIST

## 2022-05-20 PROCEDURE — 6370000000 HC RX 637 (ALT 250 FOR IP): Performed by: STUDENT IN AN ORGANIZED HEALTH CARE EDUCATION/TRAINING PROGRAM

## 2022-05-20 PROCEDURE — 36592 COLLECT BLOOD FROM PICC: CPT

## 2022-05-20 PROCEDURE — P9047 ALBUMIN (HUMAN), 25%, 50ML: HCPCS | Performed by: INTERNAL MEDICINE

## 2022-05-20 PROCEDURE — 71045 X-RAY EXAM CHEST 1 VIEW: CPT

## 2022-05-20 PROCEDURE — 80048 BASIC METABOLIC PNL TOTAL CA: CPT

## 2022-05-20 PROCEDURE — 36600 WITHDRAWAL OF ARTERIAL BLOOD: CPT

## 2022-05-20 PROCEDURE — 94640 AIRWAY INHALATION TREATMENT: CPT

## 2022-05-20 PROCEDURE — 84100 ASSAY OF PHOSPHORUS: CPT

## 2022-05-20 PROCEDURE — 85025 COMPLETE CBC W/AUTO DIFF WBC: CPT

## 2022-05-20 RX ORDER — ANTICOAGULANT SODIUM CITRATE SOLUTION 4 G/100ML
SOLUTION INTRAVENOUS
Status: DISPENSED
Start: 2022-05-20 | End: 2022-05-20

## 2022-05-20 RX ORDER — MIDAZOLAM HYDROCHLORIDE 2 MG/2ML
1 INJECTION, SOLUTION INTRAMUSCULAR; INTRAVENOUS
Status: DISCONTINUED | OUTPATIENT
Start: 2022-05-20 | End: 2022-05-26 | Stop reason: HOSPADM

## 2022-05-20 RX ORDER — ALBUMIN (HUMAN) 12.5 G/50ML
25 SOLUTION INTRAVENOUS ONCE
Status: COMPLETED | OUTPATIENT
Start: 2022-05-20 | End: 2022-05-20

## 2022-05-20 RX ADMIN — CHLORHEXIDINE GLUCONATE 0.12% ORAL RINSE 15 ML: 1.2 LIQUID ORAL at 09:00

## 2022-05-20 RX ADMIN — MIDAZOLAM HYDROCHLORIDE 1 MG: 1 INJECTION, SOLUTION INTRAMUSCULAR; INTRAVENOUS at 05:42

## 2022-05-20 RX ADMIN — Medication 200 MCG/HR: at 12:04

## 2022-05-20 RX ADMIN — DILTIAZEM HYDROCHLORIDE 30 MG: 30 TABLET, FILM COATED ORAL at 11:56

## 2022-05-20 RX ADMIN — IPRATROPIUM BROMIDE AND ALBUTEROL SULFATE 1 AMPULE: .5; 2.5 SOLUTION RESPIRATORY (INHALATION) at 15:55

## 2022-05-20 RX ADMIN — CEFTOLOZANE AND TAZOBACTAM 1500 MG: 1; .5 INJECTION, POWDER, LYOPHILIZED, FOR SOLUTION INTRAVENOUS at 03:07

## 2022-05-20 RX ADMIN — LEVOTHYROXINE SODIUM 175 MCG: 125 TABLET ORAL at 06:12

## 2022-05-20 RX ADMIN — CHLORHEXIDINE GLUCONATE 0.12% ORAL RINSE 15 ML: 1.2 LIQUID ORAL at 20:02

## 2022-05-20 RX ADMIN — Medication 10 ML: at 12:17

## 2022-05-20 RX ADMIN — Medication 200 MCG/HR: at 05:21

## 2022-05-20 RX ADMIN — TIGECYCLINE 50 MG: 50 INJECTION, POWDER, LYOPHILIZED, FOR SOLUTION INTRAVENOUS at 17:51

## 2022-05-20 RX ADMIN — MIDAZOLAM HYDROCHLORIDE 1 MG: 1 INJECTION, SOLUTION INTRAMUSCULAR; INTRAVENOUS at 17:31

## 2022-05-20 RX ADMIN — ACETYLCYSTEINE 400 MG: 100 INHALANT RESPIRATORY (INHALATION) at 07:50

## 2022-05-20 RX ADMIN — HEPARIN SODIUM 5000 UNITS: 10000 INJECTION, SOLUTION INTRAVENOUS; SUBCUTANEOUS at 11:55

## 2022-05-20 RX ADMIN — ALBUMIN (HUMAN) 25 G: 0.25 INJECTION, SOLUTION INTRAVENOUS at 15:37

## 2022-05-20 RX ADMIN — ACETYLCYSTEINE 400 MG: 100 INHALANT RESPIRATORY (INHALATION) at 20:18

## 2022-05-20 RX ADMIN — DOCUSATE SODIUM LIQUID 100 MG: 100 LIQUID ORAL at 20:07

## 2022-05-20 RX ADMIN — LANSOPRAZOLE 30 MG: KIT at 06:12

## 2022-05-20 RX ADMIN — EPOETIN ALFA-EPBX 2000 UNITS: 2000 INJECTION, SOLUTION INTRAVENOUS; SUBCUTANEOUS at 11:57

## 2022-05-20 RX ADMIN — METOPROLOL TARTRATE 25 MG: 25 TABLET, FILM COATED ORAL at 21:00

## 2022-05-20 RX ADMIN — MUPIROCIN: 20 OINTMENT TOPICAL at 20:02

## 2022-05-20 RX ADMIN — HEPARIN SODIUM 5000 UNITS: 10000 INJECTION, SOLUTION INTRAVENOUS; SUBCUTANEOUS at 17:31

## 2022-05-20 RX ADMIN — IPRATROPIUM BROMIDE AND ALBUTEROL SULFATE 1 AMPULE: .5; 2.5 SOLUTION RESPIRATORY (INHALATION) at 20:18

## 2022-05-20 RX ADMIN — TIGECYCLINE 50 MG: 50 INJECTION, POWDER, LYOPHILIZED, FOR SOLUTION INTRAVENOUS at 06:29

## 2022-05-20 RX ADMIN — Medication 200 MCG/HR: at 17:33

## 2022-05-20 RX ADMIN — LEVETIRACETAM 500 MG: 100 SOLUTION ORAL at 11:54

## 2022-05-20 RX ADMIN — ACETYLCYSTEINE 400 MG: 100 INHALANT RESPIRATORY (INHALATION) at 15:55

## 2022-05-20 RX ADMIN — IPRATROPIUM BROMIDE AND ALBUTEROL SULFATE 1 AMPULE: .5; 2.5 SOLUTION RESPIRATORY (INHALATION) at 23:53

## 2022-05-20 RX ADMIN — ACETYLCYSTEINE 400 MG: 100 INHALANT RESPIRATORY (INHALATION) at 04:32

## 2022-05-20 RX ADMIN — IPRATROPIUM BROMIDE AND ALBUTEROL SULFATE 1 AMPULE: .5; 2.5 SOLUTION RESPIRATORY (INHALATION) at 00:04

## 2022-05-20 RX ADMIN — HEPARIN SODIUM 5000 UNITS: 10000 INJECTION, SOLUTION INTRAVENOUS; SUBCUTANEOUS at 03:01

## 2022-05-20 RX ADMIN — CEFTOLOZANE AND TAZOBACTAM 1500 MG: 1; .5 INJECTION, POWDER, LYOPHILIZED, FOR SOLUTION INTRAVENOUS at 13:00

## 2022-05-20 RX ADMIN — SULFAMETHOXAZOLE AND TRIMETHOPRIM 2 TABLET: 800; 160 TABLET ORAL at 11:56

## 2022-05-20 RX ADMIN — IPRATROPIUM BROMIDE AND ALBUTEROL SULFATE 1 AMPULE: .5; 2.5 SOLUTION RESPIRATORY (INHALATION) at 04:32

## 2022-05-20 RX ADMIN — IPRATROPIUM BROMIDE AND ALBUTEROL SULFATE 1 AMPULE: .5; 2.5 SOLUTION RESPIRATORY (INHALATION) at 11:49

## 2022-05-20 RX ADMIN — EPOETIN ALFA-EPBX 8000 UNITS: 4000 INJECTION, SOLUTION INTRAVENOUS; SUBCUTANEOUS at 11:57

## 2022-05-20 RX ADMIN — METOPROLOL TARTRATE 25 MG: 25 TABLET, FILM COATED ORAL at 11:56

## 2022-05-20 RX ADMIN — IPRATROPIUM BROMIDE AND ALBUTEROL SULFATE 1 AMPULE: .5; 2.5 SOLUTION RESPIRATORY (INHALATION) at 07:51

## 2022-05-20 RX ADMIN — CEFTOLOZANE AND TAZOBACTAM 1500 MG: 1; .5 INJECTION, POWDER, LYOPHILIZED, FOR SOLUTION INTRAVENOUS at 19:03

## 2022-05-20 RX ADMIN — MIDAZOLAM HYDROCHLORIDE 1 MG: 1 INJECTION, SOLUTION INTRAMUSCULAR; INTRAVENOUS at 11:53

## 2022-05-20 RX ADMIN — ACETYLCYSTEINE 400 MG: 100 INHALANT RESPIRATORY (INHALATION) at 11:49

## 2022-05-20 RX ADMIN — MUPIROCIN: 20 OINTMENT TOPICAL at 12:17

## 2022-05-20 RX ADMIN — Medication 10 ML: at 20:01

## 2022-05-20 RX ADMIN — POLYETHYLENE GLYCOL 3350 17 G: 17 POWDER, FOR SOLUTION ORAL at 11:57

## 2022-05-20 RX ADMIN — COLLAGENASE SANTYL: 250 OINTMENT TOPICAL at 12:18

## 2022-05-20 RX ADMIN — ACETYLCYSTEINE 400 MG: 100 INHALANT RESPIRATORY (INHALATION) at 00:05

## 2022-05-20 RX ADMIN — DILTIAZEM HYDROCHLORIDE 30 MG: 30 TABLET, FILM COATED ORAL at 17:31

## 2022-05-20 RX ADMIN — SENNOSIDES 8.8 MG: 8.8 SYRUP ORAL at 20:02

## 2022-05-20 RX ADMIN — LEVETIRACETAM 500 MG: 100 SOLUTION ORAL at 20:07

## 2022-05-20 RX ADMIN — ACETYLCYSTEINE 400 MG: 100 INHALANT RESPIRATORY (INHALATION) at 23:53

## 2022-05-20 RX ADMIN — MIDAZOLAM HYDROCHLORIDE 1 MG: 1 INJECTION, SOLUTION INTRAMUSCULAR; INTRAVENOUS at 20:01

## 2022-05-20 RX ADMIN — DOCUSATE SODIUM LIQUID 100 MG: 100 LIQUID ORAL at 11:54

## 2022-05-20 RX ADMIN — INSULIN LISPRO 2 UNITS: 100 INJECTION, SOLUTION INTRAVENOUS; SUBCUTANEOUS at 17:31

## 2022-05-20 RX ADMIN — MIDAZOLAM HYDROCHLORIDE 1 MG: 1 INJECTION, SOLUTION INTRAMUSCULAR; INTRAVENOUS at 14:56

## 2022-05-20 ASSESSMENT — PULMONARY FUNCTION TESTS
PIF_VALUE: 26
PIF_VALUE: 31
PIF_VALUE: 27
PIF_VALUE: 31
PIF_VALUE: 38
PIF_VALUE: 32
PIF_VALUE: 29
PIF_VALUE: 25
PIF_VALUE: 28
PIF_VALUE: 27
PIF_VALUE: 28
PIF_VALUE: 29
PIF_VALUE: 22
PIF_VALUE: 29
PIF_VALUE: 29
PIF_VALUE: 26
PIF_VALUE: 30
PIF_VALUE: 28
PIF_VALUE: 29
PIF_VALUE: 29
PIF_VALUE: 25
PIF_VALUE: 26
PIF_VALUE: 28
PIF_VALUE: 24
PIF_VALUE: 28
PIF_VALUE: 37
PIF_VALUE: 26

## 2022-05-20 ASSESSMENT — PAIN SCALES - GENERAL
PAINLEVEL_OUTOF10: 0

## 2022-05-20 NOTE — PATIENT CARE CONFERENCE
Intensive Care Daily Quality Rounding Checklist        ICU Team Members:      ICU Day #: 10     Intubation Date: 5/11/2022     Ventilator Day #: 10     Central Line Insertion Date: 5/17/22                                                    Day #: 4      Arterial Line Insertion Date: n/a                             Day #: n/a     Temporary Hemodialysis Catheter Insertion Date: n/a                             Day # admitted with tunneled dialysis cath     DVT Prophylaxis: Eliquis on hold for anemia/ SCD's    GI Prophylaxis: Protonix     Roberts Catheter Insertion Date: HD patient                                        CLT #:                              Continued need (if yes, reason documented and discussed with physician):     Skin Issues/ Wounds and ordered treatment discussed on rounds: recent debridement to sacral wound     Goals/ Plans for the Day: vent management, monitor labs and replace as needed, sx on Monday for diverting colostomy, check an albumin today, daily restraint order,

## 2022-05-20 NOTE — PROGRESS NOTES
[address] [place] [address] [facility]  [facility]UC[facility]  Progress Note    SUBJECTIVE:  Chief Complaint   Patient presents with    Loss of Consciousness     arrived from [facility] via [facility] unresponsive     The patient is still in the ICU. He had a PEG. Tube feedings at a minimum. No fever. He had high residuals yesterday and reportedly vomited. Review of systems:  A 10 point review of systems was done. As stated above, otherwise negative.     Medications:   anticoagulant sodium citrate        dilTIAZem  30 mg PEG Tube 4 times per day    lansoprazole  30 mg Per G Tube QAM AC    heparin (porcine)  5,000 Units SubCUTAneous Q8H    collagenase   Topical Daily    ceftolozane-tazobactam (ZERBAXA) IVPB  1,500 mg IntraVENous Q8H    metoprolol tartrate  25 mg Oral BID    epoetin kandi-epbx  8,000 Units SubCUTAneous Once per day on Mon Wed Fri    And    epoetin kandi-epbx  2,000 Units SubCUTAneous Once per day on Mon Wed Fri    mupirocin   Topical BID    tigecycline (TYGACIL) IVPB  50 mg IntraVENous Q12H    sulfamethoxazole-trimethoprim  2 tablet Oral Daily    acetylcysteine  4 mL Inhalation Q4H    ipratropium-albuterol  1 ampule Inhalation Q4H    polyethylene glycol  17 g Oral Daily    chlorhexidine  15 mL Mouth/Throat BID    senna  5 mL Per G Tube Nightly    docusate  100 mg Per G Tube BID    levothyroxine  175 mcg Per G Tube Daily    [Held by provider] insulin glargine  20 Units SubCUTAneous BID    insulin lispro  0-12 Units SubCUTAneous Q6H    levETIRAcetam  500 mg PEG Tube BID    sodium chloride flush  5-40 mL IntraVENous 2 times per day      sodium chloride      fentaNYL 200 mcg/hr (05/20/22 0521)    sodium chloride 10 mL/hr at 05/19/22 1800     midazolam, sodium chloride, anticoagulant sodium citrate, perflutren lipid microspheres, glucose, dextrose bolus **OR** dextrose bolus, glucagon (rDNA), sodium chloride flush, sodium chloride    OBJECTIVE:  /72   Pulse 110   Temp 96.8 °F (36 °C) Resp 17   Ht 6' 5\" (1.956 m)   Wt 257 lb 8 oz (116.8 kg)   SpO2 98%   BMI 30.53 kg/m²   Temp  Av.3 °F (36.3 °C)  Min: 96.8 °F (36 °C)  Max: 97.6 °F (36.4 °C)  Constitutional: The patient is sedated, on ventilator. FiO2 35%. PEEP 6. Opens eyes. Somewhat restless and restrained. Skin: Warm and dry. No rashes were noted. HEENT: Eyes show round, and reactive pupils. No jaundice. Moist mucous membranes, no ulcerations, no thrush. ETT. Neck: Supple to movements. No lymphadenopathy. Chest: No use of accessory muscles to breathe. Symmetrical expansion. Auscultation reveals coarse breath sounds. scattered rhonchi. Right tunneled HD catheter. Cardiovascular: Heart sounds arrhythmic and irregular. Abdomen: Positive bowel sounds to auscultation. Benign to palpation. PEG. Extremities: Minimal edema. Left third toe missing. Back: Stage IV decubitus ulcer with dressing. There is undermining. Necrosis of the edges. Lines: Left IJ TLC 2022. Fecal management system.     Laboratory and Tests Review:  Lab Results   Component Value Date    WBC 14.7 (H) 2022    WBC 8.4 2022    WBC 9.1 2022    HGB 8.2 (L) 2022    HCT 25.8 (L) 2022    MCV 85.7 2022     (L) 2022     Lab Results   Component Value Date    NEUTROABS 13.50 (H) 2022    NEUTROABS 7.81 (H) 2022    NEUTROABS 8.56 (H) 2022     Lab Results   Component Value Date    CRP 13.9 (H) 2022     No results found for: CRPHS  Lab Results   Component Value Date    SEDRATE 78 (H) 2022     Lab Results   Component Value Date    ALT 30 2022    AST 31 2022    ALKPHOS 591 (H) 2022    BILITOT 0.3 2022     Lab Results   Component Value Date     2022    K 4.9 2022    K 4.5 2022    CL 96 2022    CO2 23 2022    BUN 68 2022    CREATININE 2.0 2022    CREATININE 1.5 2022    CREATININE 0.8 2022    GFRAA 40 05/20/2022    LABGLOM 33 05/20/2022    GLUCOSE 111 05/20/2022    PROT 6.9 05/11/2022    LABALBU 2.4 05/11/2022    CALCIUM 8.7 05/20/2022    BILITOT 0.3 05/11/2022    ALKPHOS 591 05/11/2022    AST 31 05/11/2022    ALT 30 05/11/2022     Radiology:    Reviewed  Microbiology:   [facility]:  Blood culture 5/10/2022: Negative so far  Decubitus ulcer 5/10/2022: MRSA, mixed GNR  Respiratory panel: Pending  Nares screen MRSA: Pending  Blood cultures 5/11/2022: Negative so far      [facility]:  Respiratory panel: Negative  Blood cultures 5/11/2022: Staphylococcus epidermidis in 2 of 2 sets  Nares screen MRSA: Positive   Respiratory culture 5/12/2022: MDR Acinetobacter baumanii (sensitive to Bactrim, Tigecycline, Cefiderocol. Intermediate to Avycaz and Eravacycline. Resistant to all others)  Respiratory culture 5/16/2022: OP patricia reduced. GNR, GNR, GNR. Pleural fluid 5/13/2022: Pseudomonas aeruginosa (Resistant to Levofloxacin and Meropenem. Intermediate to Zosyn. Sensitive to Avycaz and Gentamicin)  Sacral wound 5/17/2022: MDR Acinetobacter baumanii, Pseudomonas aeruginosa (sensitive only to aminoglycosides and Zerbaxa)    ASSESSMENT:  · HCAP with MDR Acinetobacter baumanii  · Sepsis with septic shock associated to HCAP  · Status postcardiac arrest  · Acute respiratory failure. Unable to wean  · Leukocytosis   · History of sacral decubitus ulcer infection with Pseudomonas and Enterococcus. Treated with IV antibiotic between March and April 2022. Status postdebridement 5/17/2022. Now colonized/infected with MDR Acinetobacter and MDR Pseudomonas  · CKD, on HD  · Acinetobacter in the respiratory cultures. Possible outbreak in the ICU  · Pleural fluid infection with Pseudomonas. Possible empyema    PLAN:  · Continue Tigecycline, Bactrim, day 6  · Continue Zerbaxa, day 4  · Tracheostomy and diverting colostomy  · Continue contact isolation    Spoke with nursing.     Edgardo Major MD  9:52 AM  5/20/2022

## 2022-05-20 NOTE — PLAN OF CARE
Problem: Discharge Planning  Goal: Discharge to home or other facility with appropriate resources  Outcome: Progressing  Flowsheets (Taken 5/20/2022 0800)  Discharge to home or other facility with appropriate resources: Identify barriers to discharge with patient and caregiver     Problem: Pain  Goal: Verbalizes/displays adequate comfort level or baseline comfort level  Outcome: Progressing  Flowsheets (Taken 5/20/2022 0800)  Verbalizes/displays adequate comfort level or baseline comfort level: Assess pain using appropriate pain scale     Problem: Skin/Tissue Integrity  Goal: Absence of new skin breakdown  Description: 1. Monitor for areas of redness and/or skin breakdown  2. Assess vascular access sites hourly  3. Every 4-6 hours minimum:  Change oxygen saturation probe site  4. Every 4-6 hours:  If on nasal continuous positive airway pressure, respiratory therapy assess nares and determine need for appliance change or resting period.   Outcome: Progressing     Problem: Safety - Adult  Goal: Free from fall injury  Outcome: Progressing  Flowsheets (Taken 5/20/2022 0800)  Free From Fall Injury: Based on caregiver fall risk screen, instruct family/caregiver to ask for assistance with transferring infant if caregiver noted to have fall risk factors     Problem: ABCDS Injury Assessment  Goal: Absence of physical injury  Outcome: Progressing  Flowsheets (Taken 5/20/2022 0800)  Absence of Physical Injury: Implement safety measures based on patient assessment     Problem: Respiratory - Adult  Goal: Achieves optimal ventilation and oxygenation  5/20/2022 1445 by Susy Santiago RN  Outcome: Progressing  5/20/2022 0909 by Lazarus April  Outcome: Progressing  Flowsheets (Taken 5/19/2022 0415 by Lizbeth Manzanares RCP)  Achieves optimal ventilation and oxygenation:   Assess for changes in respiratory status   Position to facilitate oxygenation and minimize respiratory effort   Assess the need for suctioning and aspirate as needed   Oxygen supplementation based on oxygen saturation or arterial blood gases     Problem: Chronic Conditions and Co-morbidities  Goal: Patient's chronic conditions and co-morbidity symptoms are monitored and maintained or improved  Outcome: Progressing  Flowsheets (Taken 5/20/2022 0800)  Care Plan - Patient's Chronic Conditions and Co-Morbidity Symptoms are Monitored and Maintained or Improved: Monitor and assess patient's chronic conditions and comorbid symptoms for stability, deterioration, or improvement     Problem: Safety - Medical Restraint  Goal: Remains free of injury from restraints (Restraint for Interference with Medical Device)  Description: INTERVENTIONS:  1. Determine that other, less restrictive measures have been tried or would not be effective before applying the restraint  2. Evaluate the patient's condition at the time of restraint application  3. Inform patient/family regarding the reason for restraint  4.  Q2H: Monitor safety, psychosocial status, comfort, nutrition and hydration  Outcome: Progressing  Flowsheets (Taken 5/20/2022 0719)  Remains free of injury from restraints (restraint for interference with medical device): Every 2 hours: Monitor safety, psychosocial status, comfort, nutrition and hydration     Problem: Nutrition Deficit:  Goal: Optimize nutritional status  Outcome: Progressing

## 2022-05-20 NOTE — FLOWSHEET NOTE
05/20/22 1145   Vital Signs   /78   Temp 98.6 °F (37 °C)   Pulse 113   Resp 16   Weight 251 lb 5.2 oz (114 kg)   Weight Method Bed scale   Percent Weight Change -2.4   Pain Assessment   Pain Assessment None - Denies Pain   Post-Hemodialysis Assessment   Post-Treatment Procedures Blood returned;Catheter capped, clamped and heparinized x 2 ports   Machine Disinfection Process Acid/Vinegar Clean;Exterior Machine Disinfection; Heat Disinfect   Rinseback Volume (ml) 300 ml   Total Liters Processed (l/min) 77 l/min   Dialyzer Clearance Lightly streaked   Duration of Treatment (minutes) 210 minutes   Heparin amount administered during treatment (units) 0 units   Hemodialysis Intake (ml) 300 ml   Hemodialysis Output (ml) 2650 ml   NET Removed (ml) 2350   Tolerated Treatment Good   Patient Response to Treatment blood pressure stable throughout. post line care complete.  ends capped. BV -12.4 ended in B profile.    Bilateral Breath Sounds Diminished   Edema Generalized   Edema Generalized +2   Physician Notified No   Time Off 4387   Patient Disposition Remain in ICU/ED

## 2022-05-20 NOTE — PROGRESS NOTES
Wound / ostomy dept consulted for ostomy marking. The markings were placed in the R and L quadrants slightly above the umbilicus. The pt was unable to assist with sitting or standing. Will plan to follow post op.

## 2022-05-20 NOTE — PROGRESS NOTES
Comprehensive Nutrition Assessment    Type and Reason for Visit:  Reassess    Nutrition Recommendations/Plan:   1. Continue current trickle TF preop and resume previous order after OR on 5/23, when medically feasible. TF RECOMMENDATION (POSTOP): Resume Immune Enhancing TF (Pivot 1.5) to goal rate 55 ml/hr x 23 hr/d (Hold 30 min before and after Synthroid) and Protein Modular once daily. This will provide: 1265 ml/d, 1998 total regine, 145 total pro, 960 ml free water  At goal, this regimen will meet 100% energy and protein needs     Malnutrition Assessment:  Malnutrition Status: At risk for malnutrition (Comment) (05/11/22 1244)    Context:  Chronic Illness     Findings of the 6 clinical characteristics of malnutrition:  Energy Intake:  Mild decrease in energy intake (Comment) (NPO/prior to TF order)  Weight Loss:  Greater than 7.5% over 3 months     Body Fat Loss:  No significant body fat loss     Muscle Mass Loss:  No significant muscle mass loss    Fluid Accumulation:  No significant fluid accumulation     Strength:  Not Performed    Nutrition Assessment:    Pt remains intubated/sedated and on IHD. Diverting colostomy planned for 5/23. Currently preop TF to trophic rate.  Will continue to monitor and recommend previous TF order to meet needs, postop when medically feasible    Nutrition Related Findings:    intubated/sedated, PEG to TF, +2 gen edema, +I/O 17L, rounded soft abd +BS, Wound Type: Stage IV,Pressure Injury,Deep Tissue Injury (sacral PI with distal DTI per wound care)       Current Nutrition Intake & Therapies:    Average Meal Intake: NPO  Average Supplements Intake: NPO  Diet NPO Exceptions are: Sips of Water with Meds  ADULT TUBE FEEDING; PEG; Immune Enhancing; Continuous; 10; No; 30; Q 4 hours  Diet NPO Exceptions are: Sips of Water with Meds  Current Tube Feeding (TF) Orders:  · Feeding Route: PEG  · Formula: Immune Enhancing  · Schedule: Continuous  · Feeding Regimen: trickle feed at 10 ml/hr = 240 ml/d (pt to have colostomy 5/23)  · Additives/Modulars: None  · Water Flushes: 30 ml Q 4 hr = 180 ml/d  · Current TF & Flush Orders Provides: trickle rate currently (preop)  · Goal TF & Flush Orders Provides: 360 regine, 23 g pro, 362 ml total free water      Anthropometric Measures:  Height: 6' 5\" (195.6 cm)  Ideal Body Weight (IBW): 208 lbs (95 kg)    Admission Body Weight: 206 lb 9.1 oz (93.7 kg) (5/11)  Current Body Weight: 257 lb 8 oz (116.8 kg), 123.8 % IBW. Weight Source: Bed Scale (5/20)  Current BMI (kg/m2): 30.5  Usual Body Weight: 249 lb 1.9 oz (113 kg) (2/12 at [facility])  % Weight Change (Calculated): -17.1                    BMI Categories: Normal Weight (BMI 18.5-24.9) (admit wt)    Estimated Daily Nutrient Needs:  Energy Requirements Based On: Formula ([address] [facility] [address])  Weight Used for Energy Requirements: Admission  Energy (kcal/day):   Weight Used for Protein Requirements: Admission  Protein (g/day): 140-155  Method Used for Fluid Requirements: Standard Renal  Fluid (ml/day): per Renal and Critical Care    Nutrition Diagnosis:   · Inadequate oral intake related to impaired respiratory function as evidenced by intubation,nutrition support - enteral nutrition,NPO or clear liquid status due to medical condition,wounds      Nutrition Interventions:   Food and/or Nutrient Delivery: Modify Tube Feeding,Continue NPO (Continue current TF preop and then resumed prior TF order when medically feasible postop)  Nutrition Education/Counseling: Education not indicated  Coordination of Nutrition Care: Continue to monitor while inpatient       Goals:  Previous Goal Met: No Progress toward Goal(s)  Goals:  Tolerate nutrition support at goal rate       Nutrition Monitoring and Evaluation:   Behavioral-Environmental Outcomes: None Identified  Food/Nutrient Intake Outcomes: Enteral Nutrition Intake/Tolerance  Physical Signs/Symptoms Outcomes: GI Status,Biochemical Data,Fluid Status or Edema,Nutrition Focused Physical Findings,Skin,Weight,Hemodynamic Status    Discharge Planning:    Enteral Nutrition     Rosalinda Husain RD, [address] [place] , [place]  Contact: [phone]

## 2022-05-20 NOTE — PROGRESS NOTES
Critical Care Team - Daily Progress Note      Date and time: 5/20/2022 7:56 AM  Patient's name:  Yeison Purvis  Medical Record Number: [id number]  Patient's account/billing number: [de-identified]  Patient's YOB: 1955  Age: 74 y.o. Date of Admission: 5/10/2022 11:58 PM  Length of stay during current admission: 9      Primary Care Physician: Lakhwinder Carter MD  ICU Attending Physician: Dr. Santi Osborne    Code Status: DNR-CCA    Reason for ICU admission: Status postcardiac arrest      SUBJECTIVE:     OVERNIGHT EVENTS:         Patient remains afebrile, inubated. Patient is awake, alert and orientated. Nods to questions appropriately. He is aware of the surgery coming up. He denies any sacral pain, chest pain,  Fever, chills, nausea. Intake/Output:   No intake/output data recorded. I/O last 3 completed shifts: In: 6536 [I.V.:690.4; NG/GT:567; IV Piggyback:506.6]  Out: 280 [Stool:50; Chest Tube:230]    Awake and following commands: No  Current Ventilation: - Ventilator Settings:    Vt (Set, mL): 400 mL  Resp Rate (Set): 18 bmp  FiO2 : 35 %    PEEP/CPAP (cmH2O): 6  Pressure Support: 0 cmH20  Secretions: Thick, yellow secretions, blood  Sedation: fentanyl and Versed as needed  Paralyzed: No  Vasopressors: No    Initial HPI + past overnight events: 67-year-old male resident of [facility] with significant past medical history of A. fib on Eliquis, aspirin, anxiety, anemia, CHF, insulin-dependent type 2 diabetes, CKD on hemodialysis, sacral decubitus ulcers with wound VAC, hyperlipidemia, hypothyroidism. Patient presented to the ICU after having a cardiac arrest, required CPR 2 rounds of epinephrine, and intubated. He is currently on Tigacyline and Amikacin per ID for treatment of HCAP. D10 started for hypoglycemia.     OBJECTIVE:     VITAL SIGNS:  /78   Pulse 112   Temp 96.8 °F (36 °C)   Resp 17   Ht 6' 5\" (1.956 m)   Wt 257 lb 8 oz (116.8 kg)   SpO2 98%   BMI 30.53 kg/m²   Tmax over 24 See monthly session summary notes.   hours: Temp (24hrs), Av.4 °F (36.3 °C), Min:96.8 °F (36 °C), Max:97.6 °F (36.4 °C)      Patient Vitals for the past 6 hrs:   BP Temp Pulse Resp SpO2 Weight   22 0752 -- -- -- 17 98 % --   22 0751 -- -- 112 18 96 % --   22 0733 131/78 96.8 °F (36 °C) 113 18 -- 257 lb 8 oz (116.8 kg)   22 0700 121/76 -- 113 18 97 % --   22 0600 (!) 94/49 -- 112 17 95 % --   22 0500 122/75 -- 113 26 98 % --   22 0431 -- -- 113 15 98 % --   22 0400 (!) 147/82 -- 111 17 97 % --   22 0300 113/71 -- 110 18 96 % --   22 0200 109/68 -- 100 17 96 % --         Intake/Output Summary (Last 24 hours) at 2022 0756  Last data filed at 2022 1800  Gross per 24 hour   Intake 1764.04 ml   Output 280 ml   Net 1484.04 ml     Wt Readings from Last 2 Encounters:   22 257 lb 8 oz (116.8 kg)   22 212 lb (96.2 kg)     Body mass index is 30.53 kg/m². Physical Exam  Vitals and nursing note reviewed. Constitutional:       General: He is awake. Appearance: He is ill-appearing. Comments:  Intubated  Patient responds appropriately to all questions,   Attempting to mouth words   HENT:      Head: Normocephalic and atraumatic. Nose: Nose normal. No congestion or rhinorrhea. Mouth/Throat:      Mouth: Mucous membranes are moist.      Pharynx: Oropharynx is clear. Eyes:      General: No scleral icterus. Extraocular Movements: Extraocular movements intact. Conjunctiva/sclera: Conjunctivae normal.   Cardiovascular:      Rate and Rhythm: Normal rate and regular rhythm. Pulses: Normal pulses. Heart sounds: Normal heart sounds. No murmur heard. Pulmonary:      Breath sounds: No stridor. Rhonchi present. Comments: intubated  Abdominal:      General: Bowel sounds are normal. There is no distension. Palpations: Abdomen is soft. There is no mass. Tenderness: There is no abdominal tenderness. There is no guarding or rebound. Comments: PEG tube   Musculoskeletal:         General: Normal range of motion. Cervical back: Normal range of motion and neck supple. No tenderness. Right lower leg: Edema present. Left lower leg: Edema present. Skin:     Findings: Lesion (Sacral decubitus) present. Comments: Chronic changes -Dark-colored lower legs b/l  Feet are cold to touch    Neurological:      Mental Status: He is alert and oriented to person, place, and time. Cranial Nerves: No cranial nerve deficit. Sensory: No sensory deficit. Comments: Patient awake and alert, orientated to name. Not sure about location and season. Psychiatric:         Attention and Perception: Attention normal.         Behavior: Behavior is cooperative.            MEDICATIONS:    Scheduled Meds:   anticoagulant sodium citrate        lansoprazole  30 mg Per G Tube QAM AC    heparin (porcine)  5,000 Units SubCUTAneous Q8H    collagenase   Topical Daily    ceftolozane-tazobactam (ZERBAXA) IVPB  1,500 mg IntraVENous Q8H    metoprolol tartrate  25 mg Oral BID    epoetin kandi-epbx  8,000 Units SubCUTAneous Once per day on Mon Wed Fri    And    epoetin kandi-epbx  2,000 Units SubCUTAneous Once per day on Mon Wed Fri    mupirocin   Topical BID    tigecycline (TYGACIL) IVPB  50 mg IntraVENous Q12H    sulfamethoxazole-trimethoprim  2 tablet Oral Daily    acetylcysteine  4 mL Inhalation Q4H    ipratropium-albuterol  1 ampule Inhalation Q4H    polyethylene glycol  17 g Oral Daily    chlorhexidine  15 mL Mouth/Throat BID    senna  5 mL Per G Tube Nightly    docusate  100 mg Per G Tube BID    levothyroxine  175 mcg Per G Tube Daily    [Held by provider] insulin glargine  20 Units SubCUTAneous BID    insulin lispro  0-12 Units SubCUTAneous Q6H    levETIRAcetam  500 mg PEG Tube BID    sodium chloride flush  5-40 mL IntraVENous 2 times per day     Continuous Infusions:   sodium chloride      fentaNYL 200 mcg/hr (05/20/22 5731)    sodium chloride 10 mL/hr at 05/19/22 1800     PRN Meds:   midazolam, 1 mg, Q3H PRN  sodium chloride, , PRN  anticoagulant sodium citrate, 4.2 mL, PRN  perflutren lipid microspheres, 1.5 mL, ONCE PRN  glucose, 4 tablet, PRN  dextrose bolus, 125 mL, PRN   Or  dextrose bolus, 250 mL, PRN  glucagon (rDNA), 1 mg, PRN  sodium chloride flush, 5-40 mL, PRN  sodium chloride, , PRN          VENT SETTINGS (Comprehensive) (if applicable):  Vent Information  Ventilator ID: [id number]  Vent Mode: AC/VC  Ventilator Initiate: Yes  Additional Respiratory Assessments  Pulse: 112  Resp: 17  SpO2: 98 %  End Tidal CO2: 42 (%)  Position: Semi-Fajardo's  Humidification Source: Heated wire  Humidification Temp: 37  Circuit Condensation: Drained  Cuff Pressure (cm H2O): 28 cm H2O    Arterial Blood Gas 5/20/2022  No new ABG       Laboratory findings:    Complete Blood Count:   Recent Labs     05/18/22  0525 05/19/22  0550 05/20/22  0540   WBC 9.1 8.4 14.7*   HGB 7.8* 7.6* 8.2*   HCT 24.9* 24.3* 25.8*   PLT 97* 98* 103*        Last 3 Blood Glucose:   Recent Labs     05/18/22  1414 05/19/22  0550 05/20/22  0540   GLUCOSE 119* 104* 111*        PT/INR:    Lab Results   Component Value Date    PROTIME 15.5 05/17/2022    INR 1.4 05/17/2022     PTT:    Lab Results   Component Value Date    APTT 32.8 05/11/2022       Comprehensive Metabolic Profile:   Recent Labs     05/18/22  1414 05/19/22  0550 05/20/22  0540    130* 132   K 3.6 4.4 4.9    94* 96*   CO2 25 24 23   BUN 24* 48* 68*   CREATININE 0.8 1.5* 2.0*   GLUCOSE 119* 104* 111*   CALCIUM 8.0* 8.5* 8.7      Magnesium:   Lab Results   Component Value Date    MG 2.2 05/20/2022     Phosphorus:   Lab Results   Component Value Date    PHOS 5.8 05/20/2022     Ionized Calcium: No results found for: CAION     Urinalysis:     Troponin: No results for input(s): TROPONINI in the last 72 hours.     Microbiology:  Cultures drawn:   Gram stain rare polymore for nuclear leukocytes, epithelial cells not seen. Abundant gram-negative rods, rare yeast and rare gram-positive diplococci. Respiratory smear positive for moderate quality more for nuclear leukocytes, few yeast and rare gram-positive cocci-Pseudomonas aeruginosa   Surgical, anaerobic, fungi, acid-fast bacilli cultures pending      Radiology/Imaging:     Chest Xray (5/20/2022): Interval decrease in the left perihilar infiltrate, bilateral lower lung field infiltrates without significant interval change. Small bilateral pleural effusions. ASSESSMENT:     Patient Active Problem List    Diagnosis Date Noted    Pneumonia due to infectious organism     Pleural effusion     Cardiac arrest [facility] 05/11/2022         PLAN:     Neuro   Patient intubated   History of seizures on Keppra   Mental status somewhat improved since arrival.     Respiratory   Acute hypercapnic respiratory failure with hypoxia   Intubated 5/10/2022, and patient to received a tracheostomy next week   Thoracentesis completed 5/15/2022 MRSA nasal colonization positive - Bactroban    Pneumonia   S/P chest tube placement for parapneumonic pleural effusion with significant drainage. Cardiovascular   History of A. fib    Hold Eliquis   Elevated troponin most likely secondary to CKD   Cardiology following    GI   History of a PEG tub   Patient to have colostomy next week   Surgery consult for possible diverting colostomy. Renal   ESRD on dialysis Monday ,Wednesday, Friday   Dialysis due today   On EPO    Nephrology following    Infectious disease   Sepsis with septic shock   Leukocytosis- 14.7   decubitus ulcer-surgery, wound care following   Status postsurgical debridement yesterday in the OR   Tigecycline , bactrim day 5 .  Zerbaxa day 3,    ID following     Heme/Onc   PRBC received X2, 05/12/2022, 05/14/2022    trend H&H, transfuse if <7.   Thrombocytopenia , leukocytosis    Received EPO   Surgery following    Endocrine   Type 2 diabetes -MDSS   Hypothyroidism on synthroid    Social/Spiritual/DNR/Other   Code status: Full  Diet Diet NPO Exceptions are: Sips of Water with Meds  ADULT TUBE FEEDING; PEG; Immune Enhancing; Continuous; 10; No; 30; Q 4 hours   Diet NPO Exceptions are: Sips of Water with Meds   Stress ulcer prophylaxis: protonix 40mg   DVT prophylaxis: SCDS , heparin started    Consultations needed: Yes   Transfer out of ICU today? No    Other: albumin level, advance tube feeds as tolerated, VCG from central line , chest xray daily ,     Lines/catheters   Date 05/10  o ETT  o Urinary cath   Tunneled HD cath right subclavian 10 days  · Date 05/17   · CVC Triple lumen Ana Li MD  7:56 AM  05/20/22        I personally saw, examined and provided care for the patient. Radiographs, labs and medication list were reviewed by me independently. Review of Residents documentation was conducted and revisions were made as appropriate. I agree with the above documented exam, problem list and plan of care. Had discussion with spouse and then also with spouse and patient in the room. Discussed goals of care, treatment options, and prognosis. Discussed that he will need colostomy and will likely need trach if they want to continue to pursue treatment. Patient and spouse understand. They are agreeable to proceed with tracheostomy as well as colostomy. Needs trach and diverting colostomy soon. Surgery following.     CCT excluding procedures 36 minutes    Debra Hill DO

## 2022-05-20 NOTE — PROGRESS NOTES
Chart reviewed and received an update from the patient's nurse. Plan is for a diverting colostomy and tracheostomy placement. CODE STATUS established as a DNR CCA. Plan is to return to [facility]. There are no further PM needs at this time. PM will now sign off. If new PM needs arise, please re-consult. Thank you.

## 2022-05-20 NOTE — PROGRESS NOTES
Nephrology Progress Note  [facility]    Reason for Consult: ESRD  Date of Service: 5/20/2022     Subjective    5/20/22: Pt intubated and vented awake alert and nodded to me for simple questions        Past Medical History:        Diagnosis Date    [facility]fib [facility]     [facility]AN [facility] [address]     Anemia     Anxiety     [facility]     [facility]arcot foot due to diabetes mellitus ([facility]     CHF [facility]est[facility] failure) [facility]     CKD (chronic kidney disease)     Diabetes mellitus ([facility] [facility]     D[facility] patient ([facility]     Hyperlipidemia     Hypertension     Right sided weakness     Sacral decubitus ulcer     Seizure [facility])        Past Surgical History:        Procedure Laterality Date    AMPUTATION Right 2017    right toe    BACK SURGERY N/A 5/17/2022    SACRAL WOUND DEBRIDEMENT performed by Tara Araiza MD at [address] [place] [address]    right chest    HYDROCELE EXCISION  2010    JOINT REPLACEMENT Right 2011    TONSILLECTOMY      UPPER GASTROINTESTINAL ENDOSCOPY N/A 4/27/2022    EGD BIOPSY performed by Alexa Hardy MD at [place] [address]  2010    venous ablation       Current Medications:    Current Facility-Administered Medications: anticoagulant sodium citrate 4 GM/100ML solution, , ,   dilTIAZem (CARDIZEM) tablet 30 mg, 30 mg, PEG Tube, 4 times per day  lansoprazole suspension SUSP 30 mg, 30 mg, Per G Tube, QAM AC  heparin (porcine) injection 5,000 Units, 5,000 Units, SubCUTAneous, Q8H  collagenase ointment, , Topical, Daily  midazolam PF (VERSED) injection 1 mg, 1 mg, IntraVENous, Q3H PRN  0.9 % sodium chloride infusion, , IntraVENous, PRN  ceftolozane-tazobactam (ZERBAXA) 1,500 mg in sodium chloride 0.9 % 100 mL IVPB, 1,500 mg, IntraVENous, Q8H  metoprolol tartrate (LOPRESSOR) tablet 25 mg, 25 mg, Oral, BID  epoetin kandi-epbx (RETACRIT) injection 8,000 Units, 8,000 Units, SubCUTAneous, Once per day on Mon Wed Fri **AND** epoetin kandi-epbx (RETACRIT) injection 2,000 Units, 2,000 Units, SubCUTAneous, Once per day on Mon Wed Fri  mupirocin (BACTROBAN) 2 % ointment, , Topical, BID  [COMPLETED] tigecycline (TYGACIL) 100 mg in sodium chloride 0.9 % 100 mL IVPB, 100 mg, IntraVENous, Once **FOLLOWED BY** tigecycline (TYGACIL) 50 mg in sodium chloride 0.9 % 100 mL IVPB, 50 mg, IntraVENous, Q12H  sulfamethoxazole-trimethoprim (BACTRIM DS;SEPTRA DS) 800-160 MG per tablet 2 tablet, 2 tablet, Oral, Daily  acetylcysteine (MUCOMYST) 10 % solution 400 mg, 4 mL, Inhalation, Q4H  ipratropium-albuterol (DUONEB) nebulizer solution 1 ampule, 1 ampule, Inhalation, Q4H  anticoagulant sodium citrate 4 GM/100ML solution 0.168 g, 4.2 mL, IntraCATHeter, PRN  fentaNYL 10 mcg/ml in 0.9%  ml infusion,  mcg/hr, IntraVENous, Continuous  polyethylene glycol (GLYCOLAX) packet 17 g, 17 g, Oral, Daily  chlorhexidine (PERIDEX) 0.12 % solution 15 mL, 15 mL, Mouth/Throat, BID  senna (SENOKOT) 8.8 MG/5ML syrup 8.8 mg, 5 mL, Per G Tube, Nightly  docusate (COLACE) 50 MG/5ML liquid 100 mg, 100 mg, Per G Tube, BID  levothyroxine (SYNTHROID) tablet 175 mcg, 175 mcg, Per G Tube, Daily  perflutren lipid microspheres (DEFINITY) injection 1.65 mg, 1.5 mL, IntraVENous, ONCE PRN  [Held by provider] insulin glargine (LANTUS) injection vial 20 Units, 20 Units, SubCUTAneous, BID  insulin lispro (HUMALOG) injection vial 0-12 Units, 0-12 Units, SubCUTAneous, Q6H  glucose chewable tablet 16 g, 4 tablet, Oral, PRN  dextrose bolus 10% 125 mL, 125 mL, IntraVENous, PRN **OR** dextrose bolus 10% 250 mL, 250 mL, IntraVENous, PRN  glucagon (rDNA) injection 1 mg, 1 mg, IntraMUSCular, PRN  levETIRAcetam (KEPPRA) 100 MG/ML solution 500 mg, 500 mg, PEG Tube, BID  sodium chloride flush 0.9 % injection 5-40 mL, 5-40 mL, IntraVENous, 2 times per day  sodium chloride flush 0.9 % injection 5-40 mL, 5-40 mL, IntraVENous, PRN  0.9 % sodium chloride infusion, , IntraVENous, PRN    Allergies: Other      Physical exam:   Constitutional:  VITALS:  /60   Pulse 85   Temp 98.3 °F (36.8 °C) (Temporal)   Resp 8   Ht 6' 5\" (1.956 m)   Wt 251 lb 5.2 oz (114 kg)   SpO2 94%   BMI 29.80 kg/m²     Gen: alert, awake, no acute distress  Eyes: eomi  HEENT: atraumatic/normocephalic, +ETT   Lungs: clear to ascultation bilaterally, equal lung expansion  CV: RRR, no murmurs  Abdomen: soft, nontender, nondistended, +PEG  Extremitiy: + edema  Skin: no rash, tugor wnl  Neuro: limited by medical condition   Psych: limited by medical condition        Data:         Last 3 BMP  Recent Labs     05/18/22  1414 05/19/22  0550 05/20/22  0540    130* 132   K 3.6 4.4 4.9    94* 96*   CO2 25 24 23   BUN 24* 48* 68*   CREATININE 0.8 1.5* 2.0*   GLUCOSE 119* 104* 111*   CALCIUM 8.0* 8.5* 8.7         Last 3 CMP:    Recent Labs     05/18/22  1414 05/19/22  0550 05/20/22  0540    130* 132   K 3.6 4.4 4.9    94* 96*   CO2 25 24 23   BUN 24* 48* 68*   CREATININE 0.8 1.5* 2.0*   GLUCOSE 119* 104* 111*   CALCIUM 8.0* 8.5* 8.7   LABALBU  --   --  2.3*         Last 3 Glucose:     Recent Labs     05/18/22  1414 05/19/22  0550 05/20/22  0540   GLUCOSE 119* 104* 111*         Last 3 POC Glucose:     No results for input(s): POCGLU in the last 72 hours. Last 3 CK, CKMB, Troponin  No results for input(s): CKTOTAL, CKMB, TROPONINI in the last 72 hours.       Last 3 CBC:  Recent Labs     05/18/22  0525 05/19/22  0550 05/20/22  0540   WBC 9.1 8.4 14.7*   RBC 2.87* 2.79* 3.01*   HGB 7.8* 7.6* 8.2*   HCT 24.9* 24.3* 25.8*   MCV 86.8 87.1 85.7   MCH 27.2 27.2 27.2   MCHC 31.3* 31.3* 31.8*   RDW 19.9* 20.1* 20.5*   PLT 97* 98* 103*   MPV 9.5 9.9 9.1       Last 3 Hepatic Function Panel:    Recent Labs     05/20/22  0540   LABALBU 2.3*       Albumin:  Recent Labs     05/20/22  0540   LABALBU 2.3*       Calcium:  Recent Labs     05/20/22  0540   CALCIUM 8.7       Ionized Calcium:  No results for input(s): IONCA in the last 72 hours.    Magnesium:    Recent Labs     05/20/22  0540   MG 2.2         ABGs:  No results for input(s): PHART, PO2ART, YUI2KIU, KLH7AAF, BEART, K4IUDDVO in the last 72 hours. Lactic Acid:  No results for input(s): LACTA in the last 72 hours. Last 3 Amylase:  No results for input(s): AMYLASE in the last 72 hours. Last 3 Lipase:  No results for input(s): LIPASE in the last 72 hours. Last 3 BNP:  No results for input(s): BNP in the last 72 hours. Assessment/Plan      1 ESRD  PLAN:  1. IHD today with 2350ml vol removal net  2. Will do isolated ultrafiltration  3. Next IHD on 5/23/22    2 Volume overload  No significant change in the CXR compared to 5/17/22  PLAN:  1. UF in AM 5/21/22      3 Hypotension  Maintain MAP>65mmHg  PLAN:  1. Follow BP with the vol removal    4 Hyponatremia  With volume overload and hypotonic fluids  Na+ 132  PLAN:  1. Follow with UF    5 Anemia With thrombocytopenia  PLAN:  1. Continue On SHANIQUA  2. Follow Hgb and transfuse as needed for hgB <7    6. Hyperphosphatemia in the setting of the Sec HPTH with the Immune Enhancing TF  PLAN:  1. Consider changing the TF to Nephro      Thank you for the opportunity to participate in the care of Mr Jim Barajas     ______________________________      Jason Wills MD  5/20/2022  3:16 PM

## 2022-05-20 NOTE — PROGRESS NOTES
Internal Medicine Progress Note      Synopsis: Patient admitted on 5/10/2022     Mr. Pooja Stout, a 74y.o. year old male who has a past medical history of A-fib ([address] TRUMAN (acute kidney injury) ([address] Anemia, Anxiety, Bipolar 1 disorder ([address] Charcot foot due to diabetes mellitus ([address] CHF (congestive heart failure) ([address] CKD (chronic kidney disease), Diabetes mellitus ([address] Dialysis patient ([address] Hyperlipidemia, Hypertension, Right sided weakness, Sacral decubitus ulcer, and Seizure ([facility] This is a 73-year-old male with progressive dementia and spinal stenosis. Has been bedbound for a long time. He has had in the past bouts of kidney failure that did not require long-term resuscitation with dialysis however he was at acute care for decompensation from his large sacral decubitus and acute renal failure that did require renal replacement therapy of hemodialysis. He finishes antibiotics. His sacral wound was slow to heal because of poor oral intake and he was given a PEG tube for nutritional supplementation purposes since his wife wanted to. Patient had been stable till about 48 hours ago. Monday he was noticed to have a large amount of bloody stool. He was started on an intravenous proton pump inhibitor and surgery services were asked to see him. His anticoagulation was held. Patient was being watched and he did not have any more episodes. Yesterday he had an increase in his WBCs without having any localizing symptoms. Infectious disease did see him and started him immediately on broad-spectrum antibiotics. Patient became unresponsive and virtually reportedly evening more towards the early hours of this morning. Fluids were started unfortunately patient could not be resuscitated despite best efforts of the on-call intensive virtual specialist. Patient was sent over to acute care where he was intubated for cardiorespiratory failure.  He was found to be in cardiac arrest.   Appropriate O2 resuscitation resulted in resumption of heart rhythm. Patient was started on pressors moved to the ICU   Imaging reveals a significant pleural effusion and HCAP    Subjective    Still in the ICU. Pressors just weaned off. Still little bit soft on his blood pressure. Less sedation    May 13  Patient appeared more alert. Still hypotensive. Thoracentesis on schedule  May 14  Uneventful night. He was on vent support at night although he did tolerate trials in the day. Centesis pulled off some fluid   May 15 th he is still intubated   May 16  Chest tube for pleural effusion that appears to be infected  Tomorrow for debridement of his decubitus  May 17  Patient still looks sickly    A. fib controlled with metoprolol. Eliquis on hold. May 18  Still looks not well. Blood pressure is low. Wife is made him a DNR CCA. He has had some agitation overnight  May 19  When seen this morning he appeared to be okay. He was alert. Recognized me. Staff had been conversing with him regarding all of his pressures and he appeared to not want chronic ventilation or a tracheostomy. exam:  BP (!) 101/57   Pulse 112   Temp 97.3 °F (36.3 °C) (Infrared)   Resp 19   Ht 6' 5\" (1.956 m)   Wt 242 lb 11.6 oz (110.1 kg)   SpO2 91%   BMI 28.78 kg/m²   Eyes are closed orally intubated face looks pale    Respiratory: Significantly decreased bibasilar bases   Left-sided chest tube  Cardiovascular: Irregular heart rate rhythm   Abdomen: Nontender positive for distention positive for PEG   Musculoskeletal: Some thickening of the skin on his lower extremities.  Dorsalis pedis hard to palpate   skin: Large clean sacral decubitus   neurologic: Sleepy      Medications:  Reviewed    Infusion Medications    sodium chloride      fentaNYL 200 mcg/hr (05/19/22 1800)    sodium chloride 10 mL/hr at 05/19/22 1800     Scheduled Medications    [START ON 5/20/2022] lansoprazole  30 mg Per G Tube QAM AC    heparin (porcine)  5,000 Units SubCUTAneous Q8H    collagenase   Topical Daily    ceftolozane-tazobactam (ZERBAXA) IVPB  1,500 mg IntraVENous Q8H    metoprolol tartrate  25 mg Oral BID    epoetin kandi-epbx  8,000 Units SubCUTAneous Once per day on Mon Wed Fri    And    epoetin kandi-epbx  2,000 Units SubCUTAneous Once per day on Mon Wed Fri    mupirocin   Topical BID    tigecycline (TYGACIL) IVPB  50 mg IntraVENous Q12H    sulfamethoxazole-trimethoprim  2 tablet Oral Daily    acetylcysteine  4 mL Inhalation Q4H    ipratropium-albuterol  1 ampule Inhalation Q4H    polyethylene glycol  17 g Oral Daily    chlorhexidine  15 mL Mouth/Throat BID    senna  5 mL Per G Tube Nightly    docusate  100 mg Per G Tube BID    levothyroxine  175 mcg Per G Tube Daily    [Held by provider] insulin glargine  20 Units SubCUTAneous BID    insulin lispro  0-12 Units SubCUTAneous Q6H    levETIRAcetam  500 mg PEG Tube BID    sodium chloride flush  5-40 mL IntraVENous 2 times per day     PRN Meds: midazolam, sodium chloride, anticoagulant sodium citrate, perflutren lipid microspheres, glucose, dextrose bolus **OR** dextrose bolus, glucagon (rDNA), sodium chloride flush, sodium chloride    I/O    Intake/Output Summary (Last 24 hours) at 5/19/2022 2049  Last data filed at 5/19/2022 1800  Gross per 24 hour   Intake 1764.04 ml   Output 280 ml   Net 1484.04 ml       Labs:   Recent Labs     05/17/22  0440 05/17/22  0440 05/17/22  1447 05/18/22  0525 05/19/22  0550   WBC 8.0  --   --  9.1 8.4   HGB 6.8*   < > 7.6* 7.8* 7.6*   HCT 22.0*   < > 24.4* 24.9* 24.3*   PLT 98*  --   --  97* 98*    < > = values in this interval not displayed.        Recent Labs     05/17/22  0440 05/17/22  0440 05/18/22  0525 05/18/22  1414 05/19/22  0550   *   < > 122* 134 130*   K 4.0   < > 4.8 3.6 4.4   CL 93*   < > 85* 102 94*   CO2 29   < > 23 25 24   BUN 53*   < > 68* 24* 48*   CREATININE 1.6*   < > 2.0* 0.8 1.5*   CALCIUM 8.7   < > 8.3* 8.0* 8.5*   PHOS 4.0  --  5.9*  -- 4.4    < > = values in this interval not displayed. No results for input(s): PROT, ALB, ALKPHOS, ALT, AST, BILITOT, AMYLASE, LIPASE in the last 72 hours. Recent Labs     05/17/22  0440   INR 1.4       No results for input(s): Earlis Ledbetter in the last 72 hours. Chronic labs:  Lab Results   Component Value Date    TSH 2.700 05/11/2022    INR 1.4 05/17/2022       Radiology:  CT ABDOMEN PELVIS WO CONTRAST Additional Contrast? None    Result Date: 5/11/2022  Bilateral pleural effusions, greater on the left than right, with superimposed consolidation. In the appropriate setting, a pneumonia with associated pleural effusions cannot be excluded. No bowel obstruction, free air or obstructive uropathy. Additional findings involving the abdomen and pelvis, as detailed above. CT HEAD WO CONTRAST    Result Date: 5/11/2022  No acute findings. Chronic changes as above. CT CHEST WO CONTRAST    Result Date: 5/11/2022  Findings suggesting pulmonary edema versus a multifocal pneumonia, with associated bilateral pleural effusions. Generalized mediastinal adenopathy, which may represent reactive changes. This is a nonspecific finding. Differential diagnosis would include infection, inflammation or neoplastic causes. XR CHEST PORTABLE    Result Date: 5/12/2022  1. Partial interval clearing of the lungs when compared with the patient's prior study. 2. Vague patchy right perihilar airspace disease and patchy residual airspace disease within the left lung. 3. Trace right pleural effusion and small left pleural effusion. XR CHEST PORTABLE    Result Date: 5/11/2022  Stable congestive/consolidative changes, with bilateral pleural effusions. No significant change.      XR CHEST 1 VIEW    Result Date: 4/27/2022  Bibasilar pleural thickening   ASSESSMENT:    Principal Problem:    Cardiac arrest [facility])  Active Problems:    Pneumonia due to infectious organism    Pleural effusion  Resolved Problems:    * No resolved hospital problems. *  Sepsis  Respiratory failure  Hypotension  Long-term dialysis  Pleural effusion  Pneumonia     PLAN:    1. Received a dialysis with ultrafiltration and fluid boluses  2. Continue to follow hemoglobin and appropriate transfusion  3. Remains full code per discussion with family  4. Plans for pleural effusion to be tapped to allow for better weaning  5. Glycemic control with insulin  6. Maintain Keppra for seizures  7. Maintaining linezolid and meropenem  May 13  Continue dialysis support with limiting hypotension. Thoracentesis per pulmonary services to allow for ventilator weaning. 2 beats can be started when agreeable with critical care. Glycemic control to be treated with insulin. Antibiotics continue per infectious disease  Being treated for sepsis from healthcare acquired pneumonia and septic shock. .  Remains in controlled A. Fib. Hypothermia appears slightly better. Still with significant sacral decubitus although he has finished treatment. Baseline history with dementia. Prognosis guarded. WifeWants a full code  May 14  Hemoglobin slightly low. Storm stool per nursing staff. Weaning when okay  Blood pressure still soft  Pressors are being weaned off along with sedation  Antibiotic coverage per infectious disease for sepsis from HCAP   Blood sugars cover with sliding scale  Watch hemoglobin and CBC  May 15 th   Tapped pleural fluid is positive for growth  Culture is positive for staph epidermidis and staph species and gram-negative. ID still following. Maintain Keppra  bs are ok for now  Renal failure ( creat is 1.7 ) pvr ? Straight cath? Removal of line and cx tip  May 16  Now under treatment for empyema  Significant sacral decubitus  Some degree of intolerance of tube feeds  Surgery tomorrow for debridement so insulin can be held  Looking about the same  Pale. Does tolerate his eyes open but not really following any commands  Cardiology is following. Supportive care recommended only. Bridging for anticoagulation as per the recommendation. Watch for GI bleed. May 17  Sacral wound has been debrided. Chest tube is still draining. Remains on vent support. Remains quite sickly. Palliative services did see. Dialysis support per nephrology services. Overall prognosis is poor. bs are tight still   Bp is sl better  ID noted   May 18  Maintaining multiple antibiotics to cover his empyema and septic shock. He is now hypotensive. Sacral decubitus was debrided. Blood sugars were running low yesterday despite  D5 drips. He is on dialysis right now. He is neurologically compromised from premorbid dementia and lack of mobility for many years. He remains in a poor prognostic zone. It is up to the family to keep him comfortable. Wife has had extensive discussions especially with palliative care  She does not want him to have a tracheostomy. She wants another day to make a decision if he is unable to wean. Patient would be appropriate for comfort care if family desires. May 19  Blood pressure slightly better today. Sacral decubitus not debrided. Antibiotic still in place. Maintaining dialysis support. Patient's family is agreeable to diverting ostomy. Maintaining antibiotics. Watch hemoglobin for blood product support. Blood sugars continue to monitor.   He has not been tolerant for tube feeds  Diet: Diet NPO Exceptions are: Sips of Water with Meds  ADULT TUBE FEEDING; PEG; Immune Enhancing; Continuous; 10; No; 30; Q 4 hours  Diet NPO Exceptions are: Sips of Water with Meds  Code Status: DNR-CCA  PT/OT Eval Status:   Unable  DVT Prophylaxis:   Resume when okay with critical care and due to procedures and decreased hemoglobin with recent history of bloody stool  Recommended disposition at discharge:   Unsure yet    +++++++++++++++++++++++++++++++++++++++++++++++++  Buddy Quispe MD   [facility].  +++++++++++++++++++++++++++++++++++++++++++++++++  NOTE: This report was transcribed using voice recognition software. Every effort was made to ensure accuracy; however, inadvertent computerized transcription errors may be present.

## 2022-05-20 NOTE — PROGRESS NOTES
Internal Medicine Progress Note      Synopsis: Patient admitted on 5/10/2022     Mr. Sandie Sanchez, a 74y.o. year old male who has a past medical history of A-fib ([address] TRUMAN (acute kidney injury) ([address] Anemia, Anxiety, Bipolar 1 disorder ([address] Charcot foot due to diabetes mellitus ([address] CHF (congestive heart failure) ([address] CKD (chronic kidney disease), Diabetes mellitus ([address] Dialysis patient ([address] Hyperlipidemia, Hypertension, Right sided weakness, Sacral decubitus ulcer, and Seizure ([address] This is a 18-year-old male with progressive dementia and spinal stenosis. Has been bedbound for a long time. He has had in the past bouts of kidney failure that did not require long-term resuscitation with dialysis however he was at acute care for decompensation from his large sacral decubitus and acute renal failure that did require renal replacement therapy of hemodialysis. He finishes antibiotics. His sacral wound was slow to heal because of poor oral intake and he was given a PEG tube for nutritional supplementation purposes since his wife wanted to. Patient had been stable till about 48 hours ago. Monday he was noticed to have a large amount of bloody stool. He was started on an intravenous proton pump inhibitor and surgery services were asked to see him. His anticoagulation was held. Patient was being watched and he did not have any more episodes. Yesterday he had an increase in his WBCs without having any localizing symptoms. Infectious disease did see him and started him immediately on broad-spectrum antibiotics. Patient became unresponsive and virtually reportedly evening more towards the early hours of this morning. Fluids were started unfortunately patient could not be resuscitated despite best efforts of the on-call intensive virtual specialist. Patient was sent over to acute care where he was intubated for cardiorespiratory failure.  He was found to be in cardiac arrest.   Appropriate O2 resuscitation resulted in resumption of heart rhythm. Patient was started on pressors moved to the ICU   Imaging reveals a significant pleural effusion and HCAP    Subjective    Still in the ICU. Pressors just weaned off. Still little bit soft on his blood pressure. Less sedation    May 13  Patient appeared more alert. Still hypotensive. Thoracentesis on schedule  May 14  Uneventful night. He was on vent support at night although he did tolerate trials in the day. Centesis pulled off some fluid   May 15 th he is still intubated   May 16  Chest tube for pleural effusion that appears to be infected  Tomorrow for debridement of his decubitus  May 17  Patient still looks sickly    A. fib controlled with metoprolol. Eliquis on hold. May 18  Still looks not well. Blood pressure is low. Wife is made him a DNR CCA. He has had some agitation overnight  May 19  When seen this morning he appeared to be okay. He was alert. Recognized me. Staff had been conversing with him regarding all of his pressures and he appeared to not want chronic ventilation or a tracheostomy. May 20  At this time no new needs. Patient remains orally intubated. He has been somewhat agitated. Complaining of wanting to have a endotracheal tube out, explained to him and he nods his head to state he understands that it needs left in place  exam:  /60   Pulse 83   Temp 98.3 °F (36.8 °C) (Temporal)   Resp 8   Ht 6' 5\" (1.956 m)   Wt 251 lb 5.2 oz (114 kg)   SpO2 99%   BMI 29.80 kg/m²   Eyes are closed orally intubated face looks pale    Respiratory: Significantly decreased bibasilar bases   Left-sided chest tube  Cardiovascular: Irregular heart rate rhythm   Abdomen: Nontender positive for distention positive for PEG   Musculoskeletal: Some thickening of the skin on his lower extremities.  Dorsalis pedis hard to palpate   skin: Large clean sacral decubitus   neurologic: Sleepy      Medications:  Reviewed    Infusion Medications 134 130* 132   K 4.8   < > 3.6 4.4 4.9   CL 85*   < > 102 94* 96*   CO2 23   < > 25 24 23   BUN 68*   < > 24* 48* 68*   CREATININE 2.0*   < > 0.8 1.5* 2.0*   CALCIUM 8.3*   < > 8.0* 8.5* 8.7   PHOS 5.9*  --   --  4.4 5.8*    < > = values in this interval not displayed. No results for input(s): PROT, ALB, ALKPHOS, ALT, AST, BILITOT, AMYLASE, LIPASE in the last 72 hours. No results for input(s): INR in the last 72 hours. No results for input(s): Connee Matar in the last 72 hours. Chronic labs:  Lab Results   Component Value Date    TSH 2.700 05/11/2022    INR 1.4 05/17/2022       Radiology:  CT ABDOMEN PELVIS WO CONTRAST Additional Contrast? None    Result Date: 5/11/2022  Bilateral pleural effusions, greater on the left than right, with superimposed consolidation. In the appropriate setting, a pneumonia with associated pleural effusions cannot be excluded. No bowel obstruction, free air or obstructive uropathy. Additional findings involving the abdomen and pelvis, as detailed above. CT HEAD WO CONTRAST    Result Date: 5/11/2022  No acute findings. Chronic changes as above. CT CHEST WO CONTRAST    Result Date: 5/11/2022  Findings suggesting pulmonary edema versus a multifocal pneumonia, with associated bilateral pleural effusions. Generalized mediastinal adenopathy, which may represent reactive changes. This is a nonspecific finding. Differential diagnosis would include infection, inflammation or neoplastic causes. XR CHEST PORTABLE    Result Date: 5/12/2022  1. Partial interval clearing of the lungs when compared with the patient's prior study. 2. Vague patchy right perihilar airspace disease and patchy residual airspace disease within the left lung. 3. Trace right pleural effusion and small left pleural effusion. XR CHEST PORTABLE    Result Date: 5/11/2022  Stable congestive/consolidative changes, with bilateral pleural effusions. No significant change.      XR CHEST 1 VIEW    Result Date: 4/27/2022  Bibasilar pleural thickening   ASSESSMENT:    Principal Problem:    Cardiac arrest [facility])  Active Problems:    Pneumonia due to infectious organism    Pleural effusion  Resolved Problems:    * No resolved hospital problems. *  Sepsis  Respiratory failure  Hypotension  Long-term dialysis  Pleural effusion  Pneumonia     PLAN:    1. Received a dialysis with ultrafiltration and fluid boluses  2. Continue to follow hemoglobin and appropriate transfusion  3. Remains full code per discussion with family  4. Plans for pleural effusion to be tapped to allow for better weaning  5. Glycemic control with insulin  6. Maintain Keppra for seizures  7. Maintaining linezolid and meropenem  May 13  Continue dialysis support with limiting hypotension. Thoracentesis per pulmonary services to allow for ventilator weaning. 2 beats can be started when agreeable with critical care. Glycemic control to be treated with insulin. Antibiotics continue per infectious disease  Being treated for sepsis from healthcare acquired pneumonia and septic shock. .  Remains in controlled A. Fib. Hypothermia appears slightly better. Still with significant sacral decubitus although he has finished treatment. Baseline history with dementia. Prognosis guarded. WifeWants a full code  May 14  Hemoglobin slightly low. Storm stool per nursing staff. Weaning when okay  Blood pressure still soft  Pressors are being weaned off along with sedation  Antibiotic coverage per infectious disease for sepsis from HCAP   Blood sugars cover with sliding scale  Watch hemoglobin and CBC  May 15 th   Tapped pleural fluid is positive for growth  Culture is positive for staph epidermidis and staph species and gram-negative. ID still following. Maintain Keppra  bs are ok for now  Renal failure ( creat is 1.7 ) pvr ? Straight cath?   Removal of line and cx tip  May 16  Now under treatment for empyema  Significant sacral decubitus  Some degree of intolerance of tube feeds  Surgery tomorrow for debridement so insulin can be held  Looking about the same  Pale. Does tolerate his eyes open but not really following any commands  Cardiology is following. Supportive care recommended only. Bridging for anticoagulation as per the recommendation. Watch for GI bleed. May 17  Sacral wound has been debrided. Chest tube is still draining. Remains on vent support. Remains quite sickly. Palliative services did see. Dialysis support per nephrology services. Overall prognosis is poor. bs are tight still   Bp is sl better  ID noted   May 18  Maintaining multiple antibiotics to cover his empyema and septic shock. He is now hypotensive. Sacral decubitus was debrided. Blood sugars were running low yesterday despite  D5 drips. He is on dialysis right now. He is neurologically compromised from premorbid dementia and lack of mobility for many years. He remains in a poor prognostic zone. It is up to the family to keep him comfortable. Wife has had extensive discussions especially with palliative care  She does not want him to have a tracheostomy. She wants another day to make a decision if he is unable to wean. Patient would be appropriate for comfort care if family desires. May 19  Blood pressure slightly better today. Sacral decubitus not debrided. Antibiotic still in place. Maintaining dialysis support. Patient's family is agreeable to diverting ostomy. Maintaining antibiotics. Watch hemoglobin for blood product support. Blood sugars continue to monitor. He has not been tolerant for tube feeds  May 20  Maintaining dialysis support. Watching hemoglobin. Received blood earlier but not yet again after the 14th. DNR CCA. Diverting ostomy on Monday. Also tracheostomy. Thoracentesis was positive for empyema so chest tube still in place and is draining. Remains sick. Cognizant somewhat of his issues.   Continue with trials for tube feeds if he tolerates and watch blood sugars closely  Antibiotic still in place as per infectious disease  O2 need to 35% FiO2    Diet: Diet NPO Exceptions are: Sips of Water with Meds  ADULT TUBE FEEDING; PEG; Immune Enhancing; Continuous; 10; No; 30; Q 4 hours  Diet NPO Exceptions are: Sips of Water with Meds  Code Status: DNR-CCA  PT/OT Eval Status:   Unable  DVT Prophylaxis:   Resume when okay with critical care and due to procedures and decreased hemoglobin with recent history of bloody stool  Recommended disposition at discharge:   Unsure yet    +++++++++++++++++++++++++++++++++++++++++++++++++  Usha Miller MD   [facility].  +++++++++++++++++++++++++++++++++++++++++++++++++  NOTE: This report was transcribed using voice recognition software. Every effort was made to ensure accuracy; however, inadvertent computerized transcription errors may be present.

## 2022-05-21 ENCOUNTER — APPOINTMENT (OUTPATIENT)
Dept: GENERAL RADIOLOGY | Age: 67
DRG: 003 | End: 2022-05-21
Payer: MEDICARE

## 2022-05-21 LAB
ANAEROBIC CULTURE: NORMAL
ANION GAP SERPL CALCULATED.3IONS-SCNC: 12 MMOL/L (ref 7–16)
ANISOCYTOSIS: ABNORMAL
BASOPHILS ABSOLUTE: 0 E9/L (ref 0–0.2)
BASOPHILS RELATIVE PERCENT: 0 % (ref 0–2)
BUN BLDV-MCNC: 49 MG/DL (ref 6–23)
CALCIUM SERPL-MCNC: 8.8 MG/DL (ref 8.6–10.2)
CHLORIDE BLD-SCNC: 99 MMOL/L (ref 98–107)
CO2: 26 MMOL/L (ref 22–29)
CREAT SERPL-MCNC: 1.5 MG/DL (ref 0.7–1.2)
CULTURE SURGICAL: ABNORMAL
CULTURE SURGICAL: ABNORMAL
CULTURE, RESPIRATORY: ABNORMAL
EOSINOPHILS ABSOLUTE: 0 E9/L (ref 0.05–0.5)
EOSINOPHILS RELATIVE PERCENT: 0 % (ref 0–6)
GFR AFRICAN AMERICAN: 56
GFR NON-AFRICAN AMERICAN: 46 ML/MIN/1.73
GLUCOSE BLD-MCNC: 125 MG/DL (ref 74–99)
HCT VFR BLD CALC: 24.4 % (ref 37–54)
HEMOGLOBIN: 7.7 G/DL (ref 12.5–16.5)
HYPOCHROMIA: ABNORMAL
LYMPHOCYTES ABSOLUTE: 0.36 E9/L (ref 1.5–4)
LYMPHOCYTES RELATIVE PERCENT: 3 % (ref 20–42)
MAGNESIUM: 2.1 MG/DL (ref 1.6–2.6)
MCH RBC QN AUTO: 27.1 PG (ref 26–35)
MCHC RBC AUTO-ENTMCNC: 31.6 % (ref 32–34.5)
MCV RBC AUTO: 85.9 FL (ref 80–99.9)
METER GLUCOSE: 111 MG/DL (ref 74–99)
METER GLUCOSE: 112 MG/DL (ref 74–99)
METER GLUCOSE: 121 MG/DL (ref 74–99)
METER GLUCOSE: 186 MG/DL (ref 74–99)
METER GLUCOSE: 187 MG/DL (ref 74–99)
MONOCYTES ABSOLUTE: 0.48 E9/L (ref 0.1–0.95)
MONOCYTES RELATIVE PERCENT: 4 % (ref 2–12)
NEUTROPHILS ABSOLUTE: 11.07 E9/L (ref 1.8–7.3)
NEUTROPHILS RELATIVE PERCENT: 93 % (ref 43–80)
ORGANISM: ABNORMAL
PDW BLD-RTO: 21.2 FL (ref 11.5–15)
PHOSPHORUS: 4.3 MG/DL (ref 2.5–4.5)
PLATELET # BLD: 109 E9/L (ref 130–450)
PMV BLD AUTO: 9.8 FL (ref 7–12)
POIKILOCYTES: ABNORMAL
POLYCHROMASIA: ABNORMAL
POTASSIUM SERPL-SCNC: 4.1 MMOL/L (ref 3.5–5)
RBC # BLD: 2.84 E12/L (ref 3.8–5.8)
SCHISTOCYTES: ABNORMAL
SMEAR, RESPIRATORY: ABNORMAL
SODIUM BLD-SCNC: 137 MMOL/L (ref 132–146)
TARGET CELLS: ABNORMAL
WBC # BLD: 11.9 E9/L (ref 4.5–11.5)

## 2022-05-21 PROCEDURE — 71045 X-RAY EXAM CHEST 1 VIEW: CPT

## 2022-05-21 PROCEDURE — 3E1M39Z IRRIGATION OF PERITONEAL CAVITY USING DIALYSATE, PERCUTANEOUS APPROACH: ICD-10-PCS | Performed by: INTERNAL MEDICINE

## 2022-05-21 PROCEDURE — 6360000002 HC RX W HCPCS: Performed by: INTERNAL MEDICINE

## 2022-05-21 PROCEDURE — 2580000003 HC RX 258: Performed by: SPECIALIST

## 2022-05-21 PROCEDURE — 82962 GLUCOSE BLOOD TEST: CPT

## 2022-05-21 PROCEDURE — 83735 ASSAY OF MAGNESIUM: CPT

## 2022-05-21 PROCEDURE — 36592 COLLECT BLOOD FROM PICC: CPT

## 2022-05-21 PROCEDURE — 90945 DIALYSIS ONE EVALUATION: CPT

## 2022-05-21 PROCEDURE — 6360000002 HC RX W HCPCS: Performed by: SPECIALIST

## 2022-05-21 PROCEDURE — 2580000003 HC RX 258: Performed by: INTERNAL MEDICINE

## 2022-05-21 PROCEDURE — 6360000002 HC RX W HCPCS

## 2022-05-21 PROCEDURE — 84100 ASSAY OF PHOSPHORUS: CPT

## 2022-05-21 PROCEDURE — 80048 BASIC METABOLIC PNL TOTAL CA: CPT

## 2022-05-21 PROCEDURE — 99233 SBSQ HOSP IP/OBS HIGH 50: CPT | Performed by: INTERNAL MEDICINE

## 2022-05-21 PROCEDURE — 6370000000 HC RX 637 (ALT 250 FOR IP): Performed by: INTERNAL MEDICINE

## 2022-05-21 PROCEDURE — 94640 AIRWAY INHALATION TREATMENT: CPT

## 2022-05-21 PROCEDURE — 6370000000 HC RX 637 (ALT 250 FOR IP): Performed by: SPECIALIST

## 2022-05-21 PROCEDURE — 6370000000 HC RX 637 (ALT 250 FOR IP): Performed by: STUDENT IN AN ORGANIZED HEALTH CARE EDUCATION/TRAINING PROGRAM

## 2022-05-21 PROCEDURE — 2500000003 HC RX 250 WO HCPCS: Performed by: STUDENT IN AN ORGANIZED HEALTH CARE EDUCATION/TRAINING PROGRAM

## 2022-05-21 PROCEDURE — 85025 COMPLETE CBC W/AUTO DIFF WBC: CPT

## 2022-05-21 PROCEDURE — 2000000000 HC ICU R&B

## 2022-05-21 PROCEDURE — 36415 COLL VENOUS BLD VENIPUNCTURE: CPT

## 2022-05-21 PROCEDURE — 94003 VENT MGMT INPAT SUBQ DAY: CPT

## 2022-05-21 RX ORDER — ACETYLCYSTEINE 100 MG/ML
4 SOLUTION ORAL; RESPIRATORY (INHALATION) EVERY 6 HOURS
Status: DISCONTINUED | OUTPATIENT
Start: 2022-05-21 | End: 2022-05-24

## 2022-05-21 RX ADMIN — IPRATROPIUM BROMIDE AND ALBUTEROL SULFATE 1 AMPULE: .5; 2.5 SOLUTION RESPIRATORY (INHALATION) at 23:25

## 2022-05-21 RX ADMIN — CEFTOLOZANE AND TAZOBACTAM 1500 MG: 1; .5 INJECTION, POWDER, LYOPHILIZED, FOR SOLUTION INTRAVENOUS at 22:14

## 2022-05-21 RX ADMIN — METOPROLOL TARTRATE 25 MG: 25 TABLET, FILM COATED ORAL at 21:15

## 2022-05-21 RX ADMIN — SENNOSIDES 8.8 MG: 8.8 SYRUP ORAL at 20:48

## 2022-05-21 RX ADMIN — MUPIROCIN: 20 OINTMENT TOPICAL at 09:00

## 2022-05-21 RX ADMIN — CEFTOLOZANE AND TAZOBACTAM 1500 MG: 1; .5 INJECTION, POWDER, LYOPHILIZED, FOR SOLUTION INTRAVENOUS at 05:14

## 2022-05-21 RX ADMIN — LEVETIRACETAM 500 MG: 100 SOLUTION ORAL at 20:47

## 2022-05-21 RX ADMIN — LEVOTHYROXINE SODIUM 175 MCG: 125 TABLET ORAL at 06:00

## 2022-05-21 RX ADMIN — DILTIAZEM HYDROCHLORIDE 30 MG: 30 TABLET, FILM COATED ORAL at 23:14

## 2022-05-21 RX ADMIN — DILTIAZEM HYDROCHLORIDE 30 MG: 30 TABLET, FILM COATED ORAL at 05:52

## 2022-05-21 RX ADMIN — HEPARIN SODIUM 5000 UNITS: 10000 INJECTION, SOLUTION INTRAVENOUS; SUBCUTANEOUS at 02:19

## 2022-05-21 RX ADMIN — Medication 10 ML: at 20:48

## 2022-05-21 RX ADMIN — DILTIAZEM HYDROCHLORIDE 30 MG: 30 TABLET, FILM COATED ORAL at 00:42

## 2022-05-21 RX ADMIN — Medication 200 MCG/HR: at 13:45

## 2022-05-21 RX ADMIN — INSULIN LISPRO 2 UNITS: 100 INJECTION, SOLUTION INTRAVENOUS; SUBCUTANEOUS at 23:25

## 2022-05-21 RX ADMIN — LEVETIRACETAM 500 MG: 100 SOLUTION ORAL at 12:07

## 2022-05-21 RX ADMIN — IPRATROPIUM BROMIDE AND ALBUTEROL SULFATE 1 AMPULE: .5; 2.5 SOLUTION RESPIRATORY (INHALATION) at 08:46

## 2022-05-21 RX ADMIN — METOPROLOL TARTRATE 25 MG: 25 TABLET, FILM COATED ORAL at 12:07

## 2022-05-21 RX ADMIN — COLLAGENASE SANTYL: 250 OINTMENT TOPICAL at 09:00

## 2022-05-21 RX ADMIN — ACETYLCYSTEINE 400 MG: 100 INHALANT RESPIRATORY (INHALATION) at 03:28

## 2022-05-21 RX ADMIN — LANSOPRAZOLE 30 MG: KIT at 05:51

## 2022-05-21 RX ADMIN — Medication 200 MCG/HR: at 06:09

## 2022-05-21 RX ADMIN — TIGECYCLINE 50 MG: 50 INJECTION, POWDER, LYOPHILIZED, FOR SOLUTION INTRAVENOUS at 06:32

## 2022-05-21 RX ADMIN — IPRATROPIUM BROMIDE AND ALBUTEROL SULFATE 1 AMPULE: .5; 2.5 SOLUTION RESPIRATORY (INHALATION) at 03:28

## 2022-05-21 RX ADMIN — MIDAZOLAM HYDROCHLORIDE 1 MG: 1 INJECTION, SOLUTION INTRAMUSCULAR; INTRAVENOUS at 16:46

## 2022-05-21 RX ADMIN — IPRATROPIUM BROMIDE AND ALBUTEROL SULFATE 1 AMPULE: .5; 2.5 SOLUTION RESPIRATORY (INHALATION) at 16:06

## 2022-05-21 RX ADMIN — DILTIAZEM HYDROCHLORIDE 30 MG: 30 TABLET, FILM COATED ORAL at 17:51

## 2022-05-21 RX ADMIN — DOCUSATE SODIUM LIQUID 100 MG: 100 LIQUID ORAL at 20:47

## 2022-05-21 RX ADMIN — SULFAMETHOXAZOLE AND TRIMETHOPRIM 2 TABLET: 800; 160 TABLET ORAL at 12:06

## 2022-05-21 RX ADMIN — CHLORHEXIDINE GLUCONATE 0.12% ORAL RINSE 15 ML: 1.2 LIQUID ORAL at 09:00

## 2022-05-21 RX ADMIN — MIDAZOLAM HYDROCHLORIDE 1 MG: 1 INJECTION, SOLUTION INTRAMUSCULAR; INTRAVENOUS at 00:42

## 2022-05-21 RX ADMIN — CHLORHEXIDINE GLUCONATE 0.12% ORAL RINSE 15 ML: 1.2 LIQUID ORAL at 20:48

## 2022-05-21 RX ADMIN — TIGECYCLINE 50 MG: 50 INJECTION, POWDER, LYOPHILIZED, FOR SOLUTION INTRAVENOUS at 20:57

## 2022-05-21 RX ADMIN — POLYETHYLENE GLYCOL 3350 17 G: 17 POWDER, FOR SOLUTION ORAL at 12:08

## 2022-05-21 RX ADMIN — MIDAZOLAM HYDROCHLORIDE 1 MG: 1 INJECTION, SOLUTION INTRAMUSCULAR; INTRAVENOUS at 06:00

## 2022-05-21 RX ADMIN — CEFTOLOZANE AND TAZOBACTAM 1500 MG: 1; .5 INJECTION, POWDER, LYOPHILIZED, FOR SOLUTION INTRAVENOUS at 12:15

## 2022-05-21 RX ADMIN — IPRATROPIUM BROMIDE AND ALBUTEROL SULFATE 1 AMPULE: .5; 2.5 SOLUTION RESPIRATORY (INHALATION) at 19:25

## 2022-05-21 RX ADMIN — INSULIN LISPRO 2 UNITS: 100 INJECTION, SOLUTION INTRAVENOUS; SUBCUTANEOUS at 12:36

## 2022-05-21 RX ADMIN — ACETYLCYSTEINE 400 MG: 100 INHALANT RESPIRATORY (INHALATION) at 23:27

## 2022-05-21 RX ADMIN — ACETYLCYSTEINE 400 MG: 100 INHALANT RESPIRATORY (INHALATION) at 16:06

## 2022-05-21 RX ADMIN — Medication 200 MCG/HR: at 00:00

## 2022-05-21 RX ADMIN — Medication 200 MCG/HR: at 20:50

## 2022-05-21 RX ADMIN — DILTIAZEM HYDROCHLORIDE 30 MG: 30 TABLET, FILM COATED ORAL at 12:07

## 2022-05-21 RX ADMIN — DOCUSATE SODIUM LIQUID 100 MG: 100 LIQUID ORAL at 12:08

## 2022-05-21 RX ADMIN — HEPARIN SODIUM 5000 UNITS: 10000 INJECTION, SOLUTION INTRAVENOUS; SUBCUTANEOUS at 20:51

## 2022-05-21 RX ADMIN — IPRATROPIUM BROMIDE AND ALBUTEROL SULFATE 1 AMPULE: .5; 2.5 SOLUTION RESPIRATORY (INHALATION) at 12:02

## 2022-05-21 RX ADMIN — MUPIROCIN: 20 OINTMENT TOPICAL at 20:47

## 2022-05-21 RX ADMIN — HEPARIN SODIUM 5000 UNITS: 10000 INJECTION, SOLUTION INTRAVENOUS; SUBCUTANEOUS at 12:09

## 2022-05-21 RX ADMIN — ACETYLCYSTEINE 400 MG: 100 INHALANT RESPIRATORY (INHALATION) at 08:46

## 2022-05-21 ASSESSMENT — PULMONARY FUNCTION TESTS
PIF_VALUE: 29
PIF_VALUE: 31
PIF_VALUE: 26
PIF_VALUE: 27
PIF_VALUE: 27
PIF_VALUE: 25
PIF_VALUE: 21
PIF_VALUE: 28
PIF_VALUE: 28
PIF_VALUE: 29
PIF_VALUE: 27
PIF_VALUE: 28
PIF_VALUE: 24
PIF_VALUE: 30
PIF_VALUE: 32
PIF_VALUE: 26

## 2022-05-21 ASSESSMENT — PAIN SCALES - GENERAL
PAINLEVEL_OUTOF10: 0
PAINLEVEL_OUTOF10: 0

## 2022-05-21 NOTE — PROGRESS NOTES
[facility]  Nephrology Progress Note    Patient's Name: Brianne Simon    Subjective:    5/21: Patient was seen and examined. He nodded no when asked if he had any pain. He has a visitor at the bedside.     PMH:    Past Medical History:   Diagnosis Date    A-fib ([facility]     TRUMAN (acute kidney injury) ([facility]     Anemia     Anxiety     Bipolar 1 disorder ([facility]     Charcot foot due to diabetes mellitus ([facility]     CHF (congestive heart failure) ([facility])     CKD (chronic kidney disease)     Diabetes mellitus ([facility]     Dialysis patient ([facility]     Hyperlipidemia     Hypertension     Right sided weakness     Sacral decubitus ulcer     Seizure [facility])      Patient Active Problem List   Diagnosis    Cardiac arrest ([facility]    Pneumonia due to infectious organism    Pleural effusion     Meds:     dilTIAZem  30 mg PEG Tube 4 times per day    lansoprazole  30 mg Per G Tube QAM AC    heparin (porcine)  5,000 Units SubCUTAneous Q8H    collagenase   Topical Daily    ceftolozane-tazobactam (ZERBAXA) IVPB  1,500 mg IntraVENous Q8H    metoprolol tartrate  25 mg Oral BID    epoetin akndi-epbx  8,000 Units SubCUTAneous Once per day on Mon Wed Fri    And    epoetin kandi-epbx  2,000 Units SubCUTAneous Once per day on Mon Wed Fri    mupirocin   Topical BID    tigecycline (TYGACIL) IVPB  50 mg IntraVENous Q12H    sulfamethoxazole-trimethoprim  2 tablet Oral Daily    acetylcysteine  4 mL Inhalation Q4H    ipratropium-albuterol  1 ampule Inhalation Q4H    polyethylene glycol  17 g Oral Daily    chlorhexidine  15 mL Mouth/Throat BID    senna  5 mL Per G Tube Nightly    docusate  100 mg Per G Tube BID    levothyroxine  175 mcg Per G Tube Daily    [Held by provider] insulin glargine  20 Units SubCUTAneous BID    insulin lispro  0-12 Units SubCUTAneous Q6H    levETIRAcetam  500 mg PEG Tube BID    sodium chloride flush  5-40 mL IntraVENous 2 times per day      sodium chloride      fentaNYL 200 mcg/hr (05/21/22 3990)    sodium chloride Stopped (05/20/22 0325)     Meds prn:     midazolam, sodium chloride, anticoagulant sodium citrate, perflutren lipid microspheres, glucose, dextrose bolus **OR** dextrose bolus, glucagon (rDNA), sodium chloride flush, sodium chloride    Meds prior to admission:     No current facility-administered medications on file prior to encounter. Current Outpatient Medications on File Prior to Encounter   Medication Sig Dispense Refill    insulin glargine (LANTUS) 100 UNIT/ML injection vial Inject 7 Units into the skin daily Give in a.m.      levETIRAcetam (KEPPRA) 250 MG tablet Take 250 mg by mouth daily      levETIRAcetam (KEPPRA) 500 MG tablet Take 500 mg by mouth nightly      apixaban (ELIQUIS) 5 MG TABS tablet Take 5 mg by mouth 2 times daily      chlorhexidine (PERIDEX) 0.12 % solution Take 15 mLs by mouth 2 times daily      nystatin (MYCOSTATIN) 503474 UNIT/ML suspension Take 500,000 Units by mouth 3 times daily      oxyCODONE (OXYCONTIN) 10 MG extended release tablet Take 10 mg by mouth every 12 hours.  NYSTATIN IN AQUAPHOR OINTMENT Apply topically 2 times daily      clonazePAM (KLONOPIN) 0.5 MG tablet Take 0.5 mg by mouth 2 times daily as needed.  insulin glargine (LANTUS) 100 UNIT/ML injection vial Inject 40 Units into the skin nightly       dronabinol (MARINOL) 2.5 MG capsule Take 2.5 mg by mouth 2 times daily (before meals). (Patient not taking: Reported on 5/11/2022)      alteplase (CATHFLO) 2 MG injection 2 mg by IntraCATHeter route as needed Per cathflo protocol at [facility]      cloNIDine (CATAPRES) 0.1 MG tablet Take 0.1 mg by mouth 2 times daily      HYDROmorphone (DILAUDID) 2 MG tablet Take 2 mg by mouth every 8 hours as needed for Pain.       anticoagulant sodium citrate 4 GM/100ML SOLN by CRRT route continuous      carvedilol (COREG) 3.125 MG tablet Take 3.125 mg by mouth 2 times daily (with meals)      hydrALAZINE (APRESOLINE) 25 MG tablet Take 100 mg by by mouth daily      acetaminophen (TYLENOL) 325 MG tablet Take 650 mg by mouth every 6 hours as needed for Pain       Allergies: Other    Social History:     reports that he has quit smoking. He has never used smokeless tobacco. He reports previous alcohol use. He reports previous drug use. Family History:     No family history on file.     Physical Exam:    Patient Vitals for the past 24 hrs:   BP Temp Temp src Pulse Resp SpO2 Height Weight   05/21/22 0945 114/88 -- -- 106 -- -- -- --   05/21/22 0930 118/73 -- -- 90 20 (!) 88 % -- --   05/21/22 0915 119/89 -- -- 100 -- -- -- --   05/21/22 0900 107/73 -- -- 99 -- -- -- --   05/21/22 0847 -- -- -- -- 18 98 % -- --   05/21/22 0846 -- -- -- -- 18 97 % -- --   05/21/22 0845 122/74 -- -- 82 -- -- -- --   05/21/22 0839 125/72 -- -- 89 -- -- -- --   05/21/22 0800 122/63 98.6 °F (37 °C) -- 87 17 97 % -- 251 lb 5.2 oz (114 kg)   05/21/22 0700 (!) 97/53 -- -- 87 18 98 % -- --   05/21/22 0600 129/80 -- -- 113 15 94 % -- --   05/21/22 0500 (!) 105/58 -- -- 85 18 97 % -- --   05/21/22 0400 (!) 108/57 -- Infrared 90 18 96 % -- --   05/21/22 0328 -- -- -- -- 24 97 % -- --   05/21/22 0324 -- -- -- 101 21 (!) 88 % -- --   05/21/22 0300 115/62 -- -- 90 18 97 % -- --   05/21/22 0200 124/79 -- -- 87 24 98 % -- --   05/21/22 0100 (!) 99/56 -- -- 90 14 95 % -- --   05/21/22 0000 (!) 155/72 97.3 °F (36.3 °C) Infrared 113 21 -- -- --   05/20/22 2354 -- -- -- -- 27 -- -- --   05/20/22 2353 -- -- -- -- 21 -- -- --   05/20/22 2351 -- -- -- 113 28 -- -- --   05/20/22 2022 -- -- -- -- 27 95 % -- --   05/20/22 2021 -- -- -- -- (!) 32 96 % -- --   05/20/22 2018 -- -- -- 83 17 93 % -- --   05/20/22 2000 85/60 97.3 °F (36.3 °C) Infrared 97 25 96 % -- --   05/20/22 1900 92/64 -- -- 99 22 94 % -- --   05/20/22 1800 112/67 -- -- 109 20 98 % -- --   05/20/22 1700 108/77 -- -- 95 18 97 % -- --   05/20/22 1600 115/65 97.6 °F (36.4 °C) Temporal 92 18 96 % -- --   05/20/22 1556 -- -- -- 83 8 99 % -- Fannie Rimes in the last 72 hours. Recent Labs     05/20/22  0540   LABALBU 2.3*       No results found for: FERRITIN, IRON, TIBC    No results found for: [id number]    No results found for: FOLATE    No results found for: VOL, APPEARANCE, COLORU, LABSPEC, LABPH, LEUKBLD, NITRU, GLUCOSEU, KETUA, UROBILINOGEN, KETUA, UROBILINOGEN, BILIRUBINUR, OCBU    No results found for: HARSH, CREURRAN, MACREATRATIO, OSMOU    No components found for: URIC    No results found for: LIPIDPAN    Assessment and Plans:    1. ESRD on HD   S/p HD 5/20 with 2350ml vol removal net   s/p ultrafiltration today with 2700 ml removed   PLAN:  1. Next IHD on 5/23/22     2. Volume overload  No significant change in the CXR compared to 5/17/22   s/p ultrafiltration today with 2700 ml removed   PLAN:  1. follow with HD       3 Hypotension  Maintain MAP>65mmHg  PLAN:  1. Follow BP with the vol removal     4 Hyponatremia  With volume overload and hypotonic fluids  Na+ 137 today   PLAN:  1. Follow with HD/ UF     5 Anemia with thrombocytopenia  PLAN:  1. Continue on SHANIQUA  2. Follow Hgb and transfuse for hgB <7     6. Hyperphosphatemia in the setting of the Sec HPTH with the Immune Enhancing TF  Phos WNL today   PLAN:  1. Continue to monitor labs    LILY Martino - CNP   Patient seen and examined. I had a face to face encounter with the patient. Pt remains intubated, awake alert and nodded to simple questions has pain in the buttock at the time of my visit  Agree with exam.    Agree with  formulation, assessment and plan as outlined above and directed by me. Addition and corrections were done as deemed appropriate. My exam and plan include:     A/P:  1. ESKD -had an additional ultrafiltration today with 2.7L vol removal-will plan for IHD Monday will schedule around the trach and diverting colostomy  Continue current treatment.     ROSITA Beverly MD

## 2022-05-21 NOTE — PROGRESS NOTES
Department of General Surgery - Adult  Surgical Service   Attending Progress Note      SUBJECTIVE: Patient seen and examined at bedside. alert and oriented. Responsive and appropriate. Remains mechanically ventilated    OBJECTIVE      Physical    VITALS:  /84   Pulse 113   Temp 98.4 °F (36.9 °C)   Resp 17   Ht 6' 5\" (1.956 m)   Wt 245 lb 6 oz (111.3 kg)   SpO2 96%   BMI 29.10 kg/m²     General: No acute distress, mechanically ventilated  Eyes: Extraocular movements intact, no scleral icterus  Respiratory: mechanically ventilated, no cyanosis  Cardiovascular: Normal capillary refill, 2+ radial pulse  Abdomen: Soft nontender to palpation nondistended, currently receiving tube feeds  Neurologic: No focal neurologic deficits      Data  CBC with Differential:    Lab Results   Component Value Date    WBC 11.9 05/21/2022    RBC 2.84 05/21/2022    HGB 7.7 05/21/2022    HCT 24.4 05/21/2022     05/21/2022    MCV 85.9 05/21/2022    MCH 27.1 05/21/2022    MCHC 31.6 05/21/2022    RDW 21.2 05/21/2022    NRBC 0.0 05/19/2022    METASPCT 1.7 05/11/2022    LYMPHOPCT 3.0 05/21/2022    MONOPCT 4.0 05/21/2022    BASOPCT 0.0 05/21/2022    MONOSABS 0.48 05/21/2022    LYMPHSABS 0.36 05/21/2022    EOSABS 0.00 05/21/2022    BASOSABS 0.00 05/21/2022     BMP:    Lab Results   Component Value Date     05/21/2022    K 4.1 05/21/2022    K 4.5 05/11/2022    CL 99 05/21/2022    CO2 26 05/21/2022    BUN 49 05/21/2022    LABALBU 2.3 05/20/2022    CREATININE 1.5 05/21/2022    CALCIUM 8.8 05/21/2022    GFRAA 56 05/21/2022    LABGLOM 46 05/21/2022    GLUCOSE 125 05/21/2022       ASSESSMENT AND PLAN    68-year-old male with sacral wound and anal bleeding  -Wound debrided tolerating dressing changes.  -Plan for diverting colostomy on Monday with Dr. Tanisha Painter as well as potential trach pending his respiratory status.     Blake Sandhu MD

## 2022-05-21 NOTE — PROGRESS NOTES
Critical Care Team - Daily Progress Note      Date and time: 5/21/2022 10:41 AM  Patient's name:  Nolberto Taylor  Medical Record Number: [id number]  Patient's account/billing number: [de-identified]  Patient's YOB: 1955  Age: 74 y.o. Date of Admission: 5/10/2022 11:58 PM  Length of stay during current admission: 10      Primary Care Physician: Meghan Rosenberg MD  ICU Attending Physician: Dr. Strickland Brevard    Code Status: DNR-CCA    Reason for ICU admission: Status postcardiac arrest      SUBJECTIVE:     OVERNIGHT EVENTS:         Patient remains afebrile, inubated. Patient is awake, alert and orientated. Nods to questions appropriately. He is aware of the surgery coming up. He denies any sacral pain, chest pain,  Fever, chills, nausea. Intake/Output:   No intake/output data recorded. I/O last 3 completed shifts: In: 2762.5 [I.V.:907.2; NG/GT:777; IV Piggyback:778.3]  Out: 2765 [Chest Tube:115]    Awake and following commands: No  Current Ventilation: - Ventilator Settings:    Vt (Set, mL): 400 mL  Resp Rate (Set): 18 bmp  FiO2 : 35 %    PEEP/CPAP (cmH2O): 6  Pressure Support: 0 cmH20  Secretions: Thick, yellow secretions, blood  Sedation: fentanyl and Versed as needed  Paralyzed: No  Vasopressors: No    Initial HPI + past overnight events: 40-year-old male resident of [facility] with significant past medical history of A. fib on Eliquis, aspirin, anxiety, anemia, CHF, insulin-dependent type 2 diabetes, CKD on hemodialysis, sacral decubitus ulcers with wound VAC, hyperlipidemia, hypothyroidism. Patient presented to the ICU after having a cardiac arrest, required CPR 2 rounds of epinephrine, and intubated. He is currently on Tigacyline and Amikacin per ID for treatment of HCAP. D10 started for hypoglycemia.     OBJECTIVE:     VITAL SIGNS:  /71   Pulse 101   Temp 98.6 °F (37 °C)   Resp 20   Ht 6' 5\" (1.956 m)   Wt 251 lb 5.2 oz (114 kg)   SpO2 (!) 88%   BMI 29.80 kg/m²   Tmax over 24 hours:  Temp (24hrs), Av °F (36.7 °C), Min:97.3 °F (36.3 °C), Max:98.6 °F (37 °C)      Patient Vitals for the past 6 hrs:   BP Temp Pulse Resp SpO2 Weight   22 1030 112/71 -- 101 -- -- --   22 1015 108/71 -- 101 -- -- --   22 1000 112/75 -- 100 -- -- --   22 0945 114/88 -- 106 -- -- --   22 0930 118/73 -- 90 20 (!) 88 % --   22 0915 119/89 -- 100 -- -- --   22 0900 107/73 -- 99 -- -- --   22 0847 -- -- -- 18 98 % --   22 0846 -- -- -- 18 97 % --   22 0845 122/74 -- 82 -- -- --   22 0839 125/72 -- 89 -- -- --   22 0800 122/63 98.6 °F (37 °C) 87 17 97 % 251 lb 5.2 oz (114 kg)   22 0700 (!) 97/53 -- 87 18 98 % --   22 0600 129/80 -- 113 15 94 % --   22 0500 (!) 105/58 -- 85 18 97 % --         Intake/Output Summary (Last 24 hours) at 2022 1041  Last data filed at 2022 0603  Gross per 24 hour   Intake 2762.45 ml   Output 2765 ml   Net -2.55 ml     Wt Readings from Last 2 Encounters:   22 251 lb 5.2 oz (114 kg)   22 212 lb (96.2 kg)     Body mass index is 29.8 kg/m². Physical Exam  Vitals and nursing note reviewed. Constitutional:       General: He is awake. Appearance: He is ill-appearing. Comments:  Intubated  Patient responds appropriately to all questions,   Attempting to mouth words   HENT:      Head: Normocephalic and atraumatic. Nose: Nose normal. No congestion or rhinorrhea. Mouth/Throat:      Mouth: Mucous membranes are moist.      Pharynx: Oropharynx is clear. Eyes:      General: No scleral icterus. Extraocular Movements: Extraocular movements intact. Conjunctiva/sclera: Conjunctivae normal.   Cardiovascular:      Rate and Rhythm: Normal rate and regular rhythm. Pulses: Normal pulses. Heart sounds: Normal heart sounds. No murmur heard. Pulmonary:      Breath sounds: No stridor. Rhonchi present.       Comments: intubated  Abdominal:      General: Bowel sounds are normal. There is no distension. Palpations: Abdomen is soft. There is no mass. Tenderness: There is no abdominal tenderness. There is no guarding or rebound. Comments: PEG tube   Musculoskeletal:         General: Normal range of motion. Cervical back: Normal range of motion and neck supple. No tenderness. Right lower leg: Edema present. Left lower leg: Edema present. Skin:     Findings: Lesion (Sacral decubitus) present. Comments: Chronic changes -Dark-colored lower legs b/l  Feet are cold to touch    Neurological:      Mental Status: He is alert and oriented to person, place, and time. Cranial Nerves: No cranial nerve deficit. Sensory: No sensory deficit. Comments: Patient awake and alert, orientated to name. Not sure about location and season. Psychiatric:         Attention and Perception: Attention normal.         Behavior: Behavior is cooperative.            MEDICATIONS:    Scheduled Meds:   dilTIAZem  30 mg PEG Tube 4 times per day    lansoprazole  30 mg Per G Tube QAM AC    heparin (porcine)  5,000 Units SubCUTAneous Q8H    collagenase   Topical Daily    ceftolozane-tazobactam (ZERBAXA) IVPB  1,500 mg IntraVENous Q8H    metoprolol tartrate  25 mg Oral BID    epoetin kandi-epbx  8,000 Units SubCUTAneous Once per day on Mon Wed Fri    And    epoetin kandi-epbx  2,000 Units SubCUTAneous Once per day on Mon Wed Fri    mupirocin   Topical BID    tigecycline (TYGACIL) IVPB  50 mg IntraVENous Q12H    sulfamethoxazole-trimethoprim  2 tablet Oral Daily    acetylcysteine  4 mL Inhalation Q4H    ipratropium-albuterol  1 ampule Inhalation Q4H    polyethylene glycol  17 g Oral Daily    chlorhexidine  15 mL Mouth/Throat BID    senna  5 mL Per G Tube Nightly    docusate  100 mg Per G Tube BID    levothyroxine  175 mcg Per G Tube Daily    [Held by provider] insulin glargine  20 Units SubCUTAneous BID    insulin lispro  0-12 Units SubCUTAneous Q6H    levETIRAcetam  500 mg PEG Tube BID    sodium chloride flush  5-40 mL IntraVENous 2 times per day     Continuous Infusions:   sodium chloride      fentaNYL 200 mcg/hr (05/21/22 0609)    sodium chloride Stopped (05/20/22 1459)     PRN Meds:   midazolam, 1 mg, Q2H PRN  sodium chloride, , PRN  anticoagulant sodium citrate, 4.2 mL, PRN  perflutren lipid microspheres, 1.5 mL, ONCE PRN  glucose, 4 tablet, PRN  dextrose bolus, 125 mL, PRN   Or  dextrose bolus, 250 mL, PRN  glucagon (rDNA), 1 mg, PRN  sodium chloride flush, 5-40 mL, PRN  sodium chloride, , PRN          VENT SETTINGS (Comprehensive) (if applicable):  Vent Information  Ventilator ID: [id number]  Vent Mode: AC/VC  Ventilator Initiate: Yes  Additional Respiratory Assessments  Pulse: 101  Resp: 20  SpO2: (!) 88 %  End Tidal CO2: 42 (%)  Position: Semi-Fajardo's  Humidification Source: Heated wire  Humidification Temp: 37  Circuit Condensation: Drained  Cuff Pressure (cm H2O): 37 cm H2O    Arterial Blood Gas 5/21/2022  No new ABG       Laboratory findings:    Complete Blood Count:   Recent Labs     05/19/22  0550 05/20/22  0540 05/21/22  0545   WBC 8.4 14.7* 11.9*   HGB 7.6* 8.2* 7.7*   HCT 24.3* 25.8* 24.4*   PLT 98* 103* 109*        Last 3 Blood Glucose:   Recent Labs     05/19/22  0550 05/20/22  0540 05/21/22  0545   GLUCOSE 104* 111* 125*        PT/INR:    Lab Results   Component Value Date    PROTIME 15.5 05/17/2022    INR 1.4 05/17/2022     PTT:    Lab Results   Component Value Date    APTT 32.8 05/11/2022       Comprehensive Metabolic Profile:   Recent Labs     05/19/22  0550 05/20/22  0540 05/21/22  0545   * 132 137   K 4.4 4.9 4.1   CL 94* 96* 99   CO2 24 23 26   BUN 48* 68* 49*   CREATININE 1.5* 2.0* 1.5*   GLUCOSE 104* 111* 125*   CALCIUM 8.5* 8.7 8.8   LABALBU  --  2.3*  --       Magnesium:   Lab Results   Component Value Date    MG 2.1 05/21/2022     Phosphorus:   Lab Results   Component Value Date    PHOS 4.3 05/21/2022     Ionized Calcium: No results found for: CAION     Urinalysis:     Troponin: No results for input(s): TROPONINI in the last 72 hours. Microbiology:  Cultures drawn:   Gram stain rare polymore for nuclear leukocytes, epithelial cells not seen. Abundant gram-negative rods, rare yeast and rare gram-positive diplococci. Respiratory smear positive for moderate quality more for nuclear leukocytes, few yeast and rare gram-positive cocci-Pseudomonas aeruginosa   Surgical, anaerobic, fungi, acid-fast bacilli cultures pending      Radiology/Imaging:     Chest Xray (5/21/2022): Interval decrease in the left perihilar infiltrate, bilateral lower lung field infiltrates without significant interval change. ASSESSMENT:     Patient Active Problem List    Diagnosis Date Noted    Pneumonia due to infectious organism     Pleural effusion     Cardiac arrest ([facility] 05/11/2022         PLAN:     Neuro   Patient intubated   History of seizures on Keppra   Mental status somewhat improved since arrival.     Respiratory   Acute hypercapnic respiratory failure with hypoxia   Intubated 5/10/2022   Thoracentesis completed 5/15/2022 MRSA nasal colonization positive - Bactroban    Pneumonia   S/P chest tube placement for parapneumonic pleural effusion with significant drainage.  Plan is for trach on Monday       Cardiovascular   History of A. fib    Hold Eliquis   Elevated troponin most likely secondary to CKD   Cardiology following    GI   History of a PEG tub   Patient to have colostomy next week   Surgery consult for possible diverting colostomy.    Plan for diveriting colostomy on Monday     Renal   ESRD on dialysis Monday ,Wednesday, Friday   Dialysis due today   [facility]    Nephrology following    Infectious disease   Sepsis with septic shock   Leukocytosis- 14.7   decubitus ulcer-surgery, wound care following   Status postsurgical debridement yesterday in the OR   Tigecycline , bactrim, Zerbaxa   ID following Heme/Onc   PRBC received X2, 05/12/2022, 05/14/2022    trend H&H, transfuse if <7.   Thrombocytopenia , leukocytosis    Received EPO   Surgery following    Endocrine   Type 2 diabetes -MDSS   Hypothyroidism on synthroid    Social/Spiritual/DNR/Other   Code status: Full  Diet Diet NPO Exceptions are: Sips of Water with Meds  ADULT TUBE FEEDING; PEG; Immune Enhancing; Continuous; 10; No; 30; Q 4 hours   Diet NPO Exceptions are: Sips of Water with Meds   Stress ulcer prophylaxis: protonix 40mg   DVT prophylaxis: SCDS , heparin started    Consultations needed: Yes   Transfer out of ICU today? No    Other: albumin level, advance tube feeds as tolerated, VCG from central line , chest xray daily ,     Lines/catheters   Date 05/10  o ETT  · Date 05/17   · CVC Triple lumen L IJ        Svetlana Luong DO  10:41 AM  05/21/22      I personally saw, examined and provided care for the patient. Radiographs, labs and medication list were reviewed by me independently. I spoke with bedside nursing, therapists and consultants. Critical care services and times documented are independent of procedures and multidisciplinary rounds with Residents. Additionally comprehensive, multidisciplinary rounds were conducted with the MICU team. The case was discussed in detail and plans for care were established. Review of Residents documentation was conducted and revisions were made as appropriate. I agree with the above documented exam, problem list and plan of care.   Anatoliy Montes MD   CCT excluding procedures:35'

## 2022-05-21 NOTE — PROGRESS NOTES
[facility]    Admitting Date and Time: 5/10/2022 11:58 PM  Admit Dx: Cardiac arrest ([facility] [I46.9]  Elevated troponin [R77.8]  Acute respiratory failure with hypoxia (HCC) [J96.01]  Leukocytosis, unspecified type [D72.829]  Pneumonia due to infectious organism, unspecified laterality, unspecified part of lung [J18.9]    Subjective:  Patient is being followed for Cardiac arrest ([facility] [I46.9]  Elevated troponin [R77.8]  Acute respiratory failure with hypoxia (HCC) [J96.01]  Leukocytosis, unspecified type [D72.829]  Pneumonia due to infectious organism, unspecified laterality, unspecified part of lung [J18.9]   Mr. Chris Schulz, a 74y.o. year old male who has a past medical history of A-fib ([facility] TRUMAN (acute kidney injury) ([facility] Anemia, Anxiety, Bipolar 1 disorder ([facility] Charcot foot due to diabetes mellitus ([facility] CHF (congestive heart failure) ([facility] CKD (chronic kidney disease), Diabetes mellitus ([facility] Dialysis patient ([facility] Hyperlipidemia, Hypertension, Right sided weakness, Sacral decubitus ulcer, and Seizure ([facility] This is a 68-year-old male with progressive dementia and spinal stenosis. Has been bedbound for a long time. He has had in the past bouts of kidney failure that did not require long-term resuscitation with dialysis however he was at acute care for decompensation from his large sacral decubitus and acute renal failure that did require renal replacement therapy of hemodialysis. He finishes antibiotics. His sacral wound was slow to heal because of poor oral intake and he was given a PEG tube for nutritional supplementation purposes since his wife wanted to. Patient had been stable till about 48 hours ago. Monday he was noticed to have a large amount of bloody stool. He was started on an intravenous proton pump inhibitor and surgery services were asked to see him. His anticoagulation was held. Patient was being watched and he did not have any more episodes. Yesterday he had an increase in his WBCs without having any localizing symptoms. Infectious disease did see him and started him immediately on broad-spectrum antibiotics. Patient became unresponsive and virtually reportedly evening more towards the early hours of this morning. Fluids were started unfortunately patient could not be resuscitated despite best efforts of the on-call intensive virtual specialist. Patient was sent over to acute care where he was intubated for cardiorespiratory failure. He was found to be in cardiac arrest.     ROS: denies fever, chills, cp, sob, n/v, HA unless stated above.      acetylcysteine  4 mL Inhalation Q6H    dilTIAZem  30 mg PEG Tube 4 times per day    lansoprazole  30 mg Per G Tube QAM AC    heparin (porcine)  5,000 Units SubCUTAneous Q8H    collagenase   Topical Daily    ceftolozane-tazobactam (ZERBAXA) IVPB  1,500 mg IntraVENous Q8H    metoprolol tartrate  25 mg Oral BID    epoetin kandi-epbx  8,000 Units SubCUTAneous Once per day on Mon Wed Fri    And    epoetin kandi-epbx  2,000 Units SubCUTAneous Once per day on Mon Wed Fri    mupirocin   Topical BID    tigecycline (TYGACIL) IVPB  50 mg IntraVENous Q12H    sulfamethoxazole-trimethoprim  2 tablet Oral Daily    ipratropium-albuterol  1 ampule Inhalation Q4H    polyethylene glycol  17 g Oral Daily    chlorhexidine  15 mL Mouth/Throat BID    senna  5 mL Per G Tube Nightly    docusate  100 mg Per G Tube BID    levothyroxine  175 mcg Per G Tube Daily    [Held by provider] insulin glargine  20 Units SubCUTAneous BID    insulin lispro  0-12 Units SubCUTAneous Q6H    levETIRAcetam  500 mg PEG Tube BID    sodium chloride flush  5-40 mL IntraVENous 2 times per day     midazolam, 1 mg, Q2H PRN  sodium chloride, , PRN  anticoagulant sodium citrate, 4.2 mL, PRN  perflutren lipid microspheres, 1.5 mL, ONCE PRN  glucose, 4 tablet, PRN  dextrose bolus, 125 mL, PRN   Or  dextrose bolus, 250 mL, PRN  glucagon (rDNA), 1 mg, PRN  sodium chloride flush, 5-40 mL, PRN  sodium chloride, , PRN         Objective:    /84   Pulse 113   Temp 98.4 °F (36.9 °C)   Resp 17   Ht 6' 5\" (1.956 m)   Wt 245 lb 6 oz (111.3 kg)   SpO2 96%   BMI 29.10 kg/m²     General Appearance: Can answer with gesture, patient having dialysis now. Skin: warm and dry  Head: normocephalic and atraumatic  Eyes: pupils equal, round, and reactive to light, extraocular eye movements intact, conjunctivae normal  Neck: neck supple and non tender without mass   Pulmonary/Chest: clear to auscultation bilaterally- no wheezes, rales or rhonchi, normal air movement, no respiratory distress  Cardiovascular: Irregularly irregular heart sound. Abdomen: soft, non-tender, non-distended, normal bowel sounds, no masses or organomegaly  Extremities: no cyanosis, no clubbing and no edema  Neurologic: Patient and ventilation could not be evaluated. Recent Labs     05/19/22  0550 05/20/22  0540 05/21/22  0545   * 132 137   K 4.4 4.9 4.1   CL 94* 96* 99   CO2 24 23 26   BUN 48* 68* 49*   CREATININE 1.5* 2.0* 1.5*   GLUCOSE 104* 111* 125*   CALCIUM 8.5* 8.7 8.8       Recent Labs     05/19/22  0550 05/20/22  0540 05/21/22  0545   WBC 8.4 14.7* 11.9*   RBC 2.79* 3.01* 2.84*   HGB 7.6* 8.2* 7.7*   HCT 24.3* 25.8* 24.4*   MCV 87.1 85.7 85.9   MCH 27.2 27.2 27.1   MCHC 31.3* 31.8* 31.6*   RDW 20.1* 20.5* 21.2*   PLT 98* 103* 109*   MPV 9.9 9.1 9.8         Assessment:    Principal Problem:    Cardiac arrest (HCC)  Active Problems:    Pneumonia due to infectious organism    Pleural effusion  Resolved Problems:    * No resolved hospital problems. *      Plan:  1. Acute hypercapnic Respiratory failure with hypoxia: Patient intubated on 5/10/22, thoracocentesis done due to parapneumonic effusion now on Bactrim, tigecycline and Zerbaxa tracheostomy Monday. 2.  Atrial fibrillation: Cardiology on board. 3.  ESRD: Dialysis Monday Wednesday Friday. EPO for anemia.   4.  Sepsis with septic shock: Most likely due to decubitus ulcer on the back, on tigecycline, Bactrim and Zerbaxa ID on the case  5. Diabetes mellitus continue insulin coverage. 6.  Hypothyroidism: Continue levothyroxine. NOTE: This report was transcribed using voice recognition software. Every effort was made to ensure accuracy; however, inadvertent computerized transcription errors may be present.   Electronically signed by Neetu Marks MD on 5/21/2022 at 3:01 PM

## 2022-05-21 NOTE — PROGRESS NOTES
[address] [place] [address] [facility]  [facility]  Progress Note    SUBJECTIVE:  Chief Complaint   Patient presents with    Loss of Consciousness     arrived from [facility] via [facility] unresponsive     The patient is feeling in the ICU. He is intubated. Tolerating antibiotics. Denies dyspnea. No pain. Review of systems:  A 10 point review of systems was done. As stated above, otherwise negative.     Medications:   acetylcysteine  4 mL Inhalation Q6H    dilTIAZem  30 mg PEG Tube 4 times per day    lansoprazole  30 mg Per G Tube QAM AC    heparin (porcine)  5,000 Units SubCUTAneous Q8H    collagenase   Topical Daily    ceftolozane-tazobactam (ZERBAXA) IVPB  1,500 mg IntraVENous Q8H    metoprolol tartrate  25 mg Oral BID    epoetin kandi-epbx  8,000 Units SubCUTAneous Once per day on Mon Wed Fri    And    epoetin kandi-epbx  2,000 Units SubCUTAneous Once per day on Mon Wed Fri    mupirocin   Topical BID    tigecycline (TYGACIL) IVPB  50 mg IntraVENous Q12H    sulfamethoxazole-trimethoprim  2 tablet Oral Daily    ipratropium-albuterol  1 ampule Inhalation Q4H    polyethylene glycol  17 g Oral Daily    chlorhexidine  15 mL Mouth/Throat BID    senna  5 mL Per G Tube Nightly    docusate  100 mg Per G Tube BID    levothyroxine  175 mcg Per G Tube Daily    [Held by provider] insulin glargine  20 Units SubCUTAneous BID    insulin lispro  0-12 Units SubCUTAneous Q6H    levETIRAcetam  500 mg PEG Tube BID    sodium chloride flush  5-40 mL IntraVENous 2 times per day      sodium chloride      fentaNYL 200 mcg/hr (05/21/22 0609)    sodium chloride Stopped (05/20/22 1459)     midazolam, sodium chloride, anticoagulant sodium citrate, perflutren lipid microspheres, glucose, dextrose bolus **OR** dextrose bolus, glucagon (rDNA), sodium chloride flush, sodium chloride    OBJECTIVE:  /68   Pulse 113   Temp 98.6 °F (37 °C)   Resp 20   Ht 6' 5\" (1.956 m)   Wt 251 lb 5.2 oz (114 kg)   SpO2 (!) 88% BMI 29.80 kg/m²   Temp  Av °F (36.7 °C)  Min: 97.3 °F (36.3 °C)  Max: 98.6 °F (37 °C)  Constitutional: The patient is lying in bed in the ICU. He is awake. Shakes and nods head. FiO2 35%. PEEP 6. No changes. Skin: Warm and dry. No rashes were noted. HEENT: Eyes show round, and reactive pupils. No jaundice. Moist mucous membranes, no ulcerations, no thrush. ETT. Neck: Supple to movements. No lymphadenopathy. Chest: No use of accessory muscles to breathe. Symmetrical expansion. Auscultation reveals coarse breath sounds. scattered rhonchi. Right tunneled HD catheter. Cardiovascular: Heart sounds arrhythmic and irregular. Abdomen: Positive bowel sounds to auscultation. Benign to palpation. PEG. Extremities: Minimal edema. Left third toe missing. Back: Stage IV decubitus ulcer with dressing. Lines: Left IJ TLC 2022. Fecal management system.     Laboratory and Tests Review:  Lab Results   Component Value Date    WBC 11.9 (H) 2022    WBC 14.7 (H) 2022    WBC 8.4 2022    HGB 7.7 (L) 2022    HCT 24.4 (L) 2022    MCV 85.9 2022     (L) 2022     Lab Results   Component Value Date    NEUTROABS 11.07 (H) 2022    NEUTROABS 13.50 (H) 2022    NEUTROABS 7.81 (H) 2022     Lab Results   Component Value Date    CRP 13.9 (H) 2022     No results found for: CRPHS  Lab Results   Component Value Date    SEDRATE 78 (H) 2022     Lab Results   Component Value Date    ALT 30 2022    AST 31 2022    ALKPHOS 591 (H) 2022    BILITOT 0.3 2022     Lab Results   Component Value Date     2022    K 4.1 2022    K 4.5 2022    CL 99 2022    CO2 26 2022    BUN 49 2022    CREATININE 1.5 2022    CREATININE 2.0 2022    CREATININE 1.5 2022    GFRAA 56 2022    LABGLOM 46 2022    GLUCOSE 125 2022    PROT 6.9 2022    LABALBU 2.3 2022 CALCIUM 8.8 05/21/2022    BILITOT 0.3 05/11/2022    ALKPHOS 591 05/11/2022    AST 31 05/11/2022    ALT 30 05/11/2022     Radiology:    Reviewed  Microbiology:   [facility]:  Blood culture 5/10/2022: Negative so far  Decubitus ulcer 5/10/2022: MRSA, mixed GNR  Respiratory panel: Pending  Nares screen MRSA: Pending  Blood cultures 5/11/2022: Negative so far      [facility]:  Respiratory panel: Negative  Blood cultures 5/11/2022: Staphylococcus epidermidis in 2 of 2 sets  Nares screen MRSA: Positive   Respiratory culture 5/12/2022: MDR Acinetobacter baumanii (sensitive to Bactrim, Tigecycline, Cefiderocol. Intermediate to Avycaz and Eravacycline. Resistant to all others)  Respiratory culture 5/16/2022: OP patricia reduced. GNR, GNR, GNR. Pleural fluid 5/13/2022: Pseudomonas aeruginosa (Resistant to Levofloxacin and Meropenem. Intermediate to Zosyn. Sensitive to Avycaz and Gentamicin)  Sacral wound 5/17/2022: MDR Acinetobacter baumanii, Pseudomonas aeruginosa (sensitive only to aminoglycosides and Zerbaxa)    ASSESSMENT:  · HCAP with MDR Acinetobacter baumanii  · Sepsis with septic shock associated to HCAP  · Status postcardiac arrest  · Acute respiratory failure. Unable to wean  · Leukocytosis   · History of sacral decubitus ulcer infection with Pseudomonas and Enterococcus. Treated with IV antibiotic between March and April 2022. Status postdebridement 5/17/2022. Now colonized/infected with MDR Acinetobacter and MDR Pseudomonas  · CKD, on HD  · Acinetobacter in the respiratory cultures. Possible outbreak in the ICU  · Pleural fluid infection with Pseudomonas. Possible empyema    PLAN:  · Continue Tigecycline, Bactrim, day 7  · Continue Zerbaxa, day 5  · Tracheostomy and diverting colostomy  · Continue contact isolation    Spoke with nursing.     Adamaris Hendrix MD  11:06 AM  5/21/2022

## 2022-05-21 NOTE — PATIENT CARE CONFERENCE
Intensive Care Daily Quality Rounding Checklist        ICU Team Members: bedside nurse, charge nurse, , resident,RT     ICU Day #: 11     Intubation Date: 5/11/2022  1  Ventilator Day #: 10     Central Line Insertion Date: 5/17/22                                                    Day #: 5      Arterial Line Insertion Date: n/a                             Day #: n/a     Temporary Hemodialysis Catheter Insertion Date: n/a                             Day # admitted with tunneled dialysis cath     DVT Prophylaxis: Eliquis on hold for anemia/ SCD's    GI Prophylaxis: Protonix     Roberts Catheter Insertion Date: HD patient                                        Day #:                              Continued need (if yes, reason documented and discussed with physician):     Skin Issues/ Wounds and ordered treatment discussed on rounds: recent debridement to sacral wound     Goals/ Plans for the Day: vent management, monitor labs and replace as needed, sx on Monday for diverting colostomy,  daily restraint order for patient safety

## 2022-05-21 NOTE — FLOWSHEET NOTE
05/21/22 1139   Vital Signs   /84   Temp 98.4 °F (36.9 °C)   Pulse 113   Resp 20   SpO2 97 %   Weight 245 lb 6 oz (111.3 kg)   Weight Method Stated   Percent Weight Change -2.37   Pain Assessment   Pain Assessment None - Denies Pain   Pain Level 0   Post-Hemodialysis Assessment   Post-Treatment Procedures Blood returned;Catheter capped, clamped with Citrate x 2 ports   Machine Disinfection Process Exterior Machine Disinfection   Rinseback Volume (ml) 300 ml   Total Liters Processed (l/min) 0.5 l/min   Dialyzer Clearance Lightly streaked   Duration of Treatment (minutes) 180 minutes   Hemodialysis Intake (ml) 300 ml   Hemodialysis Output (ml) 3000 ml   NET Removed (ml) 2700   Tolerated Treatment Good   Patient Response to Treatment pt tolerated tx well 2700ml removed hemo lines secure dead end caps applied nurse to nurse report given to floor nurse Holley Oswald pt/vs stable upon d/c   Bilateral Breath Sounds Diminished   Edema Generalized   Edema Generalized +2;+3;Pitting   RUE Edema +2;+3;Pitting   LUE Edema +2;+3;Pitting   RLE Edema +2   LLE Edema +2;Pitting   Time Off 1139   Patient Disposition Remain in ICU/ED

## 2022-05-21 NOTE — PLAN OF CARE
Problem: Respiratory - Adult  Goal: Achieves optimal ventilation and oxygenation  5/21/2022 1452 by Yanna Campbell RCP  Outcome: Not Progressing  5/21/2022 1309 by Nirmal Castillo RN  Outcome: Progressing   Remains on the same ventilator settings. No changes today.

## 2022-05-22 ENCOUNTER — APPOINTMENT (OUTPATIENT)
Dept: GENERAL RADIOLOGY | Age: 67
DRG: 003 | End: 2022-05-22
Payer: MEDICARE

## 2022-05-22 ENCOUNTER — ANESTHESIA EVENT (OUTPATIENT)
Dept: OPERATING ROOM | Age: 67
DRG: 003 | End: 2022-05-22
Payer: MEDICARE

## 2022-05-22 LAB
ANION GAP SERPL CALCULATED.3IONS-SCNC: 12 MMOL/L (ref 7–16)
ANISOCYTOSIS: ABNORMAL
BASOPHILS ABSOLUTE: 0.02 E9/L (ref 0–0.2)
BASOPHILS RELATIVE PERCENT: 0.2 % (ref 0–2)
BUN BLDV-MCNC: 67 MG/DL (ref 6–23)
CALCIUM SERPL-MCNC: 8.8 MG/DL (ref 8.6–10.2)
CHLORIDE BLD-SCNC: 99 MMOL/L (ref 98–107)
CO2: 24 MMOL/L (ref 22–29)
CREAT SERPL-MCNC: 2 MG/DL (ref 0.7–1.2)
EOSINOPHILS ABSOLUTE: 0.01 E9/L (ref 0.05–0.5)
EOSINOPHILS RELATIVE PERCENT: 0.1 % (ref 0–6)
GFR AFRICAN AMERICAN: 40
GFR NON-AFRICAN AMERICAN: 33 ML/MIN/1.73
GLUCOSE BLD-MCNC: 140 MG/DL (ref 74–99)
HCT VFR BLD CALC: 24.1 % (ref 37–54)
HEMOGLOBIN: 7.7 G/DL (ref 12.5–16.5)
HYPOCHROMIA: ABNORMAL
IMMATURE GRANULOCYTES #: 0.07 E9/L
IMMATURE GRANULOCYTES %: 0.6 % (ref 0–5)
LYMPHOCYTES ABSOLUTE: 0.53 E9/L (ref 1.5–4)
LYMPHOCYTES RELATIVE PERCENT: 4.5 % (ref 20–42)
MAGNESIUM: 2.1 MG/DL (ref 1.6–2.6)
MCH RBC QN AUTO: 27.3 PG (ref 26–35)
MCHC RBC AUTO-ENTMCNC: 32 % (ref 32–34.5)
MCV RBC AUTO: 85.5 FL (ref 80–99.9)
METER GLUCOSE: 123 MG/DL (ref 74–99)
METER GLUCOSE: 174 MG/DL (ref 74–99)
METER GLUCOSE: 183 MG/DL (ref 74–99)
MONOCYTES ABSOLUTE: 0.33 E9/L (ref 0.1–0.95)
MONOCYTES RELATIVE PERCENT: 2.8 % (ref 2–12)
NEUTROPHILS ABSOLUTE: 10.75 E9/L (ref 1.8–7.3)
NEUTROPHILS RELATIVE PERCENT: 91.8 % (ref 43–80)
OVALOCYTES: ABNORMAL
PDW BLD-RTO: 21.4 FL (ref 11.5–15)
PHOSPHORUS: 5.4 MG/DL (ref 2.5–4.5)
PLATELET # BLD: 96 E9/L (ref 130–450)
PLATELET CONFIRMATION: NORMAL
PMV BLD AUTO: 9.6 FL (ref 7–12)
POIKILOCYTES: ABNORMAL
POLYCHROMASIA: ABNORMAL
POTASSIUM SERPL-SCNC: 4.7 MMOL/L (ref 3.5–5)
RBC # BLD: 2.82 E12/L (ref 3.8–5.8)
SODIUM BLD-SCNC: 135 MMOL/L (ref 132–146)
TARGET CELLS: ABNORMAL
WBC # BLD: 11.7 E9/L (ref 4.5–11.5)

## 2022-05-22 PROCEDURE — 83735 ASSAY OF MAGNESIUM: CPT

## 2022-05-22 PROCEDURE — 6360000002 HC RX W HCPCS

## 2022-05-22 PROCEDURE — 6370000000 HC RX 637 (ALT 250 FOR IP): Performed by: INTERNAL MEDICINE

## 2022-05-22 PROCEDURE — 2000000000 HC ICU R&B

## 2022-05-22 PROCEDURE — 6360000002 HC RX W HCPCS: Performed by: SPECIALIST

## 2022-05-22 PROCEDURE — 6370000000 HC RX 637 (ALT 250 FOR IP): Performed by: STUDENT IN AN ORGANIZED HEALTH CARE EDUCATION/TRAINING PROGRAM

## 2022-05-22 PROCEDURE — 36592 COLLECT BLOOD FROM PICC: CPT

## 2022-05-22 PROCEDURE — 84100 ASSAY OF PHOSPHORUS: CPT

## 2022-05-22 PROCEDURE — 2580000003 HC RX 258: Performed by: SPECIALIST

## 2022-05-22 PROCEDURE — 36415 COLL VENOUS BLD VENIPUNCTURE: CPT

## 2022-05-22 PROCEDURE — 99233 SBSQ HOSP IP/OBS HIGH 50: CPT | Performed by: INTERNAL MEDICINE

## 2022-05-22 PROCEDURE — 85025 COMPLETE CBC W/AUTO DIFF WBC: CPT

## 2022-05-22 PROCEDURE — 71045 X-RAY EXAM CHEST 1 VIEW: CPT

## 2022-05-22 PROCEDURE — 82962 GLUCOSE BLOOD TEST: CPT

## 2022-05-22 PROCEDURE — 6360000002 HC RX W HCPCS: Performed by: INTERNAL MEDICINE

## 2022-05-22 PROCEDURE — 80048 BASIC METABOLIC PNL TOTAL CA: CPT

## 2022-05-22 PROCEDURE — 2500000003 HC RX 250 WO HCPCS: Performed by: STUDENT IN AN ORGANIZED HEALTH CARE EDUCATION/TRAINING PROGRAM

## 2022-05-22 PROCEDURE — 94640 AIRWAY INHALATION TREATMENT: CPT

## 2022-05-22 PROCEDURE — 94003 VENT MGMT INPAT SUBQ DAY: CPT

## 2022-05-22 PROCEDURE — 6370000000 HC RX 637 (ALT 250 FOR IP): Performed by: SPECIALIST

## 2022-05-22 PROCEDURE — 2580000003 HC RX 258: Performed by: INTERNAL MEDICINE

## 2022-05-22 RX ORDER — QUETIAPINE FUMARATE 25 MG/1
25 TABLET, FILM COATED ORAL 2 TIMES DAILY
Status: DISCONTINUED | OUTPATIENT
Start: 2022-05-22 | End: 2022-05-26 | Stop reason: HOSPADM

## 2022-05-22 RX ORDER — OXYCODONE HYDROCHLORIDE 5 MG/1
10 TABLET ORAL EVERY 6 HOURS
Status: DISCONTINUED | OUTPATIENT
Start: 2022-05-22 | End: 2022-05-26 | Stop reason: HOSPADM

## 2022-05-22 RX ADMIN — HEPARIN SODIUM 5000 UNITS: 10000 INJECTION, SOLUTION INTRAVENOUS; SUBCUTANEOUS at 17:59

## 2022-05-22 RX ADMIN — DILTIAZEM HYDROCHLORIDE 30 MG: 30 TABLET, FILM COATED ORAL at 23:51

## 2022-05-22 RX ADMIN — ACETYLCYSTEINE 400 MG: 100 INHALANT RESPIRATORY (INHALATION) at 23:41

## 2022-05-22 RX ADMIN — LEVOTHYROXINE SODIUM 175 MCG: 125 TABLET ORAL at 05:55

## 2022-05-22 RX ADMIN — OXYCODONE 10 MG: 5 TABLET ORAL at 22:06

## 2022-05-22 RX ADMIN — LANSOPRAZOLE 30 MG: KIT at 06:00

## 2022-05-22 RX ADMIN — DOCUSATE SODIUM LIQUID 100 MG: 100 LIQUID ORAL at 20:43

## 2022-05-22 RX ADMIN — IPRATROPIUM BROMIDE AND ALBUTEROL SULFATE 1 AMPULE: .5; 2.5 SOLUTION RESPIRATORY (INHALATION) at 16:45

## 2022-05-22 RX ADMIN — METOPROLOL TARTRATE 25 MG: 25 TABLET, FILM COATED ORAL at 08:37

## 2022-05-22 RX ADMIN — IPRATROPIUM BROMIDE AND ALBUTEROL SULFATE 1 AMPULE: .5; 2.5 SOLUTION RESPIRATORY (INHALATION) at 19:28

## 2022-05-22 RX ADMIN — CEFTOLOZANE AND TAZOBACTAM 1500 MG: 1; .5 INJECTION, POWDER, LYOPHILIZED, FOR SOLUTION INTRAVENOUS at 07:05

## 2022-05-22 RX ADMIN — Medication 200 MCG/HR: at 03:09

## 2022-05-22 RX ADMIN — Medication 200 MCG/HR: at 22:14

## 2022-05-22 RX ADMIN — DILTIAZEM HYDROCHLORIDE 30 MG: 30 TABLET, FILM COATED ORAL at 12:43

## 2022-05-22 RX ADMIN — HEPARIN SODIUM 5000 UNITS: 10000 INJECTION, SOLUTION INTRAVENOUS; SUBCUTANEOUS at 02:30

## 2022-05-22 RX ADMIN — TIGECYCLINE 50 MG: 50 INJECTION, POWDER, LYOPHILIZED, FOR SOLUTION INTRAVENOUS at 20:43

## 2022-05-22 RX ADMIN — Medication 10 ML: at 08:37

## 2022-05-22 RX ADMIN — LEVETIRACETAM 500 MG: 100 SOLUTION ORAL at 20:43

## 2022-05-22 RX ADMIN — METOPROLOL TARTRATE 25 MG: 25 TABLET, FILM COATED ORAL at 20:44

## 2022-05-22 RX ADMIN — QUETIAPINE FUMARATE 25 MG: 25 TABLET ORAL at 20:43

## 2022-05-22 RX ADMIN — CEFTOLOZANE AND TAZOBACTAM 1500 MG: 1; .5 INJECTION, POWDER, LYOPHILIZED, FOR SOLUTION INTRAVENOUS at 22:11

## 2022-05-22 RX ADMIN — OXYCODONE 10 MG: 5 TABLET ORAL at 17:58

## 2022-05-22 RX ADMIN — POLYETHYLENE GLYCOL 3350 17 G: 17 POWDER, FOR SOLUTION ORAL at 08:37

## 2022-05-22 RX ADMIN — MIDAZOLAM HYDROCHLORIDE 1 MG: 1 INJECTION, SOLUTION INTRAMUSCULAR; INTRAVENOUS at 07:31

## 2022-05-22 RX ADMIN — DOCUSATE SODIUM LIQUID 100 MG: 100 LIQUID ORAL at 08:37

## 2022-05-22 RX ADMIN — CEFTOLOZANE AND TAZOBACTAM 1500 MG: 1; .5 INJECTION, POWDER, LYOPHILIZED, FOR SOLUTION INTRAVENOUS at 15:01

## 2022-05-22 RX ADMIN — CHLORHEXIDINE GLUCONATE 0.12% ORAL RINSE 15 ML: 1.2 LIQUID ORAL at 20:43

## 2022-05-22 RX ADMIN — LEVETIRACETAM 500 MG: 100 SOLUTION ORAL at 08:37

## 2022-05-22 RX ADMIN — TIGECYCLINE 50 MG: 50 INJECTION, POWDER, LYOPHILIZED, FOR SOLUTION INTRAVENOUS at 08:34

## 2022-05-22 RX ADMIN — DILTIAZEM HYDROCHLORIDE 30 MG: 30 TABLET, FILM COATED ORAL at 17:58

## 2022-05-22 RX ADMIN — IPRATROPIUM BROMIDE AND ALBUTEROL SULFATE 1 AMPULE: .5; 2.5 SOLUTION RESPIRATORY (INHALATION) at 03:06

## 2022-05-22 RX ADMIN — SULFAMETHOXAZOLE AND TRIMETHOPRIM 2 TABLET: 800; 160 TABLET ORAL at 08:37

## 2022-05-22 RX ADMIN — IPRATROPIUM BROMIDE AND ALBUTEROL SULFATE 1 AMPULE: .5; 2.5 SOLUTION RESPIRATORY (INHALATION) at 12:01

## 2022-05-22 RX ADMIN — IPRATROPIUM BROMIDE AND ALBUTEROL SULFATE 1 AMPULE: .5; 2.5 SOLUTION RESPIRATORY (INHALATION) at 23:41

## 2022-05-22 RX ADMIN — Medication 200 MCG/HR: at 08:46

## 2022-05-22 RX ADMIN — INSULIN LISPRO 2 UNITS: 100 INJECTION, SOLUTION INTRAVENOUS; SUBCUTANEOUS at 12:49

## 2022-05-22 RX ADMIN — DILTIAZEM HYDROCHLORIDE 30 MG: 30 TABLET, FILM COATED ORAL at 05:55

## 2022-05-22 RX ADMIN — INSULIN LISPRO 2 UNITS: 100 INJECTION, SOLUTION INTRAVENOUS; SUBCUTANEOUS at 17:59

## 2022-05-22 RX ADMIN — CHLORHEXIDINE GLUCONATE 0.12% ORAL RINSE 15 ML: 1.2 LIQUID ORAL at 08:38

## 2022-05-22 RX ADMIN — COLLAGENASE SANTYL: 250 OINTMENT TOPICAL at 08:38

## 2022-05-22 RX ADMIN — MIDAZOLAM HYDROCHLORIDE 1 MG: 1 INJECTION, SOLUTION INTRAMUSCULAR; INTRAVENOUS at 11:09

## 2022-05-22 RX ADMIN — Medication 200 MCG/HR: at 15:27

## 2022-05-22 RX ADMIN — Medication 10 ML: at 20:44

## 2022-05-22 RX ADMIN — IPRATROPIUM BROMIDE AND ALBUTEROL SULFATE 1 AMPULE: .5; 2.5 SOLUTION RESPIRATORY (INHALATION) at 06:08

## 2022-05-22 RX ADMIN — HEPARIN SODIUM 5000 UNITS: 10000 INJECTION, SOLUTION INTRAVENOUS; SUBCUTANEOUS at 10:57

## 2022-05-22 RX ADMIN — ACETYLCYSTEINE 400 MG: 100 INHALANT RESPIRATORY (INHALATION) at 06:08

## 2022-05-22 RX ADMIN — ACETYLCYSTEINE 400 MG: 100 INHALANT RESPIRATORY (INHALATION) at 16:45

## 2022-05-22 RX ADMIN — QUETIAPINE FUMARATE 25 MG: 25 TABLET ORAL at 10:57

## 2022-05-22 RX ADMIN — OXYCODONE 10 MG: 5 TABLET ORAL at 10:57

## 2022-05-22 RX ADMIN — SENNOSIDES 8.8 MG: 8.8 SYRUP ORAL at 20:43

## 2022-05-22 ASSESSMENT — PULMONARY FUNCTION TESTS
PIF_VALUE: 28
PIF_VALUE: 30
PIF_VALUE: 28
PIF_VALUE: 28
PIF_VALUE: 29
PIF_VALUE: 28
PIF_VALUE: 27
PIF_VALUE: 31
PIF_VALUE: 30
PIF_VALUE: 30
PIF_VALUE: 29
PIF_VALUE: 28
PIF_VALUE: 34
PIF_VALUE: 27
PIF_VALUE: 33
PIF_VALUE: 34
PIF_VALUE: 28

## 2022-05-22 ASSESSMENT — LIFESTYLE VARIABLES: SMOKING_STATUS: 0

## 2022-05-22 NOTE — PROGRESS NOTES
Critical Care Team - Daily Progress Note      Date and time: 2022 11:26 AM  Patient's name:  Lacy Omalley  Medical Record Number: [id number]  Patient's account/billing number: [de-identified]  Patient's YOB: 1955  Age: 74 y.o. Date of Admission: 5/10/2022 11:58 PM  Length of stay during current admission: 11      Primary Care Physician: Angelica Huston MD  ICU Attending Physician: Dr. Blue Maya    Code Status: DNR-CCA    Reason for ICU admission: Status postcardiac arrest      SUBJECTIVE:     OVERNIGHT EVENTS:         Patient remains afebrile, inubated. Patient is awake, alert and orientated. Nods to questions appropriately. He is aware of the surgery coming up. He denies any sacral pain, chest pain,  Fever, chills, nausea. Intake/Output:   No intake/output data recorded. I/O last 3 completed shifts: In: 2415.4 [I.V.:715.4; NG/GT:887; IV AVACYZRI]  Out: 3000     Awake and following commands: No  Current Ventilation: - Ventilator Settings:    Vt (Set, mL): 400 mL  Resp Rate (Set): 18 bmp  FiO2 : 35 %    PEEP/CPAP (cmH2O): 6  Pressure Support: 0 cmH20  Secretions: Thick, yellow secretions, blood  Sedation: fentanyl and Versed as needed  Paralyzed: No  Vasopressors: No    Initial HPI + past overnight events: 58-year-old male resident of [facility] with significant past medical history of A. fib on Eliquis, aspirin, anxiety, anemia, CHF, insulin-dependent type 2 diabetes, CKD on hemodialysis, sacral decubitus ulcers with wound VAC, hyperlipidemia, hypothyroidism. Patient presented to the ICU after having a cardiac arrest, required CPR 2 rounds of epinephrine, and intubated. He is currently on Tigacyline and Amikacin per ID for treatment of HCAP. D10 started for hypoglycemia.     OBJECTIVE:     VITAL SIGNS:  BP (!) 141/93   Pulse 113   Temp 97 °F (36.1 °C)   Resp (!) 33   Ht 6' 5\" (1.956 m)   Wt 245 lb 6 oz (111.3 kg)   SpO2 (!) 85%   BMI 29.10 kg/m²   Tmax over 24 hours:  Temp (24hrs), Av.8 °F (36.6 °C), Min:97 °F (36.1 °C), Max:98.6 °F (37 °C)      Patient Vitals for the past 6 hrs:   BP Temp Pulse Resp SpO2   22 1000 (!) 141/93 -- 113 (!) 33 (!) 85 %   22 0819 -- 97 °F (36.1 °C) 103 17 100 %   22 0700 125/76 -- 113 21 99 %   22 0600 (!) 137/91 -- 114 18 99 %         Intake/Output Summary (Last 24 hours) at 2022 1126  Last data filed at 2022 0600  Gross per 24 hour   Intake 1511.51 ml   Output 3000 ml   Net -1488.49 ml     Wt Readings from Last 2 Encounters:   22 245 lb 6 oz (111.3 kg)   22 212 lb (96.2 kg)     Body mass index is 29.1 kg/m². Physical Exam  Vitals and nursing note reviewed. Constitutional:       General: He is awake. Appearance: He is ill-appearing. Comments:  Intubated  Patient responds appropriately to all questions,   Attempting to mouth words   HENT:      Head: Normocephalic and atraumatic. Nose: Nose normal. No congestion or rhinorrhea. Mouth/Throat:      Mouth: Mucous membranes are moist.      Pharynx: Oropharynx is clear. Eyes:      General: No scleral icterus. Extraocular Movements: Extraocular movements intact. Conjunctiva/sclera: Conjunctivae normal.   Cardiovascular:      Rate and Rhythm: Normal rate and regular rhythm. Pulses: Normal pulses. Heart sounds: Normal heart sounds. No murmur heard. Pulmonary:      Breath sounds: No stridor. Rhonchi present. Comments: intubated  Abdominal:      General: Bowel sounds are normal. There is no distension. Palpations: Abdomen is soft. There is no mass. Tenderness: There is no abdominal tenderness. There is no guarding or rebound. Comments: PEG tube   Musculoskeletal:         General: Normal range of motion. Cervical back: Normal range of motion and neck supple. No tenderness. Right lower leg: Edema present. Left lower leg: Edema present. Skin:     Findings: Lesion (Sacral decubitus) present. Comments: Chronic changes -Dark-colored lower legs b/l  Feet are cold to touch    Neurological:      Mental Status: He is alert and oriented to person, place, and time. Cranial Nerves: No cranial nerve deficit. Sensory: No sensory deficit. Comments: Patient awake and alert, orientated to name. Not sure about location and season. Psychiatric:         Attention and Perception: Attention normal.         Behavior: Behavior is cooperative.            MEDICATIONS:    Scheduled Meds:   oxyCODONE  10 mg Oral Q6H    QUEtiapine  25 mg Oral BID    acetylcysteine  4 mL Inhalation Q6H    dilTIAZem  30 mg PEG Tube 4 times per day    lansoprazole  30 mg Per G Tube QAM AC    heparin (porcine)  5,000 Units SubCUTAneous Q8H    collagenase   Topical Daily    ceftolozane-tazobactam (ZERBAXA) IVPB  1,500 mg IntraVENous Q8H    metoprolol tartrate  25 mg Oral BID    epoetin kandi-epbx  8,000 Units SubCUTAneous Once per day on Mon Wed Fri    And    epoetin kandi-epbx  2,000 Units SubCUTAneous Once per day on Mon Wed Fri    tigecycline (TYGACIL) IVPB  50 mg IntraVENous Q12H    sulfamethoxazole-trimethoprim  2 tablet Oral Daily    ipratropium-albuterol  1 ampule Inhalation Q4H    polyethylene glycol  17 g Oral Daily    chlorhexidine  15 mL Mouth/Throat BID    senna  5 mL Per G Tube Nightly    docusate  100 mg Per G Tube BID    levothyroxine  175 mcg Per G Tube Daily    [Held by provider] insulin glargine  20 Units SubCUTAneous BID    insulin lispro  0-12 Units SubCUTAneous Q6H    levETIRAcetam  500 mg PEG Tube BID    sodium chloride flush  5-40 mL IntraVENous 2 times per day     Continuous Infusions:   sodium chloride      fentaNYL 200 mcg/hr (05/22/22 08)    sodium chloride Stopped (05/20/22 1459)     PRN Meds:   midazolam, 1 mg, Q2H PRN  sodium chloride, , PRN  anticoagulant sodium citrate, 4.2 mL, PRN  perflutren lipid microspheres, 1.5 mL, ONCE PRN  glucose, 4 tablet, PRN  dextrose bolus, 125 mL, PRN   Or  dextrose bolus, 250 mL, PRN  glucagon (rDNA), 1 mg, PRN  sodium chloride flush, 5-40 mL, PRN  sodium chloride, , PRN          VENT SETTINGS (Comprehensive) (if applicable):  Vent Information  Ventilator ID: [id number]  Vent Mode: AC/VC  Ventilator Initiate: Yes  Additional Respiratory Assessments  Pulse: 113  Resp: (!) 33  SpO2: (!) 85 %  End Tidal CO2: 42 (%)  Position: Semi-Fajardo's  Humidification Source: Heated wire  Humidification Temp: 37  Circuit Condensation: Drained  Cuff Pressure (cm H2O): 34.5 cm H2O    Arterial Blood Gas 5/22/2022  No new ABG       Laboratory findings:    Complete Blood Count:   Recent Labs     05/20/22  0540 05/21/22  0545 05/22/22  0540   WBC 14.7* 11.9* 11.7*   HGB 8.2* 7.7* 7.7*   HCT 25.8* 24.4* 24.1*   * 109* 96*        Last 3 Blood Glucose:   Recent Labs     05/20/22  0540 05/21/22  0545 05/22/22  0540   GLUCOSE 111* 125* 140*        PT/INR:    Lab Results   Component Value Date    PROTIME 15.5 05/17/2022    INR 1.4 05/17/2022     PTT:    Lab Results   Component Value Date    APTT 32.8 05/11/2022       Comprehensive Metabolic Profile:   Recent Labs     05/20/22  0540 05/21/22  0545 05/22/22  0540    137 135   K 4.9 4.1 4.7   CL 96* 99 99   CO2 23 26 24   BUN 68* 49* 67*   CREATININE 2.0* 1.5* 2.0*   GLUCOSE 111* 125* 140*   CALCIUM 8.7 8.8 8.8   LABALBU 2.3*  --   --       Magnesium:   Lab Results   Component Value Date    MG 2.1 05/22/2022     Phosphorus:   Lab Results   Component Value Date    PHOS 5.4 05/22/2022     Ionized Calcium: No results found for: CAION     Urinalysis:     Troponin: No results for input(s): TROPONINI in the last 72 hours. Microbiology:  Cultures drawn:   Gram stain rare polymore for nuclear leukocytes, epithelial cells not seen. Abundant gram-negative rods, rare yeast and rare gram-positive diplococci.   Respiratory smear positive for moderate quality more for nuclear leukocytes, few yeast and rare gram-positive cocci-Pseudomonas aeruginosa   Surgical, anaerobic, fungi, acid-fast bacilli cultures pending      Radiology/Imaging:     Chest Xray (5/22/2022): Interval decrease in the left perihilar infiltrate, bilateral lower lung field infiltrates without significant interval change. ASSESSMENT:     Patient Active Problem List    Diagnosis Date Noted    Pneumonia due to infectious organism     Pleural effusion     Cardiac arrest ([facility] [address] 05/11/2022         PLAN:     Neuro   Patient intubated   History of seizures on Keppra   He is awake and follows commands with his left side    Respiratory   Acute hypercapnic respiratory failure with hypoxia   Intubated 5/10/2022   Thoracentesis completed 5/15/2022 MRSA nasal colonization positive - Bactroban    Pneumonia   S/P chest tube placement for parapneumonic pleural effusion with significant drainage.   We will remove chest tube once drainage less than 100 cc per 24 hours   Plan is for trach on tomorrow      Cardiovascular   History of A. fib    Hold Eliquis   Elevated troponin most likely secondary to CKD   Cardiology following    GI   History of a PEG tube   He is scheduled to have diverting colostomy tomorrow   Tolerating tube feed    Renal   ESRD on dialysis Monday ,Wednesday, Friday   On EPO    Nephrology following    Infectious disease   Sepsis with septic shock -resolved   decubitus ulcer-surgery, wound care following   Status postsurgical debridement yesterday in the OR   Tigecycline , bactrim, Zerbaxa   ID following     Heme/Onc   PRBC received X2, 05/12/2022, 05/14/2022    trend H&H, transfuse if <7.   Thrombocytopenia , leukocytosis    Received EPO   Surgery following    Endocrine   Type 2 diabetes -MDSS   Hypothyroidism on synthroid    Social/Spiritual/DNR/Other   Code status: Full  Diet Diet NPO Exceptions are: Sips of Water with Meds  ADULT TUBE FEEDING; PEG; Immune Enhancing; Continuous; 10; No; 30; Q 4 hours   Diet NPO Exceptions are: Sips of Water with Meds   Stress ulcer prophylaxis: protonix 40mg   DVT prophylaxis: SCDS , heparin started    Consultations needed: Yes   Transfer out of ICU today? No    Other: albumin level, advance tube feeds as tolerated, VCG from central line , chest xray daily ,   Will start patient on oxycodone IR 10 mg every 6 hours and Seroquel 25 mg nightly goal to wean him off his fentanyl.      Lines/catheters   Date 05/10  o ETT  · Date 05/17   · CVC Triple lumen Henry Landy X MD Crow  11:26 AM  05/22/22   CCT excluding procedures:35'

## 2022-05-22 NOTE — ANESTHESIA PRE PROCEDURE
Department of Anesthesiology  Preprocedure Note       Name:  Sheeba Soliman   Age:  74 y.o.  :  1955                                          MRN:  [id number]         Date:  2022      Surgeon: Jaci Mosher):  Denis Melissa MD    Procedure: Procedure(s):  DIAGNOSTIC LAPAROSCOPY DIVERTING COLOSTOMY POSSIBLE OPEN  SACRAL WOUND DEBRIDEMENT  +++CONTACT ISOLATION+++  PERCUTANEOUS  TRACHEOTOMY  (ENDO NURSE NEEDED)    Medications prior to admission:   Prior to Admission medications    Medication Sig Start Date End Date Taking? Authorizing Provider   insulin glargine (LANTUS) 100 UNIT/ML injection vial Inject 7 Units into the skin daily Give in a.m. Historical Provider, MD   levETIRAcetam (KEPPRA) 250 MG tablet Take 250 mg by mouth daily    Historical Provider, MD   levETIRAcetam (KEPPRA) 500 MG tablet Take 500 mg by mouth nightly    Historical Provider, MD   apixaban (ELIQUIS) 5 MG TABS tablet Take 5 mg by mouth 2 times daily    Historical Provider, MD   chlorhexidine (PERIDEX) 0.12 % solution Take 15 mLs by mouth 2 times daily    Historical Provider, MD   nystatin (MYCOSTATIN) 785972 UNIT/ML suspension Take 500,000 Units by mouth 3 times daily    Historical Provider, MD   oxyCODONE (OXYCONTIN) 10 MG extended release tablet Take 10 mg by mouth every 12 hours. Historical Provider, MD   NYSTATIN IN AQUAPHOR OINTMENT Apply topically 2 times daily    Historical Provider, MD   clonazePAM (KLONOPIN) 0.5 MG tablet Take 0.5 mg by mouth 2 times daily as needed. Historical Provider, MD   insulin glargine (LANTUS) 100 UNIT/ML injection vial Inject 40 Units into the skin nightly     Historical Provider, MD   dronabinol (MARINOL) 2.5 MG capsule Take 2.5 mg by mouth 2 times daily (before meals).   Patient not taking: Reported on 2022    Historical Provider, MD   alteplase (CATHFLO) 2 MG injection 2 mg by IntraCATHeter route as needed Per cathflo protocol at [employer], MD   cloNIDine (CATAPRES) 0.1 MG tablet Take 0.1 mg by mouth 2 times daily    Historical Provider, MD   HYDROmorphone (DILAUDID) 2 MG tablet Take 2 mg by mouth every 8 hours as needed for Pain. Historical Provider, MD   anticoagulant sodium citrate 4 GM/100ML SOLN by CRRT route continuous    Historical Provider, MD   carvedilol (COREG) 3.125 MG tablet Take 3.125 mg by mouth 2 times daily (with meals)    Historical Provider, MD   hydrALAZINE (APRESOLINE) 25 MG tablet Take 100 mg by mouth 3 times daily     Historical Provider, MD   docusate sodium (COLACE) 100 MG capsule Take 100 mg by mouth 2 times daily    Historical Provider, MD   levETIRAcetam (KEPPRA) 500 MG tablet Take 500 mg by mouth three times a week Monday, Wednesday and Friday after dialysis    Historical Provider, MD   aspirin 81 MG EC tablet Take 81 mg by mouth daily    Historical Provider, MD   memantine (NAMENDA) 5 MG tablet Take 5 mg by mouth 2 times daily    Historical Provider, MD   glucagon, rDNA, 1 MG injection as needed for Low blood sugar    Historical Provider, MD   dextrose 50 % solution Infuse 25 g intravenously as needed    Historical Provider, MD   glucose (GLUTOSE) 40 % GEL Take 15 g by mouth See Admin Instructions    Historical Provider, MD   mineral oil-hydrophilic petrolatum (AQUAPHOR) ointment Apply topically as needed for Dry Skin Apply topically as needed.     Historical Provider, MD   ondansetron (ZOFRAN-ODT) 4 MG disintegrating tablet Take 4 mg by mouth every 8 hours as needed for Nausea or Vomiting    Historical Provider, MD   darbepoetin kandi-polysorbate (ARANESP) 60 MCG/0.3ML SOSY injection Inject 60 mcg into the skin every 14 days    Historical Provider, MD   levothyroxine (SYNTHROID) 175 MCG tablet Take 175 mcg by mouth Daily    Historical Provider, MD   rosuvastatin (CRESTOR) 5 MG tablet Take 5 mg by mouth daily    Historical Provider, MD   ondansetron (ZOFRAN) 4 MG/2ML injection Infuse 4 mg intravenously every 8 hours as needed for Nausea or Vomiting    Historical Provider, MD   omeprazole (PRILOSEC) 20 MG delayed release capsule Take 40 mg by mouth 2 times daily     Historical Provider, MD   VORTIoxetine HBr (TRINTELLIX) 20 MG TABS tablet Take 20 mg by mouth daily     Historical Provider, MD   polyethylene glycol (MIRALAX) 17 g PACK packet Take 17 g by mouth daily    Historical Provider, MD   B complex-vitamin C-folic acid (NEPHRO-JESSY) 1 MG tablet Take 1 tablet by mouth daily (with breakfast)    Historical Provider, MD   lactulose (CEPHULAC) 20 g packet Take 20 g by mouth 2 times daily    Historical Provider, MD   insulin lispro (HUMALOG) 100 UNIT/ML injection vial Inject into the skin every 6 hours Sliding scale  150-199 1 unit  200-249 2 units  250-299 3 units  300-349 4 units  Above 350 call physician     Historical Provider, MD   dilTIAZem (CARDIZEM) 60 MG tablet Take 60 mg by mouth 3 times daily     Historical Provider, MD   ARIPiprazole (ABILIFY) 5 MG tablet Take 5 mg by mouth daily    Historical Provider, MD   acetaminophen (TYLENOL) 325 MG tablet Take 650 mg by mouth every 6 hours as needed for Pain    Historical Provider, MD       Current medications:    No current facility-administered medications for this visit. No current outpatient medications on file.      Facility-Administered Medications Ordered in Other Visits   Medication Dose Route Frequency Provider Last Rate Last Admin    oxyCODONE (ROXICODONE) immediate release tablet 10 mg  10 mg Oral Q6H Rohit Coelho MD   10 mg at 05/22/22 1057    QUEtiapine (SEROQUEL) tablet 25 mg  25 mg Oral BID Rohit Coelho MD   25 mg at 05/22/22 1057    acetylcysteine (MUCOMYST) 10 % solution 400 mg  4 mL Inhalation Q6H Rohit Coelho MD   400 mg at 05/22/22 1645    dilTIAZem (CARDIZEM) tablet 30 mg  30 mg PEG Tube 4 times per day Ez Jones DO   30 mg at 05/22/22 1243    midazolam PF (VERSED) injection 1 mg  1 mg IntraVENous Q2H PRN Carroll Cornell MD   1 mg at 05/22/22 1109    lansoprazole suspension SUSP 30 mg  30 mg Per G Tube QAM AC Luke Glover, DO   30 mg at 05/22/22 0600    heparin (porcine) injection 5,000 Units  5,000 Units SubCUTAneous Q8H Luke Glover DO   5,000 Units at 05/22/22 1057    collagenase ointment   Topical Daily Lion Ordonez MD   Given at 05/22/22 2352    0.9 % sodium chloride infusion   IntraVENous PRN Svetlana Luong DO        ceftolozane-tazobactam (ZERBAXA) 1,500 mg in sodium chloride 0.9 % 100 mL IVPB  1,500 mg IntraVENous Q8H Alfonzo May  mL/hr at 05/22/22 1501 1,500 mg at 05/22/22 1501    metoprolol tartrate (LOPRESSOR) tablet 25 mg  25 mg Oral BID Selvin Schmitt MD   25 mg at 05/22/22 0602    epoetin kandi-epbx (RETACRIT) injection 8,000 Units  8,000 Units SubCUTAneous Once per day on Mon Wed Fri Cadence Mcintosh MD   8,000 Units at 05/20/22 1157    And    epoetin kandi-epbx (RETACRIT) injection 2,000 Units  2,000 Units SubCUTAneous Once per day on Mon Wed Fri Cadence Mcintosh MD   2,000 Units at 05/20/22 1157    tigecycline (TYGACIL) 50 mg in sodium chloride 0.9 % 100 mL IVPB  50 mg IntraVENous Q12H Leslie Velez MD   Stopped at 05/22/22 1052    sulfamethoxazole-trimethoprim (BACTRIM DS;SEPTRA DS) 800-160 MG per tablet 2 tablet  2 tablet Oral Daily Leslie Velez MD   2 tablet at 05/22/22 0837    ipratropium-albuterol (DUONEB) nebulizer solution 1 ampule  1 ampule Inhalation Q4H Luke Glover DO   1 ampule at 05/22/22 1645    anticoagulant sodium citrate 4 GM/100ML solution 0.168 g  4.2 mL IntraCATHeter PRN Sherolyn Olszewski, MD   0.168 g at 05/18/22 1438    fentaNYL 10 mcg/ml in 0.9%  ml infusion   mcg/hr IntraVENous Continuous Cherry Jarquin MD 20 mL/hr at 05/22/22 1527 200 mcg/hr at 05/22/22 1527    polyethylene glycol (GLYCOLAX) packet 17 g  17 g Oral Daily Lion Ordonez MD   17 g at 05/22/22 0837    chlorhexidine (PERIDEX) 0.12 % solution 15 mL  15 mL Mouth/Throat BID Palak Wilcox MD   15 mL at 05/22/22 0838    senna (SENOKOT) 8.8 MG/5ML syrup 8.8 mg  5 mL Per G Tube Nightly Palak Wilcox MD   8.8 mg at 05/21/22 2048    docusate (COLACE) 50 MG/5ML liquid 100 mg  100 mg Per G Tube BID Palak Wilcox MD   100 mg at 05/22/22 1451    levothyroxine (SYNTHROID) tablet 175 mcg  175 mcg Per G Tube Daily Palak Wilcox MD   175 mcg at 05/22/22 0555    perflutren lipid microspheres (DEFINITY) injection 1.65 mg  1.5 mL IntraVENous ONCE PRN Palak Wilcox MD        [Held by provider] insulin glargine (LANTUS) injection vial 20 Units  20 Units SubCUTAneous BID Palak Wilcox MD   20 Units at 05/16/22 0922    insulin lispro (HUMALOG) injection vial 0-12 Units  0-12 Units SubCUTAneous Q6H Palak Wilcox MD   2 Units at 05/22/22 1249    glucose chewable tablet 16 g  4 tablet Oral PRN Palak Wilcox MD        dextrose bolus 10% 125 mL  125 mL IntraVENous PRN Palak Wilcox MD   Stopped at 05/17/22 2140    Or    dextrose bolus 10% 250 mL  250 mL IntraVENous PRN Palak Wilcox MD   Stopped at 05/17/22 0308    glucagon (rDNA) injection 1 mg  1 mg IntraMUSCular PRN Palak Wilcox MD        levETIRAcetam (KEPPRA) 100 MG/ML solution 500 mg  500 mg PEG Tube BID Palak Wilcox MD   500 mg at 05/22/22 0837    sodium chloride flush 0.9 % injection 5-40 mL  5-40 mL IntraVENous 2 times per day Palak Wilcox MD   10 mL at 05/22/22 0837    sodium chloride flush 0.9 % injection 5-40 mL  5-40 mL IntraVENous PRN Palak Wilcox MD        0.9 % sodium chloride infusion   IntraVENous PRN Palak Wilcox MD   Stopped at 05/20/22 1459       Allergies:     Allergies   Allergen Reactions    Other Nausea Only     \"narcotics\" reaction unknown       Problem List:    Patient Active Problem List   Diagnosis Code    Cardiac arrest ([facility] [address] I46.9    Pneumonia due to infectious organism J18.9    Pleural effusion J90       Past Medical History:        Diagnosis Date    A-fib [facility])     [facility]AN [facility]) ([email or web address]     Anemia     Anxiety     Bipolar 1 disorder ([facility])     Charcot foot due to diabetes mellitus ([email or web address]     CHF (congestive heart failure) ([facility])     CKD (chronic kidney disease)     Diabetes mellitus ([place] [address].)     Dialysis patient ([place] [address].)     Hyperlipidemia     Hypertension     Right sided weakness     Sacral decubitus ulcer     Seizure [facility])        Past Surgical History:        Procedure Laterality Date    AMPUTATION Right 2017    right toe    BACK SURGERY N/A 5/17/2022    SACRAL WOUND DEBRIDEMENT performed by Azalea Hall MD at [address] [place] [address]  2020    right chest    HYDROCELE EXCISION  2010    JOINT REPLACEMENT Right 2011    TONSILLECTOMY      UPPER GASTROINTESTINAL ENDOSCOPY N/A 4/27/2022    EGD BIOPSY performed by Mora Maurice MD at [place] [address]  2010    venous ablation       Social History:    Social History     Tobacco Use    Smoking status: Former Smoker    Smokeless tobacco: Never Used   Substance Use Topics    Alcohol use: Not Currently                                Counseling given: Not Answered      Vital Signs (Current): There were no vitals filed for this visit. BP Readings from Last 3 Encounters:   05/22/22 102/63   04/27/22 131/69   04/27/22 (!) 99/54       NPO Status:  RN was informed about NPO status after 2359 at 05/16/2022.                                                                                BMI:   Wt Readings from Last 3 Encounters:   05/21/22 245 lb 6 oz (111.3 kg)   04/27/22 212 lb (96.2 kg)     There is no height or weight on file to calculate BMI.    CBC:   Lab Results   Component Value Date    WBC 11.7 05/22/2022    RBC 2.82 05/22/2022    HGB 7.7 05/22/2022    HCT 24.1 05/22/2022    MCV 85.5 05/22/2022    RDW 21.4 05/22/2022    PLT 96 05/22/2022       CMP:   Lab Results   Component Value Date     05/22/2022    K 4.7 05/22/2022    K 4.5 05/11/2022    CL 99 05/22/2022    CO2 24 05/22/2022    BUN 67 05/22/2022    CREATININE 2.0 05/22/2022    GFRAA 40 05/22/2022    LABGLOM 33 05/22/2022    GLUCOSE 140 05/22/2022    PROT 6.9 05/11/2022    CALCIUM 8.8 05/22/2022    BILITOT 0.3 05/11/2022    ALKPHOS 591 05/11/2022    AST 31 05/11/2022    ALT 30 05/11/2022       POC Tests: No results for input(s): POCGLU, POCNA, POCK, POCCL, POCBUN, POCHEMO, POCHCT in the last 72 hours. Coags:   Lab Results   Component Value Date    PROTIME 15.5 05/17/2022    INR 1.4 05/17/2022    APTT 32.8 05/11/2022       HCG (If Applicable): No results found for: PREGTESTUR, PREGSERUM, HCG, HCGQUANT     ABGs: No results found for: PHART, PO2ART, MYR7NJB, MMH9TQT, BEART, V9KMFPEC     Type & Screen (If Applicable):  No results found for: LABABO, LABRH    Drug/Infectious Status (If Applicable):  No results found for: HIV, HEPCAB    COVID-19 Screening (If Applicable):   Lab Results   Component Value Date    COVID19 Not Detected 05/11/2022       CXR:  Narrative   EXAMINATION:   ONE XRAY VIEW OF THE CHEST       5/22/2022 6:50 am       COMPARISON:   05/21/2022       HISTORY:   ORDERING SYSTEM PROVIDED HISTORY: daily vent   TECHNOLOGIST PROVIDED HISTORY:   Reason for exam:->daily vent       FINDINGS:   EKG leads overlie the chest.  Support tubes and lines remain in place.    Cardiac silhouette remains enlarged.  No identified pneumothorax.  Persistent   bilateral pleural effusions and bilateral airspace infiltrates similar to   previous.           Anesthesia Evaluation  Patient summary reviewed and Nursing notes reviewed no history of anesthetic complications:   Airway: Mallampati: Unable to assess / NA       Comment: Patient is intubated     Dental:      Comment: Unable to assess    Pulmonary:   (+) pneumonia: unresolved,  rhonchi:bilateral decreased breath sounds: bilateral     (-) not a current smoker (Former)                          ROS comment: Acute respiratory failure with hypoxia    Per IM note:  Acute hypercapnic Respiratory failure with hypoxia: Patient intubated on 5/10/22, thoracocentesis done due to parapneumonic effusion now on Bactrim, tigecycline and Zerbaxa tracheostomy Monday. Sepsis with septic shock most likely due to decubitus ulcer on the back, on tigecycline, Bactrim and Zerbaxa ID on the case. Cardiovascular:  Exercise tolerance: poor (<4 METS),   (+) hypertension: mild and no interval change, dysrhythmias: atrial fibrillation and atrial flutter, CHF:, hyperlipidemia      ECG reviewed  Rhythm: irregular  Rate: abnormal  Echocardiogram reviewed         Beta Blocker:  Dose within 24 Hrs      ROS comment: S/P cardiac arrest    EKG:  Atrial flutter  Nonspecific T wave abnormality  Abnormal ECG  When compared with ECG of 11-MAY-2022 00:57,  Right bundle branch block resolved    ECHO:  Left ventricle was not well visualized. Micro-bubble contrast injected to enhance left ventricular visualization. Left ventricle is normal in size . No regional wall motion abnormalities seen. Normal left ventricular ejection fraction. Ejection fraction is visually estimated at 60-65%. Indeterminate diastolic function. Neuro/Psych:   (+) seizures: no interval change, CVA (Right sided weakness):, neuromuscular disease:, psychiatric history:depression/anxiety              ROS comment: Charcot foot due to diabetes mellitus  Hx right sided weakness - unable to assess origin GI/Hepatic/Renal:   (+) renal disease (Creatinine 2.0 & GFR 33. Dialysis M, W, F.): ARF, CRI and dialysis,           Endo/Other:    (+) Diabetes (Recent BGM's <200 mg/dL)Type II DM, using insulin, blood dyscrasia (7.7/24.1 w/ platelets 10D): anemia and thrombocytopenia, arthritis (Charcot foot): OA., .           Pt had no PAT visit        ROS comment: MDRO, CRE, MRSA, VRE Abdominal:         (-) obese       Vascular:   + PVD, aortic or cerebral, . ROS comment: Sacral wound from prolonged debility and intubation. Other Findings: PEG            Anesthesia Plan      general     ASA 4     (Note copied from a previous encounter (5/17 sacral wound debridement) with the appropriate addendums and changes. Patient is responsive but remains intubated on a Fentanyl drip. Consent obtained from the patient's POA/wife and placed in the chart.)  Induction: intravenous. MIPS: Postoperative opioids intended and Prophylactic antiemetics administered. Anesthetic plan and risks discussed with patient. Plan discussed with CRNA. Rachel Stark DO  5/22/2022      Patient seen and chart reviewed:  anesthetic plan, risks, benefits, alternatives, and personnel involved discussed with patient's wife who verbalized an understanding and agrees to proceed.   Confirmation of above and final disposition per DOS anesthesiologist.    Rachel Stark DO  Staff Anesthesiologist  May 22, 2022  6:18 PM

## 2022-05-22 NOTE — PLAN OF CARE
Problem: Respiratory - Adult  Goal: Achieves optimal ventilation and oxygenation  5/22/2022 0327 by Ami Nicole RCP  Outcome: Not Progressing  Flowsheets (Taken 5/22/2022 0327)  Achieves optimal ventilation and oxygenation:   Assess for changes in respiratory status   Position to facilitate oxygenation and minimize respiratory effort   Oxygen supplementation based on oxygen saturation or arterial blood gases   Assess the need for suctioning and aspirate as needed   Respiratory therapy support as indicated  Note: Same vent settings/ no ps trial yesterday

## 2022-05-22 NOTE — PLAN OF CARE
Problem: Discharge Planning  Goal: Discharge to home or other facility with appropriate resources  Outcome: Not Progressing     Problem: Pain  Goal: Verbalizes/displays adequate comfort level or baseline comfort level  Outcome: Progressing     Problem: Skin/Tissue Integrity  Goal: Absence of new skin breakdown  Description: 1. Monitor for areas of redness and/or skin breakdown  2. Assess vascular access sites hourly  3. Every 4-6 hours minimum:  Change oxygen saturation probe site  4. Every 4-6 hours:  If on nasal continuous positive airway pressure, respiratory therapy assess nares and determine need for appliance change or resting period. Outcome: Progressing     Problem: Safety - Adult  Goal: Free from fall injury  Outcome: Progressing     Problem: ABCDS Injury Assessment  Goal: Absence of physical injury  Outcome: Progressing     Problem: Respiratory - Adult  Goal: Achieves optimal ventilation and oxygenation  5/22/2022 0353 by Emilee Hurd RN  Outcome: Progressing  5/22/2022 0327 by Shannan Livingston RCP  Outcome: Not Progressing  Flowsheets (Taken 5/22/2022 0327)  Achieves optimal ventilation and oxygenation:   Assess for changes in respiratory status   Position to facilitate oxygenation and minimize respiratory effort   Oxygen supplementation based on oxygen saturation or arterial blood gases   Assess the need for suctioning and aspirate as needed   Respiratory therapy support as indicated  Note: Same vent settings/ no ps trial yesterday  5/21/2022 1452 by Gaviota Mayo RCP  Outcome: Not Progressing     Problem: Chronic Conditions and Co-morbidities  Goal: Patient's chronic conditions and co-morbidity symptoms are monitored and maintained or improved  Outcome: Progressing     Problem: Safety - Medical Restraint  Goal: Remains free of injury from restraints (Restraint for Interference with Medical Device)  Description: INTERVENTIONS:  1.  Determine that other, less restrictive measures have been tried or would not be effective before applying the restraint  2. Evaluate the patient's condition at the time of restraint application  3. Inform patient/family regarding the reason for restraint  4.  Q2H: Monitor safety, psychosocial status, comfort, nutrition and hydration  Outcome: Progressing     Problem: Nutrition Deficit:  Goal: Optimize nutritional status  Outcome: Progressing

## 2022-05-22 NOTE — PROGRESS NOTES
[facility]  Nephrology Progress Note    Patient's Name: Abbie Sotelo    Subjective:    5/22: Patient was seen and examined, no acute distress. He denied any pain today.     PMH:    Past Medical History:   Diagnosis Date    A-fib ([facility]     TRUMAN (acute kidney injury) ([facility]     Anemia     Anxiety     Bipolar 1 disorder ([facility])     Charcot foot due to diabetes mellitus ([facility]     CHF (congestive heart failure) ([facility])     CKD (chronic kidney disease)     Diabetes mellitus ([facility]     Dialysis patient ([facility]     Hyperlipidemia     Hypertension     Right sided weakness     Sacral decubitus ulcer     Seizure ([facility]      Patient Active Problem List   Diagnosis    Cardiac arrest ([facility]    Pneumonia due to infectious organism    Pleural effusion     Meds:     acetylcysteine  4 mL Inhalation Q6H    dilTIAZem  30 mg PEG Tube 4 times per day    lansoprazole  30 mg Per G Tube QAM AC    heparin (porcine)  5,000 Units SubCUTAneous Q8H    collagenase   Topical Daily    ceftolozane-tazobactam (ZERBAXA) IVPB  1,500 mg IntraVENous Q8H    metoprolol tartrate  25 mg Oral BID    epoetin kandi-epbx  8,000 Units SubCUTAneous Once per day on Mon Wed Fri    And    epoetin kandi-epbx  2,000 Units SubCUTAneous Once per day on Mon Wed Fri    tigecycline (TYGACIL) IVPB  50 mg IntraVENous Q12H    sulfamethoxazole-trimethoprim  2 tablet Oral Daily    ipratropium-albuterol  1 ampule Inhalation Q4H    polyethylene glycol  17 g Oral Daily    chlorhexidine  15 mL Mouth/Throat BID    senna  5 mL Per G Tube Nightly    docusate  100 mg Per G Tube BID    levothyroxine  175 mcg Per G Tube Daily    [Held by provider] insulin glargine  20 Units SubCUTAneous BID    insulin lispro  0-12 Units SubCUTAneous Q6H    levETIRAcetam  500 mg PEG Tube BID    sodium chloride flush  5-40 mL IntraVENous 2 times per day      sodium chloride      fentaNYL 200 mcg/hr (05/22/22 0600)    sodium chloride Stopped (05/20/22 1459)     Meds prn: midazolam, sodium chloride, anticoagulant sodium citrate, perflutren lipid microspheres, glucose, dextrose bolus **OR** dextrose bolus, glucagon (rDNA), sodium chloride flush, sodium chloride    Meds prior to admission:     No current facility-administered medications on file prior to encounter. Current Outpatient Medications on File Prior to Encounter   Medication Sig Dispense Refill    insulin glargine (LANTUS) 100 UNIT/ML injection vial Inject 7 Units into the skin daily Give in a.m.      levETIRAcetam (KEPPRA) 250 MG tablet Take 250 mg by mouth daily      levETIRAcetam (KEPPRA) 500 MG tablet Take 500 mg by mouth nightly      apixaban (ELIQUIS) 5 MG TABS tablet Take 5 mg by mouth 2 times daily      chlorhexidine (PERIDEX) 0.12 % solution Take 15 mLs by mouth 2 times daily      nystatin (MYCOSTATIN) 591753 UNIT/ML suspension Take 500,000 Units by mouth 3 times daily      oxyCODONE (OXYCONTIN) 10 MG extended release tablet Take 10 mg by mouth every 12 hours.  NYSTATIN IN AQUAPHOR OINTMENT Apply topically 2 times daily      clonazePAM (KLONOPIN) 0.5 MG tablet Take 0.5 mg by mouth 2 times daily as needed.  insulin glargine (LANTUS) 100 UNIT/ML injection vial Inject 40 Units into the skin nightly       dronabinol (MARINOL) 2.5 MG capsule Take 2.5 mg by mouth 2 times daily (before meals). (Patient not taking: Reported on 5/11/2022)      alteplase (CATHFLO) 2 MG injection 2 mg by IntraCATHeter route as needed Per cathflo protocol at [facility]      cloNIDine (CATAPRES) 0.1 MG tablet Take 0.1 mg by mouth 2 times daily      HYDROmorphone (DILAUDID) 2 MG tablet Take 2 mg by mouth every 8 hours as needed for Pain.       anticoagulant sodium citrate 4 GM/100ML SOLN by CRRT route continuous      carvedilol (COREG) 3.125 MG tablet Take 3.125 mg by mouth 2 times daily (with meals)      hydrALAZINE (APRESOLINE) 25 MG tablet Take 100 mg by mouth 3 times daily       docusate sodium (COLACE) 100 MG capsule Take 100 mg by mouth 2 times daily      levETIRAcetam (KEPPRA) 500 MG tablet Take 500 mg by mouth three times a week Monday, Wednesday and Friday after dialysis      aspirin 81 MG EC tablet Take 81 mg by mouth daily      memantine (NAMENDA) 5 MG tablet Take 5 mg by mouth 2 times daily      glucagon, rDNA, 1 MG injection as needed for Low blood sugar      dextrose 50 % solution Infuse 25 g intravenously as needed      glucose (GLUTOSE) 40 % GEL Take 15 g by mouth See Admin Instructions      mineral oil-hydrophilic petrolatum (AQUAPHOR) ointment Apply topically as needed for Dry Skin Apply topically as needed.       ondansetron (ZOFRAN-ODT) 4 MG disintegrating tablet Take 4 mg by mouth every 8 hours as needed for Nausea or Vomiting      darbepoetin kandi-polysorbate (ARANESP) 60 MCG/0.3ML SOSY injection Inject 60 mcg into the skin every 14 days      levothyroxine (SYNTHROID) 175 MCG tablet Take 175 mcg by mouth Daily      rosuvastatin (CRESTOR) 5 MG tablet Take 5 mg by mouth daily      ondansetron (ZOFRAN) 4 MG/2ML injection Infuse 4 mg intravenously every 8 hours as needed for Nausea or Vomiting      omeprazole (PRILOSEC) 20 MG delayed release capsule Take 40 mg by mouth 2 times daily       VORTIoxetine HBr (TRINTELLIX) 20 MG TABS tablet Take 20 mg by mouth daily       polyethylene glycol (MIRALAX) 17 g PACK packet Take 17 g by mouth daily      B complex-vitamin C-folic acid (NEPHRO-JESSY) 1 MG tablet Take 1 tablet by mouth daily (with breakfast)      lactulose (CEPHULAC) 20 g packet Take 20 g by mouth 2 times daily      insulin lispro (HUMALOG) 100 UNIT/ML injection vial Inject into the skin every 6 hours Sliding scale  150-199 1 unit  200-249 2 units  250-299 3 units  300-349 4 units  Above 350 call physician       dilTIAZem (CARDIZEM) 60 MG tablet Take 60 mg by mouth 3 times daily       ARIPiprazole (ABILIFY) 5 MG tablet Take 5 mg by mouth daily      acetaminophen (TYLENOL) 325 MG tablet Take 650 mg by mouth every 6 hours as needed for Pain       Allergies: Other    Social History:     reports that he has quit smoking. He has never used smokeless tobacco. He reports previous alcohol use. He reports previous drug use. Family History:     No family history on file.     Physical Exam:    Patient Vitals for the past 24 hrs:   BP Temp Temp src Pulse Resp SpO2 Weight   05/22/22 0700 125/76 -- -- 113 21 99 % --   05/22/22 0600 (!) 137/91 -- -- 114 18 99 % --   05/22/22 0500 (!) 96/55 -- -- 113 18 96 % --   05/22/22 0400 (!) 129/97 97 °F (36.1 °C) Infrared 114 18 94 % --   05/22/22 0308 -- -- -- -- (!) 37 (!) 82 % --   05/22/22 0305 -- -- -- 120 26 95 % --   05/22/22 0300 (!) 161/84 -- -- 114 (!) 31 (!) 89 % --   05/22/22 0200 115/71 -- -- 82 17 92 % --   05/22/22 0100 109/66 -- -- 76 13 96 % --   05/22/22 0009 -- -- -- 99 17 96 % --   05/22/22 0000 104/63 97.3 °F (36.3 °C) Infrared 90 18 94 % --   05/21/22 2326 -- -- -- -- 21 92 % --   05/21/22 2325 -- -- -- 98 18 92 % --   05/21/22 2300 101/63 -- -- 90 17 92 % --   05/21/22 2200 104/64 -- -- 90 18 93 % --   05/21/22 2100 129/72 -- -- 112 24 97 % --   05/21/22 2000 103/66 97.4 °F (36.3 °C) Infrared 75 16 94 % --   05/21/22 1920 -- -- -- 123 14 91 % --   05/21/22 1848 102/66 -- -- 80 28 -- --   05/21/22 1817 -- -- -- 100 -- -- --   05/21/22 1700 105/66 -- -- 84 17 97 % --   05/21/22 1609 -- -- -- -- 25 96 % --   05/21/22 1607 -- -- -- -- 20 100 % --   05/21/22 1600 -- 98.6 °F (37 °C) Infrared -- -- -- --   05/21/22 1530 128/76 -- -- 84 24 -- --   05/21/22 1430 123/68 -- -- 82 18 -- --   05/21/22 1330 110/61 -- -- 84 18 -- --   05/21/22 1230 107/64 -- -- 101 18 -- --   05/21/22 1203 -- -- -- -- 17 96 % --   05/21/22 1200 123/84 98.6 °F (37 °C) Infrared 101 18 97 % --   05/21/22 1139 123/84 98.4 °F (36.9 °C) -- 113 20 97 % 245 lb 6 oz (111.3 kg)   05/21/22 1130 108/62 -- -- 113 15 -- --   05/21/22 1115 104/64 -- -- 112 -- -- --   05/21/22 1100 123/69 -- -- 112 -- -- --   05/21/22 1045 118/68 -- -- 113 -- -- --   05/21/22 1030 112/71 -- -- 101 21 -- --   05/21/22 1015 108/71 -- -- 101 -- -- --   05/21/22 1000 112/75 -- -- 100 -- -- --   05/21/22 0945 114/88 -- -- 106 -- -- --   05/21/22 0930 118/73 -- -- 90 20 (!) 88 % --   05/21/22 0915 119/89 -- -- 100 -- -- --   05/21/22 0900 107/73 -- -- 99 -- -- --   05/21/22 0847 -- -- -- -- 18 98 % --   05/21/22 0846 -- -- -- -- 18 97 % --   05/21/22 0845 122/74 -- -- 82 -- -- --   05/21/22 0839 125/72 -- -- 89 -- -- --   05/21/22 0830 133/78 -- -- 89 20 -- --   05/21/22 0800 122/63 98.6 °F (37 °C) -- 87 17 97 % 251 lb 5.2 oz (114 kg)       Intake/Output Summary (Last 24 hours) at 5/22/2022 0754  Last data filed at 5/22/2022 0600  Gross per 24 hour   Intake 1511.51 ml   Output 3000 ml   Net -1488.49 ml     General: Awake, alert, no acute distress  Neck: No JVD noted; left IJ TLC  Lungs: Clear bilaterally upper, diminished to the bases bilaterally. Unlabored; left chest tube  CV: Regular rate and rhythm. No rub; right chest TDC  Abd: Soft, nontender, nondistended. Active bowel sounds; PEG  Skin: Warm and dry. No rash on exposed extremities  Ext: 2+ BLE edema   Neuro: Awake    Data:    Recent Labs     05/20/22  0540 05/21/22  0545 05/22/22  0540   WBC 14.7* 11.9* 11.7*   HGB 8.2* 7.7* 7.7*   HCT 25.8* 24.4* 24.1*   MCV 85.7 85.9 85.5   * 109* 96*     Recent Labs     05/20/22  0540 05/21/22  0545 05/22/22  0540    137 135   K 4.9 4.1 4.7   CL 96* 99 99   CO2 23 26 24   CREATININE 2.0* 1.5* 2.0*   BUN 68* 49* 67*   LABGLOM 33 46 33   GLUCOSE 111* 125* 140*   CALCIUM 8.7 8.8 8.8   PHOS 5.8* 4.3 5.4*   MG 2.2 2.1 2.1     No results found for: VITD25    No results found for: PTH    No results for input(s): ALT, AST, ALKPHOS, BILITOT, BILIDIR in the last 72 hours.     Recent Labs     05/20/22  0540   LABALBU 2.3*       No results found for: FERRITIN, IRON, TIBC    No results found for: [id number]    No results found for: FOLATE    No results found for: VOL, APPEARANCE, COLORU, LABSPEC, LABPH, LEUKBLD, NITRU, GLUCOSEU, KETUA, UROBILINOGEN, KETUA, UROBILINOGEN, BILIRUBINUR, OCBU    No results found for: HARSH, CREURRAN, MACREATRATIO, OSMOU    No components found for: URIC    No results found for: LIPIDPAN    Assessment and Plans:    1. ESKD on HD   S/p HD 5/20 with 2350ml vol removal net   s/p ultrafiltration 5/21 with 2700 ml removed   PLAN:  1. Next IHD on 5/23/22     2. Volume overload  No significant change in the CXR compared to 5/17/22   s/p ultrafiltration 5/21 with 2700 ml removed   PLAN:  1. follow with HD       3 Hypotension  Maintain MAP>65mmHg  PLAN:  1. Follow BP with the vol removal     4 Hyponatremia  With volume overload and hypotonic fluids  Na+ 135 today   PLAN:  1. Follow with HD/ UF       5 Anemia with thrombocytopenia  PLAN:  1. Continue on SHANIQUA  2. Follow Hgb and transfuse for hgB <7     6. Hyperphosphatemia in the setting of the Sec HPTH with the Immune Enhancing TF  Phos 5.4 today   PLAN:  1. Continue to monitor labs    LILY Valiente - CNP   Patient seen and examined. I had a face to face encounter with the patient. He is awake alert and nods. His wife is at the bedside  Agree with exam.    Agree with  formulation, assessment and plan as outlined above and directed by me. Addition and corrections were done as deemed appropriate. My exam and plan include:     A/P:  1. ESKD-will plan for dialysis in the AM prior to OR for the PEG and diverting colostomy    Continue current treatment.     ROSITA Beverly MD

## 2022-05-22 NOTE — PATIENT CARE CONFERENCE
Intensive Care Daily Quality Rounding Checklist        ICU Team Members: bedside nurse, charge nurse, , resident,RT     ICU Day #: 12     Intubation Date: 5/11/2022  1  Ventilator Day #: 11     Central Line Insertion Date: 5/17/22                                                    Day #: 6      Arterial Line Insertion Date: n/a                             Day #: n/a     Temporary Hemodialysis Catheter Insertion Date: n/a                             Day # admitted with tunneled dialysis cath     DVT Prophylaxis: On Heparin SQ    GI Prophylaxis: Protonix     Roberts Catheter Insertion Date: HD patient                                        LMN #:                              Continued need (if yes, reason documented and discussed with physician):     Skin Issues/ Wounds and ordered treatment discussed on rounds: recent debridement to sacral wound     Goals/ Plans for the Day: Vent management, monitor labs and replace as needed, sx on Monday for diverting colostomy,  daily restraint order for patient safety. Wean sedation/transition to PO.

## 2022-05-22 NOTE — PROGRESS NOTES
Yesterday he had an increase in his WBCs without having any localizing symptoms. Infectious disease did see him and started him immediately on broad-spectrum antibiotics. Patient became unresponsive and virtually reportedly evening more towards the early hours of this morning. Fluids were started unfortunately patient could not be resuscitated despite best efforts of the on-call intensive virtual specialist. Patient was sent over to acute care where he was intubated for cardiorespiratory failure. He was found to be in cardiac arrest.     ROS: denies fever, chills, cp, sob, n/v, HA unless stated above.      oxyCODONE  10 mg Oral Q6H    QUEtiapine  25 mg Oral BID    acetylcysteine  4 mL Inhalation Q6H    dilTIAZem  30 mg PEG Tube 4 times per day    lansoprazole  30 mg Per G Tube QAM AC    heparin (porcine)  5,000 Units SubCUTAneous Q8H    collagenase   Topical Daily    ceftolozane-tazobactam (ZERBAXA) IVPB  1,500 mg IntraVENous Q8H    metoprolol tartrate  25 mg Oral BID    epoetin kandi-epbx  8,000 Units SubCUTAneous Once per day on Mon Wed Fri    And    epoetin kandi-epbx  2,000 Units SubCUTAneous Once per day on Mon Wed Fri    tigecycline (TYGACIL) IVPB  50 mg IntraVENous Q12H    sulfamethoxazole-trimethoprim  2 tablet Oral Daily    ipratropium-albuterol  1 ampule Inhalation Q4H    polyethylene glycol  17 g Oral Daily    chlorhexidine  15 mL Mouth/Throat BID    senna  5 mL Per G Tube Nightly    docusate  100 mg Per G Tube BID    levothyroxine  175 mcg Per G Tube Daily    [Held by provider] insulin glargine  20 Units SubCUTAneous BID    insulin lispro  0-12 Units SubCUTAneous Q6H    levETIRAcetam  500 mg PEG Tube BID    sodium chloride flush  5-40 mL IntraVENous 2 times per day     midazolam, 1 mg, Q2H PRN  sodium chloride, , PRN  anticoagulant sodium citrate, 4.2 mL, PRN  perflutren lipid microspheres, 1.5 mL, ONCE PRN  glucose, 4 tablet, PRN  dextrose bolus, 125 mL, PRN   Or  dextrose bolus, 250 mL, PRN  glucagon (rDNA), 1 mg, PRN  sodium chloride flush, 5-40 mL, PRN  sodium chloride, , PRN         Objective:    BP (!) 141/93   Pulse 129   Temp 97 °F (36.1 °C)   Resp 18   Ht 6' 5\" (1.956 m)   Wt 245 lb 6 oz (111.3 kg)   SpO2 98%   BMI 29.10 kg/m²     General Appearance: Can answer with gesture. Skin: warm and dry  Head: normocephalic and atraumatic  Eyes: pupils equal, round, and reactive to light, extraocular eye movements intact, conjunctivae normal  Neck: neck supple and non tender without mass   Pulmonary/Chest: clear to auscultation bilaterally- no wheezes, rales or rhonchi, normal air movement, no respiratory distress  Cardiovascular: Irregularly irregular heart sound. Abdomen: soft, non-tender, non-distended, normal bowel sounds, no masses or organomegaly  Extremities: no cyanosis, no clubbing and no edema  Neurologic: Patient and ventilation could not be evaluated. Recent Labs     05/20/22  0540 05/21/22  0545 05/22/22  0540    137 135   K 4.9 4.1 4.7   CL 96* 99 99   CO2 23 26 24   BUN 68* 49* 67*   CREATININE 2.0* 1.5* 2.0*   GLUCOSE 111* 125* 140*   CALCIUM 8.7 8.8 8.8       Recent Labs     05/20/22  0540 05/21/22  0545 05/22/22  0540   WBC 14.7* 11.9* 11.7*   RBC 3.01* 2.84* 2.82*   HGB 8.2* 7.7* 7.7*   HCT 25.8* 24.4* 24.1*   MCV 85.7 85.9 85.5   MCH 27.2 27.1 27.3   MCHC 31.8* 31.6* 32.0   RDW 20.5* 21.2* 21.4*   * 109* 96*   MPV 9.1 9.8 9.6         Assessment:    Principal Problem:    Cardiac arrest (HCC)  Active Problems:    Pneumonia due to infectious organism    Pleural effusion  Resolved Problems:    * No resolved hospital problems. *      Plan:  1. Acute hypercapnic Respiratory failure with hypoxia: Patient intubated on 5/10/22, thoracocentesis done due to parapneumonic effusion now on Bactrim, tigecycline and Zerbaxa tracheostomy Monday. 2.  Atrial fibrillation: Cardiology on board. AC on hold due to anemia. continue diltiazem 30 mg 4 times daily via PEG tube.  3.  ESRD: Dialysis Monday Wednesday Friday. EPO for anemia. 4.  Sepsis with septic shock: Most likely due to decubitus ulcer on the back, on tigecycline, Bactrim and Zerbaxa ID on the case  5. Diabetes mellitus continue insulin coverage. With lantus 20 units SC at night. 6.  Hypothyroidism: Continue levothyroxine. 7. Seizure-Keppra 500mg two times daily. 8. Hypothyroidism- continue levothyroxine 175mcg via PEG tube daily. NOTE: This report was transcribed using voice recognition software. Every effort was made to ensure accuracy; however, inadvertent computerized transcription errors may be present.   Electronically signed by Elsie Shea MD on 5/22/2022 at 1:17 PM

## 2022-05-22 NOTE — PROGRESS NOTES
[address] [place] [address] [facility]  [facility]  Progress Note    SUBJECTIVE:  Chief Complaint   Patient presents with    Loss of Consciousness     arrived from [facility] via [facility] unresponsive     No new problems reported by nursing. He is still in the ICU. He is tolerating antibiotic. No fever. No vasopressors. Review of systems:  A 10 point review of systems was done. As stated above, otherwise negative.     Medications:   oxyCODONE  10 mg Oral Q6H    QUEtiapine  25 mg Oral BID    acetylcysteine  4 mL Inhalation Q6H    dilTIAZem  30 mg PEG Tube 4 times per day    lansoprazole  30 mg Per G Tube QAM AC    heparin (porcine)  5,000 Units SubCUTAneous Q8H    collagenase   Topical Daily    ceftolozane-tazobactam (ZERBAXA) IVPB  1,500 mg IntraVENous Q8H    metoprolol tartrate  25 mg Oral BID    epoetin kandi-epbx  8,000 Units SubCUTAneous Once per day on Mon Wed Fri    And    epoetin kandi-epbx  2,000 Units SubCUTAneous Once per day on Mon Wed Fri    tigecycline (TYGACIL) IVPB  50 mg IntraVENous Q12H    sulfamethoxazole-trimethoprim  2 tablet Oral Daily    ipratropium-albuterol  1 ampule Inhalation Q4H    polyethylene glycol  17 g Oral Daily    chlorhexidine  15 mL Mouth/Throat BID    senna  5 mL Per G Tube Nightly    docusate  100 mg Per G Tube BID    levothyroxine  175 mcg Per G Tube Daily    [Held by provider] insulin glargine  20 Units SubCUTAneous BID    insulin lispro  0-12 Units SubCUTAneous Q6H    levETIRAcetam  500 mg PEG Tube BID    sodium chloride flush  5-40 mL IntraVENous 2 times per day      sodium chloride      fentaNYL 200 mcg/hr (05/22/22 9979)    sodium chloride Stopped (05/20/22 0249)     midazolam, sodium chloride, anticoagulant sodium citrate, perflutren lipid microspheres, glucose, dextrose bolus **OR** dextrose bolus, glucagon (rDNA), sodium chloride flush, sodium chloride    OBJECTIVE:  BP (!) 141/93   Pulse 113   Temp 97 °F (36.1 °C)   Resp (!) 33   Ht 6' 5\" (1.956 m)   Wt 245 lb 6 oz (111.3 kg)   SpO2 (!) 85%   BMI 29.10 kg/m²   Temp  Av.8 °F (36.6 °C)  Min: 97 °F (36.1 °C)  Max: 98.6 °F (37 °C)  Constitutional: The patient is lying in bed in the ICU. He is awake. Shakes and nods head. FiO2 35%. PEEP 6. No changes. Skin: Warm and dry. No rashes were noted. HEENT: Eyes show round, and reactive pupils. No jaundice. Moist mucous membranes, no ulcerations, no thrush. ETT. Neck: Supple to movements. No lymphadenopathy. Chest: No use of accessory muscles to breathe. Symmetrical expansion. Auscultation reveals coarse breath sounds. scattered rhonchi. Right tunneled HD catheter. Cardiovascular: Heart sounds arrhythmic and irregular. Abdomen: Positive bowel sounds to auscultation. Benign to palpation. PEG. Extremities: Minimal edema. Left third toe missing. Back: Stage IV decubitus ulcer with dressing. Lines: Left IJ TLC 2022. Fecal management system.     Laboratory and Tests Review:  Lab Results   Component Value Date    WBC 11.7 (H) 2022    WBC 11.9 (H) 2022    WBC 14.7 (H) 2022    HGB 7.7 (L) 2022    HCT 24.1 (L) 2022    MCV 85.5 2022    PLT 96 (L) 2022     Lab Results   Component Value Date    NEUTROABS 10.75 (H) 2022    NEUTROABS 11.07 (H) 2022    NEUTROABS 13.50 (H) 2022     Lab Results   Component Value Date    CRP 13.9 (H) 2022     No results found for: CRP  Lab Results   Component Value Date    SEDRATE 78 (H) 2022     Lab Results   Component Value Date    ALT 30 2022    AST 31 2022    ALKPHOS 591 (H) 2022    BILITOT 0.3 2022     Lab Results   Component Value Date     2022    K 4.7 2022    K 4.5 2022    CL 99 2022    CO2 24 2022    BUN 67 2022    CREATININE 2.0 2022    CREATININE 1.5 2022    CREATININE 2.0 2022    GFRAA 40 2022    LABGLOM 33 2022    GLUCOSE 140 05/22/2022    PROT 6.9 05/11/2022    LABALBU 2.3 05/20/2022    CALCIUM 8.8 05/22/2022    BILITOT 0.3 05/11/2022    ALKPHOS 591 05/11/2022    AST 31 05/11/2022    ALT 30 05/11/2022     Radiology:    Reviewed  Microbiology:   [facility]:  Blood culture 5/10/2022: Negative so far  Decubitus ulcer 5/10/2022: MRSA, mixed GNR  Respiratory panel: Pending  Nares screen MRSA: Pending  Blood cultures 5/11/2022: Negative so far      [facility]:  Respiratory panel: Negative  Blood cultures 5/11/2022: Staphylococcus epidermidis in 2 of 2 sets  Nares screen MRSA: Positive   Respiratory culture 5/12/2022: MDR Acinetobacter baumanii (sensitive to Bactrim, Tigecycline, Cefiderocol. Intermediate to Avycaz and Eravacycline. Resistant to all others)  Respiratory culture 5/16/2022: OP patricia reduced. GNR, GNR, GNR. Pleural fluid 5/13/2022: Pseudomonas aeruginosa (Resistant to Levofloxacin and Meropenem. Intermediate to Zosyn. Sensitive to Avycaz and Gentamicin)  Sacral wound 5/17/2022: MDR Acinetobacter baumanii, Pseudomonas aeruginosa (sensitive only to aminoglycosides and Zerbaxa)    ASSESSMENT:  · HCAP with MDR Acinetobacter baumanii  · Sepsis with septic shock associated to HCAP  · Status postcardiac arrest  · Acute respiratory failure. Unable to wean  · Leukocytosis   · History of sacral decubitus ulcer infection with Pseudomonas and Enterococcus. Treated with IV antibiotic between March and April 2022. Status postdebridement 5/17/2022. Now colonized/infected with MDR Acinetobacter and MDR Pseudomonas  · CKD, on HD  · Acinetobacter in the respiratory cultures. Possible outbreak in the ICU  · Pleural fluid infection with Pseudomonas. Possible empyema    PLAN:  · Continue Tigecycline, Bactrim, day 8 of minimum 10  · Continue Zerbaxa, day 6 of minimum 10  · Tracheostomy and diverting colostomy tomorrow  · Continue contact isolation    Spoke with nursing.     Reymundo Quintanilla MD  11:08 AM  5/22/2022

## 2022-05-23 ENCOUNTER — APPOINTMENT (OUTPATIENT)
Dept: GENERAL RADIOLOGY | Age: 67
DRG: 003 | End: 2022-05-23
Payer: MEDICARE

## 2022-05-23 ENCOUNTER — ANESTHESIA (OUTPATIENT)
Dept: OPERATING ROOM | Age: 67
DRG: 003 | End: 2022-05-23
Payer: MEDICARE

## 2022-05-23 LAB
ABO/RH: NORMAL
ANION GAP SERPL CALCULATED.3IONS-SCNC: 14 MMOL/L (ref 7–16)
ANISOCYTOSIS: ABNORMAL
ANTIBODY SCREEN: NORMAL
BASOPHILS ABSOLUTE: 0.02 E9/L (ref 0–0.2)
BASOPHILS RELATIVE PERCENT: 0.2 % (ref 0–2)
BUN BLDV-MCNC: 81 MG/DL (ref 6–23)
BURR CELLS: ABNORMAL
C-REACTIVE PROTEIN: 15.4 MG/DL (ref 0–0.4)
CALCIUM SERPL-MCNC: 8.7 MG/DL (ref 8.6–10.2)
CHLORIDE BLD-SCNC: 98 MMOL/L (ref 98–107)
CO2: 23 MMOL/L (ref 22–29)
CREAT SERPL-MCNC: 2.5 MG/DL (ref 0.7–1.2)
EOSINOPHILS ABSOLUTE: 0.02 E9/L (ref 0.05–0.5)
EOSINOPHILS RELATIVE PERCENT: 0.2 % (ref 0–6)
GFR AFRICAN AMERICAN: 31
GFR NON-AFRICAN AMERICAN: 26 ML/MIN/1.73
GLUCOSE BLD-MCNC: 157 MG/DL (ref 74–99)
HAV IGM SER IA-ACNC: NORMAL
HCT VFR BLD CALC: 22.8 % (ref 37–54)
HEMOGLOBIN: 7.4 G/DL (ref 12.5–16.5)
HEPATITIS B CORE IGM ANTIBODY: NORMAL
HEPATITIS B SURFACE ANTIGEN INTERPRETATION: NORMAL
HEPATITIS C ANTIBODY INTERPRETATION: NORMAL
HYPOCHROMIA: ABNORMAL
IMMATURE GRANULOCYTES #: 0.04 E9/L
IMMATURE GRANULOCYTES %: 0.5 % (ref 0–5)
LYMPHOCYTES ABSOLUTE: 0.66 E9/L (ref 1.5–4)
LYMPHOCYTES RELATIVE PERCENT: 8.2 % (ref 20–42)
MAGNESIUM: 2.3 MG/DL (ref 1.6–2.6)
MCH RBC QN AUTO: 27.6 PG (ref 26–35)
MCHC RBC AUTO-ENTMCNC: 32.5 % (ref 32–34.5)
MCV RBC AUTO: 85.1 FL (ref 80–99.9)
METER GLUCOSE: 126 MG/DL (ref 74–99)
METER GLUCOSE: 139 MG/DL (ref 74–99)
METER GLUCOSE: 148 MG/DL (ref 74–99)
METER GLUCOSE: 82 MG/DL (ref 74–99)
MONOCYTES ABSOLUTE: 0.32 E9/L (ref 0.1–0.95)
MONOCYTES RELATIVE PERCENT: 4 % (ref 2–12)
NEUTROPHILS ABSOLUTE: 6.98 E9/L (ref 1.8–7.3)
NEUTROPHILS RELATIVE PERCENT: 86.9 % (ref 43–80)
OVALOCYTES: ABNORMAL
PDW BLD-RTO: 21.8 FL (ref 11.5–15)
PHOSPHORUS: 6.3 MG/DL (ref 2.5–4.5)
PLATELET # BLD: 100 E9/L (ref 130–450)
PMV BLD AUTO: 10.6 FL (ref 7–12)
POIKILOCYTES: ABNORMAL
POLYCHROMASIA: ABNORMAL
POTASSIUM SERPL-SCNC: 5.1 MMOL/L (ref 3.5–5)
RBC # BLD: 2.68 E12/L (ref 3.8–5.8)
SEDIMENTATION RATE, ERYTHROCYTE: 13 MM/HR (ref 0–15)
SODIUM BLD-SCNC: 135 MMOL/L (ref 132–146)
TARGET CELLS: ABNORMAL
WBC # BLD: 8 E9/L (ref 4.5–11.5)

## 2022-05-23 PROCEDURE — 0B113F4 BYPASS TRACHEA TO CUTANEOUS WITH TRACHEOSTOMY DEVICE, PERCUTANEOUS APPROACH: ICD-10-PCS | Performed by: SURGERY

## 2022-05-23 PROCEDURE — 6360000002 HC RX W HCPCS: Performed by: SPECIALIST

## 2022-05-23 PROCEDURE — 82962 GLUCOSE BLOOD TEST: CPT

## 2022-05-23 PROCEDURE — 2500000003 HC RX 250 WO HCPCS: Performed by: STUDENT IN AN ORGANIZED HEALTH CARE EDUCATION/TRAINING PROGRAM

## 2022-05-23 PROCEDURE — 87075 CULTR BACTERIA EXCEPT BLOOD: CPT

## 2022-05-23 PROCEDURE — 87070 CULTURE OTHR SPECIMN AEROBIC: CPT

## 2022-05-23 PROCEDURE — 0D1N4Z4 BYPASS SIGMOID COLON TO CUTANEOUS, PERCUTANEOUS ENDOSCOPIC APPROACH: ICD-10-PCS | Performed by: SURGERY

## 2022-05-23 PROCEDURE — 6370000000 HC RX 637 (ALT 250 FOR IP): Performed by: INTERNAL MEDICINE

## 2022-05-23 PROCEDURE — 37799 UNLISTED PX VASCULAR SURGERY: CPT

## 2022-05-23 PROCEDURE — 84100 ASSAY OF PHOSPHORUS: CPT

## 2022-05-23 PROCEDURE — 2580000003 HC RX 258: Performed by: STUDENT IN AN ORGANIZED HEALTH CARE EDUCATION/TRAINING PROGRAM

## 2022-05-23 PROCEDURE — 2500000003 HC RX 250 WO HCPCS: Performed by: ANESTHESIOLOGIST ASSISTANT

## 2022-05-23 PROCEDURE — 3600000014 HC SURGERY LEVEL 4 ADDTL 15MIN: Performed by: SURGERY

## 2022-05-23 PROCEDURE — 87116 MYCOBACTERIA CULTURE: CPT

## 2022-05-23 PROCEDURE — 6360000002 HC RX W HCPCS: Performed by: ANESTHESIOLOGIST ASSISTANT

## 2022-05-23 PROCEDURE — 86900 BLOOD TYPING SEROLOGIC ABO: CPT

## 2022-05-23 PROCEDURE — 36592 COLLECT BLOOD FROM PICC: CPT

## 2022-05-23 PROCEDURE — 36415 COLL VENOUS BLD VENIPUNCTURE: CPT

## 2022-05-23 PROCEDURE — 87205 SMEAR GRAM STAIN: CPT

## 2022-05-23 PROCEDURE — 85651 RBC SED RATE NONAUTOMATED: CPT

## 2022-05-23 PROCEDURE — 90935 HEMODIALYSIS ONE EVALUATION: CPT | Performed by: INTERNAL MEDICINE

## 2022-05-23 PROCEDURE — 6370000000 HC RX 637 (ALT 250 FOR IP): Performed by: STUDENT IN AN ORGANIZED HEALTH CARE EDUCATION/TRAINING PROGRAM

## 2022-05-23 PROCEDURE — P9012 CRYOPRECIPITATE EACH UNIT: HCPCS

## 2022-05-23 PROCEDURE — 87106 FUNGI IDENTIFICATION YEAST: CPT

## 2022-05-23 PROCEDURE — 2000000000 HC ICU R&B

## 2022-05-23 PROCEDURE — 6360000004 HC RX CONTRAST MEDICATION: Performed by: STUDENT IN AN ORGANIZED HEALTH CARE EDUCATION/TRAINING PROGRAM

## 2022-05-23 PROCEDURE — 99233 SBSQ HOSP IP/OBS HIGH 50: CPT | Performed by: STUDENT IN AN ORGANIZED HEALTH CARE EDUCATION/TRAINING PROGRAM

## 2022-05-23 PROCEDURE — 94003 VENT MGMT INPAT SUBQ DAY: CPT

## 2022-05-23 PROCEDURE — 87186 SC STD MICRODIL/AGAR DIL: CPT

## 2022-05-23 PROCEDURE — 71045 X-RAY EXAM CHEST 1 VIEW: CPT

## 2022-05-23 PROCEDURE — 2580000003 HC RX 258: Performed by: INTERNAL MEDICINE

## 2022-05-23 PROCEDURE — 94640 AIRWAY INHALATION TREATMENT: CPT

## 2022-05-23 PROCEDURE — 87102 FUNGUS ISOLATION CULTURE: CPT

## 2022-05-23 PROCEDURE — 83735 ASSAY OF MAGNESIUM: CPT

## 2022-05-23 PROCEDURE — 6360000002 HC RX W HCPCS: Performed by: INTERNAL MEDICINE

## 2022-05-23 PROCEDURE — 2580000003 HC RX 258: Performed by: SPECIALIST

## 2022-05-23 PROCEDURE — 3700000000 HC ANESTHESIA ATTENDED CARE: Performed by: SURGERY

## 2022-05-23 PROCEDURE — 85025 COMPLETE CBC W/AUTO DIFF WBC: CPT

## 2022-05-23 PROCEDURE — 80048 BASIC METABOLIC PNL TOTAL CA: CPT

## 2022-05-23 PROCEDURE — 3700000001 HC ADD 15 MINUTES (ANESTHESIA): Performed by: SURGERY

## 2022-05-23 PROCEDURE — 6360000002 HC RX W HCPCS: Performed by: STUDENT IN AN ORGANIZED HEALTH CARE EDUCATION/TRAINING PROGRAM

## 2022-05-23 PROCEDURE — 74018 RADEX ABDOMEN 1 VIEW: CPT

## 2022-05-23 PROCEDURE — 86901 BLOOD TYPING SEROLOGIC RH(D): CPT

## 2022-05-23 PROCEDURE — 80074 ACUTE HEPATITIS PANEL: CPT

## 2022-05-23 PROCEDURE — 87015 SPECIMEN INFECT AGNT CONCNTJ: CPT

## 2022-05-23 PROCEDURE — 6360000002 HC RX W HCPCS

## 2022-05-23 PROCEDURE — 6370000000 HC RX 637 (ALT 250 FOR IP): Performed by: SPECIALIST

## 2022-05-23 PROCEDURE — 86850 RBC ANTIBODY SCREEN: CPT

## 2022-05-23 PROCEDURE — 87206 SMEAR FLUORESCENT/ACID STAI: CPT

## 2022-05-23 PROCEDURE — 87077 CULTURE AEROBIC IDENTIFY: CPT

## 2022-05-23 PROCEDURE — 86140 C-REACTIVE PROTEIN: CPT

## 2022-05-23 PROCEDURE — 0QB10ZZ EXCISION OF SACRUM, OPEN APPROACH: ICD-10-PCS | Performed by: SURGERY

## 2022-05-23 PROCEDURE — 0DH63UZ INSERTION OF FEEDING DEVICE INTO STOMACH, PERCUTANEOUS APPROACH: ICD-10-PCS | Performed by: SURGERY

## 2022-05-23 PROCEDURE — 2709999900 HC NON-CHARGEABLE SUPPLY: Performed by: SURGERY

## 2022-05-23 PROCEDURE — 3600000004 HC SURGERY LEVEL 4 BASE: Performed by: SURGERY

## 2022-05-23 PROCEDURE — 3E0G76Z INTRODUCTION OF NUTRITIONAL SUBSTANCE INTO UPPER GI, VIA NATURAL OR ARTIFICIAL OPENING: ICD-10-PCS | Performed by: SURGERY

## 2022-05-23 RX ORDER — ONDANSETRON 2 MG/ML
INJECTION INTRAMUSCULAR; INTRAVENOUS PRN
Status: DISCONTINUED | OUTPATIENT
Start: 2022-05-23 | End: 2022-05-23 | Stop reason: SDUPTHER

## 2022-05-23 RX ORDER — SODIUM CHLORIDE 9 MG/ML
INJECTION, SOLUTION INTRAVENOUS CONTINUOUS
Status: DISCONTINUED | OUTPATIENT
Start: 2022-05-23 | End: 2022-05-24

## 2022-05-23 RX ORDER — ROCURONIUM BROMIDE 10 MG/ML
INJECTION, SOLUTION INTRAVENOUS PRN
Status: DISCONTINUED | OUTPATIENT
Start: 2022-05-23 | End: 2022-05-23 | Stop reason: SDUPTHER

## 2022-05-23 RX ORDER — PHENYLEPHRINE HCL IN 0.9% NACL 1 MG/10 ML
SYRINGE (ML) INTRAVENOUS PRN
Status: DISCONTINUED | OUTPATIENT
Start: 2022-05-23 | End: 2022-05-23 | Stop reason: SDUPTHER

## 2022-05-23 RX ORDER — DEXAMETHASONE SODIUM PHOSPHATE 4 MG/ML
INJECTION, SOLUTION INTRA-ARTICULAR; INTRALESIONAL; INTRAMUSCULAR; INTRAVENOUS; SOFT TISSUE PRN
Status: DISCONTINUED | OUTPATIENT
Start: 2022-05-23 | End: 2022-05-23 | Stop reason: SDUPTHER

## 2022-05-23 RX ORDER — FENTANYL CITRATE 50 UG/ML
INJECTION, SOLUTION INTRAMUSCULAR; INTRAVENOUS PRN
Status: DISCONTINUED | OUTPATIENT
Start: 2022-05-23 | End: 2022-05-23 | Stop reason: SDUPTHER

## 2022-05-23 RX ORDER — MIDAZOLAM HYDROCHLORIDE 1 MG/ML
INJECTION INTRAMUSCULAR; INTRAVENOUS PRN
Status: DISCONTINUED | OUTPATIENT
Start: 2022-05-23 | End: 2022-05-23 | Stop reason: SDUPTHER

## 2022-05-23 RX ORDER — SODIUM CHLORIDE 9 MG/ML
INJECTION, SOLUTION INTRAVENOUS CONTINUOUS
Status: DISCONTINUED | OUTPATIENT
Start: 2022-05-23 | End: 2022-05-23

## 2022-05-23 RX ADMIN — FENTANYL CITRATE 50 MCG: 50 INJECTION, SOLUTION INTRAMUSCULAR; INTRAVENOUS at 15:42

## 2022-05-23 RX ADMIN — Medication 2 MG/HR: at 17:10

## 2022-05-23 RX ADMIN — ONDANSETRON 4 MG: 2 INJECTION INTRAMUSCULAR; INTRAVENOUS at 15:36

## 2022-05-23 RX ADMIN — IPRATROPIUM BROMIDE AND ALBUTEROL SULFATE 1 AMPULE: .5; 2.5 SOLUTION RESPIRATORY (INHALATION) at 19:44

## 2022-05-23 RX ADMIN — LEVETIRACETAM 500 MG: 100 SOLUTION ORAL at 10:28

## 2022-05-23 RX ADMIN — TIGECYCLINE 50 MG: 50 INJECTION, POWDER, LYOPHILIZED, FOR SOLUTION INTRAVENOUS at 20:39

## 2022-05-23 RX ADMIN — TIGECYCLINE 50 MG: 50 INJECTION, POWDER, LYOPHILIZED, FOR SOLUTION INTRAVENOUS at 11:58

## 2022-05-23 RX ADMIN — MIDAZOLAM 2 MG: 1 INJECTION INTRAMUSCULAR; INTRAVENOUS at 14:17

## 2022-05-23 RX ADMIN — Medication 100 MCG: at 14:54

## 2022-05-23 RX ADMIN — ACETYLCYSTEINE 400 MG: 100 INHALANT RESPIRATORY (INHALATION) at 08:24

## 2022-05-23 RX ADMIN — CEFTOLOZANE AND TAZOBACTAM 1500 MG: 1; .5 INJECTION, POWDER, LYOPHILIZED, FOR SOLUTION INTRAVENOUS at 06:54

## 2022-05-23 RX ADMIN — LEVOTHYROXINE SODIUM 175 MCG: 125 TABLET ORAL at 05:08

## 2022-05-23 RX ADMIN — SUGAMMADEX 300 MG: 100 INJECTION, SOLUTION INTRAVENOUS at 15:42

## 2022-05-23 RX ADMIN — Medication 10 ML: at 09:20

## 2022-05-23 RX ADMIN — CEFTOLOZANE AND TAZOBACTAM 1500 MG: 1; .5 INJECTION, POWDER, LYOPHILIZED, FOR SOLUTION INTRAVENOUS at 22:27

## 2022-05-23 RX ADMIN — Medication 100 MCG: at 15:01

## 2022-05-23 RX ADMIN — QUETIAPINE FUMARATE 25 MG: 25 TABLET ORAL at 10:31

## 2022-05-23 RX ADMIN — QUETIAPINE FUMARATE 25 MG: 25 TABLET ORAL at 21:00

## 2022-05-23 RX ADMIN — Medication 100 MCG: at 14:56

## 2022-05-23 RX ADMIN — OXYCODONE 10 MG: 5 TABLET ORAL at 10:28

## 2022-05-23 RX ADMIN — IPRATROPIUM BROMIDE AND ALBUTEROL SULFATE 1 AMPULE: .5; 2.5 SOLUTION RESPIRATORY (INHALATION) at 03:27

## 2022-05-23 RX ADMIN — SODIUM CHLORIDE: 9 INJECTION, SOLUTION INTRAVENOUS at 13:53

## 2022-05-23 RX ADMIN — Medication 200 MCG/HR: at 05:13

## 2022-05-23 RX ADMIN — ROCURONIUM BROMIDE 20 MG: 50 INJECTION, SOLUTION INTRAVENOUS at 14:25

## 2022-05-23 RX ADMIN — FENTANYL CITRATE 50 MCG: 50 INJECTION, SOLUTION INTRAMUSCULAR; INTRAVENOUS at 14:24

## 2022-05-23 RX ADMIN — Medication 100 MCG: at 14:27

## 2022-05-23 RX ADMIN — IPRATROPIUM BROMIDE AND ALBUTEROL SULFATE 1 AMPULE: .5; 2.5 SOLUTION RESPIRATORY (INHALATION) at 08:24

## 2022-05-23 RX ADMIN — SODIUM CHLORIDE: 9 INJECTION, SOLUTION INTRAVENOUS at 18:30

## 2022-05-23 RX ADMIN — SENNOSIDES 8.8 MG: 8.8 SYRUP ORAL at 21:00

## 2022-05-23 RX ADMIN — OXYCODONE 10 MG: 5 TABLET ORAL at 05:08

## 2022-05-23 RX ADMIN — DILTIAZEM HYDROCHLORIDE 30 MG: 30 TABLET, FILM COATED ORAL at 19:40

## 2022-05-23 RX ADMIN — Medication 100 MCG: at 15:21

## 2022-05-23 RX ADMIN — IPRATROPIUM BROMIDE AND ALBUTEROL SULFATE 1 AMPULE: .5; 2.5 SOLUTION RESPIRATORY (INHALATION) at 12:34

## 2022-05-23 RX ADMIN — Medication 100 MCG: at 15:33

## 2022-05-23 RX ADMIN — DOCUSATE SODIUM LIQUID 100 MG: 100 LIQUID ORAL at 21:00

## 2022-05-23 RX ADMIN — Medication 100 MCG: at 15:10

## 2022-05-23 RX ADMIN — FENTANYL CITRATE 50 MCG: 50 INJECTION, SOLUTION INTRAMUSCULAR; INTRAVENOUS at 14:03

## 2022-05-23 RX ADMIN — EPOETIN ALFA-EPBX 8000 UNITS: 4000 INJECTION, SOLUTION INTRAVENOUS; SUBCUTANEOUS at 11:56

## 2022-05-23 RX ADMIN — Medication 100 MCG: at 14:31

## 2022-05-23 RX ADMIN — CHLORHEXIDINE GLUCONATE 0.12% ORAL RINSE 15 ML: 1.2 LIQUID ORAL at 20:18

## 2022-05-23 RX ADMIN — ACETYLCYSTEINE 400 MG: 100 INHALANT RESPIRATORY (INHALATION) at 19:44

## 2022-05-23 RX ADMIN — SODIUM CHLORIDE: 9 INJECTION, SOLUTION INTRAVENOUS at 18:24

## 2022-05-23 RX ADMIN — FENTANYL CITRATE 50 MCG: 50 INJECTION, SOLUTION INTRAMUSCULAR; INTRAVENOUS at 16:04

## 2022-05-23 RX ADMIN — DIATRIZOATE MEGLUMINE AND DIATRIZOATE SODIUM 30 ML: 660; 100 LIQUID ORAL; RECTAL at 18:01

## 2022-05-23 RX ADMIN — CEFTOLOZANE AND TAZOBACTAM 1500 MG: 1; .5 INJECTION, POWDER, LYOPHILIZED, FOR SOLUTION INTRAVENOUS at 16:57

## 2022-05-23 RX ADMIN — METOPROLOL TARTRATE 25 MG: 25 TABLET, FILM COATED ORAL at 21:12

## 2022-05-23 RX ADMIN — FENTANYL CITRATE 50 MCG: 50 INJECTION, SOLUTION INTRAMUSCULAR; INTRAVENOUS at 15:55

## 2022-05-23 RX ADMIN — DEXAMETHASONE SODIUM PHOSPHATE 4 MG: 4 INJECTION, SOLUTION INTRAMUSCULAR; INTRAVENOUS at 14:25

## 2022-05-23 RX ADMIN — DILTIAZEM HYDROCHLORIDE 30 MG: 30 TABLET, FILM COATED ORAL at 11:57

## 2022-05-23 RX ADMIN — LEVETIRACETAM 500 MG: 100 SOLUTION ORAL at 21:00

## 2022-05-23 RX ADMIN — Medication 200 MCG/HR: at 10:49

## 2022-05-23 RX ADMIN — COLLAGENASE SANTYL: 250 OINTMENT TOPICAL at 09:19

## 2022-05-23 RX ADMIN — ROCURONIUM BROMIDE 30 MG: 50 INJECTION, SOLUTION INTRAVENOUS at 14:03

## 2022-05-23 RX ADMIN — Medication 10 ML: at 20:18

## 2022-05-23 RX ADMIN — EPOETIN ALFA-EPBX 2000 UNITS: 2000 INJECTION, SOLUTION INTRAVENOUS; SUBCUTANEOUS at 11:57

## 2022-05-23 RX ADMIN — Medication 100 MCG: at 15:18

## 2022-05-23 RX ADMIN — Medication 200 MCG/HR: at 19:39

## 2022-05-23 RX ADMIN — DILTIAZEM HYDROCHLORIDE 30 MG: 30 TABLET, FILM COATED ORAL at 05:08

## 2022-05-23 RX ADMIN — CHLORHEXIDINE GLUCONATE 0.12% ORAL RINSE 15 ML: 1.2 LIQUID ORAL at 09:18

## 2022-05-23 RX ADMIN — SULFAMETHOXAZOLE AND TRIMETHOPRIM 2 TABLET: 800; 160 TABLET ORAL at 10:31

## 2022-05-23 RX ADMIN — Medication 100 MCG: at 14:36

## 2022-05-23 RX ADMIN — Medication 100 MCG: at 15:29

## 2022-05-23 ASSESSMENT — PULMONARY FUNCTION TESTS
PIF_VALUE: 25
PIF_VALUE: 26
PIF_VALUE: 27
PIF_VALUE: 27
PIF_VALUE: 28
PIF_VALUE: 30
PIF_VALUE: 29
PIF_VALUE: 27
PIF_VALUE: 27
PIF_VALUE: 21
PIF_VALUE: 29
PIF_VALUE: 28
PIF_VALUE: 30
PIF_VALUE: 28
PIF_VALUE: 29
PIF_VALUE: 28
PIF_VALUE: 28
PIF_VALUE: 25
PIF_VALUE: 27
PIF_VALUE: 30
PIF_VALUE: 27
PIF_VALUE: 27

## 2022-05-23 ASSESSMENT — PAIN SCALES - GENERAL: PAINLEVEL_OUTOF10: 0

## 2022-05-23 NOTE — PLAN OF CARE
Problem: Pain  Goal: Verbalizes/displays adequate comfort level or baseline comfort level  Outcome: Progressing     Problem: Skin/Tissue Integrity  Goal: Absence of new skin breakdown  Description: 1. Monitor for areas of redness and/or skin breakdown  2. Assess vascular access sites hourly  3. Every 4-6 hours minimum:  Change oxygen saturation probe site  4. Every 4-6 hours:  If on nasal continuous positive airway pressure, respiratory therapy assess nares and determine need for appliance change or resting period. Outcome: Not Progressing     Problem: Safety - Adult  Goal: Free from fall injury  Outcome: Progressing     Problem: ABCDS Injury Assessment  Goal: Absence of physical injury  Outcome: Progressing     Problem: Respiratory - Adult  Goal: Achieves optimal ventilation and oxygenation  5/22/2022 1717 by Tere Dougherty RCP  Outcome: Not Progressing  Flowsheets (Taken 5/22/2022 0327 by Adelso Ochoa RCP)  Achieves optimal ventilation and oxygenation:   Assess for changes in respiratory status   Position to facilitate oxygenation and minimize respiratory effort   Oxygen supplementation based on oxygen saturation or arterial blood gases   Assess the need for suctioning and aspirate as needed   Respiratory therapy support as indicated  Note: No changes to patient ventilator today.

## 2022-05-23 NOTE — CARE COORDINATION
Social work / Discharge Planning:       [facility] [employer] confirmed with patient's wife Kev Silverio that plan is to return to [facility]. Plan is for trach and diverting ostomy today. Social work and CM will follow.   Electronically signed by SHAHNAZ Steele on 5/23/2022 at 1:43 PM

## 2022-05-23 NOTE — ANESTHESIA POSTPROCEDURE EVALUATION
Department of Anesthesiology  Postprocedure Note    Patient: Elena Jung  MRN: [id number]  YOB: 1955  Date of evaluation: 5/23/2022  Time:  7:25 PM     Procedure Summary     Date: 05/23/22 Room / Location: [place] [address] / [address] [place] [address]    Anesthesia Start: 0373 Anesthesia Stop: 1622    Procedures:       DIAGNOSTIC LAPAROSCOPY DIVERTING COLOSTOMY,GASTROSTOMY TUBE PLACEMENT (N/A Abdomen)      SACRAL WOUND DEBRIDEMENT  +++CONTACT ISOLATION+++ (N/A Sacrum)      PERCUTANEOUS  TRACHEOTOMY  (ENDO NURSE NEEDED) (N/A Neck) Diagnosis: (/)    Surgeons: Afua Crouch MD Responsible Provider: Ulices Ramsey MD    Anesthesia Type: general ASA Status: 4          Anesthesia Type: general    Sally Phase I:      Sally Phase II:      Last vitals: Reviewed and per EMR flowsheets.        Anesthesia Post Evaluation    Patient location during evaluation: PACU  Patient participation: complete - patient participated  Level of consciousness: awake and alert  Airway patency: patent  Nausea & Vomiting: no vomiting and no nausea  Complications: no  Cardiovascular status: blood pressure returned to baseline  Respiratory status: acceptable  Hydration status: euvolemic  Multimodal analgesia pain management approach

## 2022-05-23 NOTE — PROGRESS NOTES
[facility] Progress Note    Admitting Date and Time: 5/10/2022 11:58 PM  Admit Dx: Cardiac arrest ([facility] [I46.9]  Elevated troponin [R77.8]  Acute respiratory failure with hypoxia (HCC) [J96.01]  Leukocytosis, unspecified type [D72.829]  Pneumonia due to infectious organism, unspecified laterality, unspecified part of lung [J18.9]    Subjective:  Patient is being followed for Cardiac arrest ([facility] [I46.9]  Elevated troponin [R77.8]  Acute respiratory failure with hypoxia (HCC) [J96.01]  Leukocytosis, unspecified type [D72.829]  Pneumonia due to infectious organism, unspecified laterality, unspecified part of lung [J18.9]   Mr. Pooja Stout, a 74y.o. year old male who has a past medical history of A-fib ([facility] TRUMAN (acute kidney injury) ([facility] Anemia, Anxiety, Bipolar 1 disorder ([facility] Charcot foot due to diabetes mellitus ([facility] CHF (congestive heart failure) ([facility] CKD (chronic kidney disease), Diabetes mellitus ([facility] Dialysis patient ([facility] Hyperlipidemia, Hypertension, Right sided weakness, Sacral decubitus ulcer, and Seizure ([facility] Patient for trach today.     ROS: unobtainable     oxyCODONE  10 mg Oral Q6H    QUEtiapine  25 mg Oral BID    acetylcysteine  4 mL Inhalation Q6H    dilTIAZem  30 mg PEG Tube 4 times per day    lansoprazole  30 mg Per G Tube QAM AC    [Held by provider] heparin (porcine)  5,000 Units SubCUTAneous Q8H    collagenase   Topical Daily    ceftolozane-tazobactam (ZERBAXA) IVPB  1,500 mg IntraVENous Q8H    metoprolol tartrate  25 mg Oral BID    epoetin kandi-epbx  8,000 Units SubCUTAneous Once per day on Mon Wed Fri    And    epoetin kandi-epbx  2,000 Units SubCUTAneous Once per day on Mon Wed Fri    tigecycline (TYGACIL) IVPB  50 mg IntraVENous Q12H    sulfamethoxazole-trimethoprim  2 tablet Oral Daily    ipratropium-albuterol  1 ampule Inhalation Q4H    polyethylene glycol  17 g Oral Daily    chlorhexidine  15 mL Mouth/Throat BID    senna  5 mL Per G Tube Nightly    docusate  100 mg Per G Tube BID    levothyroxine  175 mcg Per G Tube Daily    [Held by provider] insulin glargine  20 Units SubCUTAneous BID    insulin lispro  0-12 Units SubCUTAneous Q6H    levETIRAcetam  500 mg PEG Tube BID    sodium chloride flush  5-40 mL IntraVENous 2 times per day     diatrizoate meglumine-sodium, 30 mL, ONCE PRN  midazolam, 1 mg, Q2H PRN  anticoagulant sodium citrate, 4.2 mL, PRN  perflutren lipid microspheres, 1.5 mL, ONCE PRN  glucose, 4 tablet, PRN  dextrose bolus, 125 mL, PRN   Or  dextrose bolus, 250 mL, PRN  glucagon (rDNA), 1 mg, PRN  sodium chloride flush, 5-40 mL, PRN  sodium chloride, , PRN         Objective:    /75   Pulse 111   Temp 97.2 °F (36.2 °C) (Axillary)   Resp 18   Ht 6' 5\" (1.956 m)   Wt 240 lb 1.3 oz (108.9 kg)   SpO2 99%   BMI 28.47 kg/m²     General Appearance: sedated, intubated. Skin: warm and dry  Head: normocephalic and atraumatic  Eyes: pupils equal, round, and reactive to light, extraocular eye movements intact, conjunctivae normal  Neck: neck supple and non tender without mass   Pulmonary/Chest: clear to auscultation bilaterally- no wheezes, rales or rhonchi, normal air movement, no respiratory distress  Cardiovascular: Irregularly irregular heart sound. Abdomen: soft, non-tender, non-distended, normal bowel sounds, no masses or organomegaly  Extremities: no cyanosis, no clubbing and no edema  Neurologic:  could not be evaluated.         Recent Labs     05/21/22  0545 05/22/22  0540 05/23/22  0720    135 135   K 4.1 4.7 5.1*   CL 99 99 98   CO2 26 24 23   BUN 49* 67* 81*   CREATININE 1.5* 2.0* 2.5*   GLUCOSE 125* 140* 157*   CALCIUM 8.8 8.8 8.7       Recent Labs     05/21/22  0545 05/22/22  0540 05/23/22  0720   WBC 11.9* 11.7* 8.0   RBC 2.84* 2.82* 2.68*   HGB 7.7* 7.7* 7.4*   HCT 24.4* 24.1* 22.8*   MCV 85.9 85.5 85.1   MCH 27.1 27.3 27.6   MCHC 31.6* 32.0 32.5   RDW 21.2* 21.4* 21.8*   * 96* 100*   MPV 9.8 9. 6 10.6         Assessment:    Principal Problem:    Cardiac arrest [facility])  Active Problems:    Pneumonia due to infectious organism    Pleural effusion  Resolved Problems:    * No resolved hospital problems. *      Plan:  1. Acute hypercapnic Respiratory failure with hypoxia - b/l pneumonia - HCAP : Patient intubated on 5/10/22, thoracocentesis done due to parapneumonic effusion now on Bactrim, tigecycline and Zerbaxa. For  tracheostomy today. 2.  Atrial fibrillation: Cardiology on board. AC on hold due to anemia. continue diltiazem 30 mg 4 times daily via PEG tube. 3.  ESRD: Dialysis Monday Wednesday Friday. EPO for anemia. 4.  Sepsis with septic shock: Most likely due to decubitus ulcer on the back, on tigecycline, Bactrim and Zerbaxa ID on the case  5. Diabetes mellitus continue insulin coverage. With lantus 20 units SC at night. 6.  Hypothyroidism: Continue levothyroxine. 7. Seizure - Keppra 500mg two times daily. 8. Hypothyroidism- continue levothyroxine 175mcg via PEG tube daily. DVT prophylaxis - heparin subcut - currently held    NOTE: This report was transcribed using voice recognition software. Every effort was made to ensure accuracy; however, inadvertent computerized transcription errors may be present.   Electronically signed by Portia Sierra MD on 5/23/2022 at 6:20 PM

## 2022-05-23 NOTE — PATIENT CARE CONFERENCE
Intensive Care Daily Quality Rounding Checklist        ICU Team Members: bedside nurse, charge nurse, , resident,RT     ICU Day #: 13     Intubation Date: 5/11/2022  1  Ventilator Day #: 12     Central Line Insertion Date: 5/17/22                         5                          Day #: 6      Arterial Line Insertion Date: n/a                             Day #: n/a     Temporary Hemodialysis Catheter Insertion Date: n/a                             Day # admitted with tunneled dialysis cath     DVT Prophylaxis: On Heparin SQ    GI Prophylaxis: Protonix     Roberts Catheter Insertion Date: HD patient                                        PRY #:                              Continued need (if yes, reason documented and discussed with physician):     Skin Issues/ Wounds and ordered treatment discussed on rounds: recent debridement to sacral wound     Goals/ Plans for the Day: Vent management, monitor labs and replace as needed, surgery today for diverting colostomy and trach,  daily restraint order for patient safety.  Wean sedation/transition to PO, dialysis today

## 2022-05-23 NOTE — PROGRESS NOTES
Critical Care Team - Daily Progress Note      Date and time: 5/23/2022 8:50 AM  Patient's name:  Daniel Pack  Medical Record Number: [id number]  Patient's account/billing number: [de-identified]  Patient's YOB: 1955  Age: 74 y.o. Date of Admission: 5/10/2022 11:58 PM  Length of stay during current admission: 12      Primary Care Physician: Julianna Delarosa MD  ICU Attending Physician: Dr. Robert Lares Status: DNR-CCA    Reason for ICU admission: Status postcardiac arrest      SUBJECTIVE:     OVERNIGHT EVENTS:        Patient remains afebrile, inubated. Patient is awake, alert and orientated. Nods to questions appropriately. He is aware of the surgery coming up. He denies any sacral pain, chest pain,  Fever, chills, nausea. Intake/Output:   No intake/output data recorded. I/O last 3 completed shifts: In: 2642.5 [I.V.:696.5; NG/GT:1151; IV Piggyback:795]  Out: 205 [Chest Tube:205]    Awake and following commands: No  Current Ventilation: - Ventilator Settings:    Vt (Set, mL): 400 mL  Resp Rate (Set): 18 bmp  FiO2 : 35 %    PEEP/CPAP (cmH2O): 6  Pressure Support: 0 cmH20  Secretions: Thick, yellow secretions, blood  Sedation: fentanyl and Versed as needed  Paralyzed: No  Vasopressors: No    Initial HPI + past overnight events: 33-year-old male resident of [facility] with significant past medical history of A. fib on Eliquis, aspirin, anxiety, anemia, CHF, insulin-dependent type 2 diabetes, CKD on hemodialysis, sacral decubitus ulcers with wound VAC, hyperlipidemia, hypothyroidism. Patient presented to the ICU after having a cardiac arrest, required CPR 2 rounds of epinephrine, and intubated. He is currently on Tigacyline and Amikacin per ID for treatment of HCAP. Plan is for diverting colostomy and tracheostomy today. Heparin held for procedure.      OBJECTIVE:     VITAL SIGNS:  /63   Pulse 126   Temp 97.7 °F (36.5 °C) (Infrared)   Resp 24   Ht 6' 5\" (1.956 m)   Wt 248 lb 3.8 oz (112.6 kg)   SpO2 97%   BMI 29.44 kg/m²   Tmax over 24 hours:  Temp (24hrs), Av.9 °F (36.6 °C), Min:97 °F (36.1 °C), Max:99 °F (37.2 °C)      Patient Vitals for the past 6 hrs:   BP Temp Temp src Pulse Resp SpO2 Weight   22 0840 117/63 -- -- 126 -- -- --   22 0823 114/84 -- -- 100 -- -- --   22 0809 119/74 -- -- 99 -- -- --   22 0800 95/66 97.7 °F (36.5 °C) Infrared 112 24 97 % --   22 0752 95/66 -- -- 112 -- -- --   22 0734 93/68 -- -- 95 -- -- --   22 0715 -- 98.1 °F (36.7 °C) -- -- -- -- 248 lb 3.8 oz (112.6 kg)   22 0700 95/77 -- -- 90 22 96 % --   22 0600 117/86 -- -- 113 (!) 32 97 % --   22 0400 100/63 98 °F (36.7 °C) Infrared 87 18 95 % --   22 0327 -- -- -- -- 17 96 % --   22 0325 -- -- -- 81 18 96 % --   22 0300 (!) 102/54 -- -- 87 14 95 % --         Intake/Output Summary (Last 24 hours) at 2022 0850  Last data filed at 2022 0600  Gross per 24 hour   Intake 1815.64 ml   Output 205 ml   Net 1610.64 ml     Wt Readings from Last 2 Encounters:   22 248 lb 3.8 oz (112.6 kg)   22 212 lb (96.2 kg)     Body mass index is 29.44 kg/m². Physical Exam  Vitals and nursing note reviewed. Constitutional:       General: He is awake. Appearance: He is ill-appearing. Comments:  Intubated  Patient responds appropriately to all questions,   Attempting to mouth words   HENT:      Head: Normocephalic and atraumatic. Nose: Nose normal. No congestion or rhinorrhea. Mouth/Throat:      Mouth: Mucous membranes are moist.      Pharynx: Oropharynx is clear. Eyes:      General: No scleral icterus. Extraocular Movements: Extraocular movements intact. Conjunctiva/sclera: Conjunctivae normal.   Cardiovascular:      Rate and Rhythm: Normal rate and regular rhythm. Pulses: Normal pulses. Heart sounds: Normal heart sounds. No murmur heard. Pulmonary:      Breath sounds: No stridor.  Rhonchi present. Comments: intubated  Abdominal:      General: Bowel sounds are normal. There is no distension. Palpations: Abdomen is soft. There is no mass. Tenderness: There is no abdominal tenderness. There is no guarding or rebound. Comments: PEG tube   Musculoskeletal:         General: Normal range of motion. Cervical back: Normal range of motion and neck supple. No tenderness. Right lower leg: Edema present. Left lower leg: Edema present. Skin:     Findings: Lesion (Sacral decubitus) present. Comments: Chronic changes -Dark-colored lower legs b/l  Feet are cold to touch    Neurological:      Mental Status: He is alert and oriented to person, place, and time. Cranial Nerves: No cranial nerve deficit. Sensory: No sensory deficit. Comments: Patient awake and alert, orientated to name. Not sure about location and season. Psychiatric:         Attention and Perception: Attention normal.         Behavior: Behavior is cooperative.            MEDICATIONS:    Scheduled Meds:   oxyCODONE  10 mg Oral Q6H    QUEtiapine  25 mg Oral BID    acetylcysteine  4 mL Inhalation Q6H    dilTIAZem  30 mg PEG Tube 4 times per day    lansoprazole  30 mg Per G Tube QAM AC    [Held by provider] heparin (porcine)  5,000 Units SubCUTAneous Q8H    collagenase   Topical Daily    ceftolozane-tazobactam (ZERBAXA) IVPB  1,500 mg IntraVENous Q8H    metoprolol tartrate  25 mg Oral BID    epoetin kandi-epbx  8,000 Units SubCUTAneous Once per day on Mon Wed Fri    And    epoetin kandi-epbx  2,000 Units SubCUTAneous Once per day on Mon Wed Fri    tigecycline (TYGACIL) IVPB  50 mg IntraVENous Q12H    sulfamethoxazole-trimethoprim  2 tablet Oral Daily    ipratropium-albuterol  1 ampule Inhalation Q4H    polyethylene glycol  17 g Oral Daily    chlorhexidine  15 mL Mouth/Throat BID    senna  5 mL Per G Tube Nightly    docusate  100 mg Per G Tube BID    levothyroxine  175 mcg Per G Tube Phosphorus:   Lab Results   Component Value Date    PHOS 6.3 05/23/2022     Ionized Calcium: No results found for: CAION     Urinalysis:     Troponin: No results for input(s): TROPONINI in the last 72 hours. Microbiology:  Cultures drawn:   Gram stain rare polymore for nuclear leukocytes, epithelial cells not seen. Abundant gram-negative rods, rare yeast and rare gram-positive diplococci. Respiratory smear positive for moderate quality more for nuclear leukocytes, few yeast and rare gram-positive cocci-Pseudomonas aeruginosa   Surgical, anaerobic, fungi, acid-fast bacilli cultures pending      Radiology/Imaging:     Chest Xray (5/23/2022): Interval decrease in the left perihilar infiltrate, bilateral lower lung field infiltrates without significant interval change. ASSESSMENT:     Patient Active Problem List    Diagnosis Date Noted    Pneumonia due to infectious organism     Pleural effusion     Cardiac arrest ([facility] 05/11/2022         PLAN:     Neuro   Patient intubated   History of seizures on Keppra   He is awake and follows commands with his left side    Respiratory   Acute hypercapnic respiratory failure with hypoxia   Intubated 5/10/2022   Thoracentesis completed 5/15/2022 MRSA nasal colonization positive - Bactroban    Pneumonia   S/P chest tube placement for parapneumonic pleural effusion with significant drainage.   We will remove chest tube once drainage less than 100 cc per 24 hours   Plan is for trach on today      Cardiovascular   History of A. fib    Hold Heparin for procedure today    Elevated troponin most likely secondary to CKD-Type II NSTEMI Demand ischemia    Cardiology following    GI   History of a PEG tube   He is scheduled to have diverting colostomy today   Tolerating tube feed    Renal   ESRD on dialysis Monday ,Wednesday, Friday   On EPO    Nephrology following    Infectious disease   Sepsis with septic shock -resolved, no longer on pressors    Decubitus ulcer-surgery, wound care following   Status postsurgical debridement in the OR   Tigecycline , bactrim, Zerbaxa   ID following     Heme/Onc   PRBC received X2, 05/12/2022, 05/14/2022    trend H&H, transfuse if <7.   Thrombocytopenia , leukocytosis    Received EPO   Surgery following    Endocrine   Type 2 diabetes -MDSS   Hypothyroidism on synthroid    Social/Spiritual/DNR/Other   Code status: Full   Diet Diet NPO Exceptions are: Sips of Water with Meds   Stress ulcer prophylaxis: protonix 40mg   DVT prophylaxis: SCDS , heparin started    Consultations needed: Yes   Transfer out of ICU today? No    Other: albumin level, advance tube feeds as tolerated, VCG from central line , chest xray daily ,   Will start patient on oxycodone IR 10 mg every 6 hours and Seroquel 25 mg nightly goal to wean him off his fentanyl. Lines/catheters   Date 05/10  o ETT  · Date 05/17   · CVC Triple lumen L OSWALDO Luong,   8:50 AM  05/23/22     Attending Physician Attestation: Dr. Rei Crowderk you very much for allowing me to see this patient in consultation and follow up.     I personally saw, examined and provided care for the patient. Radiographs, labs and medication list were reviewed by me independently. I spoke with bedside nursing, respiratory therapists and consultants. Critical care services and times documented are independent of procedures and multidisciplinary rounds with Residents. Additionally comprehensive, multidisciplinary rounds were conducted with the MICU team. The case was discussed in detail and plans for care were established. Review of Residents documentation was conducted and revisions were made as appropriate.  I agree with the the above documented information.      Physical Examination:  Vitals:   Vitals          Vitals:     05/23/22 1100 05/23/22 1108 05/23/22 1119 05/23/22 1200   BP: 116/67 (!) 140/82 137/85 (!) 149/90   Pulse: 112 136 89 150   Resp: 28   18 17 Temp:     98.1 °F (36.7 °C)     TempSrc:           SpO2: 96%     100%   Weight:     240 lb 1.3 oz (108.9 kg)     Height:                 General: No Acute Distress  HEENT: normocephalic, atruamatic  CVS: RRR, S1, S2, No S3 or S4  Respiratory: decreased breath sounds at lung bases, no focal wheezes noted  Extremities: no clubbing/cyanosis/edema  Neuro: intubated, sedated      ASSESSMENT:  1.) B/L Multi-Lobar HCAP with MDR Acinetobacter baumanii  2.) Severe Sepsis with septic shock associated to HCAP  3.) S/P Cardiopulmonary Arrest  4.) History of sacral decubitus ulcer infection with Pseudomonas and Enterococcus.    5.) CKD, on HD  6.) Pleural fluid infection with Pseudomonas.  Possible empyema     In addition the following applies:     Check: serial labs   Medication Alterations: N/A   Procedures: diverting colostomy, sacral wound debridement and tracheostomy   Imaging: reviewed  New Consultations: N/A  VENT: ACVC (DAY 13) 18/400/35/6     Access: Right Tunneled HD Catheter , Left Chest Tube, Left IJ TLC  Consults: ID, Pulmonary   Drips: Fentanyl   Nutrition: Tube Feeds  ABX: Tigecycline, Zerbaxa, Bactrim      - contact isolation to continue for patient   - LTACH eval and treat     Thank you for allowing me to participate in the care of this patient.     Care reviewed with nursing staff, medical and surgical specialty care, primary care and the patient's family as available. Restraints are ordered when the patient can do harm to him/herself by pulling out devices.     Critical Care Time: > 35 minutes excluding procedures    RAMA Green MD  5/23/2022  1:58 PM

## 2022-05-23 NOTE — PROGRESS NOTES
Attending Physician Attestation: Dr. Duc Wade    Thank you very much for allowing me to see this patient in consultation and follow up. I personally saw, examined and provided care for the patient. Radiographs, labs and medication list were reviewed by me independently. I spoke with bedside nursing, respiratory therapists and consultants. Critical care services and times documented are independent of procedures and multidisciplinary rounds with Residents. Additionally comprehensive, multidisciplinary rounds were conducted with the MICU team. The case was discussed in detail and plans for care were established. Review of Residents documentation was conducted and revisions were made as appropriate. I agree with the the above documented information. Physical Examination:  Vitals:   Vitals:    05/23/22 1100 05/23/22 1108 05/23/22 1119 05/23/22 1200   BP: 116/67 (!) 140/82 137/85 (!) 149/90   Pulse: 112 136 89 150   Resp: 28  18 17   Temp:   98.1 °F (36.7 °C)    TempSrc:       SpO2: 96%   100%   Weight:   240 lb 1.3 oz (108.9 kg)    Height:          General: No Acute Distress  HEENT: normocephalic, atruamatic  CVS: RRR, S1, S2, No S3 or S4  Respiratory: decreased breath sounds at lung bases, no focal wheezes noted  Extremities: no clubbing/cyanosis/edema  Neuro: intubated, sedated     ASSESSMENT:  1.) B/L Multi-Lobar HCAP with MDR Acinetobacter baumanii  2.) Severe Sepsis with septic shock associated to HCAP  3.) S/P Cardiopulmonary Arrest  4.) History of sacral decubitus ulcer infection with Pseudomonas and Enterococcus. 5.) CKD, on HD  6.) Pleural fluid infection with Pseudomonas.   Possible empyema    In addition the following applies:    Check: serial labs   Medication Alterations: N/A   Procedures: diverting colostomy, sacral wound debridement and tracheostomy   Imaging: reviewed  New Consultations: N/A  VENT: ACVC (DAY 13) 18/400/35/6    Access: Right Tunneled HD Catheter , Left Chest Tube, Left IJ TLC  Consults: ID, Pulmonary   Drips: Fentanyl   Nutrition: Tube Feeds  ABX: Tigecycline, Zerbaxa, Bactrim     - contact isolation to continue for patient   - LTACH eval and treat    Thank you for allowing me to participate in the care of this patient. Care reviewed with nursing staff, medical and surgical specialty care, primary care and the patient's family as available. Restraints are ordered when the patient can do harm to him/herself by pulling out devices. Critical Care Time: > 35 minutes excluding procedures    Candy Gant M.D.     Candy Gant MD  5/23/2022  1:58 PM

## 2022-05-23 NOTE — BRIEF OP NOTE
Brief Postoperative Note      Patient: Meliton Horton  YOB: 1955  MRN: [id number]    Date of Procedure: 5/23/2022    Pre-Op Diagnosis: need for long term mechanical ventilation, sacral wound stage 4    Post-Op Diagnosis: Same       Procedure(s):  1. TOILET BRONCHOSCOPY  2. PERCUTANEOUS  TRACHEOSTOMY  3. DIAGNOSTIC LAPAROSCOPY LOOP COLOSTOMY  4. GASTROSTOMY TUBE PLACEMENT  5.  SHARD EXCISIONAL DEBRIDEMENT OF SACRAL WOUND TO BONE     Surgeon(s):  Randall Rodgers MD    Assistant:  Resident: Adelso Miranda MD    Anesthesia: General    Estimated Blood Loss (mL): less than 50     Complications: None    Specimens:   ID Type Source Tests Collected by Time Destination   1 : SACRAL WOUND TISSUE FOR CULTURE Tissue Tissue CULTURE, ANAEROBIC, CULTURE, FUNGUS, GRAM STAIN, CULTURE, SURGICAL, CULTURE WITH SMEAR, ACID FAST Carey Rodriguez MD 5/23/2022 1549        Implants:  * No implants in log *      Drains:   Chest Tube Left (Active)   $ Chest tube insertion $ Yes 05/16/22 1130   Chest Tube Airleak No 05/23/22 0805   Status Gravity 05/23/22 0800   Suction To water seal 05/23/22 0800   Drainage Description Yellow;Serous 05/23/22 0805   Dressing Status Clean, dry & intact 05/23/22 0805   Chest Tube Dressing Dry 05/23/22 0800   Site Assessment Clean, dry & intact 05/23/22 0805   Surrounding Skin Clean, dry & intact 05/23/22 0805   Output (ml) 125 ml 05/23/22 0600       Gastrostomy/Enterostomy/Jejunostomy Tube PEG-Jejunostomy LUQ 20 fr (Active)   Drainage Appearance Bile;Green 05/23/22 0805   Site Description Clean, dry & intact 05/23/22 0805   G Port Status Clamped 05/23/22 0805   Surrounding Skin Reddened;Clean, dry & intact 05/23/22 0805   Dressing Status Clean, dry & intact 05/23/22 0805   Dressing Type Split gauze 05/23/22 0805   G-Tube Care Completed Yes 05/23/22 0805   Tube Feeding Immune Enhancing 05/22/22 2000   Tube feeding/verify rate (mL/hr) 0 mL/hr 05/23/22 0800   Tube Feeding Intake (mL) 531 ml 05/22/22 2352   Tube Feeding Supplement Amount (mL) 90 mL 05/16/22 1500   Free Water/Flush (mL) 120 mL 05/22/22 2352   Action Taken Other (comment) 05/23/22 0805   Residual Volume (ml) 650 ml 05/23/22 1000       Colostomy LUQ Loop (Active)       Urinary Catheter (Active)       Fecal Management System 05/16/22 (Active)   Stool Appearance Loose 05/23/22 0800   Stool Color Brown 05/23/22 0800   Balloon Volume Verified Yes 05/20/22 1600   Skin Assessment of the Anal Area Unremarkable 05/23/22 0800   Tube Irrigated No 05/23/22 0800   Irrigation Volume (mL) 20 05/20/22 0400   Stool Amount Small 05/20/22 1600   Fecal Management Tube Output 50 ml 05/19/22 1800       [REMOVED] NG/OG/NJ/NE Tube Nasogastric Left nostril (Removed)   Surrounding Skin Clean, dry & intact 05/11/22 0745   Securement device Tape 05/11/22 0745   Status Clamped 05/11/22 0745   Placement Verified X-Ray (Initial) 05/11/22 0645   NG/OG/NJ/NE External Measurement (cm) 63 cm 05/11/22 0745   Drainage Appearance Straw 05/11/22 0645   Action Taken Placement verified (comment) 05/11/22 0645       [REMOVED] Urinary Catheter (Removed)   Catheter Indications Need for fluid volume management of the critically ill patient in a critical care setting 05/11/22 0645   Site Assessment No urethral drainage 05/11/22 0645   Urine Color Madyson 05/11/22 0645   Urine Appearance Sediment 05/11/22 0645   Collection Container Standard 05/11/22 0645   Securement Method Securing device (Describe) 05/11/22 0645   Catheter Care Completed Yes 05/11/22 0645   Catheter Best Practices  Drainage tube clipped to bed;Catheter secured to thigh; Tamper seal intact; Bag below bladder;Bag not on floor; Lack of dependent loop in tubing;Drainage bag less than half full 05/11/22 0645   Status Draining 05/11/22 0645       Findings: percutaneous tracheostomy with 8.5 ID shiley, toilet bronchoscopy with secretions in right lung, diagnostic laparoscopy - 3 ports at right hemiabdomen, adhesiolysis of sigmoid from left paracolic gutter, left loop colostomy with red rubber catheter for ostomy bar, when turning patient gastrostomy tube was inadvertently removed - replaced with 20Fr gastrostomy tube at 2cm at skin; sacral wound debridement 15cm x 14cm x to bone taking necrotic tissue at the corner of the wound at the patient's right inferior aspect    Electronically signed by Neftaly Vidal MD on 5/23/2022 at 4:16 PM

## 2022-05-23 NOTE — PROGRESS NOTES
[facility]  Nephrology Progress Note    Patient's Name: Griselda Estrella    Subjective:    5/23: Patient was seen and examined, no acute distress.   +ETT   PMH:    Past Medical History:   Diagnosis Date    A-fib ([facility]     [facility]AN [facility]) ([facility]     Anemia     Anxiety     Bipolar 1 disorder ([facility])     Charcot foot due to diabetes mellitus ([facility]     CHF (congestive heart failure) ([facility])     CKD (chronic kidney disease)     Diabetes mellitus ([facility]     Dialysis patient ([facility]     Hyperlipidemia     Hypertension     Right sided weakness     Sacral decubitus ulcer     Seizure ([facility]      Patient Active Problem List   Diagnosis    Cardiac arrest ([facility]    Pneumonia due to infectious organism    Pleural effusion     Meds:     oxyCODONE  10 mg Oral Q6H    QUEtiapine  25 mg Oral BID    acetylcysteine  4 mL Inhalation Q6H    dilTIAZem  30 mg PEG Tube 4 times per day    lansoprazole  30 mg Per G Tube QAM AC    [Held by provider] heparin (porcine)  5,000 Units SubCUTAneous Q8H    collagenase   Topical Daily    ceftolozane-tazobactam (ZERBAXA) IVPB  1,500 mg IntraVENous Q8H    metoprolol tartrate  25 mg Oral BID    epoetin kandi-epbx  8,000 Units SubCUTAneous Once per day on Mon Wed Fri    And    epoetin kandi-epbx  2,000 Units SubCUTAneous Once per day on Mon Wed Fri    tigecycline (TYGACIL) IVPB  50 mg IntraVENous Q12H    sulfamethoxazole-trimethoprim  2 tablet Oral Daily    ipratropium-albuterol  1 ampule Inhalation Q4H    polyethylene glycol  17 g Oral Daily    chlorhexidine  15 mL Mouth/Throat BID    senna  5 mL Per G Tube Nightly    docusate  100 mg Per G Tube BID    levothyroxine  175 mcg Per G Tube Daily    [Held by provider] insulin glargine  20 Units SubCUTAneous BID    insulin lispro  0-12 Units SubCUTAneous Q6H    levETIRAcetam  500 mg PEG Tube BID    sodium chloride flush  5-40 mL IntraVENous 2 times per day      sodium chloride      fentaNYL 200 mcg/hr (05/23/22 1049)  sodium chloride Stopped (05/20/22 1459)     Meds prn:     midazolam, sodium chloride, anticoagulant sodium citrate, perflutren lipid microspheres, glucose, dextrose bolus **OR** dextrose bolus, glucagon (rDNA), sodium chloride flush, sodium chloride    Meds prior to admission:     No current facility-administered medications on file prior to encounter. Current Outpatient Medications on File Prior to Encounter   Medication Sig Dispense Refill    insulin glargine (LANTUS) 100 UNIT/ML injection vial Inject 7 Units into the skin daily Give in a.m.      levETIRAcetam (KEPPRA) 250 MG tablet Take 250 mg by mouth daily      levETIRAcetam (KEPPRA) 500 MG tablet Take 500 mg by mouth nightly      apixaban (ELIQUIS) 5 MG TABS tablet Take 5 mg by mouth 2 times daily      chlorhexidine (PERIDEX) 0.12 % solution Take 15 mLs by mouth 2 times daily      nystatin (MYCOSTATIN) 522686 UNIT/ML suspension Take 500,000 Units by mouth 3 times daily      oxyCODONE (OXYCONTIN) 10 MG extended release tablet Take 10 mg by mouth every 12 hours.  NYSTATIN IN AQUAPHOR OINTMENT Apply topically 2 times daily      clonazePAM (KLONOPIN) 0.5 MG tablet Take 0.5 mg by mouth 2 times daily as needed.  insulin glargine (LANTUS) 100 UNIT/ML injection vial Inject 40 Units into the skin nightly       dronabinol (MARINOL) 2.5 MG capsule Take 2.5 mg by mouth 2 times daily (before meals). (Patient not taking: Reported on 5/11/2022)      alteplase (CATHFLO) 2 MG injection 2 mg by IntraCATHeter route as needed Per cathflo protocol at [facility]      cloNIDine (CATAPRES) 0.1 MG tablet Take 0.1 mg by mouth 2 times daily      HYDROmorphone (DILAUDID) 2 MG tablet Take 2 mg by mouth every 8 hours as needed for Pain.       anticoagulant sodium citrate 4 GM/100ML SOLN by CRRT route continuous      carvedilol (COREG) 3.125 MG tablet Take 3.125 mg by mouth 2 times daily (with meals)      hydrALAZINE (APRESOLINE) 25 MG tablet Take 100 mg by mouth 3 times daily       docusate sodium (COLACE) 100 MG capsule Take 100 mg by mouth 2 times daily      levETIRAcetam (KEPPRA) 500 MG tablet Take 500 mg by mouth three times a week Monday, Wednesday and Friday after dialysis      aspirin 81 MG EC tablet Take 81 mg by mouth daily      memantine (NAMENDA) 5 MG tablet Take 5 mg by mouth 2 times daily      glucagon, rDNA, 1 MG injection as needed for Low blood sugar      dextrose 50 % solution Infuse 25 g intravenously as needed      glucose (GLUTOSE) 40 % GEL Take 15 g by mouth See Admin Instructions      mineral oil-hydrophilic petrolatum (AQUAPHOR) ointment Apply topically as needed for Dry Skin Apply topically as needed.       ondansetron (ZOFRAN-ODT) 4 MG disintegrating tablet Take 4 mg by mouth every 8 hours as needed for Nausea or Vomiting      darbepoetin kandi-polysorbate (ARANESP) 60 MCG/0.3ML SOSY injection Inject 60 mcg into the skin every 14 days      levothyroxine (SYNTHROID) 175 MCG tablet Take 175 mcg by mouth Daily      rosuvastatin (CRESTOR) 5 MG tablet Take 5 mg by mouth daily      ondansetron (ZOFRAN) 4 MG/2ML injection Infuse 4 mg intravenously every 8 hours as needed for Nausea or Vomiting      omeprazole (PRILOSEC) 20 MG delayed release capsule Take 40 mg by mouth 2 times daily       VORTIoxetine HBr (TRINTELLIX) 20 MG TABS tablet Take 20 mg by mouth daily       polyethylene glycol (MIRALAX) 17 g PACK packet Take 17 g by mouth daily      B complex-vitamin C-folic acid (NEPHRO-JESSY) 1 MG tablet Take 1 tablet by mouth daily (with breakfast)      lactulose (CEPHULAC) 20 g packet Take 20 g by mouth 2 times daily      insulin lispro (HUMALOG) 100 UNIT/ML injection vial Inject into the skin every 6 hours Sliding scale  150-199 1 unit  200-249 2 units  250-299 3 units  300-349 4 units  Above 350 call physician       dilTIAZem (CARDIZEM) 60 MG tablet Take 60 mg by mouth 3 times daily       ARIPiprazole (ABILIFY) 5 MG tablet Take 5 mg by mouth daily      acetaminophen (TYLENOL) 325 MG tablet Take 650 mg by mouth every 6 hours as needed for Pain       Allergies: Other    Social History:     reports that he has quit smoking. He has never used smokeless tobacco. He reports previous alcohol use. He reports previous drug use. Family History:     No family history on file.     Physical Exam:    Patient Vitals for the past 24 hrs:   BP Temp Temp src Pulse Resp SpO2 Weight   05/23/22 1200 (!) 149/90 -- -- 150 17 100 % --   05/23/22 1119 137/85 98.1 °F (36.7 °C) -- 89 18 -- 240 lb 1.3 oz (108.9 kg)   05/23/22 1108 (!) 140/82 -- -- 136 -- -- --   05/23/22 1100 116/67 -- -- 112 28 96 % --   05/23/22 1052 116/67 -- -- 113 -- -- --   05/23/22 1038 108/71 -- -- 112 -- -- --   05/23/22 1023 120/78 -- -- 112 -- -- --   05/23/22 1007 132/86 -- -- 113 -- -- --   05/23/22 1000 125/74 -- -- 113 30 95 % --   05/23/22 0952 125/74 -- -- 113 -- -- --   05/23/22 0938 (!) 144/84 -- -- 112 -- -- --   05/23/22 0922 131/83 -- -- 111 -- -- --   05/23/22 0908 120/76 -- -- 90 -- -- --   05/23/22 0900 (!) 92/53 -- -- 108 20 99 % --   05/23/22 0853 (!) 157/60 -- -- 106 -- -- --   05/23/22 0840 117/63 -- -- 126 -- -- --   05/23/22 0823 114/84 -- -- 100 -- -- --   05/23/22 0809 119/74 -- -- 99 -- -- --   05/23/22 0800 95/66 97.7 °F (36.5 °C) Infrared 112 24 97 % --   05/23/22 0752 95/66 -- -- 112 -- -- --   05/23/22 0734 93/68 -- -- 95 -- -- --   05/23/22 0715 -- 98.1 °F (36.7 °C) -- -- -- -- 248 lb 3.8 oz (112.6 kg)   05/23/22 0700 95/77 -- -- 90 22 96 % --   05/23/22 0600 117/86 -- -- 113 (!) 32 97 % --   05/23/22 0400 100/63 98 °F (36.7 °C) Infrared 87 18 95 % --   05/23/22 0327 -- -- -- -- 17 96 % --   05/23/22 0325 -- -- -- 81 18 96 % --   05/23/22 0300 (!) 102/54 -- -- 87 14 95 % --   05/23/22 0200 103/61 -- -- 79 15 96 % --   05/23/22 0126 -- -- -- 87 18 96 % --   05/23/22 0100 (!) 102/56 -- -- 87 18 97 % --   05/23/22 0000 108/64 99 °F (37.2 °C) Infrared 81 18 99 % -- 05/22/22 2342 -- -- -- -- 18 100 % --   05/22/22 2341 -- -- -- 87 17 100 % --   05/22/22 2300 105/68 -- -- 75 17 100 % --   05/22/22 2200 106/67 -- -- 83 18 100 % --   05/22/22 2100 118/77 -- -- 92 18 100 % --   05/22/22 2000 117/76 98 °F (36.7 °C) Infrared 100 20 100 % --   05/22/22 1928 -- -- -- -- 21 100 % --   05/22/22 1925 -- -- -- 92 18 100 % --   05/22/22 1648 -- 97.6 °F (36.4 °C) Axillary 98 17 97 % --   05/22/22 1647 -- -- -- -- (!) 31 97 % --   05/22/22 1645 -- -- -- -- 18 99 % --       Intake/Output Summary (Last 24 hours) at 5/23/2022 1436  Last data filed at 5/23/2022 1119  Gross per 24 hour   Intake 2115.64 ml   Output 2505 ml   Net -389.36 ml     General: Awake, alert, no acute distress +ETT  Neck: No JVD noted; left IJ TLC  Lungs: Clear bilaterally upper, diminished to the bases bilaterally. Unlabored; left chest tube  CV: Regular rate and rhythm. No rub; right chest TDC  Abd: Soft, nontender, nondistended. Active bowel sounds; PEG  Skin: Warm and dry. No rash on exposed extremities  Ext: 2+ BLE edema   Neuro: limited by medical condition     Data:    Recent Labs     05/21/22 0545 05/22/22  0540 05/23/22  0720   WBC 11.9* 11.7* 8.0   HGB 7.7* 7.7* 7.4*   HCT 24.4* 24.1* 22.8*   MCV 85.9 85.5 85.1   * 96* 100*     Recent Labs     05/21/22  0545 05/22/22  0540 05/23/22  0720    135 135   K 4.1 4.7 5.1*   CL 99 99 98   CO2 26 24 23   CREATININE 1.5* 2.0* 2.5*   BUN 49* 67* 81*   LABGLOM 46 33 26   GLUCOSE 125* 140* 157*   CALCIUM 8.8 8.8 8.7   PHOS 4.3 5.4* 6.3*   MG 2.1 2.1 2.3     No results found for: VITD25    No results found for: PTH    No results for input(s): ALT, AST, ALKPHOS, BILITOT, BILIDIR in the last 72 hours. No results for input(s): LABALBU in the last 72 hours.     No results found for: FERRITIN, IRON, TIBC    No results found for: VBGIWGHP33    No results found for: FOLATE    No results found for: VOL, APPEARANCE, COLORU, LABSPEC, LABPH, LEUKBLD, NITRU, GLUCOSEU, Stacey Parikh, KAILASH, UROBILINOGEN, BILIRUBINUR, OCBU    No results found for: HARSH, CREURRAN, MACREATRATIO, OSMOU    No components found for: URIC    No results found for: LIPIDPAN    Assessment and Plans:    1. ESKD on HD   S/p HD 5/20 with 2350ml vol removal net   s/p ultrafiltration 5/21 with 2700 ml removed   PLAN:  1. HD today     2. Volume overload  No significant change in the CXR compared to 5/17/22  PLAN:  1. follow with HD       3 Hypotension  Maintain MAP>65mmHg  PLAN:  1. Follow BP with the vol removal     4 Hyponatremia  With volume overload and hypotonic fluids  Na+ 135 today   PLAN:  1. Follow with HD/ UF       5 Anemia with thrombocytopenia  PLAN:  1. Continue on SHANIQUA  2. Follow Hgb and transfuse for hgB <7     6. Hyperphosphatemia in the setting of the Sec HPTH with the Immune Enhancing TF  Phos 6.3 today   PLAN:  1. Continue to monitor labs        Tushar Mcclellan MD

## 2022-05-23 NOTE — PLAN OF CARE
Problem: Respiratory - Adult  Goal: Achieves optimal ventilation and oxygenation  5/23/2022 0201 by Nicolasa Hernandez RCP  Outcome: Not Progressing  Flowsheets (Taken 5/23/2022 0201)  Achieves optimal ventilation and oxygenation:   Assess for changes in respiratory status   Position to facilitate oxygenation and minimize respiratory effort   Oxygen supplementation based on oxygen saturation or arterial blood gases   Assess the need for suctioning and aspirate as needed   Respiratory therapy support as indicated

## 2022-05-23 NOTE — PROGRESS NOTES
[address] [place] [address] [facility]  [facility]  Progress Note    SUBJECTIVE:  Chief Complaint   Patient presents with    Loss of Consciousness     arrived from [facility] unresponsive     The patient is still in the ICU. Tolerating antibiotics. Tolerating dialysis. Denies headache, dyspnea or pain. No fever. Review of systems:  A 10 point review of systems was done. As stated above, otherwise negative.     Medications:   oxyCODONE  10 mg Oral Q6H    QUEtiapine  25 mg Oral BID    acetylcysteine  4 mL Inhalation Q6H    dilTIAZem  30 mg PEG Tube 4 times per day    lansoprazole  30 mg Per G Tube QAM AC    [Held by provider] heparin (porcine)  5,000 Units SubCUTAneous Q8H    collagenase   Topical Daily    ceftolozane-tazobactam (ZERBAXA) IVPB  1,500 mg IntraVENous Q8H    metoprolol tartrate  25 mg Oral BID    epoetin kandi-epbx  8,000 Units SubCUTAneous Once per day on Mon Wed Fri    And    epoetin kandi-epbx  2,000 Units SubCUTAneous Once per day on Mon Wed Fri    tigecycline (TYGACIL) IVPB  50 mg IntraVENous Q12H    sulfamethoxazole-trimethoprim  2 tablet Oral Daily    ipratropium-albuterol  1 ampule Inhalation Q4H    polyethylene glycol  17 g Oral Daily    chlorhexidine  15 mL Mouth/Throat BID    senna  5 mL Per G Tube Nightly    docusate  100 mg Per G Tube BID    levothyroxine  175 mcg Per G Tube Daily    [Held by provider] insulin glargine  20 Units SubCUTAneous BID    insulin lispro  0-12 Units SubCUTAneous Q6H    levETIRAcetam  500 mg PEG Tube BID    sodium chloride flush  5-40 mL IntraVENous 2 times per day      sodium chloride      fentaNYL 200 mcg/hr (05/23/22 0600)    sodium chloride Stopped (05/20/22 1459)     midazolam, sodium chloride, anticoagulant sodium citrate, perflutren lipid microspheres, glucose, dextrose bolus **OR** dextrose bolus, glucagon (rDNA), sodium chloride flush, sodium chloride    OBJECTIVE:  BP (!) 144/84   Pulse 112   Temp 97.7 °F (36.5 °C) (Infrared)   Resp 20   Ht 6' 5\" (1.956 m)   Wt 248 lb 3.8 oz (112.6 kg)   SpO2 99%   BMI 29.44 kg/m²   Temp  Av.9 °F (36.6 °C)  Min: 97 °F (36.1 °C)  Max: 99 °F (37.2 °C)  Constitutional: The patient is lying in bed in the ICU. He is awake. Shakes and nods head. FiO2 35%. PEEP 6. No changes. Skin: Warm and dry. No rashes were noted. HEENT: Eyes show round, and reactive pupils. No jaundice. Moist mucous membranes, no ulcerations, no thrush. ETT. Neck: Supple to movements. No lymphadenopathy. Chest: No use of accessory muscles to breathe. Symmetrical expansion. Auscultation reveals coarse breath sounds. scattered rhonchi. Right tunneled HD catheter. Cardiovascular: Heart sounds arrhythmic and irregular. Abdomen: Positive bowel sounds to auscultation. Benign to palpation. PEG. Extremities: Minimal edema. Left third toe missing. Back: Stage IV decubitus ulcer with dressing. Lines: Left IJ TLC 2022. Fecal management system.     Laboratory and Tests Review:  Lab Results   Component Value Date    WBC 8.0 2022    WBC 11.7 (H) 2022    WBC 11.9 (H) 2022    HGB 7.4 (L) 2022    HCT 22.8 (L) 2022    MCV 85.1 2022     (L) 2022     Lab Results   Component Value Date    NEUTROABS 10.75 (H) 2022    NEUTROABS 11.07 (H) 2022    NEUTROABS 13.50 (H) 2022     Lab Results   Component Value Date    CRP 13.9 (H) 2022     No results found for: CRPHS  Lab Results   Component Value Date    SEDRATE 78 (H) 2022     Lab Results   Component Value Date    ALT 30 2022    AST 31 2022    ALKPHOS 591 (H) 2022    BILITOT 0.3 2022     Lab Results   Component Value Date     2022    K 5.1 2022    K 4.5 2022    CL 98 2022    CO2 23 2022    BUN 81 2022    CREATININE 2.5 2022    CREATININE 2.0 2022    CREATININE 1.5 2022    GFRAA 31 2022    LABGLOM 26 05/23/2022    GLUCOSE 157 05/23/2022    PROT 6.9 05/11/2022    LABALBU 2.3 05/20/2022    CALCIUM 8.7 05/23/2022    BILITOT 0.3 05/11/2022    ALKPHOS 591 05/11/2022    AST 31 05/11/2022    ALT 30 05/11/2022     Radiology:    Reviewed  Microbiology:   [facility]:  Blood culture 5/10/2022: Negative so far  Decubitus ulcer 5/10/2022: MRSA, mixed GNR  Respiratory panel: Pending  Nares screen MRSA: Pending  Blood cultures 5/11/2022: Negative so far      [facility]:  Respiratory panel: Negative  Blood cultures 5/11/2022: Staphylococcus epidermidis in 2 of 2 sets  Nares screen MRSA: Positive   Respiratory culture 5/12/2022: MDR Acinetobacter baumanii (sensitive to Bactrim, Tigecycline, Cefiderocol. Intermediate to Avycaz and Eravacycline. Resistant to all others)  Respiratory culture 5/16/2022: OP patricia reduced. GNR, GNR, GNR. Pleural fluid 5/13/2022: Pseudomonas aeruginosa (Resistant to Levofloxacin and Meropenem. Intermediate to Zosyn. Sensitive to Avycaz and Gentamicin)  Sacral wound 5/17/2022: MDR Acinetobacter baumanii, Pseudomonas aeruginosa (sensitive only to aminoglycosides and Zerbaxa)    ASSESSMENT:  · HCAP with MDR Acinetobacter baumanii  · Sepsis with septic shock associated to HCAP  · Status postcardiac arrest  · Acute respiratory failure. Unable to wean  · Leukocytosis   · History of sacral decubitus ulcer infection with Pseudomonas and Enterococcus. Treated with IV antibiotic between March and April 2022. Status postdebridement 5/17/2022. Now colonized/infected with MDR Acinetobacter and MDR Pseudomonas  · CKD, on HD  · Acinetobacter in the respiratory cultures. Possible outbreak in the ICU  · Pleural fluid infection with Pseudomonas. Possible empyema    PLAN:  · Continue Tigecycline, Bactrim, day 9 of minimum 10  · Continue Zerbaxa, day 7 of minimum 10  · Tracheostomy and diverting colostomy   · Continue contact isolation    Spoke with nursing.     Joslyn Archuleta MD  9:41 AM  5/23/2022

## 2022-05-23 NOTE — PROGRESS NOTES
GENERAL SURGERY  DAILY PROGRESS NOTE  5/23/2022    Chief Complaint   Patient presents with    Loss of Consciousness     arrived from [facility] unresponsive       Subjective:  No acute changes, continued mechanical ventilation     Objective:  BP (!) 102/56   Pulse 81   Temp 99 °F (37.2 °C) (Infrared)   Resp 17   Ht 6' 5\" (1.956 m)   Wt 245 lb 6 oz (111.3 kg)   SpO2 96%   BMI 29.10 kg/m²     GENERAL:  Laying in bed, awake, no apparent distress  HEAD: Normocephalic, atraumatic  EYES: No sclera icterus, pupils equal  LUNGS:  Mechanical ventilation   CARDIOVASCULAR:  RR  ABDOMEN:  Soft, non-tender, non-distended  EXTREMITIES: No edema or swelling  SKIN: Warm and dry    Assessment/Plan:  74 y.o. male with sacral wound, respiratory failure    - OR today for diverting colostomy, sacral wound debridement and tracheostomy       Electronically signed by Zonia Salas MD on 5/23/2022 at 6:53 AM      Agree; for perc trach/lap diverting ostomy/sacral wound evaluation today  R/b/a discussed, all questions answered, informed consent in chart    Ruben Herrera MD  Minimally Invasive General Surgery and Endoscopy  [address] [facility].  [address]: [phone]  F: [phone]    Electronically signed by Noemy Maddox MD on 5/23/2022 at 1:19 PM

## 2022-05-24 ENCOUNTER — APPOINTMENT (OUTPATIENT)
Dept: GENERAL RADIOLOGY | Age: 67
DRG: 003 | End: 2022-05-24
Payer: MEDICARE

## 2022-05-24 ENCOUNTER — APPOINTMENT (OUTPATIENT)
Dept: ULTRASOUND IMAGING | Age: 67
DRG: 003 | End: 2022-05-24
Payer: MEDICARE

## 2022-05-24 LAB
ACANTHOCYTES: ABNORMAL
ANION GAP SERPL CALCULATED.3IONS-SCNC: 15 MMOL/L (ref 7–16)
ANISOCYTOSIS: ABNORMAL
B.E.: -0.8 MMOL/L (ref -3–3)
BASOPHILS ABSOLUTE: 0.01 E9/L (ref 0–0.2)
BASOPHILS RELATIVE PERCENT: 0.1 % (ref 0–2)
BLOOD BANK DISPENSE STATUS: NORMAL
BLOOD BANK PRODUCT CODE: NORMAL
BPU ID: NORMAL
BUN BLDV-MCNC: 57 MG/DL (ref 6–23)
BURR CELLS: ABNORMAL
CALCIUM SERPL-MCNC: 8.3 MG/DL (ref 8.6–10.2)
CHLORIDE BLD-SCNC: 100 MMOL/L (ref 98–107)
CO2: 21 MMOL/L (ref 22–29)
COHB: 1.4 % (ref 0–1.5)
CREAT SERPL-MCNC: 1.9 MG/DL (ref 0.7–1.2)
CRITICAL: ABNORMAL
DATE ANALYZED: ABNORMAL
DATE OF COLLECTION: ABNORMAL
DESCRIPTION BLOOD BANK: NORMAL
EOSINOPHILS ABSOLUTE: 0 E9/L (ref 0.05–0.5)
EOSINOPHILS RELATIVE PERCENT: 0 % (ref 0–6)
FIBRINOGEN: 173 MG/DL (ref 200–400)
FIO2: 30 %
GFR AFRICAN AMERICAN: 43
GFR NON-AFRICAN AMERICAN: 35 ML/MIN/1.73
GLUCOSE BLD-MCNC: 171 MG/DL (ref 74–99)
GRAM STAIN ORDERABLE: NORMAL
HCO3: 21.4 MMOL/L (ref 22–26)
HCT VFR BLD CALC: 24.8 % (ref 37–54)
HEMOGLOBIN: 8.1 G/DL (ref 12.5–16.5)
HHB: 2.2 % (ref 0–5)
HYPOCHROMIA: ABNORMAL
IMMATURE GRANULOCYTES #: 0.05 E9/L
IMMATURE GRANULOCYTES %: 0.7 % (ref 0–5)
LAB: ABNORMAL
LYMPHOCYTES ABSOLUTE: 0.5 E9/L (ref 1.5–4)
LYMPHOCYTES RELATIVE PERCENT: 7.2 % (ref 20–42)
Lab: ABNORMAL
MAGNESIUM: 2.1 MG/DL (ref 1.6–2.6)
MCH RBC QN AUTO: 27.3 PG (ref 26–35)
MCHC RBC AUTO-ENTMCNC: 32.7 % (ref 32–34.5)
MCV RBC AUTO: 83.5 FL (ref 80–99.9)
METER GLUCOSE: 134 MG/DL (ref 74–99)
METER GLUCOSE: 142 MG/DL (ref 74–99)
METER GLUCOSE: 150 MG/DL (ref 74–99)
METER GLUCOSE: 155 MG/DL (ref 74–99)
METER GLUCOSE: 169 MG/DL (ref 74–99)
METHB: 0.3 % (ref 0–1.5)
MODE: AC
MONOCYTES ABSOLUTE: 0.17 E9/L (ref 0.1–0.95)
MONOCYTES RELATIVE PERCENT: 2.4 % (ref 2–12)
NEUTROPHILS ABSOLUTE: 6.23 E9/L (ref 1.8–7.3)
NEUTROPHILS RELATIVE PERCENT: 89.6 % (ref 43–80)
O2 CONTENT: 12.2 ML/DL
O2 SATURATION: 97.8 % (ref 92–98.5)
O2HB: 96.1 % (ref 94–97)
OPERATOR ID: 3342
OVALOCYTES: ABNORMAL
PATIENT TEMP: 37 C
PCO2: 26.7 MMHG (ref 35–45)
PDW BLD-RTO: 22.2 FL (ref 11.5–15)
PEEP/CPAP: 6 CMH2O
PFO2: 3.37 MMHG/%
PH BLOOD GAS: 7.52 (ref 7.35–7.45)
PHOSPHORUS: 5.1 MG/DL (ref 2.5–4.5)
PLATELET # BLD: 84 E9/L (ref 130–450)
PLATELET CONFIRMATION: NORMAL
PMV BLD AUTO: 10.6 FL (ref 7–12)
PO2: 101.2 MMHG (ref 75–100)
POIKILOCYTES: ABNORMAL
POLYCHROMASIA: ABNORMAL
POTASSIUM SERPL-SCNC: 4.4 MMOL/L (ref 3.5–5)
RBC # BLD: 2.97 E12/L (ref 3.8–5.8)
RR MECHANICAL: 18 B/MIN
SODIUM BLD-SCNC: 136 MMOL/L (ref 132–146)
SOURCE, BLOOD GAS: ABNORMAL
TARGET CELLS: ABNORMAL
THB: 8.9 G/DL (ref 11.5–16.5)
TIME ANALYZED: 524
VT MECHANICAL: 400 ML
WBC # BLD: 7 E9/L (ref 4.5–11.5)

## 2022-05-24 PROCEDURE — 94003 VENT MGMT INPAT SUBQ DAY: CPT

## 2022-05-24 PROCEDURE — 6360000002 HC RX W HCPCS: Performed by: STUDENT IN AN ORGANIZED HEALTH CARE EDUCATION/TRAINING PROGRAM

## 2022-05-24 PROCEDURE — 84100 ASSAY OF PHOSPHORUS: CPT

## 2022-05-24 PROCEDURE — 36415 COLL VENOUS BLD VENIPUNCTURE: CPT

## 2022-05-24 PROCEDURE — 2580000003 HC RX 258: Performed by: STUDENT IN AN ORGANIZED HEALTH CARE EDUCATION/TRAINING PROGRAM

## 2022-05-24 PROCEDURE — 6370000000 HC RX 637 (ALT 250 FOR IP): Performed by: STUDENT IN AN ORGANIZED HEALTH CARE EDUCATION/TRAINING PROGRAM

## 2022-05-24 PROCEDURE — 36592 COLLECT BLOOD FROM PICC: CPT

## 2022-05-24 PROCEDURE — 86022 PLATELET ANTIBODIES: CPT

## 2022-05-24 PROCEDURE — 80048 BASIC METABOLIC PNL TOTAL CA: CPT

## 2022-05-24 PROCEDURE — 94640 AIRWAY INHALATION TREATMENT: CPT

## 2022-05-24 PROCEDURE — 82962 GLUCOSE BLOOD TEST: CPT

## 2022-05-24 PROCEDURE — 2500000003 HC RX 250 WO HCPCS: Performed by: STUDENT IN AN ORGANIZED HEALTH CARE EDUCATION/TRAINING PROGRAM

## 2022-05-24 PROCEDURE — 83735 ASSAY OF MAGNESIUM: CPT

## 2022-05-24 PROCEDURE — 36430 TRANSFUSION BLD/BLD COMPNT: CPT

## 2022-05-24 PROCEDURE — 82805 BLOOD GASES W/O2 SATURATION: CPT

## 2022-05-24 PROCEDURE — 85384 FIBRINOGEN ACTIVITY: CPT

## 2022-05-24 PROCEDURE — 37799 UNLISTED PX VASCULAR SURGERY: CPT

## 2022-05-24 PROCEDURE — 71045 X-RAY EXAM CHEST 1 VIEW: CPT

## 2022-05-24 PROCEDURE — 85025 COMPLETE CBC W/AUTO DIFF WBC: CPT

## 2022-05-24 PROCEDURE — 2000000000 HC ICU R&B

## 2022-05-24 PROCEDURE — 93971 EXTREMITY STUDY: CPT

## 2022-05-24 RX ORDER — ONDANSETRON 2 MG/ML
4 INJECTION INTRAMUSCULAR; INTRAVENOUS ONCE
Status: COMPLETED | OUTPATIENT
Start: 2022-05-24 | End: 2022-05-24

## 2022-05-24 RX ORDER — SODIUM CHLORIDE 9 MG/ML
INJECTION, SOLUTION INTRAVENOUS PRN
Status: DISCONTINUED | OUTPATIENT
Start: 2022-05-24 | End: 2022-05-26 | Stop reason: HOSPADM

## 2022-05-24 RX ADMIN — DILTIAZEM HYDROCHLORIDE 30 MG: 30 TABLET, FILM COATED ORAL at 19:00

## 2022-05-24 RX ADMIN — LEVETIRACETAM 500 MG: 100 SOLUTION ORAL at 20:41

## 2022-05-24 RX ADMIN — LEVOTHYROXINE SODIUM 175 MCG: 125 TABLET ORAL at 07:51

## 2022-05-24 RX ADMIN — OXYCODONE 10 MG: 5 TABLET ORAL at 16:53

## 2022-05-24 RX ADMIN — DILTIAZEM HYDROCHLORIDE 30 MG: 30 TABLET, FILM COATED ORAL at 00:54

## 2022-05-24 RX ADMIN — IPRATROPIUM BROMIDE AND ALBUTEROL SULFATE 1 AMPULE: .5; 2.5 SOLUTION RESPIRATORY (INHALATION) at 20:06

## 2022-05-24 RX ADMIN — DILTIAZEM HYDROCHLORIDE 30 MG: 30 TABLET, FILM COATED ORAL at 23:52

## 2022-05-24 RX ADMIN — Medication 10 ML: at 08:50

## 2022-05-24 RX ADMIN — CEFTOLOZANE AND TAZOBACTAM 1500 MG: 1; .5 INJECTION, POWDER, LYOPHILIZED, FOR SOLUTION INTRAVENOUS at 14:22

## 2022-05-24 RX ADMIN — Medication 10 ML: at 20:41

## 2022-05-24 RX ADMIN — SODIUM CHLORIDE, PRESERVATIVE FREE 10 ML: 5 INJECTION INTRAVENOUS at 14:44

## 2022-05-24 RX ADMIN — LANSOPRAZOLE 30 MG: KIT at 06:43

## 2022-05-24 RX ADMIN — OXYCODONE 10 MG: 5 TABLET ORAL at 22:38

## 2022-05-24 RX ADMIN — CEFTOLOZANE AND TAZOBACTAM 1500 MG: 1; .5 INJECTION, POWDER, LYOPHILIZED, FOR SOLUTION INTRAVENOUS at 22:38

## 2022-05-24 RX ADMIN — INSULIN LISPRO 2 UNITS: 100 INJECTION, SOLUTION INTRAVENOUS; SUBCUTANEOUS at 23:53

## 2022-05-24 RX ADMIN — CHLORHEXIDINE GLUCONATE 0.12% ORAL RINSE 15 ML: 1.2 LIQUID ORAL at 20:40

## 2022-05-24 RX ADMIN — SENNOSIDES 8.8 MG: 8.8 SYRUP ORAL at 20:41

## 2022-05-24 RX ADMIN — IPRATROPIUM BROMIDE AND ALBUTEROL SULFATE 1 AMPULE: .5; 2.5 SOLUTION RESPIRATORY (INHALATION) at 11:24

## 2022-05-24 RX ADMIN — DOCUSATE SODIUM LIQUID 100 MG: 100 LIQUID ORAL at 20:41

## 2022-05-24 RX ADMIN — QUETIAPINE FUMARATE 25 MG: 25 TABLET ORAL at 20:42

## 2022-05-24 RX ADMIN — ONDANSETRON 4 MG: 2 INJECTION INTRAMUSCULAR; INTRAVENOUS at 14:14

## 2022-05-24 RX ADMIN — TIGECYCLINE 50 MG: 50 INJECTION, POWDER, LYOPHILIZED, FOR SOLUTION INTRAVENOUS at 20:39

## 2022-05-24 RX ADMIN — CEFTOLOZANE AND TAZOBACTAM 1500 MG: 1; .5 INJECTION, POWDER, LYOPHILIZED, FOR SOLUTION INTRAVENOUS at 06:50

## 2022-05-24 RX ADMIN — INSULIN LISPRO 2 UNITS: 100 INJECTION, SOLUTION INTRAVENOUS; SUBCUTANEOUS at 17:04

## 2022-05-24 RX ADMIN — IPRATROPIUM BROMIDE AND ALBUTEROL SULFATE 1 AMPULE: .5; 2.5 SOLUTION RESPIRATORY (INHALATION) at 08:13

## 2022-05-24 RX ADMIN — COLLAGENASE SANTYL: 250 OINTMENT TOPICAL at 09:00

## 2022-05-24 RX ADMIN — DILTIAZEM HYDROCHLORIDE 30 MG: 30 TABLET, FILM COATED ORAL at 06:47

## 2022-05-24 RX ADMIN — IPRATROPIUM BROMIDE AND ALBUTEROL SULFATE 1 AMPULE: .5; 2.5 SOLUTION RESPIRATORY (INHALATION) at 15:42

## 2022-05-24 RX ADMIN — ACETYLCYSTEINE 400 MG: 100 INHALANT RESPIRATORY (INHALATION) at 01:48

## 2022-05-24 RX ADMIN — CHLORHEXIDINE GLUCONATE 0.12% ORAL RINSE 15 ML: 1.2 LIQUID ORAL at 08:44

## 2022-05-24 RX ADMIN — QUETIAPINE FUMARATE 25 MG: 25 TABLET ORAL at 08:44

## 2022-05-24 RX ADMIN — METOPROLOL TARTRATE 25 MG: 25 TABLET, FILM COATED ORAL at 08:44

## 2022-05-24 RX ADMIN — ACETYLCYSTEINE 400 MG: 100 INHALANT RESPIRATORY (INHALATION) at 08:14

## 2022-05-24 RX ADMIN — TIGECYCLINE 50 MG: 50 INJECTION, POWDER, LYOPHILIZED, FOR SOLUTION INTRAVENOUS at 09:25

## 2022-05-24 RX ADMIN — METOPROLOL TARTRATE 25 MG: 25 TABLET, FILM COATED ORAL at 20:42

## 2022-05-24 RX ADMIN — LEVETIRACETAM 500 MG: 100 SOLUTION ORAL at 08:45

## 2022-05-24 RX ADMIN — IPRATROPIUM BROMIDE AND ALBUTEROL SULFATE 1 AMPULE: .5; 2.5 SOLUTION RESPIRATORY (INHALATION) at 01:48

## 2022-05-24 RX ADMIN — SULFAMETHOXAZOLE AND TRIMETHOPRIM 2 TABLET: 800; 160 TABLET ORAL at 08:50

## 2022-05-24 RX ADMIN — Medication 75 MCG/HR: at 04:11

## 2022-05-24 RX ADMIN — OXYCODONE 10 MG: 5 TABLET ORAL at 11:03

## 2022-05-24 RX ADMIN — DOCUSATE SODIUM LIQUID 100 MG: 100 LIQUID ORAL at 08:45

## 2022-05-24 ASSESSMENT — PULMONARY FUNCTION TESTS
PIF_VALUE: 26
PIF_VALUE: 31
PIF_VALUE: 23
PIF_VALUE: 28
PIF_VALUE: 25
PIF_VALUE: 24
PIF_VALUE: 29
PIF_VALUE: 24
PIF_VALUE: 30
PIF_VALUE: 24
PIF_VALUE: 30
PIF_VALUE: 22
PIF_VALUE: 23
PIF_VALUE: 27
PIF_VALUE: 29
PIF_VALUE: 27
PIF_VALUE: 26
PIF_VALUE: 22
PIF_VALUE: 27
PIF_VALUE: 16
PIF_VALUE: 25
PIF_VALUE: 27
PIF_VALUE: 16
PIF_VALUE: 24
PIF_VALUE: 31

## 2022-05-24 NOTE — PROGRESS NOTES
[address] [place] [address] [facility]  [facility]UC[facility]  Progress Note    SUBJECTIVE:  Chief Complaint   Patient presents with    Loss of Consciousness     arrived from [facility] via [facility] unresponsive     The patient remains intubated. He still in the ICU. He is on ventilator. Tolerating antibiotics. No fever. Review of systems:  A 10 point review of systems was done. As stated above, otherwise negative.     Medications:   oxyCODONE  10 mg Oral Q6H    QUEtiapine  25 mg Oral BID    acetylcysteine  4 mL Inhalation Q6H    dilTIAZem  30 mg PEG Tube 4 times per day    lansoprazole  30 mg Per G Tube QAM AC    [Held by provider] heparin (porcine)  5,000 Units SubCUTAneous Q8H    collagenase   Topical Daily    ceftolozane-tazobactam (ZERBAXA) IVPB  1,500 mg IntraVENous Q8H    metoprolol tartrate  25 mg Oral BID    epoetin kandi-epbx  8,000 Units SubCUTAneous Once per day on Mon Wed Fri    And    epoetin kandi-epbx  2,000 Units SubCUTAneous Once per day on Mon Wed Fri    tigecycline (TYGACIL) IVPB  50 mg IntraVENous Q12H    sulfamethoxazole-trimethoprim  2 tablet Oral Daily    ipratropium-albuterol  1 ampule Inhalation Q4H    polyethylene glycol  17 g Oral Daily    chlorhexidine  15 mL Mouth/Throat BID    senna  5 mL Per G Tube Nightly    docusate  100 mg Per G Tube BID    levothyroxine  175 mcg Per G Tube Daily    [Held by provider] insulin glargine  20 Units SubCUTAneous BID    insulin lispro  0-12 Units SubCUTAneous Q6H    levETIRAcetam  500 mg PEG Tube BID    sodium chloride flush  5-40 mL IntraVENous 2 times per day      sodium chloride 75 mL/hr at 05/24/22 0643    midazolam Stopped (05/24/22 0541)    fentaNYL 50 mcg/hr (05/24/22 0643)    sodium chloride Stopped (05/20/22 1459)     diatrizoate meglumine-sodium, midazolam, anticoagulant sodium citrate, perflutren lipid microspheres, glucose, dextrose bolus **OR** dextrose bolus, glucagon (rDNA), sodium chloride flush, sodium chloride    OBJECTIVE:  BP (!) 114/58   Pulse 108   Temp 98.4 °F (36.9 °C) (Axillary)   Resp (!) 33   Ht 6' 5\" (1.956 m)   Wt 243 lb 6.2 oz (110.4 kg)   SpO2 96%   BMI 28.86 kg/m²   Temp  Av.7 °F (36.5 °C)  Min: 97.1 °F (36.2 °C)  Max: 98.4 °F (36.9 °C)  Constitutional: The patient is lying in bed in the ICU. He is awake. Shakes and nods head. FiO2 35%. PEEP 5. Skin: Warm and dry. No rashes were noted. HEENT: Eyes show round, and reactive pupils. No jaundice. Moist mucous membranes, no ulcerations, no thrush. Neck: Supple to movements. Tracheostomy. Chest: No use of accessory muscles to breathe. Symmetrical expansion. Auscultation reveals coarse breath sounds. scattered rhonchi. Right tunneled HD catheter. Cardiovascular: Heart sounds arrhythmic and irregular. Abdomen: Positive bowel sounds to auscultation. Benign to palpation. PEG. Colostomy. Extremities: Moderate edema. Left third toe missing. Back: Stage IV decubitus ulcer with dressing. Lines: Left IJ TLC 2022. Fecal management system.     Laboratory and Tests Review:  Lab Results   Component Value Date    WBC 7.0 2022    WBC 8.0 2022    WBC 11.7 (H) 2022    HGB 8.1 (L) 2022    HCT 24.8 (L) 2022    MCV 83.5 2022    PLT 84 (L) 2022     Lab Results   Component Value Date    NEUTROABS 6.23 2022    NEUTROABS 6.98 2022    NEUTROABS 10.75 (H) 2022     Lab Results   Component Value Date    CRP 15.4 (H) 2022    CRP 13.9 (H) 2022     No results found for: CRPHS  Lab Results   Component Value Date    SEDRATE 13 2022    SEDRATE 78 (H) 2022     Lab Results   Component Value Date    ALT 30 2022    AST 31 2022    ALKPHOS 591 (H) 2022    BILITOT 0.3 2022     Lab Results   Component Value Date     2022    K 4.4 2022    K 4.5 2022     2022    CO2 21 2022    BUN 57 2022    CREATININE 1.9 05/24/2022    CREATININE 2.5 05/23/2022    CREATININE 2.0 05/22/2022    GFRAA 43 05/24/2022    LABGLOM 35 05/24/2022    GLUCOSE 171 05/24/2022    PROT 6.9 05/11/2022    LABALBU 2.3 05/20/2022    CALCIUM 8.3 05/24/2022    BILITOT 0.3 05/11/2022    ALKPHOS 591 05/11/2022    AST 31 05/11/2022    ALT 30 05/11/2022     Radiology:    Reviewed  Microbiology:   [facility]:  Blood culture 5/10/2022: Negative so far  Decubitus ulcer 5/10/2022: MRSA, mixed GNR  Respiratory panel: Pending  Nares screen MRSA: Pending  Blood cultures 5/11/2022: Negative so far      [facility]:  Respiratory panel: Negative  Blood cultures 5/11/2022: Staphylococcus epidermidis in 2 of 2 sets  Nares screen MRSA: Positive   Respiratory culture 5/12/2022: MDR Acinetobacter baumanii (sensitive to Bactrim, Tigecycline, Cefiderocol. Intermediate to Avycaz and Eravacycline. Resistant to all others)  Respiratory culture 5/16/2022: OP patricia reduced. GNR, GNR, GNR. Pleural fluid 5/13/2022: Pseudomonas aeruginosa (Resistant to Levofloxacin and Meropenem. Intermediate to Zosyn. Sensitive to Avycaz and Gentamicin)  Sacral wound 5/17/2022: MDR Acinetobacter baumanii, Pseudomonas aeruginosa (sensitive only to aminoglycosides and Zerbaxa)    ASSESSMENT:  · HCAP with MDR Acinetobacter baumanii  · Sepsis with septic shock associated to HCAP  · Status postcardiac arrest  · Acute respiratory failure. Unable to wean. Status post tracheostomy and colostomy 5/23/2022  · Leukocytosis   · History of sacral decubitus ulcer infection with Pseudomonas and Enterococcus. Treated with IV antibiotic between March and April 2022. Status postdebridement 5/17/2022. Now colonized/infected with MDR Acinetobacter and MDR Pseudomonas  · CKD, on HD  · Acinetobacter in the respiratory cultures. Possible outbreak in the ICU  · Pleural fluid infection with Pseudomonas.   Possible empyema    PLAN:  · Continue Tigecycline, Bactrim, day 10  · Continue Zerbaxa, day 8 of minimum 10  · Tracheostomy and diverting colostomy   · Continue contact isolation    Spoke with nursing.     Antione Burkett MD  10:08 AM  5/24/2022

## 2022-05-24 NOTE — PROGRESS NOTES
Department of Internal Medicine  Nephrology Attending Progress Note        SUBJECTIVE: Mr. Roberto George seems more comfortable today. His right leg remains more swollen, his weight has  decreased to 243 without much improvement in chest x-ray.      PMH  History of CKD 4 baseline creatinine approximately 2  TRUMAN superimposed on CKD 4 no evidence of renal recovery has been dialysis dependent since February  Transfer from [facility] after being found unresponsive and undergoing 5 minutes of CPR  Anemia of chronic kidney disease  History of decubitus ulcer  History of PEG tube placement for nutrition  History of type 2 diabetes with nephropathy, neuropathy, Charcot joint disease  History of heart failure  Chronic atrial fibrillation  Gastritis duodenitis  History of seizure disorder  History of anxiety disorder  History of right toe amputation  Bacteremia with staph epidermis in 2 of 2 sets  Respiratory cultures gram-negative wound  Pleural cultures Pseudomonas aeruginosa  Sacral wound cultures Acetobacter pulmonary and Pseudomonas aeruginosa  Status post diverting colostomy        Physical Exam:    Vitals:    05/24/22 1800   BP: 110/66   Pulse: (!) 110   Resp: 15   Temp:    SpO2: 100%       I/O last 24 hours:  Intake/Output 2434    Weight: 2434/3900    General appearance: Intubated on vent  Skin: large sacral decubitus, stasis dermatitis bilateral lower extremities  Neck: No JVD, RIJ TDC  Lungs: Coarse upper, diminished lower  Heart: irreg., no rub  Abdomen: Soft, + bowel sounds, +PEG  Extremities: Trace edema lower extremities  Neurologic: opens eyes to stimuli,     DATA:    CBC with Differential:    Lab Results   Component Value Date    WBC 7.0 05/24/2022    RBC 2.97 05/24/2022    HGB 8.1 05/24/2022    HCT 24.8 05/24/2022    PLT 84 05/24/2022    MCV 83.5 05/24/2022    MCH 27.3 05/24/2022    MCHC 32.7 05/24/2022    RDW 22.2 05/24/2022    NRBC 0.0 05/19/2022    METASPCT 1.7 05/11/2022    LYMPHOPCT 7.2 05/24/2022    MONOPCT 2.4 05/24/2022    BASOPCT 0.1 05/24/2022    MONOSABS 0.17 05/24/2022    LYMPHSABS 0.50 05/24/2022    EOSABS 0.00 05/24/2022    BASOSABS 0.01 05/24/2022     CMP:    Lab Results   Component Value Date     05/24/2022    K 4.4 05/24/2022    K 4.5 05/11/2022     05/24/2022    CO2 21 05/24/2022    BUN 57 05/24/2022    CREATININE 1.9 05/24/2022    GFRAA 43 05/24/2022    LABGLOM 35 05/24/2022    GLUCOSE 171 05/24/2022    PROT 6.9 05/11/2022    LABALBU 2.3 05/20/2022    CALCIUM 8.3 05/24/2022    BILITOT 0.3 05/11/2022    ALKPHOS 591 05/11/2022    AST 31 05/11/2022    ALT 30 05/11/2022     Magnesium:    Lab Results   Component Value Date    MG 2.1 05/24/2022     Phosphorus:    Lab Results   Component Value Date    PHOS 5.1 05/24/2022          oxyCODONE  10 mg Oral Q6H    QUEtiapine  25 mg Oral BID    dilTIAZem  30 mg PEG Tube 4 times per day    lansoprazole  30 mg Per G Tube QAM AC    [Held by provider] heparin (porcine)  5,000 Units SubCUTAneous Q8H    collagenase   Topical Daily    ceftolozane-tazobactam (ZERBAXA) IVPB  1,500 mg IntraVENous Q8H    metoprolol tartrate  25 mg Oral BID    epoetin kandi-epbx  8,000 Units SubCUTAneous Once per day on Mon Wed Fri    And    epoetin kandi-epbx  2,000 Units SubCUTAneous Once per day on Mon Wed Fri    tigecycline (TYGACIL) IVPB  50 mg IntraVENous Q12H    sulfamethoxazole-trimethoprim  2 tablet Oral Daily    ipratropium-albuterol  1 ampule Inhalation Q4H    polyethylene glycol  17 g Oral Daily    chlorhexidine  15 mL Mouth/Throat BID    senna  5 mL Per G Tube Nightly    docusate  100 mg Per G Tube BID    levothyroxine  175 mcg Per G Tube Daily    [Held by provider] insulin glargine  20 Units SubCUTAneous BID    insulin lispro  0-12 Units SubCUTAneous Q6H    levETIRAcetam  500 mg PEG Tube BID    sodium chloride flush  5-40 mL IntraVENous 2 times per day      sodium chloride      sodium chloride Stopped (05/20/22 5006)     sodium chloride, diatrizoate meglumine-sodium, midazolam, anticoagulant sodium citrate, perflutren lipid microspheres, glucose, dextrose bolus **OR** dextrose bolus, glucagon (rDNA), sodium chloride flush, sodium chloride    IMPRESSION/RECOMMENDATIONS:      End-stage renal disease continue dialysis Monday Wednesday Friday  Anemia of chronic kidney disease continue SHANIQUA therapy  Respiratory failure remains on vent   secondary hyperparathyroid disease of renal origin phosphorus has improved  Nutrition on tube feedings  Chronic A. fib rate less well controlled today  Thrombocytopenia        Natalie Paez MD  5/24/2022 7:42 PM

## 2022-05-24 NOTE — PATIENT CARE CONFERENCE
Intensive Care Daily Quality Rounding Checklist        ICU Team Members: bedside nurse, charge nurse, Rudi Zhang, residents, clinical pharmacist      ICU Day #: 14     Intubation Date: 5/11/2022, Trach 5/23/22    Ventilator Day #: 14 [address] Peter 2     Central Line Insertion Date: 5/17/22                                                   Day #: 8      Arterial Line Insertion Date: 5/23/22                             Day #: 2     Temporary Hemodialysis Catheter Insertion Date: n/a                             Day # admitted with tunneled dialysis cath     DVT Prophylaxis: SCDs    GI Prophylaxis: Lansoprazole     Roberts Catheter Insertion Date: HD patient                                        KOJO #:                              Continued need (if yes, reason documented and discussed with physician):     Skin Issues/ Wounds and ordered treatment discussed on rounds: recent debridement to sacral wound, diabetic wounds R foot      Goals/ Plans for the Day: Vent management, monitor labs and replace as needed, daily restraint order for patient safety.  Wean sedation,stop fentanyl

## 2022-05-24 NOTE — PROGRESS NOTES
Hospital Progress Note    Admitting Date and Time: 5/10/2022 11:58 PM  Admit Dx: Cardiac arrest ([facility] [I46.9]  Elevated troponin [R77.8]  Acute respiratory failure with hypoxia (HCC) [J96.01]  Leukocytosis, unspecified type [D72.829]  Pneumonia due to infectious organism, unspecified laterality, unspecified part of lung [J18.9]    Subjective:  Patient is being followed for Cardiac arrest ([facility] [I46.9]  Elevated troponin [R77.8]  Acute respiratory failure with hypoxia (HCC) [J96.01]  Leukocytosis, unspecified type [D72.829]  Pneumonia due to infectious organism, unspecified laterality, unspecified part of lung [J18.9]   Mr. Karen Soler, a 74y.o. year old male who has a past medical history of A-fib ([facility] TRUMAN (acute kidney injury) ([facility] Anemia, Anxiety, Bipolar 1 disorder ([facility] Charcot foot due to diabetes mellitus ([facility] CHF (congestive heart failure) ([facility] CKD (chronic kidney disease), Diabetes mellitus ([facility] Dialysis patient ([facility] Hyperlipidemia, Hypertension, Right sided weakness, Sacral decubitus ulcer, and Seizure ([facility] Patient was able to have a trach and debridement      When seen today he was sitting propped up in bed. nutritional support through tube feeds had not been started yet.   Wife at bedside and patient know quite happy    ROS: unobtainable     oxyCODONE  10 mg Oral Q6H    QUEtiapine  25 mg Oral BID    dilTIAZem  30 mg PEG Tube 4 times per day    lansoprazole  30 mg Per G Tube QAM AC    [Held by provider] heparin (porcine)  5,000 Units SubCUTAneous Q8H    collagenase   Topical Daily    ceftolozane-tazobactam (ZERBAXA) IVPB  1,500 mg IntraVENous Q8H    metoprolol tartrate  25 mg Oral BID    epoetin kandi-epbx  8,000 Units SubCUTAneous Once per day on Mon Wed Fri    And    epoetin kandi-epbx  2,000 Units SubCUTAneous Once per day on Mon Wed Fri    tigecycline (TYGACIL) IVPB  50 mg IntraVENous Q12H    sulfamethoxazole-trimethoprim  2 tablet Oral Daily    ipratropium-albuterol  1 ampule Inhalation Q4H    polyethylene glycol  17 g Oral Daily    chlorhexidine  15 mL Mouth/Throat BID    senna  5 mL Per G Tube Nightly    docusate  100 mg Per G Tube BID    levothyroxine  175 mcg Per G Tube Daily    [Held by provider] insulin glargine  20 Units SubCUTAneous BID    insulin lispro  0-12 Units SubCUTAneous Q6H    levETIRAcetam  500 mg PEG Tube BID    sodium chloride flush  5-40 mL IntraVENous 2 times per day     sodium chloride, , PRN  diatrizoate meglumine-sodium, 30 mL, ONCE PRN  midazolam, 1 mg, Q2H PRN  anticoagulant sodium citrate, 4.2 mL, PRN  perflutren lipid microspheres, 1.5 mL, ONCE PRN  glucose, 4 tablet, PRN  dextrose bolus, 125 mL, PRN   Or  dextrose bolus, 250 mL, PRN  glucagon (rDNA), 1 mg, PRN  sodium chloride flush, 5-40 mL, PRN  sodium chloride, , PRN         Objective:    /79   Pulse (!) 109   Temp 97.7 °F (36.5 °C)   Resp 14   Ht 6' 5\" (1.956 m)   Wt 243 lb 6.2 oz (110.4 kg)   SpO2 99%   BMI 28.86 kg/m²     General Appearance: anterior tracheostomy and he is alert and trying to mouth words  Skin: warm and dry  Head: normocephalic and atraumatic  Eyes: pupils equal, round, and reactive to light, extraocular eye movements intact, conjunctivae normal  Neck: neck supple and non tender without mass   Pulmonary/Chest: clear to auscultation bilaterally- no wheezes, rales or rhonchi, normal air movement, no respiratory distress  Cardiovascular: Irregularly irregular heart sound.   Abdomen: soft, non-tender, non-distended, normal bowel sounds, no masses or organomegaly  Extremities: no cyanosis, no clubbing and no edema  Neurologic:  Lower extremities he is not moving  Left chest tube in place  PEG is in place  Sacrum with dressing    Able to move upper extremities      Recent Labs     05/22/22  0540 05/23/22  0720 05/24/22  0515    135 136   K 4.7 5.1* 4.4   CL 99 98 100   CO2 24 23 21*   BUN 67* 81* 57*   CREATININE 2.0* 2.5* 1.9* GLUCOSE 140* 157* 171*   CALCIUM 8.8 8.7 8.3*       Recent Labs     05/22/22  0540 05/23/22  0720 05/24/22  0515   WBC 11.7* 8.0 7.0   RBC 2.82* 2.68* 2.97*   HGB 7.7* 7.4* 8.1*   HCT 24.1* 22.8* 24.8*   MCV 85.5 85.1 83.5   MCH 27.3 27.6 27.3   MCHC 32.0 32.5 32.7   RDW 21.4* 21.8* 22.2*   PLT 96* 100* 84*   MPV 9.6 10.6 10.6         Assessment:    Principal Problem:    Cardiac arrest (HCC)  Active Problems:    Pneumonia due to infectious organism    Pleural effusion  Resolved Problems:    * No resolved hospital problems. *      Plan:  1. Acute hypercapnic Respiratory failure with hypoxia - b/l pneumonia - HCAP : Patient intubated on 5/10/22, thoracocentesis done due to parapneumonic effusion now on Bactrim, tigecycline and Zerbaxa. For  tracheostomy today. 2.  Atrial fibrillation: Cardiology on board. AC on hold due to anemia. continue diltiazem 30 mg 4 times daily via PEG tube. 3.  ESRD: Dialysis Monday Wednesday Friday. EPO for anemia. 4.  Sepsis with septic shock: Most likely due to decubitus ulcer on the back, on tigecycline, Bactrim and Zerbaxa ID on the case  5. Diabetes mellitus continue insulin coverage. With lantus 20 units SC at night. 6.  Hypothyroidism: Continue levothyroxine. 7. Seizure - Keppra 500mg two times daily. 8. Hypothyroidism- continue levothyroxine 175mcg via PEG tube daily. DVT prophylaxis - heparin subcut - currently held    May 24  Patient has had his tracheostomy yesterday. His face definitely looks better. He looks more comfortable. Chest tube is still in place on the left side  Mild tachycardia still in place along with mild hypotension. Antibiotics are per infectious disease. Diverting ostomy would be watched for output    Maintain dialysis support. Maintain treatment of underactive thyroid and he is on some scheduled pain medicines because of his chronic pain medication use in the past    NOTE: This report was transcribed using voice recognition software.  Every effort was made to ensure accuracy; however, inadvertent computerized transcription errors may be present.   Electronically signed by Adelso Chun MD on 5/24/2022 at 5:42 PM

## 2022-05-24 NOTE — OP NOTE
Operative Note      Patient: Nolberto Taylor  YOB: 1955  MRN: [id number]    Date of Procedure: 5/23/2022    Pre-Op Diagnosis: need for long term mechanical ventilation, sacral wound stage 4    Post-Op Diagnosis: Same       Procedure(s):  1. TOILET BRONCHOSCOPY  2. PERCUTANEOUS  TRACHEOSTOMY  3. DIAGNOSTIC LAPAROSCOPY LOOP COLOSTOMY  4. GASTROSTOMY TUBE PLACEMENT  5.  SHARD EXCISIONAL DEBRIDEMENT OF SACRAL WOUND TO BONE     Surgeon(s):  Tasha Pleitez MD    Assistant:   Resident: Leslie Jimenez MD    Anesthesia: General    Estimated Blood Loss (mL): Minimal    Complications: None    Specimens:   ID Type Source Tests Collected by Time Destination   1 : SACRAL WOUND TISSUE FOR CULTURE Tissue Tissue CULTURE, ANAEROBIC, CULTURE, FUNGUS, GRAM STAIN (Canceled), CULTURE, SURGICAL, CULTURE WITH SMEAR, ACID FAST Thad Nam MD 5/23/2022 1549        Implants:  * No implants in log *      Drains:   Chest Tube Left (Active)   $ Chest tube insertion $ Yes 05/16/22 1130   Chest Tube Airleak No 05/24/22 0400   Status Gravity 05/23/22 2000   Suction To water seal 05/23/22 2000   Drainage Description Yellow;Serous 05/24/22 0400   Dressing Status Clean, dry & intact 05/24/22 0400   Chest Tube Dressing Dry 05/24/22 0400   Site Assessment Clean, dry & intact 05/24/22 0400   Surrounding Skin Clean, dry & intact 05/24/22 0400   Output (ml) 200 ml 05/24/22 0630       Gastrostomy/Enterostomy/Jejunostomy Tube Percutaneous Endoscopic Gastrostomy (PEG) LUQ 20 fr (Active)   Drainage Appearance None 05/24/22 0400   Site Description Clean, dry & intact 05/24/22 0400   J Port Status Clamped 05/23/22 1645   G Port Status Intermittent Suction 05/24/22 0630   Surrounding Skin Clean, dry & intact 05/24/22 0400   Dressing Status Clean, dry & intact 05/24/22 0400   Dressing Type Split gauze 05/24/22 0400   G-Tube Care Completed Yes 05/24/22 0000   Free Water/Flush (mL) 60 mL 05/23/22 2100   Output (mL) 350 ml 05/24/22 0630       Colostomy LUQ Loop (Active)   Stomal Appliance 1 piece 05/24/22 0400   Stoma  Assessment Red;Moist 05/24/22 0400   Peristomal Assessment Pink 05/24/22 0400   Treatment Other (Comment) 05/24/22 0400   Stool Appearance Bloody; Watery 05/24/22 0400   Stool Color Red Streak 05/24/22 0400   Stool Amount Small 05/24/22 0400   Output (mL) 25 ml 05/24/22 0630       Fecal Management System 05/16/22 (Active)   Stool Appearance Loose 05/24/22 0400   Stool Color Brown 05/24/22 0400   Balloon Volume Verified Yes 05/24/22 0400   Skin Assessment of the Anal Area Unremarkable 05/24/22 0400   Tube Irrigated Yes 05/24/22 0400   Irrigation Volume (mL) 60 05/24/22 0400   Stool Amount Small 05/24/22 0400   Fecal Management Tube Output 200 ml 05/24/22 0630       [REMOVED] NG/OG/NJ/NE Tube Nasogastric Left nostril (Removed)   Surrounding Skin Clean, dry & intact 05/11/22 0745   Securement device Tape 05/11/22 0745   Status Clamped 05/11/22 0745   Placement Verified X-Ray (Initial) 05/11/22 0645   NG/OG/NJ/NE External Measurement (cm) 63 cm 05/11/22 0745   Drainage Appearance Straw 05/11/22 0645   Action Taken Placement verified (comment) 05/11/22 0645       [REMOVED] Gastrostomy/Enterostomy/Jejunostomy Tube PEG-Jejunostomy LUQ 20 fr (Removed)   Drainage Appearance Bile;Green 05/23/22 0805   Site Description Clean, dry & intact 05/23/22 0805   G Port Status Clamped 05/23/22 0805   Surrounding Skin Reddened;Clean, dry & intact 05/23/22 0805   Dressing Status Clean, dry & intact 05/23/22 0805   Dressing Type Split gauze 05/23/22 0805   G-Tube Care Completed Yes 05/23/22 0805   Tube Feeding Immune Enhancing 05/22/22 2000   Tube feeding/verify rate (mL/hr) 0 mL/hr 05/23/22 0800   Tube Feeding Intake (mL) 531 ml 05/22/22 2352   Tube Feeding Supplement Amount (mL) 90 mL 05/16/22 1500   Free Water/Flush (mL) 120 mL 05/22/22 2352   Action Taken Other (comment) 05/23/22 0805   Residual Volume (ml) 650 ml 05/23/22 1000 [REMOVED] Urinary Catheter (Removed)   Catheter Indications Need for fluid volume management of the critically ill patient in a critical care setting 05/11/22 0645   Site Assessment No urethral drainage 05/11/22 0645   Urine Color Madyson 05/11/22 0645   Urine Appearance Sediment 05/11/22 0645   Collection Container Standard 05/11/22 0645   Securement Method Securing device (Describe) 05/11/22 0645   Catheter Care Completed Yes 05/11/22 0645   Catheter Best Practices  Drainage tube clipped to bed;Catheter secured to thigh; Tamper seal intact; Bag below bladder;Bag not on floor; Lack of dependent loop in tubing;Drainage bag less than half full 05/11/22 0645   Status Draining 05/11/22 0645       [REMOVED] Urinary Catheter (Removed)   Urine Color Madyson 05/24/22 0000   Urine Appearance Cloudy 05/24/22 0000   Securement Method Securing device (Describe) 05/24/22 0000   Catheter Care Completed Yes 05/23/22 2000   Status Draining;Patent 05/24/22 0000       Findings:  percutaneous tracheostomy with 8.5 ID selvin, toilet bronchoscopy with secretions in right lung, diagnostic laparoscopy - 3 ports at right hemiabdomen, adhesiolysis of sigmoid from left paracolic gutter, left loop colostomy with red rubber catheter for ostomy bar. when turning patient gastrostomy tube was inadvertently removed - replaced with 20Fr gastrostomy tube at 2cm at skin; sacral wound debridement 15cm x 14cm x to bone taking necrotic tissue at the corner of the wound at the patient's right inferior aspect    Detailed Description of Procedure: The patient was brought to the operating room and positioned supine on the operating room table. Sequential compression devices were placed on the patient's lower extremities and were powered on. General anesthesia was administered and preoperative antibiotics were administered per the anesthetic record.  The patient was prepped and draped in the usual sterile fashion and the procedure went forth with strict aseptic technique under maximal barrier precautions. Immediately prior to the procedure a time-out was called and the surgical checklist was reviewed and agreed upon by all present. A 15 scalpel blade was used to make incision 2 fingerbreadths above the sternal notch approximately 2 cm in size. Incision was made vertically. A hemostat was used to clear the tissues overlying the pretracheal fascia. Once the pretracheal fascial layer was palpated a bronchoscope was inserted through the endotracheal tube. The trachea was visualized and was noted to be without any issue. The right sided airways were filled with a milky colored fluid consistent with mucus which was suctioned. Once this was performed the endotracheal tube was pulled back so that the trachea was palpated under direct visualization. Then a 18-gauge needle was placed through the trachea and an Angiocatheter was deployed into the trachea. Then a wire was placed into the airways and the angiocatheter was removed. Then using serial dilations and a Seldinger type fashion a 8.5 inner diameter cuffed Shiley tracheostomy tube was placed. The endotracheal tube was then removed and the bronchoscope was inserted through the tracheostomy and it was noted to be in good position. The tracheostomy was secured in place with 0 Prolene sutures on either side. We then turned our attention to the colostomy portion of the procedure. The abdomen was prepped and draped in the usual standard fashion. A stab incision was made at Mancuso's point using an 11 scalpel blade and a Veress needle was inserted. A resting abdominal pressure of 6 mmHg was measured and carbon dioxide gas was used to insufflate the abdomen to a pressure of 15 mmHg. Then a 5 mm incision was made at the right upper quadrant and a 5 mm trocar was inserted. A 5 mm 30 degree laparoscope was used and there was good visualization and no injury from our entry technique. The Veress needle was removed.   2 additional 5 mm trochars were placed at the right hemiabdomen under direct visualization the same manner as described above. Using laparoscopic graspers we noted a very redundant sigmoid colon. During the course of our dissection we created a serosal tear at the distal sigmoid colon which was repaired with a 0 silk suture laparoscopically in a Lembert style. We used the LigaSure device to free the sigmoid colon from the left hemiabdomen at the paracolic gutter where there were adhesions. Once this was performed we were able to grasp the sigmoid colon and pull it up to the left anterior abdominal wall. We then left the abdomen insufflated while we turned our attention to maturation of the ostomy. An [place] Delude was used to grasp the skin at the area that was marked for an ostomy site at the left hemiabdomen. This was lifted superiorly and Bovie electrocautery was used to create a circular incision at the area. [employer] retractors were used to spread the adipose tissue at the area to the level of the fascia. The fascia was incised vertically with the Bovie and the muscle fibers were spread laterally and medially with the retractors. Once the posterior fascia was seen we created a cruciate incision to accommodate the sigmoid colon. We were able to use a [place] grasper to pull the sigmoid colon up through the wound. Once this was performed we created a mesenteric window at the bowel segment that was pulled up through the wound and an ostomy bar was fashioned using a 14 Albanian red rubber catheter which was suture-ligated with an 0 silk suture. We then created an enterotomy longitudinally at the tinea and secured the edges of the ostomy to the dermis making sure to ilsa the proximal aspect. The laparoscopic incisions were closed with 4-0 Monocryl in a subcuticular fashion. Dermabond was applied those incisions well as the Veress needle entry site.     We turned our attention to the sacral wound debridement portion of the procedure. However during our attempts to turn the patient a right lateral decubitus position the gastrostomy tube was dislodged. We replaced the gastrostomy tube with a 21 Hungarian gastrostomy tube after placement we inflated the balloon to 15 mL using sterile water. Gastric contents were aspirated. In the right lateral decubitus position we prepped the sacral wound using Betadine. Before the start of the procedure however we remove the vessel loop by cutting this with scissors. There was an area of necrotic tissue at the right inferior aspect which we took with Bovie electrocautery. The portion of tissue taken was from the epidermis to the layer of the adipose tissue. Hemostasis was obtained. A total wound size was measured at 15 cm x 14 cm to the level of bone which was approximately 4 cm in distance. The patient was placed supine and moved to his ICU bed. Counts were correct x2. The patient was brought back to the ICU where he will recover. A tube study has been ordered to confirm placement of the gastrostomy tube. Once this is confirmed within the stomach he will be able to receive medications and tube feedings per the ICU. We will await return of bowel function through his loop colostomy and monitor his sacral wound for any signs and need for further debridement. Dr. Dorie Lucero was present for this procedure.     Electronically signed by Brielle Rosa MD on 5/24/2022 at 10:34 AM

## 2022-05-24 NOTE — PLAN OF CARE
Problem: Respiratory - Adult  Goal: Achieves optimal ventilation and oxygenation  Outcome: Progressing  Flowsheets (Taken 5/24/2022 0444)  Achieves optimal ventilation and oxygenation:   Assess for changes in respiratory status   Position to facilitate oxygenation and minimize respiratory effort   Assess the need for suctioning and aspirate as needed   Respiratory therapy support as indicated   Oxygen supplementation based on oxygen saturation or arterial blood gases

## 2022-05-24 NOTE — PLAN OF CARE
Problem: Safety - Medical Restraint  Goal: Remains free of injury from restraints (Restraint for Interference with Medical Device)  Description: INTERVENTIONS:  1. Determine that other, less restrictive measures have been tried or would not be effective before applying the restraint  2. Evaluate the patient's condition at the time of restraint application  3. Inform patient/family regarding the reason for restraint  4.  Q2H: Monitor safety, psychosocial status, comfort, nutrition and hydration  Outcome: Completed  Flowsheets (Taken 5/24/2022 2862)  Remains free of injury from restraints (restraint for interference with medical device): Every 2 hours: Monitor safety, psychosocial status, comfort, nutrition and hydration

## 2022-05-24 NOTE — CARE COORDINATION
Updated plan of care. Trach/PEG and diverting colostomy done on 5/23. Continue wound vac, vent support, FiO2-30%, PEEP-6. Iv fluids, HD to resume on 5/25. Plan for return to [place] [id number] possible tomorrow. Transport forms on chart.  Will continue to follow-o

## 2022-05-24 NOTE — PROGRESS NOTES
GENERAL SURGERY  DAILY PROGRESS NOTE  5/24/2022    Chief Complaint   Patient presents with    Loss of Consciousness     arrived from [facility] via [facility] unresponsive       Subjective: Tolerated procedure well. No ostomy output yet. Objective:  BP (!) 106/58   Pulse 90   Temp 97.8 °F (36.6 °C) (Axillary)   Resp 16   Ht 6' 5\" (1.956 m)   Wt 243 lb 6.2 oz (110.4 kg)   SpO2 97%   BMI 28.86 kg/m²     GENERAL:  Laying in bed, awake, no apparent distress  HEAD: Normocephalic, atraumatic  EYES: No sclera icterus, pupils equal  LUNGS:  Mechanical ventilation   CARDIOVASCULAR:  RR  ABDOMEN:  Soft, non-tender, non-distended. PEG in place  EXTREMITIES: No edema or swelling  SKIN: Warm and dry    Assessment/Plan:  74 y.o. male with sacral wound, respiratory failure    - continue local wound care  - await ostomy output  - wean vent as able       Electronically signed by Chaya Felton MD on 5/24/2022 at 6:34 AM      Pt seen and examined; agree w above  Wife at bedside  No acute issues    Ostomy viable, peg in place    Trach sutures can be removed in 7 days  Ok to use peg  Appreciate wound care for sacral wound  Do not anticipate any further surgical intervention at this time    Flor Richards MD  Minimally Invasive General Surgery and Endoscopy  [address] [place] [address].  [address]: [phone]  F: [phone]    Electronically signed by Joselyn Stark MD on 5/24/2022 at 1:51 PM

## 2022-05-24 NOTE — PLAN OF CARE
Problem: Respiratory - Adult  Goal: Achieves optimal ventilation and oxygenation  5/24/2022 0909 by Robb Kumar RCP  Outcome: Progressing  Flowsheets (Taken 5/24/2022 0444 by Yomi Davison RCP)  Achieves optimal ventilation and oxygenation:   Assess for changes in respiratory status   Position to facilitate oxygenation and minimize respiratory effort   Assess the need for suctioning and aspirate as needed   Respiratory therapy support as indicated   Oxygen supplementation based on oxygen saturation or arterial blood gases

## 2022-05-24 NOTE — PROGRESS NOTES
Critical Care Team - Daily Progress Note      Date and time: 5/24/2022 7:45 AM  Patient's name:  Daysi Cordero  Medical Record Number: [id number]  Patient's account/billing number: [de-identified]  Patient's YOB: 1955  Age: 74 y.o. Date of Admission: 5/10/2022 11:58 PM  Length of stay during current admission: 13      Primary Care Physician: Yeison Jim MD  ICU Attending Physician: Dr. Óscar Lakhani Status: DNR-CCA    Reason for ICU admission: Status postcardiac arrest      SUBJECTIVE:     OVERNIGHT EVENTS:       Patient had trach, PEG, diverting colonoscopy yesterday. Awake and alert orientated, nods appropriately to questions. He denies any fever, chills, nausea, sacral pain, chest pain. Overnight patient's Versed has been titrated off, fentanyl has been titrated from 200 to 50. Patient was hypotensive, tachycardic, tachypnic. Intake/Output:   No intake/output data recorded. I/O last 3 completed shifts: In: 3933.5 [I.V.:1891.3; NG/GT:831; IV Piggyback:911.2]  Out: 3250 [Emesis/NG output:700; Stool:100; Blood:25; Chest Tube:125]    Awake and following commands: Yes  Current Ventilation: - Ventilator Settings:    Vt (Set, mL): 400 mL  Resp Rate (Set): 18 bmp  FiO2 : 30 %    PEEP/CPAP (cmH2O): 6  Pressure Support: 0 cmH20  Secretions: none   Sedation: fentanyl   Paralyzed: No  Vasopressors: None    Initial HPI + past overnight events: 64-year-old male resident of [facility] with significant past medical history of A. Fib Eliquis, aspirin, anxiety, anemia, CHF, insulin-dependent type 2 diabetes, CKD on hemodialysis, sacral decubitus ulcers with wound VAC, hyperlipidemia, hypothyroidism. Presented to the ICU after cardiac arrest requiring CPR with 2 rounds of epinephrine and intubated.     OBJECTIVE:     VITAL SIGNS:  BP (!) 106/58   Pulse 90   Temp 97.8 °F (36.6 °C) (Axillary)   Resp 16   Ht 6' 5\" (1.956 m)   Wt 243 lb 6.2 oz (110.4 kg)   SpO2 97%   BMI 28.86 kg/m²   Tmax over 24 hours: Temp (24hrs), Av.6 °F (36.4 °C), Min:97.1 °F (36.2 °C), Max:98.3 °F (36.8 °C)      Patient Vitals for the past 6 hrs:   BP Temp Temp src Pulse Resp SpO2 Weight   22 0500 (!) 106/58 -- -- 90 16 -- --   22 0400 (!) 118/57 97.8 °F (36.6 °C) Axillary 73 24 97 % 243 lb 6.2 oz (110.4 kg)   22 0348 -- -- -- 69 16 98 % --   22 0300 (!) 110/50 -- -- 79 19 98 % --   22 0200 (!) 101/47 -- -- 68 17 98 % --         Intake/Output Summary (Last 24 hours) at 2022 0745  Last data filed at 2022 0528  Gross per 24 hour   Intake 2434.08 ml   Output 3125 ml   Net -690.92 ml     Wt Readings from Last 2 Encounters:   22 243 lb 6.2 oz (110.4 kg)   22 212 lb (96.2 kg)     Body mass index is 28.86 kg/m². Physical Exam  Vitals and nursing note reviewed. Constitutional:       General: He is awake. Appearance: He is ill-appearing. Comments: Patient responds and follows commands. HENT:      Head: Normocephalic and atraumatic. Nose: Nose normal. No congestion or rhinorrhea. Mouth/Throat:      Mouth: Mucous membranes are moist.      Pharynx: Oropharynx is clear. Eyes:      General: No scleral icterus. Extraocular Movements: Extraocular movements intact. Conjunctiva/sclera: Conjunctivae normal.   Cardiovascular:      Rate and Rhythm: Normal rate and regular rhythm. Pulses: Normal pulses. Heart sounds: Normal heart sounds. No murmur heard. Pulmonary:      Effort: No respiratory distress. Breath sounds: No stridor. Rales present. No wheezing. Comments: Trach in place  Abdominal:      General: Bowel sounds are normal. There is no distension. Palpations: Abdomen is soft. There is no mass. Tenderness: There is no abdominal tenderness. There is no guarding or rebound. Comments: PEG tube in place   Musculoskeletal:         General: Swelling (Bilateral hands) present. Normal range of motion.       Cervical back: Normal range of motion and neck supple. No tenderness. Right lower leg: Edema present. Left lower leg: Edema present. Skin:     Comments: Bilateral lower legs chronic dark skin changes. Neurological:      General: No focal deficit present. Mental Status: He is alert and oriented to person, place, and time. Psychiatric:         Attention and Perception: Attention normal.         Behavior: Behavior is cooperative.            MEDICATIONS:    Scheduled Meds:   oxyCODONE  10 mg Oral Q6H    QUEtiapine  25 mg Oral BID    acetylcysteine  4 mL Inhalation Q6H    dilTIAZem  30 mg PEG Tube 4 times per day    lansoprazole  30 mg Per G Tube QAM AC    [Held by provider] heparin (porcine)  5,000 Units SubCUTAneous Q8H    collagenase   Topical Daily    ceftolozane-tazobactam (ZERBAXA) IVPB  1,500 mg IntraVENous Q8H    metoprolol tartrate  25 mg Oral BID    epoetin kandi-epbx  8,000 Units SubCUTAneous Once per day on Mon Wed Fri    And    epoetin kandi-epbx  2,000 Units SubCUTAneous Once per day on Mon Wed Fri    tigecycline (TYGACIL) IVPB  50 mg IntraVENous Q12H    sulfamethoxazole-trimethoprim  2 tablet Oral Daily    ipratropium-albuterol  1 ampule Inhalation Q4H    polyethylene glycol  17 g Oral Daily    chlorhexidine  15 mL Mouth/Throat BID    senna  5 mL Per G Tube Nightly    docusate  100 mg Per G Tube BID    levothyroxine  175 mcg Per G Tube Daily    [Held by provider] insulin glargine  20 Units SubCUTAneous BID    insulin lispro  0-12 Units SubCUTAneous Q6H    levETIRAcetam  500 mg PEG Tube BID    sodium chloride flush  5-40 mL IntraVENous 2 times per day     Continuous Infusions:   sodium chloride 75 mL/hr at 05/24/22 0643    midazolam Stopped (05/24/22 0541)    fentaNYL 50 mcg/hr (05/24/22 0643)    sodium chloride Stopped (05/20/22 1459)     PRN Meds:   diatrizoate meglumine-sodium, 30 mL, ONCE PRN  midazolam, 1 mg, Q2H PRN  anticoagulant sodium citrate, 4.2 mL, PRN  perflutren lipid microspheres, 1.5 mL, ONCE PRN  glucose, 4 tablet, PRN  dextrose bolus, 125 mL, PRN   Or  dextrose bolus, 250 mL, PRN  glucagon (rDNA), 1 mg, PRN  sodium chloride flush, 5-40 mL, PRN  sodium chloride, , PRN          VENT SETTINGS (Comprehensive) (if applicable):  Vent Information  Ventilator ID: [id number]  Vent Mode: AC/VC  Ventilator Initiate: Yes  Additional Respiratory Assessments  Pulse: 90  Resp: 16  SpO2: 97 %  End Tidal CO2: 42 (%)  Position: Semi-Fajardo's  Humidification Source: Heated wire  Humidification Temp: 37  Circuit Condensation: Drained  Cuff Pressure (cm H2O): 37.6 cm H2O    Arterial Blood Gas 5/24/2022  pH 7.521, pCO2 26.7, pO2 101.2, HCO3 21.4 . Laboratory findings:    Complete Blood Count:   Recent Labs     05/22/22  0540 05/23/22  0720 05/24/22  0515   WBC 11.7* 8.0 7.0   HGB 7.7* 7.4* 8.1*   HCT 24.1* 22.8* 24.8*   PLT 96* 100* 84*        Last 3 Blood Glucose:   Recent Labs     05/22/22  0540 05/23/22  0720 05/24/22  0515   GLUCOSE 140* 157* 171*        PT/INR:    Lab Results   Component Value Date    PROTIME 15.5 05/17/2022    INR 1.4 05/17/2022     PTT:    Lab Results   Component Value Date    APTT 32.8 05/11/2022       Comprehensive Metabolic Profile:   Recent Labs     05/22/22  0540 05/23/22  0720 05/24/22  0515    135 136   K 4.7 5.1* 4.4   CL 99 98 100   CO2 24 23 21*   BUN 67* 81* 57*   CREATININE 2.0* 2.5* 1.9*   GLUCOSE 140* 157* 171*   CALCIUM 8.8 8.7 8.3*      Magnesium:   Lab Results   Component Value Date    MG 2.1 05/24/2022     Phosphorus:   Lab Results   Component Value Date    PHOS 5.1 05/24/2022     Ionized Calcium: No results found for: CAION     Urinalysis:     Troponin: No results for input(s): TROPONINI in the last 72 hours.     Microbiology:  Cultures drawn:   Gram stain  Surgical anaerobic cultures pending, and stain, fungus, acid-fast bacillus pending  Oral pharyngeal respiratory culture positive for actinobacter baumanni and Pseudomonas aeruginosa  5/21/2022 MRSA nasal colonization + 5/12/2022    Respiratory panel negative        Radiology/Imaging:     Chest Xray (5/24/2022): No new imaging     5/23/2022 PEG tube in distal stomach location and contrast within the stomach, distention of colon may represent ileus    ASSESSMENT:     Patient Active Problem List    Diagnosis Date Noted    Pneumonia due to infectious organism     Pleural effusion     Cardiac arrest ([facility] [address] 05/11/2022         PLAN:     Neuro   History of seizures on Keppra   Awake and alert responding to commands    Respiratory   Acute hypercapnic respiratory failure with hypoxia   Trach placed 5/23/2022   Thoracocentesis completed 5/15/2021   Status post chest tube placement for per 1 moderate pleural effusion with significant drainage   Of empyema pleural fluid infection with Pseudomonas   Pulmonary following    Cardiovascular   History of A. Fib   Cardiology following    GI   PEG tube replaced 5/23/2022   Diverting colostomy 5/23/2022   Surgery following-aware of possible ileus    Renal    ESRD on dialysis M/W/F   Receiving EPO   Nephrology following    Infectious disease   Sepsis with septic shock   Leukocytosis improving 7.0   Sacral decubitus ulcer with Enterococcus and Pseudomonas   Leukocytosis   Decubitus ulcer   ID following    Heme/Onc   Patient received PRBC 05/12/2022, 05/14/2022   Trend H&H transfuse if hemoglobin less than 7   Thrombocytopenia    Check - Fibrinogen, Heparin induced platelet antibody, path review smear   Surgery following    Endocrine   Diabetes mellitus on MDSS   Hypothyroidism on Synthroid    Social/Spiritual/DNR/Other   Code status: DNR CCA   Diet Diet NPO Exceptions are: Sips of Water with Meds   Stress ulcer prophylaxis: Protonix 40 mg   DVT prophylaxis: pharmacologic prophylaxis (with the following: heparin)- was held for trach , SVDS in place    Consultations needed: Yes   Transfer out of ICU today?  No- to return to [employer] Other: wean off fentanyl, NS infusion, strop Mucomyst , tube feeds to be started 10cc. Lines/catheters  Hemodialysis central access right side clavicle-8 days  CVC triple-lumen 05/17/2022 left internal jugular  Arterial line 05/23/2022 left radial  Chest tube left 8 days- 200 overnight   Fecal management system 05/16/22  Colostomy left upper quadrant loop 5/23/2022  Trach 05/23/2020      Bryn Carbajal MD  7:45 AM  05/24/22    Attending Physician Attestation: Dr. Emerson Montes    Thank you very much for allowing me to see this patient in consultation and follow up. I personally saw, examined and provided care for the patient. Radiographs, labs and medication list were reviewed by me independently. I spoke with bedside nursing, respiratory therapists and consultants. Critical care services and times documented are independent of procedures and multidisciplinary rounds with Residents. Additionally comprehensive, multidisciplinary rounds were conducted with the MICU team. The case was discussed in detail and plans for care were established. Review of Residents documentation was conducted and revisions were made as appropriate. I agree with the the above documented information.      Physical Examination:  Vitals:   Vitals:    05/24/22 1100 05/24/22 1200 05/24/22 1300 05/24/22 1400   BP: (!) 100/56 (!) 107/56  107/67   Pulse: 112 112 111 94   Resp: (!) 37 18 20 12   Temp:       TempSrc:       SpO2: 97% 98% 97% 99%   Weight:       Height:          General: No Acute Distress, trach/vent  HEENT: normocephalic, atruamatic  CVS: RRR, S1, S2, No S3 or S4  Respiratory: decreased breath sounds at lung bases, no focal wheezes noted  Extremities: no clubbing/cyanosis  - 2-3+ edema  Neuro: trach/vent, sedated      ASSESSMENT:  1.) B/L Multi-Lobar HCAP with MDR Acinetobacter baumanii  - s/p trach and G-tube placement 5/23/2022  2.) Severe Sepsis with septic shock associated to HCAP  3.) S/P Cardiopulmonary Arrest  4.) History of sacral decubitus ulcer infection with Pseudomonas and Enterococcus.    5.) CKD, on HD  6.) Pleural fluid infection with Pseudomonas.  Possible empyema  7.) Lower Extremity Swelling - doppler US legs (negtiave for DVT) on 5/25/2022     In addition the following applies:     Check: serial labs, fibrinogen, PF4 ab, PS (r/o schistocytes)  Medication Alterations: hold subcutaneous heparin, OK for PCDs  Procedures:   Completed procedures 5/23/2022:  1. TOILET BRONCHOSCOPY  2. PERCUTANEOUS  TRACHEOSTOMY  3. DIAGNOSTIC LAPAROSCOPY LOOP COLOSTOMY  4. GASTROSTOMY TUBE PLACEMENT  5. SHARD EXCISIONAL DEBRIDEMENT OF SACRAL WOUND TO BONE   Imaging: reviewed, doppler US legs (negtiave for DVT)  New Consultations: N/A  VENT/TRACH: ACVC (DAY 14) 12/400/30/5     Access: Right Tunneled HD Catheter , Left Chest Tube, Left IJ TLC  Consults: ID, Pulmonary, GS, Cardiology   Drips: Fentanyl   Nutrition: Tube Feeds  ABX: Tigecycline, Zerbaxa, Bactrim      - contact isolation to continue for patient   - LTACH eval and treat, await transfer to [facility] in next 24 hours   - remain on oxycodone and wean off fentanyl drip  - look to oxycodone prn once off fentanyl drip   - tube feeds trickle at 10 cc/hr and advance as tolerated     Thank you for allowing me to participate in the care of this patient. Care reviewed with nursing staff, medical and surgical specialty care, primary care and the patient's family as available. Restraints are ordered when the patient can do harm to him/herself by pulling out devices. Critical Care Time: > 35 minutes excluding procedures    Chapis Cullen M.D.     Chapis Cullen MD  5/24/2022  2:20 PM

## 2022-05-24 NOTE — PROGRESS NOTES
Comprehensive Nutrition Assessment    Type and Reason for Visit:  Reassess    Nutrition Recommendations/Plan:   1. When medically feasible recommend advance current TF from trophic rate to goal rate to more closely meet needs:    TF RECOMMENDATION (POSTOP): Resume Immune Enhancing TF (Pivot 1.5) to goal rate 55 ml/hr x 23 hr/d (Hold 30 min before and after Synthroid) and Protein Modular once daily. This will provide: 1265 ml/d, 1998 total regine, 145 total pro, 960 ml free water  At goal, this regimen will meet 100% energy and protein needs       Malnutrition Assessment:  Malnutrition Status: At risk for malnutrition (Comment) (05/11/22 0204)    Context:  Chronic Illness     Findings of the 6 clinical characteristics of malnutrition:  Energy Intake:  Mild decrease in energy intake (Comment) (NPO/prior to TF order)  Weight Loss:  Greater than 7.5% over 3 months     Body Fat Loss:  No significant body fat loss     Muscle Mass Loss:  No significant muscle mass loss    Fluid Accumulation:  No significant fluid accumulation     Strength:  Not Performed    Nutrition Assessment:    Pt now postop 5/23 trach/PEG replacement, diverting colostomy and debridement sacral wound. Remains intubated. HD to resume 5/25. TF now resumed at trophic rate postop.  Will continue to monitor pt tolerance    Nutrition Related Findings:    intubated/trach, soft abd w/hypo BS, +I/O 16L, PEG, +2 anasarca Wound Type: Surgical Incision,Deep Tissue Injury,Pressure Injury,Stage IV       Current Nutrition Intake & Therapies:    Average Meal Intake: NPO  Average Supplements Intake: NPO  Current Tube Feeding (TF) Orders:  · Feeding Route: PEG  · Formula: Immune Enhancing  · Schedule: Continuous  · Feeding Regimen: Trickle feed at 10 ml/hr = 240 ml/d  · Additives/Modulars: None  · Water Flushes: 30 ml Q 4 hr = 180 ml/d  · Current TF & Flush Orders Provides: trickle rate - now postop 5/24  · Goal TF & Flush Orders Provides: 360 regine, 23 g pro, 362 ml total free water      Anthropometric Measures:  Height: 6' 5\" (195.6 cm)  Ideal Body Weight (IBW): 208 lbs (95 kg)    Admission Body Weight: 206 lb 9.1 oz (93.7 kg) (5/11)  Current Body Weight: 243 lb 6.2 oz (110.4 kg), 123.8 % IBW. Weight Source: Bed Scale (5/24)  Current BMI (kg/m2): 28.9  Usual Body Weight: 249 lb 1.9 oz (113 kg) (2/12 at [facility])  % Weight Change (Calculated): -17.1                    BMI Categories: Normal Weight (BMI 18.5-24.9) (admit wt)    Estimated Daily Nutrient Needs:  Energy Requirements Based On: Formula ([address] [address])  Weight Used for Energy Requirements: Admission  Energy (kcal/day):   Weight Used for Protein Requirements: Admission  Protein (g/day): 140-155  Method Used for Fluid Requirements: Standard Renal  Fluid (ml/day): per Renal and Critical Care    Nutrition Diagnosis:   · Inadequate oral intake related to impaired respiratory function as evidenced by intubation,nutrition support - enteral nutrition,NPO or clear liquid status due to medical condition,wounds      Nutrition Interventions:   Food and/or Nutrient Delivery: Continue NPO,Modify Tube Feeding (Continue to advance TF to goal rate to more closely meet needs, when medically advisable)  Nutrition Education/Counseling: Education not indicated  Coordination of Nutrition Care: Continue to monitor while inpatient       Goals:  Previous Goal Met: No Progress toward Goal(s)  Goals:  Tolerate nutrition support at goal rate       Nutrition Monitoring and Evaluation:   Behavioral-Environmental Outcomes: None Identified  Food/Nutrient Intake Outcomes: Enteral Nutrition Intake/Tolerance  Physical Signs/Symptoms Outcomes: GI Status,Biochemical Data,Fluid Status or Edema,Hemodynamic Status,Nutrition Focused Physical Findings,Skin,Weight    Discharge Planning:    Enteral Nutrition     Rosalinda Husain RD, CNSC, LD  Contact: [phone]

## 2022-05-25 ENCOUNTER — APPOINTMENT (OUTPATIENT)
Dept: GENERAL RADIOLOGY | Age: 67
DRG: 003 | End: 2022-05-25
Payer: MEDICARE

## 2022-05-25 VITALS
BODY MASS INDEX: 29.36 KG/M2 | SYSTOLIC BLOOD PRESSURE: 120 MMHG | DIASTOLIC BLOOD PRESSURE: 64 MMHG | WEIGHT: 248.68 LBS | OXYGEN SATURATION: 99 % | RESPIRATION RATE: 30 BRPM | HEIGHT: 77 IN | TEMPERATURE: 99.2 F | HEART RATE: 116 BPM

## 2022-05-25 LAB
ACANTHOCYTES: ABNORMAL
ANION GAP SERPL CALCULATED.3IONS-SCNC: 15 MMOL/L (ref 7–16)
ANISOCYTOSIS: ABNORMAL
B.E.: -0.3 MMOL/L (ref -3–3)
BASOPHILIC STIPPLING: ABNORMAL
BASOPHILS ABSOLUTE: 0 E9/L (ref 0–0.2)
BASOPHILS RELATIVE PERCENT: 0 % (ref 0–2)
BUN BLDV-MCNC: 72 MG/DL (ref 6–23)
BURR CELLS: ABNORMAL
CALCIUM SERPL-MCNC: 8.7 MG/DL (ref 8.6–10.2)
CHLORIDE BLD-SCNC: 100 MMOL/L (ref 98–107)
CO2: 23 MMOL/L (ref 22–29)
COHB: 1.2 % (ref 0–1.5)
CREAT SERPL-MCNC: 2.3 MG/DL (ref 0.7–1.2)
CRITICAL: ABNORMAL
DATE ANALYZED: ABNORMAL
DATE OF COLLECTION: ABNORMAL
EOSINOPHILS ABSOLUTE: 0 E9/L (ref 0.05–0.5)
EOSINOPHILS RELATIVE PERCENT: 0 % (ref 0–6)
FIBRINOGEN: 155 MG/DL (ref 200–400)
FIO2: 30 %
GFR AFRICAN AMERICAN: 34
GFR NON-AFRICAN AMERICAN: 28 ML/MIN/1.73
GLUCOSE BLD-MCNC: 155 MG/DL (ref 74–99)
HCO3: 24.3 MMOL/L (ref 22–26)
HCT VFR BLD CALC: 25.8 % (ref 37–54)
HEMOGLOBIN: 8.2 G/DL (ref 12.5–16.5)
HHB: 2.8 % (ref 0–5)
HYPOCHROMIA: ABNORMAL
IMMATURE GRANULOCYTES #: 0.05 E9/L
IMMATURE GRANULOCYTES %: 0.6 % (ref 0–5)
LAB: ABNORMAL
LYMPHOCYTES ABSOLUTE: 0.73 E9/L (ref 1.5–4)
LYMPHOCYTES RELATIVE PERCENT: 8.6 % (ref 20–42)
Lab: ABNORMAL
MAGNESIUM: 2.3 MG/DL (ref 1.6–2.6)
MCH RBC QN AUTO: 27.5 PG (ref 26–35)
MCHC RBC AUTO-ENTMCNC: 31.8 % (ref 32–34.5)
MCV RBC AUTO: 86.6 FL (ref 80–99.9)
METER GLUCOSE: 111 MG/DL (ref 74–99)
METER GLUCOSE: 149 MG/DL (ref 74–99)
METER GLUCOSE: 95 MG/DL (ref 74–99)
METHB: 0.3 % (ref 0–1.5)
MODE: ABNORMAL
MONOCYTES ABSOLUTE: 0.32 E9/L (ref 0.1–0.95)
MONOCYTES RELATIVE PERCENT: 3.8 % (ref 2–12)
NEUTROPHILS ABSOLUTE: 7.41 E9/L (ref 1.8–7.3)
NEUTROPHILS RELATIVE PERCENT: 87 % (ref 43–80)
O2 CONTENT: 13.5 ML/DL
O2 SATURATION: 97.2 % (ref 92–98.5)
O2HB: 95.7 % (ref 94–97)
OPERATOR ID: 420
OVALOCYTES: ABNORMAL
PATHOLOGIST REVIEW: NORMAL
PATIENT TEMP: 37 C
PCO2: 39.2 MMHG (ref 35–45)
PDW BLD-RTO: 23.5 FL (ref 11.5–15)
PEEP/CPAP: 5 CMH2O
PFO2: 3.29 MMHG/%
PH BLOOD GAS: 7.41 (ref 7.35–7.45)
PHOSPHORUS: 6.5 MG/DL (ref 2.5–4.5)
PLATELET # BLD: 85 E9/L (ref 130–450)
PLATELET CONFIRMATION: NORMAL
PMV BLD AUTO: 10.3 FL (ref 7–12)
PO2: 98.8 MMHG (ref 75–100)
POIKILOCYTES: ABNORMAL
POLYCHROMASIA: ABNORMAL
POTASSIUM SERPL-SCNC: 4.4 MMOL/L (ref 3.5–5)
PS: 12 CMH20
RBC # BLD: 2.98 E12/L (ref 3.8–5.8)
SODIUM BLD-SCNC: 138 MMOL/L (ref 132–146)
SOURCE, BLOOD GAS: ABNORMAL
TARGET CELLS: ABNORMAL
TEAR DROP CELLS: ABNORMAL
THB: 9.9 G/DL (ref 11.5–16.5)
TIME ANALYZED: 1512
WBC # BLD: 8.5 E9/L (ref 4.5–11.5)

## 2022-05-25 PROCEDURE — 80048 BASIC METABOLIC PNL TOTAL CA: CPT

## 2022-05-25 PROCEDURE — 94640 AIRWAY INHALATION TREATMENT: CPT

## 2022-05-25 PROCEDURE — 36415 COLL VENOUS BLD VENIPUNCTURE: CPT

## 2022-05-25 PROCEDURE — 2580000003 HC RX 258: Performed by: STUDENT IN AN ORGANIZED HEALTH CARE EDUCATION/TRAINING PROGRAM

## 2022-05-25 PROCEDURE — 71045 X-RAY EXAM CHEST 1 VIEW: CPT

## 2022-05-25 PROCEDURE — 85025 COMPLETE CBC W/AUTO DIFF WBC: CPT

## 2022-05-25 PROCEDURE — 6360000002 HC RX W HCPCS: Performed by: STUDENT IN AN ORGANIZED HEALTH CARE EDUCATION/TRAINING PROGRAM

## 2022-05-25 PROCEDURE — 6370000000 HC RX 637 (ALT 250 FOR IP): Performed by: STUDENT IN AN ORGANIZED HEALTH CARE EDUCATION/TRAINING PROGRAM

## 2022-05-25 PROCEDURE — 36592 COLLECT BLOOD FROM PICC: CPT

## 2022-05-25 PROCEDURE — 90935 HEMODIALYSIS ONE EVALUATION: CPT

## 2022-05-25 PROCEDURE — 94003 VENT MGMT INPAT SUBQ DAY: CPT

## 2022-05-25 PROCEDURE — 6360000002 HC RX W HCPCS: Performed by: INTERNAL MEDICINE

## 2022-05-25 PROCEDURE — 83735 ASSAY OF MAGNESIUM: CPT

## 2022-05-25 PROCEDURE — 85384 FIBRINOGEN ACTIVITY: CPT

## 2022-05-25 PROCEDURE — 84100 ASSAY OF PHOSPHORUS: CPT

## 2022-05-25 PROCEDURE — 37799 UNLISTED PX VASCULAR SURGERY: CPT

## 2022-05-25 PROCEDURE — 82805 BLOOD GASES W/O2 SATURATION: CPT

## 2022-05-25 PROCEDURE — 82962 GLUCOSE BLOOD TEST: CPT

## 2022-05-25 RX ORDER — SULFAMETHOXAZOLE AND TRIMETHOPRIM 800; 160 MG/1; MG/1
2 TABLET ORAL DAILY
Qty: 16 TABLET | Refills: 0 | Status: SHIPPED | OUTPATIENT
Start: 2022-05-26 | End: 2022-06-03

## 2022-05-25 RX ORDER — OXYCODONE HYDROCHLORIDE 10 MG/1
5 TABLET ORAL EVERY 6 HOURS
Qty: 6 TABLET | Refills: 0 | Status: SHIPPED | OUTPATIENT
Start: 2022-05-25 | End: 2022-05-28

## 2022-05-25 RX ORDER — LANSOPRAZOLE
30 KIT
Qty: 300 ML | Refills: 0 | Status: SHIPPED | OUTPATIENT
Start: 2022-05-26

## 2022-05-25 RX ORDER — QUETIAPINE FUMARATE 25 MG/1
25 TABLET, FILM COATED ORAL 2 TIMES DAILY
Qty: 60 TABLET | Refills: 3 | Status: SHIPPED | OUTPATIENT
Start: 2022-05-25

## 2022-05-25 RX ORDER — LEVETIRACETAM 100 MG/ML
500 SOLUTION ORAL 2 TIMES DAILY
Qty: 60 EACH | Refills: 3 | Status: SHIPPED | OUTPATIENT
Start: 2022-05-25

## 2022-05-25 RX ORDER — IPRATROPIUM BROMIDE AND ALBUTEROL SULFATE 2.5; .5 MG/3ML; MG/3ML
3 SOLUTION RESPIRATORY (INHALATION) EVERY 4 HOURS
Qty: 360 ML | Refills: 1 | Status: SHIPPED | OUTPATIENT
Start: 2022-05-25

## 2022-05-25 RX ADMIN — OXYCODONE 10 MG: 5 TABLET ORAL at 04:08

## 2022-05-25 RX ADMIN — Medication 10 ML: at 20:42

## 2022-05-25 RX ADMIN — EPOETIN ALFA-EPBX 8000 UNITS: 4000 INJECTION, SOLUTION INTRAVENOUS; SUBCUTANEOUS at 10:30

## 2022-05-25 RX ADMIN — IPRATROPIUM BROMIDE AND ALBUTEROL SULFATE 1 AMPULE: .5; 2.5 SOLUTION RESPIRATORY (INHALATION) at 07:32

## 2022-05-25 RX ADMIN — DOCUSATE SODIUM LIQUID 100 MG: 100 LIQUID ORAL at 20:42

## 2022-05-25 RX ADMIN — IPRATROPIUM BROMIDE AND ALBUTEROL SULFATE 1 AMPULE: .5; 2.5 SOLUTION RESPIRATORY (INHALATION) at 00:17

## 2022-05-25 RX ADMIN — SULFAMETHOXAZOLE AND TRIMETHOPRIM 2 TABLET: 800; 160 TABLET ORAL at 10:23

## 2022-05-25 RX ADMIN — LEVETIRACETAM 500 MG: 100 SOLUTION ORAL at 10:22

## 2022-05-25 RX ADMIN — LEVOTHYROXINE SODIUM 175 MCG: 125 TABLET ORAL at 06:39

## 2022-05-25 RX ADMIN — IPRATROPIUM BROMIDE AND ALBUTEROL SULFATE 1 AMPULE: .5; 2.5 SOLUTION RESPIRATORY (INHALATION) at 04:09

## 2022-05-25 RX ADMIN — QUETIAPINE FUMARATE 25 MG: 25 TABLET ORAL at 20:43

## 2022-05-25 RX ADMIN — EPOETIN ALFA-EPBX 2000 UNITS: 2000 INJECTION, SOLUTION INTRAVENOUS; SUBCUTANEOUS at 10:30

## 2022-05-25 RX ADMIN — IPRATROPIUM BROMIDE AND ALBUTEROL SULFATE 1 AMPULE: .5; 2.5 SOLUTION RESPIRATORY (INHALATION) at 15:54

## 2022-05-25 RX ADMIN — DILTIAZEM HYDROCHLORIDE 30 MG: 30 TABLET, FILM COATED ORAL at 06:31

## 2022-05-25 RX ADMIN — CHLORHEXIDINE GLUCONATE 0.12% ORAL RINSE 15 ML: 1.2 LIQUID ORAL at 20:42

## 2022-05-25 RX ADMIN — TIGECYCLINE 50 MG: 50 INJECTION, POWDER, LYOPHILIZED, FOR SOLUTION INTRAVENOUS at 12:46

## 2022-05-25 RX ADMIN — IPRATROPIUM BROMIDE AND ALBUTEROL SULFATE 1 AMPULE: .5; 2.5 SOLUTION RESPIRATORY (INHALATION) at 19:38

## 2022-05-25 RX ADMIN — CEFTOLOZANE AND TAZOBACTAM 1500 MG: 1; .5 INJECTION, POWDER, LYOPHILIZED, FOR SOLUTION INTRAVENOUS at 06:30

## 2022-05-25 RX ADMIN — LANSOPRAZOLE 30 MG: KIT at 06:31

## 2022-05-25 RX ADMIN — ALTEPLASE 4 MG: 2.2 INJECTION, POWDER, LYOPHILIZED, FOR SOLUTION INTRAVENOUS at 09:30

## 2022-05-25 RX ADMIN — CEFTOLOZANE AND TAZOBACTAM 1500 MG: 1; .5 INJECTION, POWDER, LYOPHILIZED, FOR SOLUTION INTRAVENOUS at 13:56

## 2022-05-25 RX ADMIN — Medication 10 ML: at 10:24

## 2022-05-25 RX ADMIN — LEVETIRACETAM 500 MG: 100 SOLUTION ORAL at 20:42

## 2022-05-25 RX ADMIN — COLLAGENASE SANTYL: 250 OINTMENT TOPICAL at 10:27

## 2022-05-25 RX ADMIN — OXYCODONE 10 MG: 5 TABLET ORAL at 15:30

## 2022-05-25 RX ADMIN — INSULIN LISPRO 2 UNITS: 100 INJECTION, SOLUTION INTRAVENOUS; SUBCUTANEOUS at 06:33

## 2022-05-25 RX ADMIN — DOCUSATE SODIUM LIQUID 100 MG: 100 LIQUID ORAL at 10:22

## 2022-05-25 RX ADMIN — OXYCODONE 10 MG: 5 TABLET ORAL at 10:23

## 2022-05-25 RX ADMIN — QUETIAPINE FUMARATE 25 MG: 25 TABLET ORAL at 10:23

## 2022-05-25 RX ADMIN — CHLORHEXIDINE GLUCONATE 0.12% ORAL RINSE 15 ML: 1.2 LIQUID ORAL at 10:23

## 2022-05-25 RX ADMIN — DILTIAZEM HYDROCHLORIDE 30 MG: 30 TABLET, FILM COATED ORAL at 13:37

## 2022-05-25 RX ADMIN — SENNOSIDES 8.8 MG: 8.8 SYRUP ORAL at 20:42

## 2022-05-25 RX ADMIN — METOPROLOL TARTRATE 25 MG: 25 TABLET, FILM COATED ORAL at 20:42

## 2022-05-25 RX ADMIN — IPRATROPIUM BROMIDE AND ALBUTEROL SULFATE 1 AMPULE: .5; 2.5 SOLUTION RESPIRATORY (INHALATION) at 11:31

## 2022-05-25 ASSESSMENT — PULMONARY FUNCTION TESTS
PIF_VALUE: 21
PIF_VALUE: 19
PIF_VALUE: 22
PIF_VALUE: 23
PIF_VALUE: 22
PIF_VALUE: 21
PIF_VALUE: 24
PIF_VALUE: 20
PIF_VALUE: 21
PIF_VALUE: 20
PIF_VALUE: 23
PIF_VALUE: 23
PIF_VALUE: 18
PIF_VALUE: 20
PIF_VALUE: 19
PIF_VALUE: 23
PIF_VALUE: 27
PIF_VALUE: 27
PIF_VALUE: 23
PIF_VALUE: 23
PIF_VALUE: 17
PIF_VALUE: 23
PIF_VALUE: 21
PIF_VALUE: 20
PIF_VALUE: 20
PIF_VALUE: 24

## 2022-05-25 NOTE — PLAN OF CARE
Problem: Respiratory - Adult  Goal: Achieves optimal ventilation and oxygenation  5/25/2022 1411 by Garo Rust RCP  Outcome: Progressing  Note: Patient weaning on PS 12 30% +5

## 2022-05-25 NOTE — PROGRESS NOTES
Nurse to nurse report called to Ritesh Avendaño at [facility]. All pertinent information given regarding patients hospital course. All questions were answered. Patient to be transported to [facility] at [facility]. Wife was updated on this information.

## 2022-05-25 NOTE — DISCHARGE SUMMARY
Internal Medicine Discharge Summary    Patient ID: Daniel Pack      Patient's PCP: Julianna Delarosa MD    Admit Date: 5/10/2022     Discharge Date:   5 25 22    Admitting Physician: Julianna Delarosa MD     Discharge Physician: Julianna Delarosa MD     Discharge Diagnoses: Active Hospital Problems    Diagnosis Date Noted    Pneumonia due to infectious organism [J18.9]      Priority: Medium    Pleural effusion [J90]      Priority: Medium    Cardiac arrest [facility]) [I46.9] 05/11/2022     Priority: Medium       The patient was seen and examined on day of discharge and this discharge summary is in conjunction with any daily progress note from day of discharge. Hospital Course:  Mr. Daniel Pack, a 74y.o. year old male who has a past medical history of A-fib ([address] TRUMAN (acute kidney injury) ([address] Anemia, Anxiety, Bipolar 1 disorder ([address] Charcot foot due to diabetes mellitus ([address] CHF (congestive heart failure) ([address] CKD (chronic kidney disease), Diabetes mellitus ([address] Dialysis patient ([address] Hyperlipidemia, Hypertension, Right sided weakness, Sacral decubitus ulcer, and Seizure ([address] This is a 20-year-old male with progressive dementia and spinal stenosis. Has been bedbound for a long time. He has had in the past bouts of kidney failure that did not require long-term resuscitation with dialysis however he was at acute care for decompensation from his large sacral decubitus and acute renal failure that did require renal replacement therapy of hemodialysis. He finishes antibiotics. His sacral wound was slow to heal because of poor oral intake and he was given a PEG tube for nutritional supplementation purposes since his wife wanted to. Patient had been stable till about 48 hours ago. Monday he was noticed to have a large amount of bloody stool. He was started on an intravenous proton pump inhibitor and surgery services were asked to see him. His anticoagulation was held.  Patient was being watched and he did not have any more episodes. Yesterday he had an increase in his WBCs without having any localizing symptoms. Infectious disease did see him and started him immediately on broad-spectrum antibiotics. Patient became unresponsive and virtually reportedly evening more towards the early hours of this morning. Fluids were started unfortunately patient could not be resuscitated despite best efforts of the on-call intensive virtual specialist. Patient was sent over to acute care where he was intubated for cardiorespiratory failure. He was found to be in cardiac arrest.   Appropriate O2 resuscitation resulted in resumption of heart rhythm.  Patient was started on pressors moved to the ICU   Imaging reveals a significant pleural effusion and HCAP  He was +ve for empyema and was given a chest tube on left side   He did have a trach and  Diverting ostomy   He did have a debridement of his sacrum as well  He has not been weanable  He is a dnr cca     He has not been running a fever   bP  Is better pain is controlled    off of atc  Due to low plts  Cardiac arrest was felt to be due to sepsis       PER  ID    PLAN:  · Continue Tigecycline, Bactrim, day 10 of minimum 14  · Continue Zerbaxa for MDR Pseudomonas, day 8 of minimum 28 days  · Tracheostomy and diverting colostomy   · Continue contact isolation  · Discharged to [facility]      Exam:     BP (!) 121/56   Pulse 79   Temp 97.6 °F (36.4 °C) (Axillary)   Resp 16   Ht 6' 5\" (1.956 m)   Wt 248 lb 10.9 oz (112.8 kg)   SpO2 98%   BMI 29.49 kg/m²     General Appearance: anterior tracheostomy and he is alert and trying to mouth words   Skin: warm and dry   Head: normocephalic and atraumatic   Eyes: pupils equal, round, and reactive to light, extraocular eye movements intact, conjunctivae normal   Neck: neck supple and non tender without mass   Pulmonary/Chest: clear to auscultation bilaterally- no wheezes, rales or rhonchi, normal air movement, no respiratory distress Cardiovascular: Irregularly irregular heart sound. Abdomen: soft, non-tender, non-distended, normal bowel sounds, no masses or organomegaly   Extremities: no cyanosis, no clubbing and no edema   Neurologic: Lower extremities he is not moving   Left chest tube in place   PEG is in place   Sacrum with dressing        Consults:     IP CONSULT TO INTERNAL MEDICINE  IP CONSULT TO CRITICAL CARE  IP CONSULT TO GENERAL SURGERY  IP CONSULT TO DIETITIAN  IP CONSULT TO INFECTIOUS DISEASES  IP CONSULT TO NEPHROLOGY  IP CONSULT TO CARDIOLOGY  IP CONSULT TO DIETITIAN  IP CONSULT TO PALLIATIVE CARE  IP CONSULT TO GENERAL SURGERY  IP CONSULT TO IV TEAM    Significant Diagnostic Studies:   CT ABDOMEN PELVIS WO CONTRAST Additional Contrast? None    Result Date: 5/11/2022  Bilateral pleural effusions, greater on the left than right, with superimposed consolidation. In the appropriate setting, a pneumonia with associated pleural effusions cannot be excluded. No bowel obstruction, free air or obstructive uropathy. Additional findings involving the abdomen and pelvis, as detailed above. CT HEAD WO CONTRAST    Result Date: 5/11/2022  No acute findings. Chronic changes as above. CT CHEST WO CONTRAST    Result Date: 5/15/2022  1. Small right pleural effusion with thickening of the visceral and parietal pleural surfaces, probable loculated effusion 2. Prominence of the visceral and parietal pleural surfaces on the left, complicated effusion/empyema not excluded. No gas within the effusion. The effusion is stable. 3.  Slight interval improvement in alveolar consolidation in the right and left lung compared to the prior study with some residual alveolar opacities compatible with pneumonia. 4.  Endotracheal tube and right IJ central venous catheters appear to be in good position.      CT CHEST WO CONTRAST    Result Date: 5/11/2022  Findings suggesting pulmonary edema versus a multifocal pneumonia, with associated bilateral pleural effusions. Generalized mediastinal adenopathy, which may represent reactive changes. This is a nonspecific finding. Differential diagnosis would include infection, inflammation or neoplastic causes. XR CHEST PORTABLE    Result Date: 5/25/2022  Some partial resolution of left basilar atelectasis/infiltrate     XR CHEST PORTABLE    Result Date: 5/24/2022  New tracheostomy tube in good position. Otherwise no significant change of the past 23 hours. XR CHEST PORTABLE    Result Date: 5/23/2022  No significant change. Continued follow-up recommended. XR CHEST PORTABLE    Result Date: 5/22/2022  No significant change. Continued follow-up recommended. XR CHEST PORTABLE    Result Date: 5/21/2022  No significant change. Continued follow-up recommended. XR CHEST PORTABLE    Result Date: 5/20/2022  No significant change over the past 48 hours. XR CHEST PORTABLE    Result Date: 5/18/2022  No significant change. Continued follow-up recommended. XR CHEST PORTABLE    Result Date: 5/17/2022  Interval decrease in the left perihilar infiltrate, when compared to the previous study performed earlier in the day. The other bilateral lower lung field infiltrates without significant interval change. Small bilateral pleural effusions again noted. Interval placement of a left-sided IJ line. No pneumothorax. XR CHEST PORTABLE    Result Date: 5/17/2022  Bilateral infiltrates are similar to slightly improved. Continued follow-up recommended. XR CHEST PORTABLE    Result Date: 5/16/2022  1. Interval placement of a left chest tube. Mild decrease in left pleural effusion. 2. Small bilateral pleural effusions are noted. No pneumothorax. 3. Pulmonary opacities in the lower lungs which may atelectasis or pneumonia. 4.  The life-support lines and tubes are well positioned. XR CHEST PORTABLE    Result Date: 5/16/2022  No significant change. Continued follow-up recommended.      XR CHEST PORTABLE    Result Date: 5/15/2022  Stable chest with lines and tubes unchanged. Increased markings seen throughout the lung fields with no change in the bilateral pleural effusions. XR CHEST PORTABLE    Result Date: 5/14/2022  No significant change. Continued follow-up recommended. XR CHEST PORTABLE    Result Date: 5/13/2022  No evidence of a pneumothorax. Persistent bilateral pleural effusions with bibasilar infiltrates and right lower lobe atelectasis. XR CHEST PORTABLE    Result Date: 5/13/2022  Increasing infiltrate and or atelectasis on the right. Pleural effusions and left-sided airspace disease as before. XR CHEST PORTABLE    Result Date: 5/12/2022  1. Partial interval clearing of the lungs when compared with the patient's prior study. 2. Vague patchy right perihilar airspace disease and patchy residual airspace disease within the left lung. 3. Trace right pleural effusion and small left pleural effusion. XR CHEST PORTABLE    Result Date: 5/11/2022  Stable congestive/consolidative changes, with bilateral pleural effusions. No significant change. XR CHEST 1 VIEW    Result Date: 4/27/2022  Bibasilar pleural thickening     US DUP LOWER EXTREMITY RIGHT NERY    Result Date: 5/24/2022  No evidence of DVT in the right lower extremity. XR ABDOMEN FOR NG/OG/NE TUBE PLACEMENT    Result Date: 5/23/2022  Peg tube projects in distal stomach location and contrast appears within the stomach. Disposition: stable for dc    Discharge Instructions/Follow-up:   given    Code Status:  DNR-CCA    Activity: activity as tolerated    Diet: tube feeds    Labs:  For convenience and continuity at follow-up the following most recent labs are provided:      CBC:    Lab Results   Component Value Date    WBC 8.5 05/25/2022    HGB 8.2 05/25/2022    HCT 25.8 05/25/2022    PLT 85 05/25/2022       Renal:    Lab Results   Component Value Date     05/25/2022    K 4.4 05/25/2022    K 4.5 05/11/2022     05/25/2022    CO2 23 05/25/2022    BUN 72 05/25/2022    CREATININE 2.3 05/25/2022    CALCIUM 8.7 05/25/2022    PHOS 6.5 05/25/2022       Discharge Medications:     Current Discharge Medication List           Details   metoprolol tartrate (LOPRESSOR) 25 MG tablet Take 1 tablet by mouth 2 times daily  Qty: 60 tablet, Refills: 3      QUEtiapine (SEROQUEL) 25 MG tablet Take 1 tablet by mouth 2 times daily  Qty: 60 tablet, Refills: 3      lansoprazole 3 MG/ML SUSP 10 mLs by Per G Tube route every morning (before breakfast)  Qty: 300 mL, Refills: 0      levETIRAcetam (KEPPRA) 100 MG/ML solution 5 mLs by PEG Tube route 2 times daily  Qty: 60 each, Refills: 3      ipratropium-albuterol (DUONEB) 0.5-2.5 (3) MG/3ML SOLN nebulizer solution Inhale 3 mLs into the lungs every 4 hours  Qty: 360 mL, Refills: 1      oxyCODONE (OXY-IR) 10 MG immediate release tablet Take 0.5 tablets by mouth every 6 hours for 3 days. Qty: 6 tablet, Refills: 0    Comments: Reduce doses taken as pain becomes manageable  [id number]  Associated Diagnoses: Pain      sulfamethoxazole-trimethoprim (BACTRIM DS;SEPTRA DS) 800-160 MG per tablet Take 2 tablets by mouth daily for 8 doses  Qty: 16 tablet, Refills: 0              Details   dilTIAZem (CARDIZEM) 30 MG tablet 1 tablet by PEG Tube route 4 times daily  Qty: 120 tablet, Refills: 3              Details   insulin glargine (LANTUS) 100 UNIT/ML injection vial Inject 7 Units into the skin daily Give in a.m.      chlorhexidine (PERIDEX) 0.12 % solution Take 15 mLs by mouth 2 times daily      nystatin (MYCOSTATIN) 622757 UNIT/ML suspension Take 500,000 Units by mouth 3 times daily      NYSTATIN IN AQUAPHOR OINTMENT Apply topically 2 times daily      clonazePAM (KLONOPIN) 0.5 MG tablet Take 0.5 mg by mouth 2 times daily as needed.       docusate sodium (COLACE) 100 MG capsule Take 100 mg by mouth 2 times daily      glucagon, rDNA, 1 MG injection as needed for Low blood sugar      mineral oil-hydrophilic petrolatum (AQUAPHOR) ointment Apply topically as needed for Dry Skin Apply topically as needed. darbepoetin kandi-polysorbate (ARANESP) 60 MCG/0.3ML SOSY injection Inject 60 mcg into the skin every 14 days      levothyroxine (SYNTHROID) 175 MCG tablet Take 175 mcg by mouth Daily      rosuvastatin (CRESTOR) 5 MG tablet Take 5 mg by mouth daily      B complex-vitamin C-folic acid (NEPHRO-JESSY) 1 MG tablet Take 1 tablet by mouth daily (with breakfast)      acetaminophen (TYLENOL) 325 MG tablet Take 650 mg by mouth every 6 hours as needed for Pain             Time Spent on discharge is more than 45 minutes in the examination, evaluation, counseling and review of medications and discharge plan.       Signed:    Yeison Jim MD   5/25/2022

## 2022-05-25 NOTE — PATIENT CARE CONFERENCE
Intensive Care Daily Quality Rounding Checklist        ICU Team Members: bedside nurse, charge nurse, Jaye Ochoa, residents, clinical pharmacist      ICU Day #: 15      Intubation Date: 5/11/2022, Trach 5/23/22     Ventilator Day #: [id number] [id number]     Central Line Insertion Date: 5/17/22                                                   Day #: 9      Arterial Line Insertion Date: 5/23/22                             Day #: 3     Temporary Hemodialysis Catheter Insertion Date: n/a                             Day # admitted with tunneled dialysis cath     DVT Prophylaxis: SCDs    GI Prophylaxis: Lansoprazole     Roberts Catheter Insertion Date: HD patient                                        LRS #: N/A                             Continued need (if yes, reason documented and discussed with physician):     Skin Issues/ Wounds and ordered treatment discussed on rounds: recent debridement to sacral wound, diabetic wounds R foot.  Remove chest tube              Goals/ Plans for the Day: Vent management, monitor labs and replace as needed, [facility] today

## 2022-05-25 NOTE — PROGRESS NOTES
Critical Care Team - Daily Progress Note      Date and time: 5/25/2022 7:57 AM  Patient's name:  Karen Soler  Medical Record Number: [id number]  Patient's account/billing number: [de-identified]  Patient's YOB: 1955  Age: 74 y.o. Date of Admission: 5/10/2022 11:58 PM  Length of stay during current admission: 14      Primary Care Physician: Emil Kumar MD  ICU Attending Physician: Dr. Weiss Prior Status: DNR-CCA    Reason for ICU admission: Status postcardiac arrest      SUBJECTIVE:     OVERNIGHT EVENTS:       Patient had trach, PEG, diverting colonoscopy yesterday. Awake and alert orientated, nods appropriately to questions. He denies any fever, chills, nausea, sacral pain, chest pain. Overnight patient's Versed has been titrated off, fentanyl has been titrated from 200 to 50. Patient was hypotensive, tachycardic, tachypnic. Intake/Output:   No intake/output data recorded. I/O last 3 completed shifts: In: 3243.9 [I.V.:1250.7; Blood:208.3; NG/GT:575; IV Piggyback:1209.9]  Out: 1885 [Emesis/NG output:1050; Stool:525; Chest Tube:310]    Awake and following commands: Yes  Current Ventilation: - Ventilator Settings:    Vt (Set, mL): 400 mL  Resp Rate (Set): 12 bmp  FiO2 : 30 %    PEEP/CPAP (cmH2O): 5  Pressure Support: 0 cmH20  Secretions: none   Sedation: fentanyl   Paralyzed: No  Vasopressors: None    Initial HPI + past overnight events: 42-year-old male resident of [facility] with significant past medical history of A. Fib Eliquis, aspirin, anxiety, anemia, CHF, insulin-dependent type 2 diabetes, CKD on hemodialysis, sacral decubitus ulcers with wound VAC, hyperlipidemia, hypothyroidism. Presented to the ICU after cardiac arrest requiring CPR with 2 rounds of epinephrine and intubated.     OBJECTIVE:     VITAL SIGNS:  /63   Pulse 87   Temp 97.6 °F (36.4 °C) (Oral)   Resp 16   Ht 6' 5\" (1.956 m)   Wt 248 lb 10.9 oz (112.8 kg)   SpO2 100%   BMI 29.49 kg/m²   Tmax over 24 hours: Temp (24hrs), Av.9 °F (36.6 °C), Min:97.6 °F (36.4 °C), Max:98.4 °F (36.9 °C)      Patient Vitals for the past 6 hrs:   BP Temp Temp src Pulse Resp SpO2 Weight   22 0735 -- -- -- 87 16 100 % --   22 0600 107/63 -- -- 83 26 100 % --   22 0500 (!) 114/49 -- -- 80 11 100 % 248 lb 10.9 oz (112.8 kg)   22 0410 -- -- -- 97 11 (!) 89 % --   22 0409 -- -- -- -- 22 100 % --   22 0400 126/78 97.6 °F (36.4 °C) Oral (!) 109 21 100 % --   22 0300 96/63 -- -- 86 12 100 % --   22 0200 114/64 -- -- 87 14 99 % --         Intake/Output Summary (Last 24 hours) at 2022 0757  Last data filed at 2022 0640  Gross per 24 hour   Intake 1609.86 ml   Output 410 ml   Net 1199.86 ml     Wt Readings from Last 2 Encounters:   22 248 lb 10.9 oz (112.8 kg)   22 212 lb (96.2 kg)     Body mass index is 29.49 kg/m². Physical Exam  Vitals and nursing note reviewed. Constitutional:       General: He is awake. Appearance: He is ill-appearing. Comments: Patient responds and follows commands. HENT:      Head: Normocephalic and atraumatic. Nose: Nose normal. No congestion or rhinorrhea. Mouth/Throat:      Mouth: Mucous membranes are moist.      Pharynx: Oropharynx is clear. Eyes:      General: No scleral icterus. Extraocular Movements: Extraocular movements intact. Conjunctiva/sclera: Conjunctivae normal.   Cardiovascular:      Rate and Rhythm: Normal rate and regular rhythm. Pulses: Normal pulses. Heart sounds: Normal heart sounds. No murmur heard. Pulmonary:      Effort: No respiratory distress. Breath sounds: No stridor. Rales present. No wheezing. Comments: Trach in place  Abdominal:      General: Bowel sounds are normal. There is no distension. Palpations: Abdomen is soft. There is no mass. Tenderness: There is no abdominal tenderness. There is no guarding or rebound.       Comments: PEG tube in place   Musculoskeletal:         General: Swelling (Bilateral hands) present. Normal range of motion. Cervical back: Normal range of motion and neck supple. No tenderness. Right lower leg: Edema present. Left lower leg: Edema present. Skin:     Comments: Bilateral lower legs chronic dark skin changes. Neurological:      General: No focal deficit present. Mental Status: He is alert and oriented to person, place, and time. Psychiatric:         Attention and Perception: Attention normal.         Behavior: Behavior is cooperative.            MEDICATIONS:    Scheduled Meds:   oxyCODONE  10 mg Oral Q6H    QUEtiapine  25 mg Oral BID    dilTIAZem  30 mg PEG Tube 4 times per day    lansoprazole  30 mg Per G Tube QAM AC    [Held by provider] heparin (porcine)  5,000 Units SubCUTAneous Q8H    collagenase   Topical Daily    ceftolozane-tazobactam (ZERBAXA) IVPB  1,500 mg IntraVENous Q8H    metoprolol tartrate  25 mg Oral BID    epoetin kandi-epbx  8,000 Units SubCUTAneous Once per day on Mon Wed Fri    And    epoetin kandi-epbx  2,000 Units SubCUTAneous Once per day on Mon Wed Fri    tigecycline (TYGACIL) IVPB  50 mg IntraVENous Q12H    sulfamethoxazole-trimethoprim  2 tablet Oral Daily    ipratropium-albuterol  1 ampule Inhalation Q4H    polyethylene glycol  17 g Oral Daily    chlorhexidine  15 mL Mouth/Throat BID    senna  5 mL Per G Tube Nightly    docusate  100 mg Per G Tube BID    levothyroxine  175 mcg Per G Tube Daily    [Held by provider] insulin glargine  20 Units SubCUTAneous BID    insulin lispro  0-12 Units SubCUTAneous Q6H    levETIRAcetam  500 mg PEG Tube BID    sodium chloride flush  5-40 mL IntraVENous 2 times per day     Continuous Infusions:   sodium chloride      sodium chloride Stopped (05/20/22 1459)     PRN Meds:   sodium chloride, , PRN  diatrizoate meglumine-sodium, 30 mL, ONCE PRN  midazolam, 1 mg, Q2H PRN  anticoagulant sodium citrate, 4.2 mL, PRN  perflutren lipid microspheres, 1.5 mL, ONCE PRN  glucose, 4 tablet, PRN  dextrose bolus, 125 mL, PRN   Or  dextrose bolus, 250 mL, PRN  glucagon (rDNA), 1 mg, PRN  sodium chloride flush, 5-40 mL, PRN  sodium chloride, , PRN          VENT SETTINGS (Comprehensive) (if applicable):  Vent Information  Ventilator ID: [id number]  Vent Mode: AC/VC  Ventilator Initiate: Yes  Additional Respiratory Assessments  Heart Rate: 87  Resp: 16  SpO2: 100 %  End Tidal CO2: 42 (%)  Position: Semi-Fajardo's  Humidification Source: Heated wire  Humidification Temp: 37  Circuit Condensation: Drained  Cuff Pressure (cm H2O): 37.6 cm H2O    Arterial Blood Gas 5/25/2022  pH 7.521, pCO2 26.7, pO2 101.2, HCO3 21.4 . Laboratory findings:    Complete Blood Count:   Recent Labs     05/23/22  0720 05/24/22  0515 05/25/22  0435   WBC 8.0 7.0 8.5   HGB 7.4* 8.1* 8.2*   HCT 22.8* 24.8* 25.8*   * 84* 85*        Last 3 Blood Glucose:   Recent Labs     05/23/22  0720 05/24/22  0515 05/25/22  0435   GLUCOSE 157* 171* 155*        PT/INR:    Lab Results   Component Value Date    PROTIME 15.5 05/17/2022    INR 1.4 05/17/2022     PTT:    Lab Results   Component Value Date    APTT 32.8 05/11/2022       Comprehensive Metabolic Profile:   Recent Labs     05/23/22  0720 05/24/22  0515 05/25/22  0435    136 138   K 5.1* 4.4 4.4   CL 98 100 100   CO2 23 21* 23   BUN 81* 57* 72*   CREATININE 2.5* 1.9* 2.3*   GLUCOSE 157* 171* 155*   CALCIUM 8.7 8.3* 8.7      Magnesium:   Lab Results   Component Value Date    MG 2.3 05/25/2022     Phosphorus:   Lab Results   Component Value Date    PHOS 6.5 05/25/2022     Ionized Calcium: No results found for: CAION     Urinalysis:     Troponin: No results for input(s): TROPONINI in the last 72 hours.     Microbiology:  Cultures drawn:   Gram stain  Surgical anaerobic cultures pending, and stain, fungus, acid-fast bacillus pending  Oral pharyngeal respiratory culture positive for actinobacter baumanni and Pseudomonas aeruginosa  5/21/2022 MRSA nasal colonization + 5/12/2022    Respiratory panel negative        Radiology/Imaging:     Chest Xray (5/25/2022): No new imaging     5/23/2022 PEG tube in distal stomach location and contrast within the stomach, distention of colon may represent ileus    ASSESSMENT:     Patient Active Problem List    Diagnosis Date Noted    Pneumonia due to infectious organism     Pleural effusion     Cardiac arrest ([address] 05/11/2022         PLAN:     Neuro   History of seizures on Keppra   Awake and alert responding to commands    Respiratory   Acute hypercapnic respiratory failure with hypoxia   Trach placed 5/23/2022   Thoracocentesis completed 5/15/2021   Status post chest tube placement for per 1 moderate pleural effusion with significant drainage of empyema pleural fluid infection with Pseudomonas   Pulmonary following    Cardiovascular   History of A.  Fib   Lower extremity edema-ultrasound of lower extremities is negative for DVT   Cardiology following    GI   PEG tube replaced 5/23/2022   Diverting colostomy 5/23/2022   Surgery following-aware of possible ileus    Renal   ESRD on dialysis M/W/F   Receiving EPO   Nephrology following     Infectious disease   Sepsis with septic shock   B/L multilobar HCAP with MDR Acinetobacter Bumanii    Leukocytosis improving    Sacral decubitus ulcer with Enterococcus and Pseudomonas   On tigacyl, zerbaxa and bactrim,    ID following    Heme/Onc   Patient received PRBC 05/12/2022, 05/14/2022   Trend H&H transfuse if hemoglobin less than 7   Thrombocytopenia    Check - Fibrinogen, Heparin induced platelet antibody, path review smear   Surgery following    Endocrine   Diabetes mellitus on MDSS   Hypothyroidism on Synthroid    Social/Spiritual/DNR/Other   Code status: DNR CCA   Diet Diet NPO Exceptions are: Sips of Water with Meds   ADULT TUBE FEEDING; PEG; Immune Enhancing; Continuous; 10; No; 30; Q 6 hours   Stress ulcer prophylaxis: Protonix 40 mg   DVT prophylaxis: pharmacologic prophylaxis (with the following: heparin)- was held for trach , SVDS in place    Consultations needed: Yes   Transfer out of ICU today? Yes- to return to [facility] Other: off fentanyl, NS infusion, tube feeds to be started 10cc. Lines/catheters  CVC triple-lumen 05/17/2022 left internal jugular--remove today   Arterial line 05/23/2022 left radial--remove today   Chest tube - 90 overnight --remove today   Fecal management system 05/16/22  Colostomy left upper quadrant loop 5/23/2022  Trach 05/23/2020      Isaias Zee,   7:57 AM  05/25/22    Attending Physician Attestation: Dr. Remonia Meigs    Thank you very much for allowing me to see this patient in consultation and follow up. I personally saw, examined and provided care for the patient. Radiographs, labs and medication list were reviewed by me independently. I spoke with bedside nursing, respiratory therapists and consultants. Critical care services and times documented are independent of procedures and multidisciplinary rounds with Residents. Additionally comprehensive, multidisciplinary rounds were conducted with the MICU team. The case was discussed in detail and plans for care were established. Review of Residents documentation was conducted and revisions were made as appropriate. I agree with the the above documented information.      Physical Examination:  Vitals:   Vitals:    05/25/22 1130 05/25/22 1133 05/25/22 1145 05/25/22 1200   BP:       Pulse: 85 86 74 89   Resp: 13 22 10 12   Temp:       TempSrc:       SpO2: 99% 96% 99% 99%   Weight:       Height:          General: No Acute Distress, trach/vent  HEENT: normocephalic, atraumatic  - trach intact with no erythema, erosion, ulceration or bleed noted  CVS: RRR, S1, S2, No S3 or S4  Respiratory: decreased breath sounds at lung bases, no focal wheezes noted  Extremities: no clubbing/cyanosis  - 2-3+ edema  Neuro: trach/vent     ASSESSMENT:  1.) B/L Multi-Lobar HCAP with MDR Acinetobacter baumanii  - s/p trach and G-tube placement 5/23/2022  2.) Severe Sepsis with septic shock associated to HCAP  3.) S/P Cardiopulmonary Arrest  4.) History of sacral decubitus ulcer infection with Pseudomonas and Enterococcus.    5.) CKD, on HD  6.) Pleural fluid infection with Pseudomonas.  Possible empyema  7.) Lower Extremity Swelling - doppler US legs (negtiave for DVT) on 5/25/2022  8.) DIC (Disseminated Intravascular Coagulation) - s/p 1 unit cryoprecipitate 5/24/2022     In addition the following applies:     Check: serial labs, fibrinogen, PF4 ab, PS (r/o schistocytes)  Medication Alterations: hold subcutaneous heparin, OK for PCDs  Procedures:   Completed procedures 5/23/2022:  1. TOILET BRONCHOSCOPY  2. PERCUTANEOUS  TRACHEOSTOMY  3. DIAGNOSTIC LAPAROSCOPY LOOP COLOSTOMY  4. GASTROSTOMY TUBE PLACEMENT  5. SHARD EXCISIONAL DEBRIDEMENT OF SACRAL WOUND TO BONE   Imaging: reviewed, doppler US legs (negtiave for DVT)  New Consultations: N/A  VENT/TRACH: ACVC (DAY 15) 12/400/30/5 to PSV 15/PEEP 5, FiO2 30%     Access: Right Tunneled HD Catheter , Left Chest Tube, Left IJ TLC  Consults: ID, Pulmonary, GS, Cardiology   Drips: Fentanyl   Nutrition: Tube Feeds  ABX: Tigecycline, Zerbaxa, Bactrim      - contact isolation to continue for patient   - LTACH eval and treat, await transfer to [facility] in next 24 hours   - off fentanyl   - D/C left IJ TLC, D/C arterial line  - D/C left chest tube   - tube feeds trickle at 10 cc/hr and advance as tolerated     Thank you for allowing me to participate in the care of this patient. Care reviewed with nursing staff, medical and surgical specialty care, primary care and the patient's family as available. Restraints are ordered when the patient can do harm to him/herself by pulling out devices. Fawn Burleson M.D.     Fawn Burleson MD  5/25/2022  12:12 PM

## 2022-05-25 NOTE — PLAN OF CARE
Problem: Discharge Planning  Goal: Discharge to home or other facility with appropriate resources  Outcome: Progressing     Problem: Pain  Goal: Verbalizes/displays adequate comfort level or baseline comfort level  Outcome: Progressing     Problem: Safety - Adult  Goal: Free from fall injury  Outcome: Progressing     Problem: Respiratory - Adult  Goal: Achieves optimal ventilation and oxygenation  5/25/2022 1535 by Doe May RN  Outcome: Progressing     Problem: Chronic Conditions and Co-morbidities  Goal: Patient's chronic conditions and co-morbidity symptoms are monitored and maintained or improved  Outcome: Progressing

## 2022-05-25 NOTE — PROGRESS NOTES
[address] [place] [address] [facility]  LUCY  Progress Note    SUBJECTIVE:  Chief Complaint   Patient presents with    Loss of Consciousness     arrived from [facility] via [facility] unresponsive     The patient is still in the ICU. Denies any new complaints. Feeling better compared to a few days ago. Tolerating antibiotics. Review of systems:  A 10 point review of systems was done. As stated above, otherwise negative.     Medications:   oxyCODONE  10 mg Oral Q6H    QUEtiapine  25 mg Oral BID    dilTIAZem  30 mg PEG Tube 4 times per day    lansoprazole  30 mg Per G Tube QAM AC    [Held by provider] heparin (porcine)  5,000 Units SubCUTAneous Q8H    collagenase   Topical Daily    ceftolozane-tazobactam (ZERBAXA) IVPB  1,500 mg IntraVENous Q8H    metoprolol tartrate  25 mg Oral BID    epoetin kandi-epbx  8,000 Units SubCUTAneous Once per day on Mon Wed Fri    And    epoetin kandi-epbx  2,000 Units SubCUTAneous Once per day on Mon Wed Fri    tigecycline (TYGACIL) IVPB  50 mg IntraVENous Q12H    sulfamethoxazole-trimethoprim  2 tablet Oral Daily    ipratropium-albuterol  1 ampule Inhalation Q4H    polyethylene glycol  17 g Oral Daily    chlorhexidine  15 mL Mouth/Throat BID    senna  5 mL Per G Tube Nightly    docusate  100 mg Per G Tube BID    levothyroxine  175 mcg Per G Tube Daily    [Held by provider] insulin glargine  20 Units SubCUTAneous BID    insulin lispro  0-12 Units SubCUTAneous Q6H    levETIRAcetam  500 mg PEG Tube BID    sodium chloride flush  5-40 mL IntraVENous 2 times per day      sodium chloride      sodium chloride Stopped (05/20/22 2649)     sodium chloride, diatrizoate meglumine-sodium, midazolam, anticoagulant sodium citrate, perflutren lipid microspheres, glucose, dextrose bolus **OR** dextrose bolus, glucagon (rDNA), sodium chloride flush, sodium chloride    OBJECTIVE:  /71   Pulse 89   Temp 97.6 °F (36.4 °C) (Oral)   Resp 21   Ht 6' 5\" (1.956 m)   Wt 248 lb 10.9 oz (112.8 kg)   SpO2 100%   BMI 29.49 kg/m²   Temp  Av.8 °F (36.6 °C)  Min: 97.6 °F (36.4 °C)  Max: 98.4 °F (36.9 °C)  Constitutional: The patient is lying in bed in the ICU. He is awake. Shakes and nods head. FiO2 30%. PEEP 5. Skin: Warm and dry. No rashes were noted. HEENT: Eyes show round, and reactive pupils. No jaundice. Moist mucous membranes, no ulcerations, no thrush. Neck: Supple to movements. Tracheostomy. Chest: No use of accessory muscles to breathe. Symmetrical expansion. Auscultation reveals coarse breath sounds. scattered rhonchi. Right tunneled HD catheter. Left small bore chest tube with serosanguineous fluid. Cardiovascular: Heart sounds arrhythmic and irregular. Abdomen: Positive bowel sounds to auscultation. Benign to palpation. PEG. Colostomy. Extremities: Moderate edema. Left third toe missing. Back: Stage IV decubitus ulcer with dressing. Lines: Left IJ TLC 2022. Left radial arterial line 2022. Fecal management system.     Laboratory and Tests Review:  Lab Results   Component Value Date    WBC 8.5 2022    WBC 7.0 2022    WBC 8.0 2022    HGB 8.2 (L) 2022    HCT 25.8 (L) 2022    MCV 86.6 2022    PLT 85 (L) 2022     Lab Results   Component Value Date    NEUTROABS 7.41 (H) 2022    NEUTROABS 6.23 2022    NEUTROABS 6.98 2022     Lab Results   Component Value Date    CRP 15.4 (H) 2022    CRP 13.9 (H) 2022     No results found for: CRPHS  Lab Results   Component Value Date    SEDRATE 13 2022    SEDRATE 78 (H) 2022     Lab Results   Component Value Date    ALT 30 2022    AST 31 2022    ALKPHOS 591 (H) 2022    BILITOT 0.3 2022     Lab Results   Component Value Date     2022    K 4.4 2022    K 4.5 2022     2022    CO2 23 2022    BUN 72 2022    CREATININE 2.3 2022    CREATININE 1.9 2022 CREATININE 2.5 05/23/2022    GFRAA 34 05/25/2022    LABGLOM 28 05/25/2022    GLUCOSE 155 05/25/2022    PROT 6.9 05/11/2022    LABALBU 2.3 05/20/2022    CALCIUM 8.7 05/25/2022    BILITOT 0.3 05/11/2022    ALKPHOS 591 05/11/2022    AST 31 05/11/2022    ALT 30 05/11/2022     Radiology:    Reviewed  Microbiology:   [facility]:  Blood culture 5/10/2022: Negative so far  Decubitus ulcer 5/10/2022: MRSA, mixed GNR  Respiratory panel: Pending  Nares screen MRSA: Pending  Blood cultures 5/11/2022: Negative so far      [facility]  [facility] panel: Negative  Blood cultures 5/11/2022: Staphylococcus epidermidis in 2 of 2 sets  Nares screen MRSA: Positive   Respiratory culture 5/12/2022: MDR Acinetobacter baumanii (sensitive to Bactrim, Tigecycline, Cefiderocol. Intermediate to Avycaz and Eravacycline. Resistant to all others)  Respiratory culture 5/16/2022: OP patricia reduced. GNR, GNR, GNR. Pleural fluid 5/13/2022: Pseudomonas aeruginosa (Resistant to Levofloxacin and Meropenem. Intermediate to Zosyn. Sensitive to Avycaz and Gentamicin)  Sacral wound 5/17/2022: MDR Acinetobacter baumanii, Pseudomonas aeruginosa (sensitive only to aminoglycosides and Zerbaxa)    ASSESSMENT:  · HCAP with MDR Acinetobacter baumanii  · Sepsis with septic shock associated to HCAP  · Status postcardiac arrest  · Acute respiratory failure. Unable to wean. Status post tracheostomy and colostomy 5/23/2022  · Leukocytosis   · History of sacral decubitus ulcer infection with Pseudomonas and Enterococcus. Treated with IV antibiotic between March and April 2022. Status postdebridement 5/17/2022. Now colonized/infected with MDR Acinetobacter and MDR Pseudomonas  · CKD, on HD  · Acinetobacter in the respiratory cultures. Possible outbreak in the ICU  · Pleural fluid infection with MDR Pseudomonas.   Possible empyema    PLAN:  · Continue Tigecycline, Bactrim, day 10 of minimum 14  · Continue Zerbaxa for MDR Pseudomonas, day 8 of minimum 28

## 2022-05-25 NOTE — PROGRESS NOTES
GENERAL SURGERY  DAILY PROGRESS NOTE  5/25/2022    Chief Complaint   Patient presents with    Loss of Consciousness     arrived from [facility] via [facility] unresponsive       Subjective:  Having some ostomy output    Objective:  /71   Pulse 87   Temp 97.6 °F (36.4 °C) (Oral)   Resp 16   Ht 6' 5\" (1.956 m)   Wt 248 lb 10.9 oz (112.8 kg)   SpO2 100%   BMI 29.49 kg/m²     GENERAL:  Laying in bed, awake, no apparent distress  HEAD: Normocephalic, atraumatic  EYES: No sclera icterus, pupils equal  LUNGS:  Mechanical ventilation   CARDIOVASCULAR:  RR  ABDOMEN:  Soft, non-tender, non-distended. PEG in place. Colostomy pink with stool and air in bag  EXTREMITIES: No edema or swelling  SKIN: Warm and dry    Assessment/Plan:  74 y.o. male with sacral wound, respiratory failure    - continue local wound care  - wean vent as able   - will remove ostomy bar and trach sutures post op day 10      Electronically signed by Alicia Ye MD on 5/25/2022 at 8:12 AM       Agree; red rubber and trach sutures can come out in 10 days   Pratima Daugherty MD  Minimally Invasive General Surgery and Endoscopy  [facility].  [address]: [phone]  F: [phone]    Electronically signed by Angel Wheeler MD on 5/25/2022 at 2:58 PM

## 2022-05-25 NOTE — PROGRESS NOTES
Hd completed, tolerated well, 2000 mLs removed, HD catheter initially very sluggish, cath kumar dwelled, catheters function improved. HD finished.

## 2022-05-25 NOTE — CARE COORDINATION
Updated plan of care. Pt to discharge to [place] [id number] today at 730 pm [facility]. Wife, pt, staff and facility aware.  Transport form on chart-mjo month(s)

## 2022-05-26 LAB — ANAEROBIC CULTURE: NORMAL

## 2022-05-26 NOTE — PROGRESS NOTES
Department of Internal Medicine  Nephrology Attending Progress Note        SUBJECTIVE: Mr. Ruben Andrade seen on dialysis having some issues with poor flows catheter will Cathflo his catheter. Catheter function improved post Cathflo therapy dialysis able to remove 2 L.   Possible transfer back to [address] [place] [address]  History of CKD 4 baseline creatinine approximately 2  TRUMAN superimposed on CKD 4 no evidence of renal recovery has been dialysis dependent since February  Transfer from [address] [place] [address] after being found unresponsive and undergoing 5 minutes of CPR  Anemia of chronic kidney disease  History of decubitus ulcer  History of PEG tube placement for nutrition  History of type 2 diabetes with nephropathy, neuropathy, Charcot joint disease  History of heart failure  Chronic atrial fibrillation  Gastritis duodenitis  History of seizure disorder  History of anxiety disorder  History of right toe amputation  Bacteremia with staph epidermis in 2 of 2 sets  Respiratory cultures gram-negative wound  Pleural cultures Pseudomonas aeruginosa  Sacral wound cultures Acetobacter pulmonary and Pseudomonas aeruginosa  Status post diverting colostomy        Physical Exam:    Vitals:    05/25/22 2000   BP: 106/68   Pulse: 90   Resp: 13   Temp: 99.2 °F (37.3 °C)   SpO2: 99%       I/O last 24 hours:  Intake/Output 731/1609    Weight: 248    General appearance: Intubated on vent  Skin: large sacral decubitus, stasis dermatitis bilateral lower extremities  Neck: No JVD, RIJ TDC  Lungs: Coarse upper, diminished lower  Heart: irreg., no rub  Abdomen: Soft, + bowel sounds, +PEG  Extremities: Trace edema lower extremities  Neurologic: opens eyes to stimuli,     DATA:    CBC with Differential:    Lab Results   Component Value Date    WBC 8.5 05/25/2022    RBC 2.98 05/25/2022    HGB 8.2 05/25/2022    HCT 25.8 05/25/2022    PLT 85 05/25/2022    MCV 86.6 05/25/2022    MCH 27.5 05/25/2022    MCHC 31.8 05/25/2022    RDW 23.5 05/25/2022    NRBC 0.0 05/19/2022 METASPCT 1.7 05/11/2022    LYMPHOPCT 8.6 05/25/2022    MONOPCT 3.8 05/25/2022    BASOPCT 0.0 05/25/2022    MONOSABS 0.32 05/25/2022    LYMPHSABS 0.73 05/25/2022    EOSABS 0.00 05/25/2022    BASOSABS 0.00 05/25/2022     CMP:    Lab Results   Component Value Date     05/25/2022    K 4.4 05/25/2022    K 4.5 05/11/2022     05/25/2022    CO2 23 05/25/2022    BUN 72 05/25/2022    CREATININE 2.3 05/25/2022    GFRAA 34 05/25/2022    LABGLOM 28 05/25/2022    GLUCOSE 155 05/25/2022    PROT 6.9 05/11/2022    LABALBU 2.3 05/20/2022    CALCIUM 8.7 05/25/2022    BILITOT 0.3 05/11/2022    ALKPHOS 591 05/11/2022    AST 31 05/11/2022    ALT 30 05/11/2022     Magnesium:    Lab Results   Component Value Date    MG 2.3 05/25/2022     Phosphorus:    Lab Results   Component Value Date    PHOS 6.5 05/25/2022          oxyCODONE  10 mg Oral Q6H    QUEtiapine  25 mg Oral BID    dilTIAZem  30 mg PEG Tube 4 times per day    lansoprazole  30 mg Per G Tube QAM AC    [Held by provider] heparin (porcine)  5,000 Units SubCUTAneous Q8H    collagenase   Topical Daily    ceftolozane-tazobactam (ZERBAXA) IVPB  1,500 mg IntraVENous Q8H    metoprolol tartrate  25 mg Oral BID    epoetin kandi-epbx  8,000 Units SubCUTAneous Once per day on Mon Wed Fri    And    epoetin kandi-epbx  2,000 Units SubCUTAneous Once per day on Mon Wed Fri    tigecycline (TYGACIL) IVPB  50 mg IntraVENous Q12H    sulfamethoxazole-trimethoprim  2 tablet Oral Daily    ipratropium-albuterol  1 ampule Inhalation Q4H    polyethylene glycol  17 g Oral Daily    chlorhexidine  15 mL Mouth/Throat BID    senna  5 mL Per G Tube Nightly    docusate  100 mg Per G Tube BID    levothyroxine  175 mcg Per G Tube Daily    [Held by provider] insulin glargine  20 Units SubCUTAneous BID    insulin lispro  0-12 Units SubCUTAneous Q6H    levETIRAcetam  500 mg PEG Tube BID    sodium chloride flush  5-40 mL IntraVENous 2 times per day      sodium chloride      sodium chloride Stopped (05/20/22 1038)     sodium chloride, diatrizoate meglumine-sodium, midazolam, anticoagulant sodium citrate, perflutren lipid microspheres, glucose, dextrose bolus **OR** dextrose bolus, glucagon (rDNA), sodium chloride flush, sodium chloride    IMPRESSION/RECOMMENDATIONS:      End-stage renal disease continue dialysis Monday Wednesday Friday  Anemia of chronic kidney disease continue SHANIQUA therapy  Respiratory failure remains on vent   secondary hyperparathyroid disease of renal origin phosphorus has improved  Nutrition on tube feedings  Chronic A. fib rate less well controlled today  Thrombocytopenia        Nesha Stewart MD  5/25/2022 9:32 PM

## 2022-05-26 NOTE — PROGRESS NOTES
2100 - [facility] here to transport patient to [facility]. VS WNL, tube feed stopped, 2100 meds administered. Patient's wife updated.      Shay Garcia RN

## 2022-05-27 LAB — HEPARIN PF4 ANTIBODY: 0.23 OD

## 2022-06-01 LAB
CULTURE SURGICAL: ABNORMAL
ORGANISM: ABNORMAL

## 2022-06-09 LAB
Lab: NORMAL
Lab: NORMAL
REPORT: NORMAL
REPORT: NORMAL
THIS TEST SENT TO: NORMAL
THIS TEST SENT TO: NORMAL

## 2022-06-20 LAB
FUNGUS (MYCOLOGY) CULTURE: ABNORMAL
FUNGUS STAIN: ABNORMAL
ORGANISM: ABNORMAL

## 2022-07-03 LAB
FUNGUS (MYCOLOGY) CULTURE: ABNORMAL
FUNGUS (MYCOLOGY) CULTURE: ABNORMAL
FUNGUS STAIN: ABNORMAL
ORGANISM: ABNORMAL
ORGANISM: ABNORMAL

## 2022-07-05 LAB
AFB CULTURE (MYCOBACTERIA): NORMAL
AFB SMEAR: NORMAL

## 2022-07-12 LAB
AFB CULTURE (MYCOBACTERIA): NORMAL
AFB SMEAR: NORMAL

## 2024-02-08 NOTE — PROGRESS NOTES
Fax is being resent     Hospital Progress Note    Admitting Date and Time: 5/10/2022 11:58 PM  Admit Dx: Cardiac arrest ([facility] [I46.9]  Elevated troponin [R77.8]  Acute respiratory failure with hypoxia (HCC) [J96.01]  Leukocytosis, unspecified type [D72.829]  Pneumonia due to infectious organism, unspecified laterality, unspecified part of lung [J18.9]    Subjective:  Patient is being followed for Cardiac arrest ([facility] [I46.9]  Elevated troponin [R77.8]  Acute respiratory failure with hypoxia (HCC) [J96.01]  Leukocytosis, unspecified type [D72.829]  Pneumonia due to infectious organism, unspecified laterality, unspecified part of lung [J18.9]   Mr. Lacy Omalley, a 74y.o. year old male who has a past medical history of A-fib ([facility] TRUMAN (acute kidney injury) ([facility] Anemia, Anxiety, Bipolar 1 disorder ([facility] Charcot foot due to diabetes mellitus ([facility] CHF (congestive heart failure) ([facility] CKD (chronic kidney disease), Diabetes mellitus ([facility] Dialysis patient ([facility] Hyperlipidemia, Hypertension, Right sided weakness, Sacral decubitus ulcer, and Seizure ([facility] Patient was able to have a trach and debridement      When seen today he was sitting propped up in bed. nutritional support through tube feeds had not been started yet. Wife at bedside and patient know quite happy    May 25  He is on dialysis right now. Recognize me right away.   Tolerating tube feeds at 10 cc an hour and his ostomy has output     alteplase  4 mg IntraCATHeter Once    oxyCODONE  10 mg Oral Q6H    QUEtiapine  25 mg Oral BID    dilTIAZem  30 mg PEG Tube 4 times per day    lansoprazole  30 mg Per G Tube QAM AC    [Held by provider] heparin (porcine)  5,000 Units SubCUTAneous Q8H    collagenase   Topical Daily    ceftolozane-tazobactam (ZERBAXA) IVPB  1,500 mg IntraVENous Q8H    metoprolol tartrate  25 mg Oral BID    epoetin kandi-epbx  8,000 Units SubCUTAneous Once per day on Mon Wed Fri    And    epoetin kandi-epbx  2,000 Units SubCUTAneous Once per day on Mon Wed Fri    tigecycline (TYGACIL) IVPB  50 mg IntraVENous Q12H    sulfamethoxazole-trimethoprim  2 tablet Oral Daily    ipratropium-albuterol  1 ampule Inhalation Q4H    polyethylene glycol  17 g Oral Daily    chlorhexidine  15 mL Mouth/Throat BID    senna  5 mL Per G Tube Nightly    docusate  100 mg Per G Tube BID    levothyroxine  175 mcg Per G Tube Daily    [Held by provider] insulin glargine  20 Units SubCUTAneous BID    insulin lispro  0-12 Units SubCUTAneous Q6H    levETIRAcetam  500 mg PEG Tube BID    sodium chloride flush  5-40 mL IntraVENous 2 times per day     sodium chloride, , PRN  diatrizoate meglumine-sodium, 30 mL, ONCE PRN  midazolam, 1 mg, Q2H PRN  anticoagulant sodium citrate, 4.2 mL, PRN  perflutren lipid microspheres, 1.5 mL, ONCE PRN  glucose, 4 tablet, PRN  dextrose bolus, 125 mL, PRN   Or  dextrose bolus, 250 mL, PRN  glucagon (rDNA), 1 mg, PRN  sodium chloride flush, 5-40 mL, PRN  sodium chloride, , PRN         Objective:    /71   Pulse 88   Temp 97.6 °F (36.4 °C) (Oral)   Resp 16   Ht 6' 5\" (1.956 m)   Wt 248 lb 10.9 oz (112.8 kg)   SpO2 100%   BMI 29.49 kg/m²     General Appearance: anterior tracheostomy and he is alert and trying to mouth words  Skin: warm and dry  Head: normocephalic and atraumatic  Eyes: pupils equal, round, and reactive to light, extraocular eye movements intact, conjunctivae normal  Neck: neck supple and non tender without mass   Pulmonary/Chest: clear to auscultation bilaterally- no wheezes, rales or rhonchi, normal air movement, no respiratory distress  Cardiovascular: Irregularly irregular heart sound.   Abdomen: soft, non-tender, non-distended, normal bowel sounds, no masses or organomegaly  Extremities: no cyanosis, no clubbing and no edema  Neurologic:  Lower extremities he is not moving  Left chest tube in place  PEG is in place  Sacrum with dressing    Able to move upper extremities      Recent Labs     05/23/22  0720 05/24/22  0515 05/25/22  0435    136 138   K 5.1* 4.4 4.4   CL 98 100 100   CO2 23 21* 23   BUN 81* 57* 72*   CREATININE 2.5* 1.9* 2.3*   GLUCOSE 157* 171* 155*   CALCIUM 8.7 8.3* 8.7       Recent Labs     05/23/22  0720 05/24/22  0515 05/25/22  0435   WBC 8.0 7.0 8.5   RBC 2.68* 2.97* 2.98*   HGB 7.4* 8.1* 8.2*   HCT 22.8* 24.8* 25.8*   MCV 85.1 83.5 86.6   MCH 27.6 27.3 27.5   MCHC 32.5 32.7 31.8*   RDW 21.8* 22.2* 23.5*   * 84* 85*   MPV 10.6 10.6 10.3         Assessment:    Principal Problem:    Cardiac arrest (HCC)  Active Problems:    Pneumonia due to infectious organism    Pleural effusion  Resolved Problems:    * No resolved hospital problems. *      Plan:  1. Acute hypercapnic Respiratory failure with hypoxia - b/l pneumonia - HCAP : Patient intubated on 5/10/22, thoracocentesis done due to parapneumonic effusion now on Bactrim, tigecycline and Zerbaxa. For  tracheostomy today. 2.  Atrial fibrillation: Cardiology on board. AC on hold due to anemia. continue diltiazem 30 mg 4 times daily via PEG tube. 3.  ESRD: Dialysis Monday Wednesday Friday. EPO for anemia. 4.  Sepsis with septic shock: Most likely due to decubitus ulcer on the back, on tigecycline, Bactrim and Zerbaxa ID on the case  5. Diabetes mellitus continue insulin coverage. With lantus 20 units SC at night. 6.  Hypothyroidism: Continue levothyroxine. 7. Seizure - Keppra 500mg two times daily. 8. Hypothyroidism- continue levothyroxine 175mcg via PEG tube daily. DVT prophylaxis - heparin subcut - currently held    May 24  Patient has had his tracheostomy yesterday. His face definitely looks better. He looks more comfortable. Chest tube is still in place on the left side  Mild tachycardia still in place along with mild hypotension. Antibiotics are per infectious disease. Diverting ostomy would be watched for output    Maintain dialysis support.   Maintain treatment of underactive thyroid and he is on some scheduled pain

## (undated) DEVICE — 18GA (1.3MM OD: 1.0MM ID) X 7CM INTRODUCER18GA X 7CM NEEDLENEEDLE: Brand: INTRODUCER NEEDLEINTRODUCER NEEDLE

## (undated) DEVICE — FORCEPS BONNEY

## (undated) DEVICE — Device

## (undated) DEVICE — FORCEPS BX OVL CUP FEN DISPOSABLE CAP L 160CM RAD JAW 4

## (undated) DEVICE — KIT,ANTI FOG,W/SPONGE & FLUID,SOFT PACK: Brand: MEDLINE

## (undated) DEVICE — GAUZE,SPONGE,4"X4",8PLY,STRL,LF,10/TRAY: Brand: MEDLINE

## (undated) DEVICE — DRAPE,LAPAROTOMY,PCH,STERILE: Brand: MEDLINE

## (undated) DEVICE — BLOCK BITE 60FR RUBBER ADLT DENTAL

## (undated) DEVICE — 3M™ IOBAN™ 2 ANTIMICROBIAL INCISE DRAPE 6640EZ: Brand: IOBAN™ 2

## (undated) DEVICE — SURGICAL PROCEDURE PACK EENT CUST

## (undated) DEVICE — FORM MED AMBULATORY ONCOLOGY PHYSICIAN RPT

## (undated) DEVICE — ADHESIVE SKIN CLSR 0.7ML TOP DERMBND ADV

## (undated) DEVICE — AGENT HEMSTAT 5GM ARISTA AH

## (undated) DEVICE — PACK PROCEDURE SURG GEN CUST

## (undated) DEVICE — CATHETER,SUCTION,18FR,WHISTL,STR PK,CAL: Brand: MEDLINE

## (undated) DEVICE — COVER HNDL LT DISP

## (undated) DEVICE — INTENDED FOR TISSUE SEPARATION, AND OTHER PROCEDURES THAT REQUIRE A SHARP SURGICAL BLADE TO PUNCTURE OR CUT.: Brand: BARD-PARKER ® STAINLESS STEEL BLADES

## (undated) DEVICE — DRAPE,LAP,CHOLE,W/TROUGHS,STERILE: Brand: MEDLINE

## (undated) DEVICE — ELECTRODE PT RET AD L9FT HI MOIST COND ADH HYDRGEL CORDED

## (undated) DEVICE — BINDER ABD SM M W12IN CIRC 30 45IN 4 PNL E CNTCT CLSR POSTOP

## (undated) DEVICE — BLADE ES ELASTOMERIC COAT INSUL DURABLE BEND UPTO 90DEG

## (undated) DEVICE — SYRINGE 20ML LL S/C 50

## (undated) DEVICE — LOOP VES W25MM THK1MM MAXI RED SIL FLD REPELLENT 100 PER

## (undated) DEVICE — SPONGE LAP W18XL18IN WHT COT 4 PLY FLD STRUNG RADPQ DISP ST

## (undated) DEVICE — PLUMEPORT LAPAROSCOPIC SMOKE FILTRATION DEVICE: Brand: PLUMEPORT ACTIV

## (undated) DEVICE — SET LAPAROSCOPIC HERNIA

## (undated) DEVICE — UNDERPANTS INCONT XL 45-70IN KNIT SEAMLESS DSGN COLOR-CODED

## (undated) DEVICE — SKIN PREP TRAY 4 COMPARTM TRAY: Brand: MEDLINE INDUSTRIES, INC.

## (undated) DEVICE — TROCAR: Brand: KII® SLEEVE

## (undated) DEVICE — TROCAR: Brand: KII FIOS FIRST ENTRY

## (undated) DEVICE — SPONGE,DRAIN,NONWVN,4"X4",6PLY,STRL,LF: Brand: MEDLINE

## (undated) DEVICE — BLUE RHINO G2-MULTI PERCUTANEOUS TRACHEOSTOMY INTRODUCER TRAY: Brand: BLUE RHINO

## (undated) DEVICE — CAMERA STRYKER 1488

## (undated) DEVICE — GRADUATE TRIANG MEASURE 1000ML BLK PRNT

## (undated) DEVICE — SPONGE,DISSECTOR,ROUND CHERRY,XR,ST,5/PK: Brand: MEDLINE

## (undated) DEVICE — SET SURG INSTR MINI VASC

## (undated) DEVICE — APPLICATOR PREP 26ML 0.7% IOD POVACRYLEX 74% ISO ALC ST

## (undated) DEVICE — AEGIS 1" DISK 4MM HOLE, PEEL OPEN: Brand: MEDLINE

## (undated) DEVICE — COUNTER NDL 30 COUNT DBL MAG

## (undated) DEVICE — CONTROL SYRINGE LUER-LOCK TIP: Brand: MONOJECT

## (undated) DEVICE — SYRINGE MED 50ML LUERLOCK TIP

## (undated) DEVICE — 4-PORT MANIFOLD: Brand: NEPTUNE 2

## (undated) DEVICE — INSTRUMENT CLAMP TOWEL LARGE REUSABLE

## (undated) DEVICE — DOUBLE BASIN SET: Brand: MEDLINE INDUSTRIES, INC.

## (undated) DEVICE — ELECTRODE ELECSURG NDL 2.8 INX7.2 CM COAT INSUL EDGE

## (undated) DEVICE — SET HEMORRHOID

## (undated) DEVICE — SOLUTION IV IRRIG POUR BRL 0.9% SODIUM CHL 2F7124

## (undated) DEVICE — SPONGE GZ W4XL4IN RAYON POLY FILL CVR W/ NONWOVEN FAB

## (undated) DEVICE — TOWEL,OR,DSP,ST,BLUE,STD,6/PK,12PK/CS: Brand: MEDLINE

## (undated) DEVICE — GOWN,SIRUS,FABRNF,XL,20/CS: Brand: MEDLINE

## (undated) DEVICE — TAPE,WATERPROOF,CURAD,2"X10YD,LF,72/CS: Brand: CURAD

## (undated) DEVICE — DRESSING FOAM W3XL3IN POLYUR HYDRPHLC N ADH FEN PD OPTIFOAM

## (undated) DEVICE — MAGNETIC INSTR DRAPE 20X16: Brand: MEDLINE INDUSTRIES, INC.

## (undated) DEVICE — INSUFFLATION NEEDLE TO ESTABLISH PNEUMOPERITONEUM.: Brand: INSUFFLATION NEEDLE

## (undated) DEVICE — SWABSTICK MEDICATED L4IN BENZ TINC SKIN PREP APLICARE

## (undated) DEVICE — WARMER SCP 2 ANTIFOG LAP DISP

## (undated) DEVICE — SET INSTRUMENT LAP I

## (undated) DEVICE — SET INSTR TRACH

## (undated) DEVICE — GLOVE ORANGE PI 7 1/2   MSG9075

## (undated) DEVICE — BANDAGE,GAUZE,BULKEE II,4.5"X4.1YD,STRL: Brand: MEDLINE

## (undated) DEVICE — 3M™ MICROPORE™ TAPE, 1530-2: Brand: 3M™ MICROPORE™

## (undated) DEVICE — TUBE TRACH AD SZ 8 L79MM OD122MM ID85MM DK BLU PVC CUF CANN

## (undated) DEVICE — TRAP SPEC MUCUS FOR SUCT

## (undated) DEVICE — TRAY 30DEG 5MM LAPAROSCOPIC LENS REUSABLE

## (undated) DEVICE — INSUFFLATION TUBING SET WITH FILTER, FUNNEL CONNECTOR AND LUER LOCK: Brand: JOSNOE MEDICAL INC

## (undated) DEVICE — TOTAL TRAY, DB, 100% SILI FOLEY, 16FR 10: Brand: MEDLINE

## (undated) DEVICE — CATHETER URETH 14FR L16IN RED RUB RND HLLW TIP W/ TWO EYE

## (undated) DEVICE — NEEDLE HYPO 25GA L1.5IN BLU POLYPR HUB S STL REG BVL STR

## (undated) DEVICE — PAD,ABDOMINAL,5"X9",ST,LF,25/BX: Brand: MEDLINE INDUSTRIES, INC.